# Patient Record
Sex: FEMALE | Race: BLACK OR AFRICAN AMERICAN | NOT HISPANIC OR LATINO | Employment: OTHER | URBAN - METROPOLITAN AREA
[De-identification: names, ages, dates, MRNs, and addresses within clinical notes are randomized per-mention and may not be internally consistent; named-entity substitution may affect disease eponyms.]

---

## 2018-12-05 ENCOUNTER — TELEPHONE (OUTPATIENT)
Dept: HEMATOLOGY/ONCOLOGY | Facility: CLINIC | Age: 63
End: 2018-12-05

## 2018-12-05 NOTE — TELEPHONE ENCOUNTER
Pt wants daughter to accompany her to consult.  She will call me to schedule tomorrow.  Answered her questions about consult visit & transplant planning.    ----- Message from Leila Canela sent at 12/5/2018  2:05 PM CST -----  Regarding: RE: transplant benefit check    Yes patient has medicare and medicaid    ----- Message -----  From: Nanci Overton RN  Sent: 12/5/2018   1:55 PM  To: Leila Canela  Subject: transplant benefit check                         Please advise coverage.  Her medicare & medicaid cards in media.    Thanks,  Nanci

## 2018-12-05 NOTE — LETTER
Fax Transmission                                                                                                                                                       Date: 2018    - - - Slide Request - - -   To:      [facility] From:   Blood & Marrow Transplant                   Navigator - Nanci       Fax:          [phone] Fax:          [phone]   Phone:     [phone] Phone:      [phone]     Patient:   SUKHDEV RYANO.B.  1955               Specimen ID:  [id number]   Date Collected:  2018           Please send slides to:     [facility]     Leukemia, Blood & Marrow Transplant Program     3rd Floor     [facility]     Attn:  Transplant Coordinator     [address] [place] [address]  [id number]     [employer] Acct code:  [id number]                               Thank you        If there are any problems with this transmission, please call immediately. Thank you.    Confidentiality notice: The accompanying facsimile is intended solely for the use of the recipient designated above. Document(s) transmitted herewith may contain information that is confidential and privileged. Delivery, distribution or dissemination of this communication other than to the intended recipient is strictly prohibited. If you have received this facsimile in error, please notify [facility]'s Corporate Integrity Department immediately by telephone at [phone].

## 2018-12-14 DIAGNOSIS — C90.00 MULTIPLE MYELOMA, REMISSION STATUS UNSPECIFIED: Primary | ICD-10-CM

## 2018-12-14 DIAGNOSIS — Z76.82 STEM CELL TRANSPLANT CANDIDATE: ICD-10-CM

## 2018-12-14 PROCEDURE — 88323 CONSLTJ&REPRT MATRL PREP SLD: CPT | Performed by: PATHOLOGY

## 2018-12-14 PROCEDURE — 88323 CONSLTJ&REPRT MATRL PREP SLD: CPT | Mod: 26,,, | Performed by: PATHOLOGY

## 2018-12-17 ENCOUNTER — EDUCATION (OUTPATIENT)
Dept: HEMATOLOGY/ONCOLOGY | Facility: CLINIC | Age: 63
End: 2018-12-17

## 2018-12-17 ENCOUNTER — INITIAL CONSULT (OUTPATIENT)
Dept: HEMATOLOGY/ONCOLOGY | Facility: CLINIC | Age: 63
End: 2018-12-17
Payer: MEDICARE

## 2018-12-17 VITALS
TEMPERATURE: 98 F | SYSTOLIC BLOOD PRESSURE: 153 MMHG | RESPIRATION RATE: 18 BRPM | OXYGEN SATURATION: 98 % | HEART RATE: 80 BPM | BODY MASS INDEX: 34.52 KG/M2 | DIASTOLIC BLOOD PRESSURE: 72 MMHG | WEIGHT: 202.19 LBS | HEIGHT: 64 IN

## 2018-12-17 DIAGNOSIS — C90.00 MULTIPLE MYELOMA, REMISSION STATUS UNSPECIFIED: Primary | ICD-10-CM

## 2018-12-17 DIAGNOSIS — Z11.4 ENCOUNTER FOR SCREENING FOR HIV: ICD-10-CM

## 2018-12-17 PROCEDURE — 99999 PR PBB SHADOW E&M-EST. PATIENT-LVL IV: CPT | Mod: PBBFAC,,, | Performed by: INTERNAL MEDICINE

## 2018-12-17 PROCEDURE — 99205 OFFICE O/P NEW HI 60 MIN: CPT | Mod: S$PBB,,, | Performed by: INTERNAL MEDICINE

## 2018-12-17 PROCEDURE — 99214 OFFICE O/P EST MOD 30 MIN: CPT | Mod: PBBFAC | Performed by: INTERNAL MEDICINE

## 2018-12-17 RX ORDER — LENALIDOMIDE 25 MG/1
CAPSULE ORAL
Refills: 0 | COMMUNITY
Start: 2018-11-26 | End: 2019-02-25

## 2018-12-17 RX ORDER — DEXAMETHASONE 4 MG/1
40 TABLET ORAL
Refills: 4 | COMMUNITY
Start: 2018-11-19 | End: 2019-03-18

## 2018-12-17 RX ORDER — TRAMADOL HYDROCHLORIDE 50 MG/1
TABLET ORAL
Refills: 2 | COMMUNITY
Start: 2018-10-23 | End: 2019-02-19

## 2018-12-17 RX ORDER — BROMFENAC SODIUM 0.7 MG/ML
SOLUTION/ DROPS OPHTHALMIC
Refills: 2 | COMMUNITY
Start: 2018-10-10 | End: 2019-02-19

## 2018-12-17 RX ORDER — ONDANSETRON 8 MG/1
8 TABLET, ORALLY DISINTEGRATING ORAL EVERY 8 HOURS PRN
Refills: 6 | COMMUNITY
Start: 2018-10-02

## 2018-12-17 RX ORDER — OFLOXACIN 3 MG/ML
SOLUTION/ DROPS OPHTHALMIC
Refills: 0 | COMMUNITY
Start: 2018-11-04 | End: 2019-02-19

## 2018-12-17 RX ORDER — SITAGLIPTIN AND METFORMIN HYDROCHLORIDE 500; 50 MG/1; MG/1
TABLET, FILM COATED ORAL
Refills: 5 | COMMUNITY
Start: 2018-11-26

## 2018-12-17 RX ORDER — LOSARTAN POTASSIUM AND HYDROCHLOROTHIAZIDE 12.5; 1 MG/1; MG/1
TABLET ORAL
Refills: 2 | COMMUNITY
Start: 2018-09-21 | End: 2021-07-02

## 2018-12-17 RX ORDER — MONTELUKAST SODIUM 10 MG/1
10 TABLET ORAL DAILY
Refills: 0 | Status: ON HOLD | COMMUNITY
Start: 2018-12-03 | End: 2019-03-14

## 2018-12-17 RX ORDER — ASPIRIN 81 MG/1
TABLET ORAL
Refills: 2 | COMMUNITY
Start: 2018-11-07 | End: 2019-03-18

## 2018-12-17 RX ORDER — GLIPIZIDE 10 MG/1
TABLET ORAL
Refills: 2 | COMMUNITY
Start: 2018-11-25

## 2018-12-17 RX ORDER — PREDNISOLONE ACETATE 10 MG/ML
SUSPENSION/ DROPS OPHTHALMIC
Refills: 1 | COMMUNITY
Start: 2018-11-01 | End: 2019-02-19

## 2018-12-17 RX ORDER — ACYCLOVIR 400 MG/1
400 TABLET ORAL 2 TIMES DAILY
Refills: 10 | Status: ON HOLD | COMMUNITY
Start: 2018-12-03 | End: 2019-04-01 | Stop reason: HOSPADM

## 2018-12-17 RX ORDER — PROCHLORPERAZINE MALEATE 10 MG
10 TABLET ORAL 4 TIMES DAILY PRN
Refills: 6 | COMMUNITY
Start: 2018-10-02

## 2018-12-17 NOTE — PROGRESS NOTES
Met with patient and daughter, michelle Singh for an informational introductory educational session given on autlogous transplant.  Discussed pre transplant evaluation, mobilization, collection and admission to the hospital.  Written materials supplied.  Patient and caregiver given the opportunity to ask questions.  They watched a short film on autologous stem cell transplant.  The patient states she has an appt on Dec 27th at [facility] for a dental check up and extractions.  Patient states she had a mammogram about one year ago at [facility] and her colonoscopy within the last 5 years at [facility].  I will obtain those reports.  Patient and caregiver understand the need for a caregiver to be present with the patient once discharged from home for 24/7 for up to 30 days post transplant.      We discussed a tentative calendar assuming her schedule will be in the next few weeks.  Her restaging will take place approx. The first week of   Feb.  She will finish up her treatment with Dr. Cantrell at [facility].  Both patient and her daughter were given the opportunity to ask questions.  Both stated their questions were answered to their satisfaction.   MANUELA Beckham RN, BMTCN

## 2018-12-17 NOTE — LETTER
December 18, 2018      Aydin Juan MD  [address] [facility] [address]           [facility]  [address] [facility] [address]  Phone: [phone]          Patient: Lori Rivero   MR Number: [id number]   YOB: 1955   Date of Visit: 12/17/2018       Dear Dr. Aydin Juan:    Thank you for referring Lori Rivero to me for evaluation. Attached you will find relevant portions of my assessment and plan of care.    If you have questions, please do not hesitate to call me. I look forward to following Lori Rivero along with you.    Sincerely,    Saúl Alva MD    Enclosure  CC:  No Recipients    If you would like to receive this communication electronically, please contact [email or web address] or [phone] to request more information on [employer] Link access.    For providers and/or their staff who would like to refer a patient to [facility], please contact us through our one-stop-shop provider referral line, [facility] , at [phone].    If you feel you have received this communication in error or would no longer like to receive these types of communications, please e-mail [email or web address]

## 2018-12-17 NOTE — Clinical Note
cbc, cmp, serum free light chains, quantitative immunoglobulins, serum electropheresis, serum immunofixation and MD appt in 8 weeks

## 2018-12-17 NOTE — PROGRESS NOTES
SECTION OF HEMATOLOGY AND BONE MARROW TRANSPLANT  New Patient Visit   12/18/2018  Referred by:  Dr. Aydin Juan  Referred for:myeloma, SCT eval     CHIEF COMPLAINT: No chief complaint on file.      HISTORY OF PRESENT ILLNESS:   63 y.o. female with pmh as below referred for SCT evaluation for MM; with regard to oncologic history; IgG kappa MM; ISS 2; standard risk cytogenetics; with lytic bone lesions at presentation; initiate VRd (21 day cycle) with Dr. Juan at [facility]; currently end of cycle 2 with already significant reduction in m-protein.  She has been tolerating therapy well with only mild grade 1 neuropathy in fingertips.  She comes to clinic with daughter.  States she can walk 2-3 city blocks 2 flights of stairs with no issues.  Denies fever, chills, nightsweats, bleeding, brusing, lymphadenopathy, signs/symptoms of splenomegaly.     PAST MEDICAL HISTORY:   Past Medical History:   Diagnosis Date    DM2 (diabetes mellitus, type 2)     Glaucoma     HTN (hypertension)     Myeloma        PAST SURGICAL HISTORY:   History reviewed. No pertinent surgical history.    PAST SOCIAL HISTORY:   reports that  has never smoked. she has never used smokeless tobacco. She reports that she does not drink alcohol or use drugs.    FAMILY HISTORY:  History reviewed. No pertinent family history.    CURRENT MEDICATIONS:   Current Outpatient Medications   Medication Sig    acyclovir (ZOVIRAX) 400 MG tablet     aspirin (ECOTRIN) 81 MG EC tablet TK 1 T PO D    dexamethasone (DECADRON) 4 MG Tab     glipiZIDE (GLUCOTROL) 10 MG tablet TK 1 T PO D    JANUMET  mg per tablet TK 1 T PO BID WITH MEALS    losartan-hydrochlorothiazide 100-12.5 mg (HYZAAR) 100-12.5 mg Tab TK 1 T PO QD    montelukast (SINGULAIR) 10 mg tablet Take 10 mg by mouth once daily.    ofloxacin (OCUFLOX) 0.3 % ophthalmic solution INT 1 GTT INTO OS QID    ondansetron (ZOFRAN-ODT) 8 MG TbDL     PAZEO 0.7 % Drop Place 1 drop into both eyes every  morning.    prednisoLONE acetate (PRED FORTE) 1 % DrpS INT 1 GTT INTO GTT INTO OS QID    prochlorperazine (COMPAZINE) 10 MG tablet     PROLENSA 0.07 % Drop INT 1 GTT INTO OS QD    REVLIMID 25 mg Cap TK 1 C PO ON DAYS 1 TO 14 AND OFF FOR 7 DAYS FOR A 21 DAY CYCLE    traMADol (ULTRAM) 50 mg tablet TK 1 T PO Q 8 H PRN P     No current facility-administered medications for this visit.      ALLERGIES:   Review of patient's allergies indicates:  No Known Allergies          REVIEW OF SYSTEMS:   General ROS: negative  Psychological ROS: negative  Ophthalmic ROS: negative  ENT ROS: negative  Allergy and Immunology ROS: negative  Hematological and Lymphatic ROS: negative  Endocrine ROS: negative  Respiratory ROS: negative  Cardiovascular ROS: negative  Gastrointestinal ROS: negative  Genito-Urinary ROS: negative  Musculoskeletal ROS: negative  Neurological ROS: see HPI  Dermatological ROS: negative    PHYSICAL EXAM:   Vitals:    12/17/18 1327   BP: (!) 153/72   Pulse: 80   Resp: 18   Temp: 98.2 °F (36.8 °C)       General - well developed, well nourished, no apparent distress  Head & Face - no sinus tenderness  Eyes - normal conjunctivae and lids   ENT - normal external auditory canals and tympanic membranes bilaterally oropharynx clear,  Normal dentition and gums  Neck - normal thyroid  Chest and Lung - normal respiratory effort, clear to auscultation bilaterally   Cardiovascular - RRR with no MGR, normal S1 and S2; no pedal edema  Abdomen -  soft, nontender, no palpable hepatomegaly or splenomegaly  Lymph - no palpable lymphadenopathy  Extremities - unremarkable nails and digits  Heme - no bruising, petechiae, pallor  Skin - no rashes or lesions  Psych - appropriate mood and affect      ECOG Performance Status: (foot note - ECOG PS provided by [employer]) 1 - Symptomatic but completely ambulatory    Karnofsky Performance Score:  80%- Normal Activity with Effort: Some Symptoms of Disease  DATA:    See epic media  Patient left prior to having labs drawn today     ASSESSMENT AND PLAN:   Encounter Diagnoses   Name Primary?    Multiple myeloma, remission status unspecified Yes    Encounter for screening for HIV       -IgG kappa MM; ISS 2; standard risk cytogenetics; with lytic bone lesions at presentation; initiate VRd (21 day cycle) with Dr. Juan at [facility]; currently end of cycle 2 with already significant reduction in m-protein   -appears to be reasonable SCT candidate  -spent 60 minutes on this case, half of which was spent face to face with patient discussion rationale, alternatives, risks of central line placement,  Mobilization/colleection, melphalan autoSCT; discussed estimated 1% risk of transplant related mortality  -she is interested in pursuing transplant  -plan to restage her formally (PET, biochemical studies, serum/urine studies) after cycle 4 VRd and if IMWG [place] or better proceed with pretransplant evaluation   -has minimal well controlled comorbiidites and PS 1  -have communicated with Dr. Juan at [facility] who will continue myeloma therapy and supportive medications       Follow Up: cbc, cmp, serum free light chains, quantitative immunoglobulins, serum electropheresis, serum immunofixation and MD appt in 8 weeks   Darrius Alva MD  Hematology/Oncology/Bone Marrow Transplant

## 2019-01-14 ENCOUNTER — TELEPHONE (OUTPATIENT)
Dept: HEMATOLOGY/ONCOLOGY | Facility: CLINIC | Age: 64
End: 2019-01-14

## 2019-01-14 DIAGNOSIS — C90.00 MULTIPLE MYELOMA, REMISSION STATUS UNSPECIFIED: Primary | ICD-10-CM

## 2019-01-14 DIAGNOSIS — Z76.82 STEM CELL TRANSPLANT CANDIDATE: ICD-10-CM

## 2019-01-28 ENCOUNTER — TELEPHONE (OUTPATIENT)
Dept: HEMATOLOGY/ONCOLOGY | Facility: CLINIC | Age: 64
End: 2019-01-28

## 2019-01-28 NOTE — TELEPHONE ENCOUNTER
Left message for patient to call me regarding her upcoming staging appts on 2/7/19 and to see if she has completed her treatments at [facility].  Earlier this month she stated she had some things she wanted to take care of before starting with transplant eval.  I will also discuss that with her.  I could not leave a message at her cell number.  Message was left at home number along with my contact info.  MANUELA Beckham RN, BMTCN

## 2019-01-31 DIAGNOSIS — R06.02 SHORTNESS OF BREATH ON EXERTION: ICD-10-CM

## 2019-01-31 DIAGNOSIS — R06.02 SOB (SHORTNESS OF BREATH) ON EXERTION: ICD-10-CM

## 2019-01-31 DIAGNOSIS — Z01.818 ENCOUNTER FOR PRE-TRANSPLANT EVALUATION FOR STEM CELL TRANSPLANT: ICD-10-CM

## 2019-01-31 DIAGNOSIS — Z76.82 STEM CELL TRANSPLANT CANDIDATE: ICD-10-CM

## 2019-01-31 DIAGNOSIS — C90.00 MULTIPLE MYELOMA: Primary | ICD-10-CM

## 2019-01-31 DIAGNOSIS — D69.6 THROMBOCYTOPENIA: ICD-10-CM

## 2019-02-01 ENCOUNTER — TELEPHONE (OUTPATIENT)
Dept: HEMATOLOGY/ONCOLOGY | Facility: CLINIC | Age: 64
End: 2019-02-01

## 2019-02-01 NOTE — TELEPHONE ENCOUNTER
Phone call to patient and her daughter. (on conference call).  Notified her of her upcoming appts on 2/7/19.  Patient and daughter were given the opportunity to ask questions.  They verbalized understanding and stated all of their questions had been answered to their satisfaction.  MANUELA Beckham RN, BMTCN

## 2019-02-06 NOTE — H&P
[facility]  Hematology  Bone Marrow Transplant  H&P    Subjective:     Principal Problem: Multiple Myeloma pre-transplant staging    HPI: 63 y.o. female with pmh as below referred for SCT evaluation for MM; with regard to oncologic history; IgG kappa MM; ISS 2; standard risk cytogenetics; with lytic bone lesions at presentation; initiate VRd (21 day cycle) with Dr. Juan at [facility]; currently end of cycle 2 with already significant reduction in m-protein.  She has been tolerating therapy well with only mild grade 1 neuropathy in fingertips.  She comes to clinic with daughter.  States she can walk 2-3 city blocks 2 flights of stairs with no issues.  Denies fever, chills, nightsweats, bleeding, brusing, lymphadenopathy, signs/symptoms of splenomegaly.     Patient information was obtained from patient, past medical records and ER records.     Oncology History: see HPI     No medications prior to admission.       Patient has no known allergies.     Past Medical History:   Diagnosis Date    DM2 (diabetes mellitus, type 2)     Glaucoma     HTN (hypertension)     Myeloma      No past surgical history on file.  Family History     None        Tobacco Use    Smoking status: Never Smoker    Smokeless tobacco: Never Used   Substance and Sexual Activity    Alcohol use: No     Frequency: Never    Drug use: No    Sexual activity: Not on file       Review of Systems   Constitutional: Negative.    HENT: Negative.    Eyes: Negative.    Respiratory: Negative.    Cardiovascular: Negative.    Gastrointestinal: Negative.    Endocrine: Negative.    Genitourinary: Negative.    Musculoskeletal:        Sciatica   Skin: Negative.    Allergic/Immunologic: Negative.    Neurological:        Sciatica to ble   Hematological: Negative.    Psychiatric/Behavioral: Negative.      Objective:     Vital Signs (Most Recent):    Vital Signs (24h Range):  BP: ()/()   Arterial Line BP: ()/()    General - well developed, well  nourished, no apparent distress  Head & Face - no sinus tenderness  Eyes - normal conjunctivae and lids   ENT - normal external auditory canals and tympanic membranes bilaterally oropharynx clear,  Normal dentition and gums  Neck - normal thyroid  Chest and Lung - normal respiratory effort, clear to auscultation bilaterally   Cardiovascular - RRR with no MGR, normal S1 and S2; no pedal edema  Abdomen -  soft, nontender, no palpable hepatomegaly or splenomegaly  Lymph - no palpable lymphadenopathy  Extremities - unremarkable nails and digits  Heme - no bruising, petechiae, pallor  Skin - no rashes or lesions  Psych - appropriate mood and affect         There is no height or weight on file to calculate BMI.  There is no height or weight on file to calculate BSA.      Assessment/Plan:     Okay to proceed with planned BMBx.    VTE Risk Mitigation (From admission, onward)    None          Disposition: Discharge home per anesthesia.    Maria Guadalupe Poole, NP  Bone Marrow Transplant  Hematology  [facility]

## 2019-02-07 ENCOUNTER — HOSPITAL ENCOUNTER (OUTPATIENT)
Dept: RADIOLOGY | Facility: HOSPITAL | Age: 64
Discharge: HOME | End: 2019-02-07
Attending: INTERNAL MEDICINE
Payer: MEDICARE

## 2019-02-07 ENCOUNTER — HOSPITAL ENCOUNTER (OUTPATIENT)
Facility: HOSPITAL | Age: 64
Discharge: HOME | End: 2019-02-07
Attending: INTERNAL MEDICINE | Admitting: INTERNAL MEDICINE
Payer: MEDICARE

## 2019-02-07 ENCOUNTER — ANESTHESIA EVENT (OUTPATIENT)
Dept: SURGERY | Facility: HOSPITAL | Age: 64
End: 2019-02-07
Payer: MEDICARE

## 2019-02-07 ENCOUNTER — OFFICE VISIT (OUTPATIENT)
Dept: PSYCHIATRY | Facility: CLINIC | Age: 64
End: 2019-02-07
Payer: MEDICARE

## 2019-02-07 ENCOUNTER — ANESTHESIA (OUTPATIENT)
Dept: SURGERY | Facility: HOSPITAL | Age: 64
End: 2019-02-07
Payer: MEDICARE

## 2019-02-07 VITALS
WEIGHT: 198 LBS | HEART RATE: 68 BPM | BODY MASS INDEX: 33.8 KG/M2 | TEMPERATURE: 99 F | DIASTOLIC BLOOD PRESSURE: 72 MMHG | OXYGEN SATURATION: 100 % | HEIGHT: 64 IN | SYSTOLIC BLOOD PRESSURE: 167 MMHG | RESPIRATION RATE: 18 BRPM

## 2019-02-07 DIAGNOSIS — C90.00 MULTIPLE MYELOMA, REMISSION STATUS UNSPECIFIED: ICD-10-CM

## 2019-02-07 DIAGNOSIS — Z76.82 STEM CELL TRANSPLANT CANDIDATE: ICD-10-CM

## 2019-02-07 DIAGNOSIS — C90.00 MULTIPLE MYELOMA, REMISSION STATUS UNSPECIFIED: Primary | ICD-10-CM

## 2019-02-07 DIAGNOSIS — G47.01 INSOMNIA DUE TO MEDICAL CONDITION: ICD-10-CM

## 2019-02-07 DIAGNOSIS — Z01.818 PRE-TRANSPLANT EVALUATION FOR STEM CELL TRANSPLANT: Primary | ICD-10-CM

## 2019-02-07 LAB
BASOPHILS # BLD AUTO: 0.02 K/UL
BASOPHILS NFR BLD: 0.4 %
BONE MARROW WRIGHT STAIN COMMENT: NORMAL
DIFFERENTIAL METHOD: ABNORMAL
EOSINOPHIL # BLD AUTO: 0.3 K/UL
EOSINOPHIL NFR BLD: 5.3 %
ERYTHROCYTE [DISTWIDTH] IN BLOOD BY AUTOMATED COUNT: 17.2 %
HCT VFR BLD AUTO: 31.6 %
HGB BLD-MCNC: 9.5 G/DL
IMM GRANULOCYTES # BLD AUTO: 0.01 K/UL
IMM GRANULOCYTES NFR BLD AUTO: 0.2 %
LYMPHOCYTES # BLD AUTO: 1.3 K/UL
LYMPHOCYTES NFR BLD: 23.4 %
MCH RBC QN AUTO: 25.3 PG
MCHC RBC AUTO-ENTMCNC: 30.1 G/DL
MCV RBC AUTO: 84 FL
MONOCYTES # BLD AUTO: 0.4 K/UL
MONOCYTES NFR BLD: 7.5 %
NEUTROPHILS # BLD AUTO: 3.5 K/UL
NEUTROPHILS NFR BLD: 63.2 %
NRBC BLD-RTO: 0 /100 WBC
PLATELET # BLD AUTO: 137 K/UL
PMV BLD AUTO: 13 FL
POCT GLUCOSE: 123 MG/DL (ref 70–110)
POCT GLUCOSE: 144 MG/DL (ref 70–110)
POCT GLUCOSE: 97 MG/DL (ref 70–110)
RBC # BLD AUTO: 3.76 M/UL
WBC # BLD AUTO: 5.48 K/UL

## 2019-02-07 PROCEDURE — 88311 TISSUE SPECIMEN TO PATHOLOGY, BONE MARROW ASPIRATION/BIOPSY PROCEDURE: ICD-10-PCS | Mod: 26,,, | Performed by: PATHOLOGY

## 2019-02-07 PROCEDURE — 88237 TISSUE CULTURE BONE MARROW: CPT

## 2019-02-07 PROCEDURE — D9220A PRA ANESTHESIA: ICD-10-PCS | Mod: ANES,,, | Performed by: ANESTHESIOLOGY

## 2019-02-07 PROCEDURE — 88189 PR  FLOWCYTOMETRY/READ, 16 & > MARKERS: ICD-10-PCS | Mod: ,,, | Performed by: PATHOLOGY

## 2019-02-07 PROCEDURE — 88342 IMHCHEM/IMCYTCHM 1ST ANTB: CPT | Mod: 26,59,, | Performed by: PATHOLOGY

## 2019-02-07 PROCEDURE — 88313 TISSUE SPECIMEN TO PATHOLOGY, BONE MARROW ASPIRATION/BIOPSY PROCEDURE: ICD-10-PCS | Mod: 26,,, | Performed by: PATHOLOGY

## 2019-02-07 PROCEDURE — 36000705 HC OR TIME LEV I EA ADD 15 MIN: Performed by: INTERNAL MEDICINE

## 2019-02-07 PROCEDURE — 99999 PR PBB SHADOW E&M-EST. PATIENT-LVL II: ICD-10-PCS | Mod: PBBFAC,,, | Performed by: PSYCHOLOGIST

## 2019-02-07 PROCEDURE — 78815 PET IMAGE W/CT SKULL-THIGH: CPT | Mod: TC

## 2019-02-07 PROCEDURE — 71000015 HC POSTOP RECOV 1ST HR: Performed by: INTERNAL MEDICINE

## 2019-02-07 PROCEDURE — A9552 F18 FDG: HCPCS

## 2019-02-07 PROCEDURE — 99212 OFFICE O/P EST SF 10 MIN: CPT | Mod: PBBFAC,25 | Performed by: PSYCHOLOGIST

## 2019-02-07 PROCEDURE — 90791 PR PSYCHIATRIC DIAGNOSTIC EVALUATION: ICD-10-PCS | Mod: S$PBB,,, | Performed by: PSYCHOLOGIST

## 2019-02-07 PROCEDURE — 36000704 HC OR TIME LEV I 1ST 15 MIN: Performed by: INTERNAL MEDICINE

## 2019-02-07 PROCEDURE — 85097 TISSUE SPECIMEN TO PATHOLOGY, BONE MARROW ASPIRATION/BIOPSY PROCEDURE: ICD-10-PCS | Mod: ,,, | Performed by: PATHOLOGY

## 2019-02-07 PROCEDURE — D9220A PRA ANESTHESIA: ICD-10-PCS | Mod: CRNA,,, | Performed by: NURSE ANESTHETIST, CERTIFIED REGISTERED

## 2019-02-07 PROCEDURE — 88305 TISSUE SPECIMEN TO PATHOLOGY, BONE MARROW ASPIRATION/BIOPSY PROCEDURE: ICD-10-PCS | Mod: 26,,, | Performed by: PATHOLOGY

## 2019-02-07 PROCEDURE — 25000003 PHARM REV CODE 250: Performed by: ANESTHESIOLOGY

## 2019-02-07 PROCEDURE — 71000044 HC DOSC ROUTINE RECOVERY FIRST HOUR: Performed by: INTERNAL MEDICINE

## 2019-02-07 PROCEDURE — D9220A PRA ANESTHESIA: Mod: ANES,,, | Performed by: ANESTHESIOLOGY

## 2019-02-07 PROCEDURE — 88189 FLOWCYTOMETRY/READ 16 & >: CPT | Mod: ,,, | Performed by: PATHOLOGY

## 2019-02-07 PROCEDURE — 88264 CHROMOSOME ANALYSIS 20-25: CPT

## 2019-02-07 PROCEDURE — 88184 FLOWCYTOMETRY/ TC 1 MARKER: CPT | Performed by: PATHOLOGY

## 2019-02-07 PROCEDURE — 38222 PR BONE MARROW BIOPSY(IES) W/ASPIRATION(S); DIAGNOSTIC: ICD-10-PCS | Mod: LT,,, | Performed by: NURSE PRACTITIONER

## 2019-02-07 PROCEDURE — 82962 GLUCOSE BLOOD TEST: CPT | Performed by: INTERNAL MEDICINE

## 2019-02-07 PROCEDURE — 78815 NM PET CT ROUTINE: ICD-10-PCS | Mod: 26,PI,, | Performed by: RADIOLOGY

## 2019-02-07 PROCEDURE — 38222 DX BONE MARROW BX & ASPIR: CPT | Mod: LT,,, | Performed by: NURSE PRACTITIONER

## 2019-02-07 PROCEDURE — 78815 PET IMAGE W/CT SKULL-THIGH: CPT | Mod: 26,PI,, | Performed by: RADIOLOGY

## 2019-02-07 PROCEDURE — 63600175 PHARM REV CODE 636 W HCPCS: Performed by: NURSE ANESTHETIST, CERTIFIED REGISTERED

## 2019-02-07 PROCEDURE — 37000009 HC ANESTHESIA EA ADD 15 MINS: Performed by: INTERNAL MEDICINE

## 2019-02-07 PROCEDURE — 90791 PSYCH DIAGNOSTIC EVALUATION: CPT | Mod: S$PBB,,, | Performed by: PSYCHOLOGIST

## 2019-02-07 PROCEDURE — 88271 CYTOGENETICS DNA PROBE: CPT | Mod: 59

## 2019-02-07 PROCEDURE — 37000008 HC ANESTHESIA 1ST 15 MINUTES: Performed by: INTERNAL MEDICINE

## 2019-02-07 PROCEDURE — 88341 TISSUE SPECIMEN TO PATHOLOGY, BONE MARROW ASPIRATION/BIOPSY PROCEDURE: ICD-10-PCS | Mod: 26,,, | Performed by: PATHOLOGY

## 2019-02-07 PROCEDURE — 88311 DECALCIFY TISSUE: CPT | Mod: 26,,, | Performed by: PATHOLOGY

## 2019-02-07 PROCEDURE — D9220A PRA ANESTHESIA: Mod: CRNA,,, | Performed by: NURSE ANESTHETIST, CERTIFIED REGISTERED

## 2019-02-07 PROCEDURE — 88313 SPECIAL STAINS GROUP 2: CPT

## 2019-02-07 PROCEDURE — 90791 PSYCH DIAGNOSTIC EVALUATION: CPT | Mod: PBBFAC | Performed by: PSYCHOLOGIST

## 2019-02-07 PROCEDURE — 88185 FLOWCYTOMETRY/TC ADD-ON: CPT | Mod: 59 | Performed by: PATHOLOGY

## 2019-02-07 PROCEDURE — 25000003 PHARM REV CODE 250: Performed by: INTERNAL MEDICINE

## 2019-02-07 PROCEDURE — 85097 BONE MARROW INTERPRETATION: CPT | Mod: ,,, | Performed by: PATHOLOGY

## 2019-02-07 PROCEDURE — 88313 SPECIAL STAINS GROUP 2: CPT | Mod: 26,,, | Performed by: PATHOLOGY

## 2019-02-07 PROCEDURE — 99999 PR PBB SHADOW E&M-EST. PATIENT-LVL II: CPT | Mod: PBBFAC,,, | Performed by: PSYCHOLOGIST

## 2019-02-07 PROCEDURE — 88341 IMHCHEM/IMCYTCHM EA ADD ANTB: CPT | Mod: 26,,, | Performed by: PATHOLOGY

## 2019-02-07 PROCEDURE — 88305 TISSUE EXAM BY PATHOLOGIST: CPT | Mod: 59 | Performed by: PATHOLOGY

## 2019-02-07 PROCEDURE — 85025 COMPLETE CBC W/AUTO DIFF WBC: CPT

## 2019-02-07 PROCEDURE — 88342 TISSUE SPECIMEN TO PATHOLOGY, BONE MARROW ASPIRATION/BIOPSY PROCEDURE: ICD-10-PCS | Mod: 26,59,, | Performed by: PATHOLOGY

## 2019-02-07 RX ORDER — PROPOFOL 10 MG/ML
VIAL (ML) INTRAVENOUS
Status: DISCONTINUED | OUTPATIENT
Start: 2019-02-07 | End: 2019-02-07

## 2019-02-07 RX ORDER — LIDOCAINE HCL/PF 100 MG/5ML
SYRINGE (ML) INTRAVENOUS
Status: DISCONTINUED | OUTPATIENT
Start: 2019-02-07 | End: 2019-02-07

## 2019-02-07 RX ORDER — ONDANSETRON 2 MG/ML
4 INJECTION INTRAMUSCULAR; INTRAVENOUS ONCE AS NEEDED
Status: DISCONTINUED | OUTPATIENT
Start: 2019-02-07 | End: 2019-02-07 | Stop reason: HOSPADM

## 2019-02-07 RX ORDER — SODIUM CHLORIDE 0.9 % (FLUSH) 0.9 %
3 SYRINGE (ML) INJECTION
Status: DISCONTINUED | OUTPATIENT
Start: 2019-02-07 | End: 2019-02-07 | Stop reason: HOSPADM

## 2019-02-07 RX ORDER — SODIUM CHLORIDE 9 MG/ML
1000 INJECTION, SOLUTION INTRAVENOUS CONTINUOUS
Status: DISCONTINUED | OUTPATIENT
Start: 2019-02-07 | End: 2019-02-07 | Stop reason: HOSPADM

## 2019-02-07 RX ORDER — PROPOFOL 10 MG/ML
VIAL (ML) INTRAVENOUS CONTINUOUS PRN
Status: DISCONTINUED | OUTPATIENT
Start: 2019-02-07 | End: 2019-02-07

## 2019-02-07 RX ORDER — FENTANYL CITRATE 50 UG/ML
25 INJECTION, SOLUTION INTRAMUSCULAR; INTRAVENOUS EVERY 5 MIN PRN
Status: DISCONTINUED | OUTPATIENT
Start: 2019-02-07 | End: 2019-02-07 | Stop reason: HOSPADM

## 2019-02-07 RX ORDER — LIDOCAINE HYDROCHLORIDE 10 MG/ML
INJECTION, SOLUTION EPIDURAL; INFILTRATION; INTRACAUDAL; PERINEURAL
Status: DISCONTINUED | OUTPATIENT
Start: 2019-02-07 | End: 2019-02-07 | Stop reason: HOSPADM

## 2019-02-07 RX ORDER — TIZANIDINE 4 MG/1
4 TABLET ORAL 3 TIMES DAILY PRN
COMMUNITY

## 2019-02-07 RX ORDER — LIDOCAINE HYDROCHLORIDE 10 MG/ML
1 INJECTION, SOLUTION EPIDURAL; INFILTRATION; INTRACAUDAL; PERINEURAL ONCE
Status: COMPLETED | OUTPATIENT
Start: 2019-02-07 | End: 2019-02-07

## 2019-02-07 RX ADMIN — LIDOCAINE HYDROCHLORIDE 80 MG: 20 INJECTION, SOLUTION INTRAVENOUS at 12:02

## 2019-02-07 RX ADMIN — LIDOCAINE HYDROCHLORIDE 10 MG: 10 INJECTION, SOLUTION EPIDURAL; INFILTRATION; INTRACAUDAL; PERINEURAL at 11:02

## 2019-02-07 RX ADMIN — SODIUM CHLORIDE 1000 ML: 0.9 INJECTION, SOLUTION INTRAVENOUS at 11:02

## 2019-02-07 RX ADMIN — PROPOFOL 200 MCG/KG/MIN: 10 INJECTION, EMULSION INTRAVENOUS at 12:02

## 2019-02-07 RX ADMIN — PROPOFOL 60 MG: 10 INJECTION, EMULSION INTRAVENOUS at 12:02

## 2019-02-07 NOTE — PLAN OF CARE
Patient awake and alert. No complaints of pain or nausea.  Left hip band-aid dry and intact. No signs of bleeding or hematoma.  Discharge instructions given and explained to patient and family.  Patient tolerated P.O. Liquids.  Patient dressed,in wheelchair and ready for discharge.

## 2019-02-07 NOTE — DISCHARGE SUMMARY
[facility]  Hematology  Bone Marrow Transplant  Discharge Summary      Patient Name: Lori Rivero  MRN: [id number]  Admission Date: 2/7/2019  Hospital Length of Stay: 0 days  Discharge Date and Time: 2/7/2019  1:10 PM  Attending Physician: Saúl Alva MD   Discharging Provider: Maria Guadalupe Poole NP  Primary Care Provider: Rachael Munoz MD    HPI: No notes on file    Procedure(s) (LRB):  Biopsy-bone marrow (Left)     Pending Diagnostic Studies:     Procedure Component Value Units Date/Time    Bone Marrow Prep and Stain [[id number]] Collected:  02/07/19 1151    Order Status:  Sent Lab Status:  In process Updated:  02/07/19 1151    Specimen:  Bone Marrow     CBC auto differential [[id number]] Collected:  02/07/19 1148    Order Status:  Sent Lab Status:  In process Updated:  02/07/19 1155    Specimen:  Blood     Chromosome Analysis, Bone Marrow [[id number]] Collected:  02/07/19 1151    Order Status:  Sent Lab Status:  In process Updated:  02/07/19 1152    Specimen:  Bone Marrow     Iron Stain, Bone Marrow [[id number]] Collected:  02/07/19 1151    Order Status:  Sent Lab Status:  In process Updated:  02/07/19 1151    Specimen:  Bone Marrow     Leukemia/Lymphoma Screen - Bone Marrow Left Posterior Iliac Crest [[id number]] Collected:  02/07/19 1151    Order Status:  Sent Lab Status:  In process Updated:  02/07/19 1152    Specimen:  Bone Marrow     Plasma Cell Proliferative Disorder (PCPD), FISH [[id number]] Collected:  02/07/19 1151    Order Status:  Sent Lab Status:  In process Updated:  02/07/19 1151    Specimen:  Bone Marrow     Tissue Specimen to Pathology, Bone Marrow Aspiration/Biopsy Procedure [[id number]] Collected:  02/07/19 1151    Order Status:  Sent Lab Status:  No result     Specimen:  Bone Marrow Aspirate, Left Iliac Crest         Final Active Diagnoses:    Diagnosis Date Noted POA    Multiple myeloma [C90.00] 02/07/2019 Yes      Problems Resolved During this Admission:       Discharged Condition: stable    Disposition: Home or Self Care    Follow Up:    Patient Instructions:      Notify your health care provider if you experience any of the following:  temperature >100.4     Notify your health care provider if you experience any of the following:  severe uncontrolled pain     Notify your health care provider if you experience any of the following:  redness, tenderness, or signs of infection (pain, swelling, redness, odor or green/yellow discharge around incision site)     Remove dressing in 24 hours     Activity as tolerated     Medications:  Reconciled Home Medications:      Medication List      ASK your doctor about these medications    acyclovir 400 MG tablet  Commonly known as:  ZOVIRAX     aspirin 81 MG EC tablet  Commonly known as:  ECOTRIN  TK 1 T PO D     dexamethasone 4 MG Tab  Commonly known as:  DECADRON     glipiZIDE 10 MG tablet  Commonly known as:  GLUCOTROL  TK 1 T PO D     JANUMET  mg per tablet  Generic drug:  SITagliptan-metformin  TK 1 T PO BID WITH MEALS     losartan-hydrochlorothiazide 100-12.5 mg 100-12.5 mg Tab  Commonly known as:  HYZAAR  TK 1 T PO QD     montelukast 10 mg tablet  Commonly known as:  SINGULAIR  Take 10 mg by mouth once daily.     ofloxacin 0.3 % ophthalmic solution  Commonly known as:  OCUFLOX  INT 1 GTT INTO OS QID     ondansetron 8 MG Tbdl  Commonly known as:  ZOFRAN-ODT     PAZEO 0.7 % Drop  Generic drug:  olopatadine  Place 1 drop into both eyes every morning.     prednisoLONE acetate 1 % Drps  Commonly known as:  PRED FORTE  INT 1 GTT INTO GTT INTO OS QID     prochlorperazine 10 MG tablet  Commonly known as:  COMPAZINE     PROLENSA 0.07 % Drop  Generic drug:  bromfenac  INT 1 GTT INTO OS QD     REVLIMID 25 mg Cap  Generic drug:  lenalidomide  TK 1 C PO ON DAYS 1 TO 14 AND OFF FOR 7 DAYS FOR A 21 DAY CYCLE     tiZANidine 4 MG tablet  Commonly known as:  ZANAFLEX  Take 4 mg by mouth 3 (three) times daily as needed.     traMADol 50 mg  tablet  Commonly known as:  ULTRAM  TK 1 T PO Q 8 H PRN P            Maria Guadalupe Poole, NP  Bone Marrow Transplant  [facility]

## 2019-02-07 NOTE — DISCHARGE INSTRUCTIONS
Discharge Instructions for patients having a Bone Marrow Aspiration / Biopsy    Keep bandage in place for 24 hours following procedure.   - Do not shower or take a tube bath during this time. (You may take a sponge bath.)  - Call the nurse or physician for excessive bleeding or pain.  - You may take Tylenol as needed for pain.     You have received medication to sedate you.  -Do not drive a car or operate heavy machinery for the rest of the day.  You may resume other activities as tolerated.    You can call [phone] for any problems during the hours of 8:00 AM- 5:00 PM.    For an emergency after 5;00 PM you can call [phone] and have the  page the Hematologist / Oncologist on call.

## 2019-02-07 NOTE — LETTER
February 10, 2019        Saúl Alva MD  [address] [facility] [address]             [facility]  [address] [address]  Phone: [phone]  Fax: [phone]   Patient: Lori Rivero   MR Number: [id number]   YOB: 1955   Date of Visit: 2/7/2019       Dear Dr. Alva:    Thank you for referring Lori Rivero to me for evaluation. Below are the relevant portions of my assessment and plan of care.     Lori Rivero is a  63 y.o. female referred by Dr. Alva for psychological evaluation prior to stem cell transplantation.   The patient appears absent of disabling psychopathology or disabilities which would prevent understanding and compliance with medical treatment.  There is no evidence of suicidality.    The patient has limited knowledge about HSCT, unclear expectations for health and illness following transplantation, inadequate  understanding of the possible risks and complications of this treatment option, and a high willingness to sustain effort for lifestyle changes and health adaptations which will be required of her.  She is unaware of possible medical side effects.    She is aware of the 30 day 1 hour residence requirement.   She reports adequate compliance with previous medical treatment.   Lori Rivero has excellent social support from several family members, particularly her daughter. Caregivers are engaged and aware of post-HSCT demands.   The patient exhibits a high degree of social stability.   The patient acknowledges no stressors expected to limit her ability to cope with the demands of HSCT and recovery.   The patient reports moderate caffeine consumption without withdrawal symptoms, no tobacco use, no alcohol use and no illicit drug use   She demonstrates limited health literacy.        Impressions:  Lori Rivero is an acceptable HSCT candidate from a psychological perspective,  once additional education has been accomplished.   There are no overt psychological contraindications for proceeding with the procedure.She has no significant mental health history, and reports no current psychiatric problems or major adjustment issues. The patient has good social support and has already begun making appropriate life plans in anticipation of the procedure. The patient has verbalized appropriate awareness and commitment to the necessary behavioral changes associated with HSCTand appears willing to adjust to long-term lifestyle challenges and medical follow-up. There are no recommendations for psychological treatment at this time. The patient and family are aware of resources available should their psychological needs change in the future. Given literacy limitations, this patient should be provided all vital information for consent, decision-making, and treatment both verbally and in writing.       If you have questions, please do not hesitate to call me. I look forward to following Lori along with you.    Sincerely,      Aaron Steinberg, PhD           CC  Kalli Beckham RN

## 2019-02-07 NOTE — ANESTHESIA PREPROCEDURE EVALUATION
02/07/2019  Lori Rivero is a 63 y.o., female   Pre-operative evaluation for Procedure(s) (LRB):  Biopsy-bone marrow (Left)    Lori Rivero is a 63 y.o. female     There is no problem list on file for this patient.      Review of patient's allergies indicates:  No Known Allergies    No current facility-administered medications on file prior to encounter.      Current Outpatient Medications on File Prior to Encounter   Medication Sig Dispense Refill    acyclovir (ZOVIRAX) 400 MG tablet   10    aspirin (ECOTRIN) 81 MG EC tablet TK 1 T PO D  2    dexamethasone (DECADRON) 4 MG Tab   4    glipiZIDE (GLUCOTROL) 10 MG tablet TK 1 T PO D  2    JANUMET  mg per tablet TK 1 T PO BID WITH MEALS  5    losartan-hydrochlorothiazide 100-12.5 mg (HYZAAR) 100-12.5 mg Tab TK 1 T PO QD  2    montelukast (SINGULAIR) 10 mg tablet Take 10 mg by mouth once daily.  0    ofloxacin (OCUFLOX) 0.3 % ophthalmic solution INT 1 GTT INTO OS QID  0    ondansetron (ZOFRAN-ODT) 8 MG TbDL   6    PAZEO 0.7 % Drop Place 1 drop into both eyes every morning.  5    prednisoLONE acetate (PRED FORTE) 1 % DrpS INT 1 GTT INTO GTT INTO OS QID  1    prochlorperazine (COMPAZINE) 10 MG tablet   6    PROLENSA 0.07 % Drop INT 1 GTT INTO OS QD  2    REVLIMID 25 mg Cap TK 1 C PO ON DAYS 1 TO 14 AND OFF FOR 7 DAYS FOR A 21 DAY CYCLE  0    traMADol (ULTRAM) 50 mg tablet TK 1 T PO Q 8 H PRN P  2       No past surgical history on file.    Social History     Socioeconomic History    Marital status: Legally      Spouse name: Not on file    Number of children: Not on file    Years of education: Not on file    Highest education level: Not on file   Social Needs    Financial resource strain: Not on file    Food insecurity - worry: Not on file    Food insecurity - inability: Not on file    Transportation needs - medical: Not  on file    Transportation needs - non-medical: Not on file   Occupational History    Not on file   Tobacco Use    Smoking status: Never Smoker    Smokeless tobacco: Never Used   Substance and Sexual Activity    Alcohol use: No     Frequency: Never    Drug use: No    Sexual activity: Not on file   Other Topics Concern    Not on file   Social History Narrative    Not on file         Anesthesia Evaluation    I have reviewed the Patient Summary Reports.     I have reviewed the Medications.     Review of Systems  Anesthesia Hx:  No problems with previous Anesthesia Denies Hx of Anesthetic complications  Denies Family Hx of Anesthesia complications.   Denies Personal Hx of Anesthesia complications.   Social:  No Alcohol Use, Non-Smoker    Hematology/Oncology:  Hematology Normal      Current/Recent Cancer. Oncology Comments: Multiple myeloma    EENT/Dental:EENT/Dental Normal   Cardiovascular:   Exercise tolerance: good Hypertension    Pulmonary:  Pulmonary Normal    Renal/:  Renal/ Normal     Hepatic/GI:  Hepatic/GI Normal    Neurological:  Neurology Normal    Endocrine:   Diabetes, type 2    Psych:  Psychiatric Normal           Physical Exam  General:  Well nourished    Airway/Jaw/Neck:  Airway Findings: Mouth Opening: Normal Tongue: Normal  General Airway Assessment: Adult, Average  Mallampati: II  TM Distance: Normal, at least 6 cm  Jaw/Neck Findings:  Neck ROM: Normal ROM      Dental:  Dental Findings: In tact   Chest/Lungs:  Chest/Lungs Findings: Normal Respiratory Rate, Clear to auscultation     Heart/Vascular:  Heart Findings: Rate: Normal  Rhythm: Regular Rhythm        Mental Status:  Mental Status Findings:  Alert and Oriented, Cooperative         Anesthesia Plan  Type of Anesthesia, risks & benefits discussed:  Anesthesia Type:  MAC  Patient's Preference:   Intra-op Monitoring Plan: standard ASA monitors  Intra-op Monitoring Plan Comments:   Post Op Pain Control Plan: multimodal analgesia and IV/PO  Opioids PRN  Post Op Pain Control Plan Comments:   Induction:   IV  Beta Blocker:  Patient is not currently on a Beta-Blocker (No further documentation required).       Informed Consent: Patient understands risks and agrees with Anesthesia plan.  Questions answered. Anesthesia consent signed with patient.  ASA Score: 3     Day of Surgery Review of History & Physical:    H&P update referred to the surgeon.         Ready For Surgery From Anesthesia Perspective.

## 2019-02-07 NOTE — PROCEDURES
PROCEDURE NOTE:  Date of Procedure: 02/07/2019  Bone Marrow Biopsy and Aspiration  Indication: Multiple Myeloma pre-transplant staging  Consent: Informed consent was obtained from patient.  Timeout: Done and documented.  Position: Right Lateral  Site: Left posterior illiac crest.  Prep: Betadine.  Needle used: 11 gauge Jamshidi needle.  Anesthetic: 1% lidocaine 5 cc.  Biopsy: The biopsy needle was introduced into the marrow cavity and an aspirate was obtained without complications and sent for flow cytometry and cytogenetics and PCPD FISH. Core biopsy obtained without difficulty and sent for routine histologic examination.  Complications: None.  Disposition: The patient was discharged home per anesthesia protocol.  Blood loss: Minimal.     Maria Guadalupe Poole, FNP  Hematology/Oncology/Bone Marrow Transplant

## 2019-02-07 NOTE — TRANSFER OF CARE
"Anesthesia Transfer of Care Note    Patient: Lori Rivero    Procedure(s) Performed: Procedure(s) (LRB):  Biopsy-bone marrow (Left)    Patient location: [facility]    Anesthesia Type: general    Transport from OR: Transported from OR on room air with adequate spontaneous ventilation    Post pain: adequate analgesia    Post assessment: tolerated procedure well and no apparent anesthetic complications    Post vital signs: stable    Level of consciousness: responds to stimulation and sedated    Nausea/Vomiting: no nausea/vomiting    Complications: none    Transfer of care protocol was followed      Last vitals:   Visit Vitals  /50   Pulse 66   Temp 36.4 °C (97.6 °F) (Oral)   Resp 18   Ht 5' 4" (1.626 m)   Wt 89.8 kg (198 lb)   SpO2 98%   Breastfeeding? No   BMI 33.99 kg/m²     "

## 2019-02-08 LAB — BONE MARROW IRON STAIN COMMENT: NORMAL

## 2019-02-08 NOTE — ANESTHESIA POSTPROCEDURE EVALUATION
"Anesthesia Post Evaluation    Patient: Lori Rivero    Procedure(s) Performed: Procedure(s) (LRB):  Biopsy-bone marrow (Left)    Final Anesthesia Type: general  Patient location during evaluation: PACU  Patient participation: Yes- Able to Participate  Level of consciousness: awake and alert  Pain management: adequate  Airway patency: patent  PONV status at discharge: No PONV  Anesthetic complications: no      Cardiovascular status: blood pressure returned to baseline  Respiratory status: unassisted, spontaneous ventilation and room air  Hydration status: euvolemic  Follow-up not needed.        Visit Vitals  BP (!) 167/72 (BP Location: Right arm, Patient Position: Lying)   Pulse 68   Temp 37.1 °C (98.7 °F) (Temporal)   Resp 18   Ht 5' 4" (1.626 m)   Wt 89.8 kg (198 lb)   SpO2 100%   Breastfeeding? No   BMI 33.99 kg/m²       Pain/Corrine Score: Pain Rating Prior to Med Admin: 0 (2/7/2019  1:10 PM)  Pain Rating Post Med Admin: 0 (2/7/2019  1:10 PM)  Corrine Score: 10 (2/7/2019  1:10 PM)        "

## 2019-02-10 PROBLEM — G47.01 INSOMNIA DUE TO MEDICAL CONDITION: Status: ACTIVE | Noted: 2019-02-10

## 2019-02-10 NOTE — PROGRESS NOTES
Psycho-Oncology Pre-Transplant Evaluation  Psychiatry Initial Visit (PhD)    Date:  2/7/2019     CPT Code: 46168 Evaluation Length (direct face-to-face time):  1 hour      Referred by:  BMT Team/ Oncologist: NOEL Alva MD.     Chief complaint/reason for encounter:  Psychological Evaluation prior to stem cell transplantation    Clinical status of patient: Outpatient    Lori Rivero, a 63 y.o. female, was seen for initial evaluation visit.  Met with patient and daughter. Her primary care physician is Rachael Munoz MD.       Social situation/Stressors:Lori Rivero is an 63 y.o. female referred by Dr. Alva for pre-transplant evaluation.  Lori Rivero lives alone in [place], [place]. She is a retired .  She has been away from work for 10 years. Lori Rivero has been  1x ( since 1980's) and has 4 children (Samantha, Cayetano, Aydin, Scotty). She has 8 grandchildren and 1 great-grandchild. She has 10 living siblings. The patient reports good social support, especially from her daughter, 2 of her sons, and 2 daughter's in law, as well as her siblings. Her daugther will be present and available to assist the patient during her recovery period.   Lori Rivero is an active member of a [employer] (her niece is her ). Lori Rivero's hobbies include watching [employer] and cooking for her family.   The patient has no  history.    Additional stressors: sciatica     Strengths: Housing stability, Able to vocalize needs, Values and traditions, Interpersonal relationships and supports available - family, relatives, friends and Cultural/spiritual/Quaker and community involvement   Liabilities: Complicated medical illness    History of present illness: Lori Rivero has adjusted to illness fairly well primarily through active coping strategies, focus on alternative activities and prayer. She has engaged in limited information  gathering, preferring to allow her daughter to gather information for her.  The patient has excellent family support.  Her family is coping well with the diagnosis/treatment/prognosis.    Lori Rivero reports using time with family and friends  as her primary methods of coping with general stressors. She reports no illness-related psychosocial stressors.  She reports no stressors expected to pose a barrier to transplant or post-transplant requirements. The patient has an adequate and growing partnership with her [facility] [place] [place]. The patient reports the following barriers to cancer care:none.    Stem Cell Transplantation (SCT):  oLri Rivero possesses a limited level of knowledge about SCT gleaned from information provided to her by her daughter and discussions with her clinical team. She had not met with all clinical team members at the time of interview.  Lori Rivero is knowledgeable about some of the possible costs, risks, and complications of the procedure and the behavioral changes which will be required of her. She requires additional teaching about risks of the procedure. She has anticipated her recovery needs and has planned assistance from [facility] to facilitate healing. Lori Rivero is aware of the requirement that HSCT patients must stay within 1 hour of the hospital for their first 30 days post-transplant.  Lori Rivero knows she must commit to careful monitoring of symptoms, the possibility of a complex long-term multiple drug treatment regimen, and long term follow-up visits with her oncologist (as required) following the procedure.  She is aware of the following necessary behavioral changes:changes in food selection, preparation, and storage, increased vigilance with home cleanliness , careful personal and dental hygiene  and rapid return to physical activity. The patient reports good compliance with medical treatment in the past, which is supported by review of  her medical chart.  Lori Rivero has limited knowledge on which to base expectations of health and illness possibilities following SCT.    Medical/Surgical History:   Patient Active Problem List   Diagnosis    Multiple myeloma    Insomnia due to medical condition         Pain scale:     Health Behaviors:       ETOH Use: No       Tobacco Use: No   Illicit Drug Use:  No     Prescription Misuse:No   Caffeine: moderate, no caffeine withdrawal   Exercise: 2x week PT.   Advanced directives:No     Family History:   Psychiatric illness: Yes (cousin- anxiety disorder)    Alcohol/Drug Abuse: No     Suicide: No      Past Psychiatric History:   Inpatient treatment: No     Outpatient treatment: Yes (group therapy after mother's death)    Prior substance abuse treatment: No     Suicide Attempts: No      Psychotropic Medications:  Current: none       Past: none    Current medications as per below, allergies reviewed in chart.  Current Outpatient Medications   Medication    acyclovir (ZOVIRAX) 400 MG tablet    aspirin (ECOTRIN) 81 MG EC tablet    dexamethasone (DECADRON) 4 MG Tab    glipiZIDE (GLUCOTROL) 10 MG tablet    JANUMET  mg per tablet    losartan-hydrochlorothiazide 100-12.5 mg (HYZAAR) 100-12.5 mg Tab    montelukast (SINGULAIR) 10 mg tablet    ofloxacin (OCUFLOX) 0.3 % ophthalmic solution    ondansetron (ZOFRAN-ODT) 8 MG TbDL    PAZEO 0.7 % Drop    prednisoLONE acetate (PRED FORTE) 1 % DrpS    prochlorperazine (COMPAZINE) 10 MG tablet    PROLENSA 0.07 % Drop    REVLIMID 25 mg Cap    tiZANidine (ZANAFLEX) 4 MG tablet    traMADol (ULTRAM) 50 mg tablet     No current facility-administered medications for this visit.         CAM Therapies:     Psychological Screening: Distress thermometer:   Distress Score    Distress Score: 0        Practical Problems Physical Problems                                                   Family Problems Fatigue: Yes                     Getting Around: Yes              "    Emotional Problems                      Nervousness: Yes  Pain: Yes           Worry: Yes        Sleep: Yes          Spiritual/Religions Concerns               Other Problems               Depression: Denies  Standardized depression screening tool used    (PHQ-9= 5; does not meet screener)    [place]um[place]: Denies Psychosis: Denies    Generalized anxiety: Denies   Standardized anxiety screening tool used  (MAGALY-7= 3 does not meet screener)     Panic Disorder: Denies   Social/specific phobia: Denies OCD: Denies  Trauma: Denies   Sexual Dysfunction:  Denies    Head Injury History: Denies   Sleep: restless sleep , interrupted sleep, sleep interrupted by pain and non-restorative sleep , 1 hour+ extended sleep onset latency and multiple x WASO (with re-onset difficulty), (+) EDS  and daily naps (1 hour) , AM caffeine and no sleep hygiene considerations  no use of OTC/melatonin/hypnotics/benzodiazepines ; taking neurontin ("not as much help as I'd hoped")   Personality Functioning: The patient does not display any personality    characteristics which would be an impediment to receiving BMT.      Mental Status Exam:    General appearance:  appears stated age, neatly dressed, well groomed  Level of cooperation:  cooperative  Thought processes:  logical, goal-directed   Speech: normal in rate, volume, and tone    Mood: euthymic  Affect: euthymic  Thought content:  no illusions, no visual hallucinations, no auditory hallucinations, no delusions, no active or passive homicidal thoughts, no active or passive suicidal ideation, no obsessions, no compulsions, no violence  Orientation:  oriented to person, place, and time  Memory:  Recent memory:  3 of 3 objects after brief delay.    Remote memory - intact  Attention span and concentration:  spelled WORLD forwards and backwards; SAVEAHAART without difficulty  Abstract reasoning:    Similarities: abstract.    Proverbs: abstract.  Judgment and insight: fair  Language:  Intact    MMSE:  " 26/30; No evidence of impairment  REALM-R:  Limited health literacy      SUMMARY AND RECOMMENDATIONS:   Lori Rivero is a  63 y.o. female referred by Dr. Alva for psychological evaluation prior to stem cell transplantation.   The patient appears absent of disabling psychopathology or disabilities which would prevent understanding and compliance with medical treatment.  There is no evidence of suicidality.    The patient has limited knowledge about HSCT, unclear expectations for health and illness following transplantation, inadequate  understanding of the possible risks and complications of this treatment option, and a high willingness to sustain effort for lifestyle changes and health adaptations which will be required of her.  She is unaware of possible medical side effects.    She is aware of the 30 day 1 hour residence requirement.   She reports adequate compliance with previous medical treatment.   Lori Rivero has excellent social support from several family members, particularly her daughter. Caregivers are engaged and aware of post-HSCT demands.   The patient exhibits a high degree of social stability.   The patient acknowledges no stressors expected to limit her ability to cope with the demands of HSCT and recovery.   The patient reports moderate caffeine consumption without withdrawal symptoms, no tobacco use, no alcohol use and no illicit drug use   She demonstrates limited health literacy.        Impressions:  Lori Rivero is an acceptable HSCT candidate from a psychological perspective, once additional education has been accomplished.   There are no overt psychological contraindications for proceeding with the procedure.She has no significant mental health history, and reports no current psychiatric problems or major adjustment issues. The patient has good social support and has already begun making appropriate life plans in anticipation of the procedure. The patient has  verbalized appropriate awareness and commitment to the necessary behavioral changes associated with HSCTand appears willing to adjust to long-term lifestyle challenges and medical follow-up. There are no recommendations for psychological treatment at this time. The patient and family are aware of resources available should their psychological needs change in the future. Given literacy limitations, this patient should be provided all vital information for consent, decision-making, and treatment both verbally and in writing.     Aaron Steinberg, PhD  Clinical Psychologist  [place] License #[id number]

## 2019-02-11 DIAGNOSIS — Z76.82 STEM CELL TRANSPLANT CANDIDATE: ICD-10-CM

## 2019-02-11 DIAGNOSIS — C90.00 MULTIPLE MYELOMA: Primary | ICD-10-CM

## 2019-02-11 LAB
BODY SITE - BONE MARROW: NORMAL
CLINICAL DIAGNOSIS - BONE MARROW: NORMAL
FLOW CYTOMETRY ANTIBODIES ANALYZED - BONE MARROW: NORMAL
FLOW CYTOMETRY COMMENT - BONE MARROW: NORMAL
FLOW CYTOMETRY INTERPRETATION - BONE MARROW: NORMAL
GENETICIST REVIEW: NORMAL
PLASMA CELL PROLIF RELEASED BY: NORMAL
PLASMA CELL PROLIF RESULT SUMMARY: NORMAL
PLASMA CELL PROLIF RESULT TABLE: NORMAL
REASON FOR REFERRAL, PLASMA CELL PROLIF (PCPD), FISH: NORMAL
REF LAB TEST METHOD: NORMAL
RESULTS, PLASMA CELL PROLIF (PCPD), FISH: NORMAL
SERVICE CMNT-IMP: NORMAL
SERVICE CMNT-IMP: NORMAL
SPECIMEN SOURCE: NORMAL
SPECIMEN, PLASMA CELL PROLIF (PCPD), FISH: NORMAL

## 2019-02-11 RX ORDER — DIPHENHYDRAMINE HCL 25 MG
25 CAPSULE ORAL ONCE AS NEEDED
Status: CANCELLED | OUTPATIENT
Start: 2019-03-14 | End: 2019-03-14

## 2019-02-11 RX ORDER — DIPHENHYDRAMINE HCL 25 MG
25 CAPSULE ORAL ONCE AS NEEDED
Status: CANCELLED | OUTPATIENT
Start: 2019-03-10 | End: 2019-03-03

## 2019-02-11 RX ORDER — ACETAMINOPHEN 325 MG/1
650 TABLET ORAL ONCE AS NEEDED
Status: CANCELLED | OUTPATIENT
Start: 2019-03-10 | End: 2019-03-03

## 2019-02-11 RX ORDER — SODIUM,POTASSIUM PHOSPHATES 280-250MG
2 POWDER IN PACKET (EA) ORAL ONCE AS NEEDED
Status: CANCELLED | OUTPATIENT
Start: 2019-03-14 | End: 2019-03-14

## 2019-02-11 RX ORDER — LANOLIN ALCOHOL/MO/W.PET/CERES
800 CREAM (GRAM) TOPICAL ONCE AS NEEDED
Status: CANCELLED | OUTPATIENT
Start: 2019-03-12 | End: 2019-03-12

## 2019-02-11 RX ORDER — POTASSIUM CHLORIDE 750 MG/1
40 TABLET, EXTENDED RELEASE ORAL ONCE AS NEEDED
Status: CANCELLED | OUTPATIENT
Start: 2019-03-13 | End: 2019-03-13

## 2019-02-11 RX ORDER — LANOLIN ALCOHOL/MO/W.PET/CERES
800 CREAM (GRAM) TOPICAL ONCE AS NEEDED
Status: CANCELLED | OUTPATIENT
Start: 2019-03-11 | End: 2019-03-11

## 2019-02-11 RX ORDER — ACETAMINOPHEN 325 MG/1
650 TABLET ORAL ONCE AS NEEDED
Status: CANCELLED | OUTPATIENT
Start: 2019-03-14 | End: 2019-03-14

## 2019-02-11 RX ORDER — LANOLIN ALCOHOL/MO/W.PET/CERES
800 CREAM (GRAM) TOPICAL ONCE AS NEEDED
Status: CANCELLED | OUTPATIENT
Start: 2019-03-14 | End: 2019-03-14

## 2019-02-11 RX ORDER — POTASSIUM CHLORIDE 750 MG/1
40 TABLET, EXTENDED RELEASE ORAL ONCE AS NEEDED
Status: CANCELLED | OUTPATIENT
Start: 2019-03-14 | End: 2019-03-14

## 2019-02-11 RX ORDER — LANOLIN ALCOHOL/MO/W.PET/CERES
800 CREAM (GRAM) TOPICAL ONCE AS NEEDED
Status: CANCELLED | OUTPATIENT
Start: 2019-03-10 | End: 2019-03-03

## 2019-02-11 RX ORDER — DIPHENHYDRAMINE HCL 25 MG
25 CAPSULE ORAL ONCE AS NEEDED
Status: CANCELLED | OUTPATIENT
Start: 2019-03-12 | End: 2019-03-12

## 2019-02-11 RX ORDER — POTASSIUM CHLORIDE 750 MG/1
40 TABLET, EXTENDED RELEASE ORAL ONCE AS NEEDED
Status: CANCELLED | OUTPATIENT
Start: 2019-03-12 | End: 2019-03-12

## 2019-02-11 RX ORDER — DIPHENHYDRAMINE HCL 25 MG
25 CAPSULE ORAL ONCE AS NEEDED
Status: CANCELLED | OUTPATIENT
Start: 2019-03-13 | End: 2019-03-13

## 2019-02-11 RX ORDER — ACETAMINOPHEN 325 MG/1
650 TABLET ORAL ONCE AS NEEDED
Status: CANCELLED | OUTPATIENT
Start: 2019-03-12 | End: 2019-03-12

## 2019-02-11 RX ORDER — SODIUM,POTASSIUM PHOSPHATES 280-250MG
2 POWDER IN PACKET (EA) ORAL ONCE AS NEEDED
Status: CANCELLED | OUTPATIENT
Start: 2019-03-12 | End: 2019-03-12

## 2019-02-11 RX ORDER — DIPHENHYDRAMINE HCL 25 MG
25 CAPSULE ORAL ONCE AS NEEDED
Status: CANCELLED | OUTPATIENT
Start: 2019-03-11 | End: 2019-03-11

## 2019-02-11 RX ORDER — POTASSIUM CHLORIDE 750 MG/1
40 TABLET, EXTENDED RELEASE ORAL ONCE AS NEEDED
Status: CANCELLED | OUTPATIENT
Start: 2019-03-11 | End: 2019-03-11

## 2019-02-11 RX ORDER — LANOLIN ALCOHOL/MO/W.PET/CERES
800 CREAM (GRAM) TOPICAL ONCE AS NEEDED
Status: CANCELLED | OUTPATIENT
Start: 2019-03-13 | End: 2019-03-13

## 2019-02-11 RX ORDER — POTASSIUM CHLORIDE 750 MG/1
40 TABLET, EXTENDED RELEASE ORAL ONCE AS NEEDED
Status: CANCELLED | OUTPATIENT
Start: 2019-03-10 | End: 2019-03-03

## 2019-02-11 RX ORDER — SODIUM,POTASSIUM PHOSPHATES 280-250MG
2 POWDER IN PACKET (EA) ORAL ONCE AS NEEDED
Status: CANCELLED | OUTPATIENT
Start: 2019-03-13 | End: 2019-03-13

## 2019-02-11 RX ORDER — ACETAMINOPHEN 325 MG/1
650 TABLET ORAL ONCE AS NEEDED
Status: CANCELLED | OUTPATIENT
Start: 2019-03-13 | End: 2019-03-13

## 2019-02-11 RX ORDER — SODIUM,POTASSIUM PHOSPHATES 280-250MG
2 POWDER IN PACKET (EA) ORAL ONCE AS NEEDED
Status: CANCELLED | OUTPATIENT
Start: 2019-03-11 | End: 2019-03-11

## 2019-02-11 RX ORDER — SODIUM,POTASSIUM PHOSPHATES 280-250MG
2 POWDER IN PACKET (EA) ORAL ONCE AS NEEDED
Status: CANCELLED | OUTPATIENT
Start: 2019-03-10 | End: 2019-03-03

## 2019-02-11 RX ORDER — ACETAMINOPHEN 325 MG/1
650 TABLET ORAL ONCE AS NEEDED
Status: CANCELLED | OUTPATIENT
Start: 2019-03-11 | End: 2019-03-11

## 2019-02-14 LAB
CHROM BANDING METHOD: NORMAL
CHROMOSOME ANALYSIS BM ADDITIONAL INFORMATION: NORMAL
CHROMOSOME ANALYSIS BM RELEASED BY: NORMAL
CHROMOSOME ANALYSIS BM RESULT SUMMARY: NORMAL
CLINICAL CYTOGENETICIST REVIEW: NORMAL
KARYOTYP MAR: NORMAL
REASON FOR REFERRAL (NARRATIVE): NORMAL
REF LAB TEST METHOD: NORMAL
SPECIMEN SOURCE: NORMAL
SPECIMEN: NORMAL

## 2019-02-19 ENCOUNTER — CLINICAL SUPPORT (OUTPATIENT)
Dept: HEMATOLOGY/ONCOLOGY | Facility: CLINIC | Age: 64
End: 2019-02-19
Payer: MEDICARE

## 2019-02-19 ENCOUNTER — LAB VISIT (OUTPATIENT)
Dept: LAB | Facility: HOSPITAL | Age: 64
End: 2019-02-19
Payer: MEDICARE

## 2019-02-19 ENCOUNTER — SOCIAL WORK (OUTPATIENT)
Dept: HEMATOLOGY/ONCOLOGY | Facility: CLINIC | Age: 64
End: 2019-02-19
Payer: MEDICARE

## 2019-02-19 DIAGNOSIS — Z76.82 STEM CELL TRANSPLANT CANDIDATE: Primary | ICD-10-CM

## 2019-02-19 DIAGNOSIS — C90.00 MULTIPLE MYELOMA: ICD-10-CM

## 2019-02-19 DIAGNOSIS — Z76.82 STEM CELL TRANSPLANT CANDIDATE: ICD-10-CM

## 2019-02-19 DIAGNOSIS — C90.00 MULTIPLE MYELOMA, REMISSION STATUS UNSPECIFIED: ICD-10-CM

## 2019-02-19 DIAGNOSIS — Z71.3 NUTRITIONAL COUNSELING: Primary | ICD-10-CM

## 2019-02-19 DIAGNOSIS — D69.6 THROMBOCYTOPENIA: ICD-10-CM

## 2019-02-19 LAB
ABO + RH BLD: NORMAL
ALBUMIN SERPL BCP-MCNC: 3.5 G/DL
ALP SERPL-CCNC: 94 U/L
ALT SERPL W/O P-5'-P-CCNC: 20 U/L
ANION GAP SERPL CALC-SCNC: 7 MMOL/L
APTT BLDCRRT: <21 SEC
AST SERPL-CCNC: 12 U/L
B2 MICROGLOB SERPL-MCNC: 1.6 UG/ML
B2 MICROGLOB SERPL-MCNC: 1.6 UG/ML
BASOPHILS # BLD AUTO: 0.02 K/UL
BASOPHILS NFR BLD: 0.3 %
BILIRUB SERPL-MCNC: 0.2 MG/DL
BLD GP AB SCN CELLS X3 SERPL QL: NORMAL
BUN SERPL-MCNC: 16 MG/DL
CALCIUM SERPL-MCNC: 9 MG/DL
CHLORIDE SERPL-SCNC: 107 MMOL/L
CO2 SERPL-SCNC: 28 MMOL/L
CREAT SERPL-MCNC: 0.9 MG/DL
CREAT SERPL-MCNC: 0.9 MG/DL
DIFFERENTIAL METHOD: ABNORMAL
EOSINOPHIL # BLD AUTO: 0.1 K/UL
EOSINOPHIL NFR BLD: 1.7 %
ERYTHROCYTE [DISTWIDTH] IN BLOOD BY AUTOMATED COUNT: 17.2 %
EST. GFR  (AFRICAN AMERICAN): >60 ML/MIN/1.73 M^2
EST. GFR  (AFRICAN AMERICAN): >60 ML/MIN/1.73 M^2
EST. GFR  (NON AFRICAN AMERICAN): >60 ML/MIN/1.73 M^2
EST. GFR  (NON AFRICAN AMERICAN): >60 ML/MIN/1.73 M^2
GLUCOSE SERPL-MCNC: 73 MG/DL
HCT VFR BLD AUTO: 36.3 %
HGB BLD-MCNC: 11.2 G/DL
HGB S BLD QL SOLY: NEGATIVE
IGA SERPL-MCNC: 164 MG/DL
IGG SERPL-MCNC: 824 MG/DL
IGM SERPL-MCNC: 59 MG/DL
IMM GRANULOCYTES # BLD AUTO: 0.04 K/UL
IMM GRANULOCYTES NFR BLD AUTO: 0.5 %
INR PPP: 0.9
LYMPHOCYTES # BLD AUTO: 1.6 K/UL
LYMPHOCYTES NFR BLD: 20.1 %
MAGNESIUM SERPL-MCNC: 2.1 MG/DL
MCH RBC QN AUTO: 26.2 PG
MCHC RBC AUTO-ENTMCNC: 30.9 G/DL
MCV RBC AUTO: 85 FL
MONOCYTES # BLD AUTO: 1.1 K/UL
MONOCYTES NFR BLD: 13.5 %
NEUTROPHILS # BLD AUTO: 5 K/UL
NEUTROPHILS NFR BLD: 63.9 %
NRBC BLD-RTO: 0 /100 WBC
PHOSPHATE SERPL-MCNC: 2.8 MG/DL
PLATELET # BLD AUTO: 218 K/UL
PMV BLD AUTO: 12.3 FL
POTASSIUM SERPL-SCNC: 4 MMOL/L
PROT SERPL-MCNC: 6.8 G/DL
PROTHROMBIN TIME: 9.5 SEC
RBC # BLD AUTO: 4.28 M/UL
SODIUM SERPL-SCNC: 142 MMOL/L
WBC # BLD AUTO: 7.8 K/UL

## 2019-02-19 PROCEDURE — 82232 ASSAY OF BETA-2 PROTEIN: CPT | Mod: 91

## 2019-02-19 PROCEDURE — 83735 ASSAY OF MAGNESIUM: CPT

## 2019-02-19 PROCEDURE — 84100 ASSAY OF PHOSPHORUS: CPT

## 2019-02-19 PROCEDURE — 84165 PROTEIN E-PHORESIS SERUM: CPT | Mod: 26,,, | Performed by: PATHOLOGY

## 2019-02-19 PROCEDURE — 86334 PATHOLOGIST INTERPRETATION IFE: ICD-10-PCS | Mod: 26,,, | Performed by: PATHOLOGY

## 2019-02-19 PROCEDURE — 36415 COLL VENOUS BLD VENIPUNCTURE: CPT

## 2019-02-19 PROCEDURE — 87535 HIV-1 PROBE&REVERSE TRNSCRPJ: CPT

## 2019-02-19 PROCEDURE — 99999 PR PBB SHADOW E&M-EST. PATIENT-LVL II: ICD-10-PCS | Mod: PBBFAC,,, | Performed by: DIETITIAN, REGISTERED

## 2019-02-19 PROCEDURE — 97802 MEDICAL NUTRITION INDIV IN: CPT | Mod: PBBFAC | Performed by: DIETITIAN, REGISTERED

## 2019-02-19 PROCEDURE — 99999 PR PBB SHADOW E&M-EST. PATIENT-LVL II: CPT | Mod: PBBFAC,,,

## 2019-02-19 PROCEDURE — 99212 OFFICE O/P EST SF 10 MIN: CPT | Mod: PBBFAC,25,27 | Performed by: DIETITIAN, REGISTERED

## 2019-02-19 PROCEDURE — 86901 BLOOD TYPING SEROLOGIC RH(D): CPT

## 2019-02-19 PROCEDURE — 85730 THROMBOPLASTIN TIME PARTIAL: CPT

## 2019-02-19 PROCEDURE — 86703 HIV-1/HIV-2 1 RESULT ANTBDY: CPT

## 2019-02-19 PROCEDURE — 83520 IMMUNOASSAY QUANT NOS NONAB: CPT | Mod: 59

## 2019-02-19 PROCEDURE — 82955 ASSAY OF G6PD ENZYME: CPT

## 2019-02-19 PROCEDURE — 99212 OFFICE O/P EST SF 10 MIN: CPT | Mod: PBBFAC,25

## 2019-02-19 PROCEDURE — 86753 PROTOZOA ANTIBODY NOS: CPT

## 2019-02-19 PROCEDURE — 86803 HEPATITIS C AB TEST: CPT

## 2019-02-19 PROCEDURE — 86704 HEP B CORE ANTIBODY TOTAL: CPT

## 2019-02-19 PROCEDURE — 83020 HEMOGLOBIN ELECTROPHORESIS: CPT

## 2019-02-19 PROCEDURE — 86334 IMMUNOFIX E-PHORESIS SERUM: CPT

## 2019-02-19 PROCEDURE — 84165 PATHOLOGIST INTERPRETATION SPE: ICD-10-PCS | Mod: 26,,, | Performed by: PATHOLOGY

## 2019-02-19 PROCEDURE — 86687 HTLV-I ANTIBODY: CPT

## 2019-02-19 PROCEDURE — 86334 IMMUNOFIX E-PHORESIS SERUM: CPT | Mod: 26,,, | Performed by: PATHOLOGY

## 2019-02-19 PROCEDURE — 85610 PROTHROMBIN TIME: CPT

## 2019-02-19 PROCEDURE — 86850 RBC ANTIBODY SCREEN: CPT

## 2019-02-19 PROCEDURE — 85660 RBC SICKLE CELL TEST: CPT

## 2019-02-19 PROCEDURE — 86644 CMV ANTIBODY: CPT

## 2019-02-19 PROCEDURE — 80053 COMPREHEN METABOLIC PANEL: CPT

## 2019-02-19 PROCEDURE — 86592 SYPHILIS TEST NON-TREP QUAL: CPT

## 2019-02-19 PROCEDURE — 87340 HEPATITIS B SURFACE AG IA: CPT

## 2019-02-19 PROCEDURE — 85025 COMPLETE CBC W/AUTO DIFF WBC: CPT

## 2019-02-19 PROCEDURE — 86901 BLOOD TYPING SEROLOGIC RH(D): CPT | Mod: 91

## 2019-02-19 PROCEDURE — 82784 ASSAY IGA/IGD/IGG/IGM EACH: CPT | Mod: 59

## 2019-02-19 PROCEDURE — 99999 PR PBB SHADOW E&M-EST. PATIENT-LVL II: CPT | Mod: PBBFAC,,, | Performed by: DIETITIAN, REGISTERED

## 2019-02-19 PROCEDURE — 99999 PR PBB SHADOW E&M-EST. PATIENT-LVL II: ICD-10-PCS | Mod: PBBFAC,,,

## 2019-02-19 PROCEDURE — 84165 PROTEIN E-PHORESIS SERUM: CPT

## 2019-02-19 PROCEDURE — 87798 DETECT AGENT NOS DNA AMP: CPT

## 2019-02-19 RX ORDER — GABAPENTIN 300 MG/1
300 CAPSULE ORAL 3 TIMES DAILY
Status: ON HOLD | COMMUNITY
End: 2019-04-01 | Stop reason: HOSPADM

## 2019-02-19 NOTE — PROGRESS NOTES
BMT Pharmacist Evaluation      Current Outpatient Medications:     acyclovir (ZOVIRAX) 400 MG tablet, Take 400 mg by mouth 2 (two) times daily. , Disp: , Rfl: 10    aspirin (ECOTRIN) 81 MG EC tablet, Take 1 tablet by mouth ever night, Disp: , Rfl: 2    dexamethasone (DECADRON) 4 MG Tab, Take 40 mg by mouth every Thursday. On day 1, 8, 15 of chemotherapy, Disp: , Rfl: 4    gabapentin (NEURONTIN) 300 MG capsule, Take 300 mg by mouth 3 (three) times daily., Disp: , Rfl:     glipiZIDE (GLUCOTROL) 10 MG tablet, Take 1 tablet by mouth daily, Disp: , Rfl: 2    JANUMET  mg per tablet, Take 1 tablet twice a day with meals, Disp: , Rfl: 5    losartan-hydrochlorothiazide 100-12.5 mg (HYZAAR) 100-12.5 mg Tab, Take 1 tablet by mouth daily, Disp: , Rfl: 2    montelukast (SINGULAIR) 10 mg tablet, Take 10 mg by mouth once daily., Disp: , Rfl: 0    multivit,iron,minerals/lutein (CENTRUM SILVER ULTRA WOMEN'S ORAL), Take 1 tablet by mouth once daily., Disp: , Rfl:     PAZEO 0.7 % Drop, Place 1 drop into both eyes every morning., Disp: , Rfl: 5    REVLIMID 25 mg Cap, Take 1 capsule by mouth on days 1 to 14 and off for 7 days for a 21 day cycle., Disp: , Rfl: 0    tiZANidine (ZANAFLEX) 4 MG tablet, Take 4 mg by mouth 3 (three) times daily as needed (back muscle spasms). , Disp: , Rfl:     ondansetron (ZOFRAN-ODT) 8 MG TbDL, Take 8 mg by mouth every 8 (eight) hours as needed (nausea). , Disp: , Rfl: 6    prochlorperazine (COMPAZINE) 10 MG tablet, Take 10 mg by mouth 4 (four) times daily as needed (nausea/vomiting). , Disp: , Rfl: 6      Review of patient's allergies indicates:  No Known Allergies      CrCl cannot be calculated (No order found.).       Medication adherence: great; uses a pill box and can name all her medications by memory  Medication-related problems: zanaflex is not working for her sciatica pain     Planned conditioning regimen:  Melphalan on Day -1    Antimicrobial Prophylaxis:  Acyclovir starting on  Day -1  Levofloxacin starting on Day -1  Fluconazole starting on Day -1    Growth Factor Support:  Neupogen starting on Day +7      Caregiver: daughter  Post-transplant discharge plans: home     Notes:  Reviewed and reconciled the medication list with the patient and daughter. Patient was able to confirm all medications she currently takes including schedule and indication. Patient demonstrates excellent medication adherence and understands the importance of this through the transplant process.     Reviewed the planned high-dose chemotherapy regimen, including schedule and possible side effects. Provided the patient with drug information handouts for chemotherapy. Reviewed possible side effects of transplant including: neutropenia, thrombocytopenia, anemia, infection, infusion reactions, nausea/vomiting, mucositis, loss of appetite, taste changes, diarrhea,hair loss, liver and/or renal dysfunction. Reviewed prophylactic antimicrobials, as well as prophylactic and as needed antiemetics. Encouraged the patient to report all possible side effects/new symptoms and to ask for supportive care medications if needed. Patient verbalized understanding and all questions were answered.     Proposed recommendations:  We discussed that she is on aspirin for her chemotherapy and this can be stopped at admit if no other indications are identified. Additionally, she states that the sciatica pain is getting worse; the gabapentin and tizanidine are not working well. Consider adjusting therapy while inpatient if this does not improve or worsens. She also states that she has nausea medications at home and we do not need to send an additional prescription at discharge. The patient demonstrates good medication adherence and understanding of the chemotherapy and transplant plan. BMT/Hematology Oncology PharmD will continue to follow the patient while admitted to the inpatient unit.     Joan Talamantes, PharmD, BCPS, BCOP  Clinical  Pharmacy Specialist   BMT/Hematology Oncology  SpectraLink: [id number]

## 2019-02-20 ENCOUNTER — HOSPITAL ENCOUNTER (OUTPATIENT)
Dept: CARDIOLOGY | Facility: CLINIC | Age: 64
Discharge: HOME | End: 2019-02-20
Payer: MEDICARE

## 2019-02-20 ENCOUNTER — HOSPITAL ENCOUNTER (OUTPATIENT)
Dept: RADIOLOGY | Facility: HOSPITAL | Age: 64
Discharge: HOME | End: 2019-02-20
Attending: INTERNAL MEDICINE
Payer: MEDICARE

## 2019-02-20 ENCOUNTER — HOSPITAL ENCOUNTER (OUTPATIENT)
Dept: PULMONOLOGY | Facility: CLINIC | Age: 64
Discharge: HOME | End: 2019-02-20
Payer: MEDICARE

## 2019-02-20 ENCOUNTER — HOSPITAL ENCOUNTER (OUTPATIENT)
Dept: CARDIOLOGY | Facility: CLINIC | Age: 64
Discharge: HOME | End: 2019-02-20
Attending: INTERNAL MEDICINE
Payer: MEDICARE

## 2019-02-20 VITALS
HEART RATE: 68 BPM | HEIGHT: 64 IN | DIASTOLIC BLOOD PRESSURE: 72 MMHG | SYSTOLIC BLOOD PRESSURE: 167 MMHG | WEIGHT: 197 LBS | BODY MASS INDEX: 33.63 KG/M2

## 2019-02-20 VITALS — WEIGHT: 198 LBS | BODY MASS INDEX: 33.8 KG/M2 | HEIGHT: 64 IN

## 2019-02-20 DIAGNOSIS — R06.02 SHORTNESS OF BREATH ON EXERTION: ICD-10-CM

## 2019-02-20 DIAGNOSIS — Z01.818 ENCOUNTER FOR PRE-TRANSPLANT EVALUATION FOR STEM CELL TRANSPLANT: ICD-10-CM

## 2019-02-20 DIAGNOSIS — Z76.82 STEM CELL TRANSPLANT CANDIDATE: ICD-10-CM

## 2019-02-20 DIAGNOSIS — C90.00 MULTIPLE MYELOMA: ICD-10-CM

## 2019-02-20 DIAGNOSIS — R06.02 SOB (SHORTNESS OF BREATH) ON EXERTION: ICD-10-CM

## 2019-02-20 LAB
ALBUMIN SERPL ELPH-MCNC: 3.64 G/DL
ALPHA1 GLOB SERPL ELPH-MCNC: 0.33 G/DL
ALPHA2 GLOB SERPL ELPH-MCNC: 0.86 G/DL
ASCENDING AORTA: 2.98 CM
AV INDEX (PROSTH): 1.07
AV MEAN GRADIENT: 3.5 MMHG
AV PEAK GRADIENT: 6.76 MMHG
AV VALVE AREA: 3.61 CM2
AV VELOCITY RATIO: 1.04
B-GLOBULIN SERPL ELPH-MCNC: 0.86 G/DL
BSA FOR ECHO PROCEDURE: 2.01 M2
CV ECHO LV RWT: 0.37 CM
DOP CALC AO PEAK VEL: 1.3 M/S
DOP CALC AO VTI: 22.7 CM
DOP CALC LVOT AREA: 3.36 CM2
DOP CALC LVOT DIAMETER: 2.07 CM
DOP CALC LVOT PEAK VEL: 1.35 M/S
DOP CALC LVOT STROKE VOLUME: 82.04 CM3
DOP CALCLVOT PEAK VEL VTI: 24.39 CM
E WAVE DECELERATION TIME: 256.34 MSEC
E/A RATIO: 0.68
E/E' RATIO: 4.95
ECHO LV POSTERIOR WALL: 0.96 CM (ref 0.6–1.1)
FRACTIONAL SHORTENING: 29 % (ref 28–44)
G6PD RBC-CCNT: 17.5 U/G HGB (ref 7–20.5)
GAMMA GLOB SERPL ELPH-MCNC: 0.7 G/DL
INTERPRETATION SERPL IFE-IMP: NORMAL
INTERVENTRICULAR SEPTUM: 0.86 CM (ref 0.6–1.1)
IVRT: 0.09 MSEC
KAPPA LC SER QL IA: 1.12 MG/DL
KAPPA LC/LAMBDA SER IA: 0.75
LA MAJOR: 5.51 CM
LA MINOR: 5.46 CM
LA WIDTH: 4.68 CM
LAMBDA LC SER QL IA: 1.5 MG/DL
LEFT ATRIUM SIZE: 3.66 CM
LEFT ATRIUM VOLUME INDEX: 41.1 ML/M2
LEFT ATRIUM VOLUME: 79.86 CM3
LEFT INTERNAL DIMENSION IN SYSTOLE: 3.67 CM (ref 2.1–4)
LEFT VENTRICLE DIASTOLIC VOLUME INDEX: 66.42 ML/M2
LEFT VENTRICLE DIASTOLIC VOLUME: 129.08 ML
LEFT VENTRICLE MASS INDEX: 87.9 G/M2
LEFT VENTRICLE SYSTOLIC VOLUME INDEX: 29.3 ML/M2
LEFT VENTRICLE SYSTOLIC VOLUME: 56.9 ML
LEFT VENTRICULAR INTERNAL DIMENSION IN DIASTOLE: 5.19 CM (ref 3.5–6)
LEFT VENTRICULAR MASS: 170.89 G
LV LATERAL E/E' RATIO: 3.92
LV SEPTAL E/E' RATIO: 6.71
MV PEAK A VEL: 0.69 M/S
MV PEAK E VEL: 0.47 M/S
PATHOLOGIST INTERPRETATION IFE: NORMAL
PATHOLOGIST INTERPRETATION SPE: NORMAL
PISA TR MAX VEL: 2.09 M/S
PRE FEV1 FVC: 81
PRE FEV1: 1.86
PRE FVC: 2.31
PREDICTED FEV1 FVC: 80
PREDICTED FEV1: 2.24
PREDICTED FVC: 2.83
PROT SERPL-MCNC: 6.4 G/DL
PULM VEIN S/D RATIO: 1.28
PV PEAK D VEL: 0.65 M/S
PV PEAK S VEL: 0.83 M/S
RA MAJOR: 4.95 CM
RA PRESSURE: 8 MMHG
RA WIDTH: 3.68 CM
RIGHT VENTRICULAR END-DIASTOLIC DIMENSION: 3.85 CM
SINUS: 3.04 CM
STJ: 2.89 CM
TDI LATERAL: 0.12
TDI SEPTAL: 0.07
TDI: 0.1
TR MAX PG: 17.47 MMHG
TRICUSPID ANNULAR PLANE SYSTOLIC EXCURSION: 2.11 CM
TV REST PULMONARY ARTERY PRESSURE: 25 MMHG

## 2019-02-20 PROCEDURE — 93010 EKG 12-LEAD: ICD-10-PCS | Mod: S$PBB,,, | Performed by: INTERNAL MEDICINE

## 2019-02-20 PROCEDURE — 94729 DIFFUSING CAPACITY: CPT | Mod: 26,S$PBB,, | Performed by: INTERNAL MEDICINE

## 2019-02-20 PROCEDURE — 94729 PR C02/MEMBANE DIFFUSE CAPACITY: ICD-10-PCS | Mod: 26,S$PBB,, | Performed by: INTERNAL MEDICINE

## 2019-02-20 PROCEDURE — 71046 X-RAY EXAM CHEST 2 VIEWS: CPT | Mod: TC,FY

## 2019-02-20 PROCEDURE — 94010 BREATHING CAPACITY TEST: CPT | Mod: PBBFAC | Performed by: INTERNAL MEDICINE

## 2019-02-20 PROCEDURE — 93306 TRANSTHORACIC ECHO (TTE) COMPLETE (CUPID ONLY): ICD-10-PCS | Mod: 26,S$PBB,, | Performed by: INTERNAL MEDICINE

## 2019-02-20 PROCEDURE — 94729 DIFFUSING CAPACITY: CPT | Mod: PBBFAC | Performed by: INTERNAL MEDICINE

## 2019-02-20 PROCEDURE — 93005 ELECTROCARDIOGRAM TRACING: CPT | Mod: PBBFAC | Performed by: INTERNAL MEDICINE

## 2019-02-20 PROCEDURE — 71046 XR CHEST PA AND LATERAL: ICD-10-PCS | Mod: 26,,, | Performed by: RADIOLOGY

## 2019-02-20 PROCEDURE — 71046 X-RAY EXAM CHEST 2 VIEWS: CPT | Mod: 26,,, | Performed by: RADIOLOGY

## 2019-02-20 PROCEDURE — 93010 ELECTROCARDIOGRAM REPORT: CPT | Mod: S$PBB,,, | Performed by: INTERNAL MEDICINE

## 2019-02-20 PROCEDURE — 94010 BREATHING CAPACITY TEST: CPT | Mod: 26,S$PBB,, | Performed by: INTERNAL MEDICINE

## 2019-02-20 PROCEDURE — 93306 TTE W/DOPPLER COMPLETE: CPT | Mod: PBBFAC | Performed by: INTERNAL MEDICINE

## 2019-02-20 PROCEDURE — 94010 BREATHING CAPACITY TEST: ICD-10-PCS | Mod: 26,S$PBB,, | Performed by: INTERNAL MEDICINE

## 2019-02-20 NOTE — PROGRESS NOTES
"Referring Physician:Saúl Alva MD     Reason for visit:  Chief Complaint   Patient presents with    Nutrition Counseling    Multiple Myeloma        :1955     Allergies Reviewed  Meds Reviewed    Anthropometrics  Weight:89.8 kg (197 lb 15.6 oz)  Height:5' 4" (1.626 m)  BMI:Body mass index is 33.98 kg/m².   IBW: 120#    Meds:  Outpatient Medications Prior to Visit   Medication Sig Dispense Refill    acyclovir (ZOVIRAX) 400 MG tablet Take 400 mg by mouth 2 (two) times daily.   10    aspirin (ECOTRIN) 81 MG EC tablet Take 1 tablet by mouth ever night  2    dexamethasone (DECADRON) 4 MG Tab Take 40 mg by mouth every Thursday. On day 1, 8, 15 of chemotherapy  4    glipiZIDE (GLUCOTROL) 10 MG tablet Take 1 tablet by mouth daily  2    JANUMET  mg per tablet Take 1 tablet twice a day with meals  5    losartan-hydrochlorothiazide 100-12.5 mg (HYZAAR) 100-12.5 mg Tab Take 1 tablet by mouth daily  2    montelukast (SINGULAIR) 10 mg tablet Take 10 mg by mouth once daily.  0    ondansetron (ZOFRAN-ODT) 8 MG TbDL Take 8 mg by mouth every 8 (eight) hours as needed (nausea).   6    PAZEO 0.7 % Drop Place 1 drop into both eyes every morning.  5    prochlorperazine (COMPAZINE) 10 MG tablet Take 10 mg by mouth 4 (four) times daily as needed (nausea/vomiting).   6    REVLIMID 25 mg Cap Take 1 capsule by mouth on days 1 to 14 and off for 7 days for a 21 day cycle.  0    tiZANidine (ZANAFLEX) 4 MG tablet Take 4 mg by mouth 3 (three) times daily as needed (back muscle spasms).        No facility-administered medications prior to visit.          Labs: WNL     Estimated Nutrition Needs: 8422-6705 Kcals/day (30-35 kcal/kg),  g protein (1.5-2.0 g/kg)     Diet Hx: Pt here with daughter for pre-transplant nutrition counseling. Current weight of 198# which pt feels is stable. Reports UBW of 209# which was last weighed in Sept/Oct. Pt denies n/v/d/c. No complaints of taste changes or issues eating. Pt " has great appetite; eating 3 meals/day with snacks. Pt has diabetes--on Glipizide and Janumet--with AM blood sugars between .        Assessment: Provided pt with handout on nutrition therapy for bone marrow transplant patients. Reviewed foods recommended and not recommended in each food group. Reviewed food safety and proper cooking temperatures. Discussed importance of adequate intake during admit for transplant. Answered questions. Reviewed medications, supplement/herbal intake and no food/med interactions noted. Lab results noted. Compliance is good. Comprehension is good.    Nutrition Diagnosis: Increased nutrient needs related to increased demand for nutrients as evidenced by bone marrow transplant    Recommendations: Encouraged pt to eat 6 small meals/day with a variety of fruits, vegetables, whole grains, lean meat, and dairy following BMT nutrition therapy and safety guidelines. Encouraged adequate intake to maintain weight throughout treatment.      Consultation Time:25 minutes.    Follow Up: PRN

## 2019-02-20 NOTE — PROGRESS NOTES
[facility]   Bone Marrow Transplant Psychosocial Assessment   Date: 2019       Demographic Information     Name: Lori Rivero    : 1955    Age: 63 y.o.    Sex: female    Race: Black or     Marital Status: Legally     #:     Phone Number(s): [phone] (home)  or [phone]- (cell)   Home Address: [address] [place] [address]  [address] [facility] [address]    Mailing Address: [address] [place] [address]  [address] [facility] [address]    Are you a [place] Citizen? Yes   If no, please explain:        Contact Information     Next of Kin: Samantha Stack   Relationship: daughter   Phone Number(s): [phone]   Emergency Contact: Extended Emergency Contact Information  Primary Emergency Contact: Samantha Rivero Phone: [phone]  Relation: Daughter        Living Arrangements   Household Composition:  Patient currently resides with: Alone   If patient resides with spouse, please explain the marital relationship:    Does the patient currently own his/her own home? No   Does the patient currently rent home/apartment: Yes   Are current living arrangements permanent? Yes (p[t. Does report however that she would like to move once she recovers from transplant to a larger apartment)   If no, please explain:        Children's Names     Name Sex Age   1.  Samantha Stack female    2.  Cayetano Rivero male    3.  Aydin Rivero male    4.  Scotty Chavez male    5.       Who will be the primary caregiver for the children when patient is admitted to the hospital?    Name:    Phone Number:         Support System   Primary Caregiver:     Name: Samantha Stack   Relationship: dtr   Cell #: [phone]   Address:    Home #:    City:    Street:    ZIP:        Secondary Caregiver:     Name:    Relationship:    Cell #:    Address:    Home #:    City:    Street:    ZIP:      Will patient's caregiver be available full-time? 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 Restorationist   Is  patient currently practicing or non-practicing? Yes      Does patient have any other sources for support? Yes      If yes, please explain: family       Post BMT Plans      Does patient have full understanding of recovery from BMT? Yes   Does patient understand risk associated with BMT? Yes   What are patient's housing plans post BMT? Relatives home (Daughters Home)   Does patient have a Living Will? No   Does patient have a Power of ?  No   If yes, please give name of POA:  Name:     Phone #:        Employment Information     Is patient currently employed? No   Employer: OTHER   Phone #: Data Unavailable   Position:    Full Time/Part Time?    YRS:        Secondary Employment Information     Employer:    Phone #:    Position:    Full Time/Part Time?    YRS:        Significant Other Employment Information     Employer:    Phone #:    Position:    Full Time/Part Time?    YRS:          Financial Information     Monthly Income: $790   Yearly Income:    Source of Income:  SSD   Do you have any financial concerns? No   If yes, please explain:         Insurance Information      Do you have health insurance?  Yes   Insurance Carrier Medicare   Policy #:    Group #:    Policy Randall:    Medicare: Yes   Medicare Part D: No   Medicaid: Yes   Do you have Disability Insurance? No      Do you have a Cancer Policy? No   Do you have medication/prescription coverage? Yes   Are you a ? No       Medical Information     Diagnosis: Multiple Myeloma   Date of Diagnosis: 09/2018   Is this a new diagnosis? Yes         If no, please explain:    Past Medical History: Past Medical History:   Diagnosis Date    Depression     DM2 (diabetes mellitus, type 2)     Glaucoma     HTN (hypertension)     Myeloma     Therapy     after mother's death      Infusion Services: none   Home Health: none   Durable Medical Equipment: none   Activities of Daily Living: Independent    Patient's Family Cancer History: Cancer-related family  history is not on file.       Cognitive Functioning     Cognitive State: alert, oriented   Does patient have any concerns that may affect medical follow up and full understanding of treatment? No   Does patient have any concerns that may impact medication compliance? No   Education Level: no high school   Does the patient have any learning disabilities? No   If yes, please explain:    Can the patient read English? Yes   Can the patient write in English? Yes      Is the patient Literate? Yes   What is the patient's primary language? English   Does the patient need interpretation services? No        Psychosocial History     Does patient have any emotional issues? No   If, yes please explain:     Does patient have a psychiatric history? No   If, yes please explain:     Is patient currently taking any psychiatric medication? No   If yes, please list medications:    Is patient currently in therapy or attending support groups? No   Where is the patient currently in therapy?    Therapist/Counselor Name:    Therapist/Counselor Phone #:        Alcohol/Drug Use/Abuse History     Alcohol Use: Social History     Substance and Sexual Activity   Alcohol Use No    Frequency: Never      Tobacco Use: Social History     Tobacco Use   Smoking Status Never Smoker   Smokeless Tobacco Never Used      Drug Use: Social History     Substance and Sexual Activity   Drug Use No          Coping Skills     How is the patient currently coping with their diagnosis? Pt. States that she is doing well. She states that her family is very supportive and involved in her care and pt. Also reports a strong aiden and prayer that have helped her thru recent illness.   Is the patient open/receptive to psychosocial intervention? Yes   Has the patient experienced any significant losses in his/her life? Yes      If yes, please explain: Sister passed recently from CHF   What are the patient's identified needs? None noted   Goals: Successful transplant    Interview Behavior: Appropriate    Suitability for Transplant: yes   Additional Comments: Pt. And daughter aware of need for 24 hr caregiver for 30 days post transplant. Daughter will be caregiver. Caregiver agreement signed.

## 2019-02-21 LAB
CHAGAS AB, DONOR EVAL (BLOOD CENTER): NORMAL
CMV AB TOTAL, DONOR EVAL (BLOOD CENTER): REACTIVE
HEPATITIS B CORE  AB, DONOR EVAL, (BLOOD CENTER): NORMAL
HEPATITIS B SURFACE AG, DONOR EVAL, (BLOOD CENTER): NORMAL
HEPATITIS C ANTIBODY,DONOR EVAL (BLOOD CENTER): NORMAL
HGB A2 MFR BLD HPLC: 3.1 %
HGB FRACT BLD ELPH-IMP: NORMAL
HGB FRACT BLD ELPH-IMP: NORMAL
HIV1/2 AG/AB, DONOR EVAL (BLOOD CENTER): NORMAL
HTLV I/II ANTIBODY, DONOR EVAL (BLOOD EVAL): NORMAL
NAT PANEL, DONOR EVAL (BLOOD CENTER): NORMAL
RPR, DONOR EVAL (BLOOD CENTER): REACTIVE

## 2019-02-25 ENCOUNTER — OFFICE VISIT (OUTPATIENT)
Dept: HEMATOLOGY/ONCOLOGY | Facility: CLINIC | Age: 64
End: 2019-02-25
Payer: MEDICARE

## 2019-02-25 VITALS
SYSTOLIC BLOOD PRESSURE: 131 MMHG | HEART RATE: 73 BPM | HEIGHT: 64 IN | BODY MASS INDEX: 31.88 KG/M2 | RESPIRATION RATE: 16 BRPM | OXYGEN SATURATION: 99 % | DIASTOLIC BLOOD PRESSURE: 63 MMHG | WEIGHT: 186.75 LBS | TEMPERATURE: 98 F

## 2019-02-25 DIAGNOSIS — R91.1 PULMONARY NODULE: ICD-10-CM

## 2019-02-25 DIAGNOSIS — C90.00 MULTIPLE MYELOMA, REMISSION STATUS UNSPECIFIED: Primary | ICD-10-CM

## 2019-02-25 DIAGNOSIS — Z76.82 STEM CELL TRANSPLANT CANDIDATE: ICD-10-CM

## 2019-02-25 PROCEDURE — 99215 PR OFFICE/OUTPT VISIT, EST, LEVL V, 40-54 MIN: ICD-10-PCS | Mod: S$PBB,,, | Performed by: INTERNAL MEDICINE

## 2019-02-25 PROCEDURE — 99214 OFFICE O/P EST MOD 30 MIN: CPT | Mod: PBBFAC | Performed by: INTERNAL MEDICINE

## 2019-02-25 PROCEDURE — 99215 OFFICE O/P EST HI 40 MIN: CPT | Mod: S$PBB,,, | Performed by: INTERNAL MEDICINE

## 2019-02-25 PROCEDURE — 99999 PR PBB SHADOW E&M-EST. PATIENT-LVL IV: CPT | Mod: PBBFAC,,, | Performed by: INTERNAL MEDICINE

## 2019-02-25 PROCEDURE — 99999 PR PBB SHADOW E&M-EST. PATIENT-LVL IV: ICD-10-PCS | Mod: PBBFAC,,, | Performed by: INTERNAL MEDICINE

## 2019-02-25 RX ORDER — GLYBURIDE 5 MG/1
5 TABLET ORAL
Status: ON HOLD | COMMUNITY
Start: 2013-06-27 | End: 2019-04-02 | Stop reason: HOSPADM

## 2019-02-25 RX ORDER — VALSARTAN AND HYDROCHLOROTHIAZIDE 160; 12.5 MG/1; MG/1
1 TABLET, FILM COATED ORAL
Status: ON HOLD | COMMUNITY
Start: 2019-02-22 | End: 2019-03-14

## 2019-02-25 NOTE — PROGRESS NOTES
SECTION OF HEMATOLOGY AND BONE MARROW TRANSPLANT  Return  Patient Visit   02/26/2019  Referred by:  No ref. provider found  Referred for:myeloma, SCT eval     CHIEF COMPLAINT: No chief complaint on file.      HISTORY OF PRESENT ILLNESS:   63 y.o. female with pmh as below referred dec 2018  for SCT evaluation for MM; with regard to oncologic history; IgG kappa MM; ISS 2; standard risk cytogenetics; with lytic bone lesions at presentation; initiated VRd (21 day cycle) with Dr. Juan at [facility]; currently mid cycle 5 .   Completed pre transplant staging showing   Achieved VGPR and completed pre transplant eval with no overt contraindications to transplant.    Presents for pre transplant consent signing. Comes with daughter who will be primary caretaker.  Denies fever, chills, nightsweats, bleeding, brusing, lymphadenopathy, signs/symptoms of splenomegaly.      PAST MEDICAL HISTORY:   Past Medical History:   Diagnosis Date    Depression     DM2 (diabetes mellitus, type 2)     Glaucoma     HTN (hypertension)     Myeloma     Therapy     after mother's death       PAST SURGICAL HISTORY:   Past Surgical History:   Procedure Laterality Date    Biopsy-bone marrow Left 2/7/2019    Performed by Saúl Alva MD at [facility] OR [address] [place] [address]       PAST SOCIAL HISTORY:   reports that  has never smoked. she has never used smokeless tobacco. She reports that she does not drink alcohol or use drugs.    FAMILY HISTORY:  History reviewed. No pertinent family history.    CURRENT MEDICATIONS:   Current Outpatient Medications   Medication Sig    acyclovir (ZOVIRAX) 400 MG tablet Take 400 mg by mouth 2 (two) times daily.     aspirin (ECOTRIN) 81 MG EC tablet Take 1 tablet by mouth ever night    dexamethasone (DECADRON) 4 MG Tab Take 40 mg by mouth every Thursday. On day 1, 8, 15 of chemotherapy    gabapentin (NEURONTIN) 300 MG capsule Take 300 mg by mouth 3 (three) times daily.    glipiZIDE (GLUCOTROL) 10 MG tablet Take 1 tablet  by mouth daily    glyBURIDE (DIABETA) 5 MG tablet Take 5 mg by mouth.    JANUMET  mg per tablet Take 1 tablet twice a day with meals    losartan-hydrochlorothiazide 100-12.5 mg (HYZAAR) 100-12.5 mg Tab Take 1 tablet by mouth daily    montelukast (SINGULAIR) 10 mg tablet Take 10 mg by mouth once daily.    multivit,iron,minerals/lutein (CENTRUM SILVER ULTRA WOMEN'S ORAL) Take 1 tablet by mouth once daily.    ondansetron (ZOFRAN-ODT) 8 MG TbDL Take 8 mg by mouth every 8 (eight) hours as needed (nausea).     PAZEO 0.7 % Drop Place 1 drop into both eyes every morning.    prochlorperazine (COMPAZINE) 10 MG tablet Take 10 mg by mouth 4 (four) times daily as needed (nausea/vomiting).     tiZANidine (ZANAFLEX) 4 MG tablet Take 4 mg by mouth 3 (three) times daily as needed (back muscle spasms).     valsartan-hydrochlorothiazide (DIOVAN-HCT) 160-12.5 mg per tablet Take 1 tablet by mouth.     No current facility-administered medications for this visit.      ALLERGIES:   Review of patient's allergies indicates:  No Known Allergies          REVIEW OF SYSTEMS:   General ROS: negative  Psychological ROS: negative  Ophthalmic ROS: negative  ENT ROS: negative  Allergy and Immunology ROS: negative  Hematological and Lymphatic ROS: negative  Endocrine ROS: negative  Respiratory ROS: negative  Cardiovascular ROS: negative  Gastrointestinal ROS: negative  Genito-Urinary ROS: negative  Musculoskeletal ROS: negative  Neurological ROS: see HPI  Dermatological ROS: negative    PHYSICAL EXAM:   Vitals:    02/25/19 1312   BP: 131/63   Pulse: 73   Resp: 16   Temp: 98.4 °F (36.9 °C)       General - well developed, well nourished, no apparent distress  Head & Face - no sinus tenderness  Eyes - normal conjunctivae and lids   ENT - normal external auditory canals and tympanic membranes bilaterally oropharynx clear,  Normal dentition and gums  Neck - normal thyroid  Chest and Lung - normal respiratory effort, clear to auscultation  bilaterally   Cardiovascular - RRR with no MGR, normal S1 and S2; no pedal edema  Abdomen -  soft, nontender, no palpable hepatomegaly or splenomegaly  Lymph - no palpable lymphadenopathy  Extremities - unremarkable nails and digits  Heme - no bruising, petechiae, pallor  Skin - no rashes or lesions  Psych - appropriate mood and affect      ECOG Performance Status: (foot note - ECOG PS provided by Eastern Cooperative Oncology Group) 1 - Symptomatic but completely ambulatory    Karnofsky Performance Score:  80%- Normal Activity with Effort: Some Symptoms of Disease  DATA:   Lab Results   Component Value Date    WBC 7.80 02/19/2019    HGB 11.2 (L) 02/19/2019    HCT 36.3 (L) 02/19/2019    MCV 85 02/19/2019     02/19/2019     Gran # (ANC)   Date Value Ref Range Status   02/19/2019 5.0 1.8 - 7.7 K/uL Final     Gran%   Date Value Ref Range Status   02/19/2019 63.9 38.0 - 73.0 % Final     CMP  Sodium   Date Value Ref Range Status   02/19/2019 142 136 - 145 mmol/L Final     Potassium   Date Value Ref Range Status   02/19/2019 4.0 3.5 - 5.1 mmol/L Final     Chloride   Date Value Ref Range Status   02/19/2019 107 95 - 110 mmol/L Final     CO2   Date Value Ref Range Status   02/19/2019 28 23 - 29 mmol/L Final     Glucose   Date Value Ref Range Status   02/19/2019 73 70 - 110 mg/dL Final     BUN, Bld   Date Value Ref Range Status   02/19/2019 16 8 - 23 mg/dL Final     Creatinine   Date Value Ref Range Status   02/19/2019 0.9 0.5 - 1.4 mg/dL Final     Calcium   Date Value Ref Range Status   02/19/2019 9.0 8.7 - 10.5 mg/dL Final     Total Protein   Date Value Ref Range Status   02/19/2019 6.8 6.0 - 8.4 g/dL Final     Albumin   Date Value Ref Range Status   02/19/2019 3.5 3.5 - 5.2 g/dL Final     Total Bilirubin   Date Value Ref Range Status   02/19/2019 0.2 0.1 - 1.0 mg/dL Final     Comment:     For infants and newborns, interpretation of results should be based  on gestational age, weight and in agreement with  clinical  observations.  Premature Infant recommended reference ranges:  Up to 24 hours.............<8.0 mg/dL  Up to 48 hours............<12.0 mg/dL  3-5 days..................<15.0 mg/dL  6-29 days.................<15.0 mg/dL       Alkaline Phosphatase   Date Value Ref Range Status   02/19/2019 94 55 - 135 U/L Final     AST   Date Value Ref Range Status   02/19/2019 12 10 - 40 U/L Final     ALT   Date Value Ref Range Status   02/19/2019 20 10 - 44 U/L Final     Anion Gap   Date Value Ref Range Status   02/19/2019 7 (L) 8 - 16 mmol/L Final     eGFR if    Date Value Ref Range Status   02/19/2019 >60.0 >60 mL/min/1.73 m^2 Final   02/19/2019 >60.0 >60 mL/min/1.73 m^2 Final     eGFR if non    Date Value Ref Range Status   02/19/2019 >60.0 >60 mL/min/1.73 m^2 Final     Comment:     Calculation used to obtain the estimated glomerular filtration  rate (eGFR) is the CKD-EPI equation.      02/19/2019 >60.0 >60 mL/min/1.73 m^2 Final     Comment:     Calculation used to obtain the estimated glomerular filtration  rate (eGFR) is the CKD-EPI equation.        [place] Free Light Chains   Date Value Ref Range Status   02/19/2019 1.12 0.33 - 1.94 mg/dL Final     Lambda Free Light Chains   Date Value Ref Range Status   02/19/2019 1.50 0.57 - 2.63 mg/dL Final     Kappa/Lambda FLC Ratio   Date Value Ref Range Status   02/19/2019 0.75 0.26 - 1.65 Final     IgG - Serum   Date Value Ref Range Status   02/19/2019 824 650 - 1600 mg/dL Final     Comment:     IgG Cord Blood Reference Range: 650-1600 mg/dL.     IgA   Date Value Ref Range Status   02/19/2019 164 40 - 350 mg/dL Final     Comment:     IgA Cord Blood Reference Range: <5 mg/dL.     IgM   Date Value Ref Range Status   02/19/2019 59 50 - 300 mg/dL Final     Comment:     IgM Cord Blood Reference Range: <25 mg/dL.     Pathologist Interpretation SPE   Pathologist Interpretation SPE   Collected: 02/19/19 1139   Resulting lab: [facility]   Value: REVIEWED   Comment: Electronically reviewed and signed by:   Bina Mejia M.D.   Signed on 02/20/19 at 17:33   Normal total protein.   No paraproteins are detected.   See corresponding JOJO.      Pathologist Interpretation JOJO   Pathologist Interpretation JOJO   Collected: 02/19/19 1139   Resulting lab: [facility]   Value: REVIEWED   Comment: Electronically reviewed and signed by:   Bina Mejia M.D.   Signed on 02/20/19 at 17:34   An IgG kappa specific monoclonal protein is present in beta.  This is   not seen on the corresponding SPE and therefore cannot be measured.        SPECIMEN -2/7/2019   1) Bone marrow clot, left iliac crest.  2) Bone marrow core biopsy, left iliac crest.  3) Bone marrow aspirate slides.  Supplemental Diagnosis  Please also see cytogenetic karyotype and FISH results reported in Epic from [facility], [address] [place] [address], which give, in part, the following results:  "46,XX[20], No clonal abnormality was apparent."  "Plasma cell proliferative disorder, FISH:  An insufficient number of plasma cells was available for this FISH test . This result does not exclude the presence  of a plasma cell proliferative disorder.  CD20  (Electronically Signed: 2019-02-14 12:17:10 )  Diagnosed by: Sun Kaba M.D.  FINAL PATHOLOGIC DIAGNOSIS  BONE MARROW, LEFT ILIAC CREST, ASPIRATE, CLOT, AND CORE BIOPSY:  --Cellular marrow, 30-60%, with trilineage hematopoiesis, see comment  --Mild plasmacytosis by  immunohistochemical stain, estimated at 5% of total cellularity, polytypic by flow  cytometry, see comment  --No increase in blasts  --Adequate megakaryocytes  --Stainable iron is present and appears adequate  COMMENT: Concomitantly submitted flow cytometric analysis detects no diagnostic abnormal hematopoietic  population. B cells are polyclonal and T cells are immunophenotypically unremarkable . " The blast gate is not  increased. A population CD38 bright plasma cells shows polytypic cytoplasmic light chain expression and no  diagnostic immunophenotypic abnormalities.  This patient has a reported prior history of myeloma. There is no definitive evidence of myeloma on the current  study. Correlate clinically and with any available cytogenetic and molecular studies. Additionally, correlation with  the patient's prior diagnostic material and with current clinical studies such as serum protein electrophoresis and  immunofixation are highly recommended.  Diagnosed by: Sun Kaba M.D.  (Electronically Signed: 2019-02-11 11:50:27)  Microscopic Examination  A 63-year-old female with recent diagnosis of myeloma, under therapy at outside hospital  Current CBC shows normocytic anemia 9.5/31.6; normal white count and diff; platelets 137  Bone marrow aspirates are cellular and for review. Megakaryocytes are present and appear acceptable in number  and morphology overall. Myeloid cells show maturation through segmented granulocyte stage with no increase in  blasts. Erythroid cells also show maturation with an overall myeloid to erythroid ratio of 1:1.1. Populations of  eosinophils monocytes, plasma cells, and small lymphocytes are present, none of which appear increased in number.  An iron stained aspirate smear shows numerous spicules with the presence of stainable iron. No increase in ring  sideroblasts is seen.  Blasts 1%  Promyelocytes 2.5%  Myelocytes 5%  Metamyelocytes 9%  Bands 5.5%  Neutrophils 11.5%  Eosinophils 3%  Monocytes 4%  Plasma cells 1%  Lymphocytes 11.5%  Erythroid precursors 46%  The bone marrow and core biopsy sections are acceptable for review. Overall cellularity is approximately 30-60%,  acceptable for age. Megakaryocytes are present and appear acceptable in number with no significant clustering seen.  Myeloid cells show maturation through the segmented granulocyte stage with obvious increase  in blasts. Scattered  erythroid islands are present. No obvious lymphoid aggregates are identified on H&E sections and no atypical infiltrate  is seen.  Iron stains of the clot and core biopsy sections show the presence of stainable iron which appears adequate. A short  panel of immunohistochemical stains is performed for greater sensitivity and architectural evaluation. CD3 highlights  scattered interstitial lymphocytes with a few loose aggregates noted. CD20 highlights scattered predominantly  individual small lymphocytes with no significant clustering seen. CD56 highlights only rare individual cells.   highlights scattered individual and small clusters of plasma cells, overall estimated at 5% of total cellularity. All  controls appear appropriate.    ASSESSMENT AND PLAN:   Encounter Diagnoses   Name Primary?    Multiple myeloma, remission status unspecified Yes    Stem cell transplant candidate     Pulmonary nodule      -IgG kappa MM; ISS 2; standard risk cytogenetics; with lytic bone lesions at presentation; initiate VRd (21 day cycle) with Dr. Juan at [facility]; currently mid cycle  5    -she has achieved VGPR  -pre transplant eval with no contraindication for transplant   -never smoker so plan to follow up LL nodule at repeat PET scan at day +100  -spent 60 minutes on this case, half of which was spent face to face with patient and daughter/caretaker discussing rationale, alternatives, risks of central line placement,  Mobilization/colleection, melphalan 200mg/m2 autoSCT; discussed estimated 1% risk of transplant related mortality  -discussed need for appt and medication compliance following transplant  -discussed need for full time caretaker through day +30  -discussed need to stay within 60 miles of transplant center through day +30  -patient and caretaker expressed understanding; all written consents obtained  -likely revlimid maintenance at day +100    Follow Up: per BMT coordinator   Darrius  MD Nida  Hematology/Oncology/Bone Marrow Transplant

## 2019-02-26 ENCOUNTER — TELEPHONE (OUTPATIENT)
Dept: HEMATOLOGY/ONCOLOGY | Facility: CLINIC | Age: 64
End: 2019-02-26

## 2019-02-26 NOTE — TELEPHONE ENCOUNTER
Saúl Alva MD sent to Kalli Beckham RN             Actually does not.  Will just fu on nodule post transplant      Previous Messages      ----- Message -----   From: Kalli Beckham RN   Sent: 2/21/2019  10:06 AM   To: Saúl Alva MD     Okay will do.     ----- Message -----   From: Saúl Alva MD   Sent: 2/21/2019   9:48 AM   To: Kalli Beckham RN     Needs urgent referral to cardiothoracic tumor board pre transplant for pet avid pulm nodule before transplant   ----- Message -----   From: Kalli Beckham RN   Sent: 2/20/2019   4:48 PM   To: MD Dr. Nida Rodriguez please advise on PET findings.  She is doing her eval currently.

## 2019-02-28 DIAGNOSIS — Z52.011 AUTOLOGOUS DONOR OF STEM CELLS: ICD-10-CM

## 2019-02-28 DIAGNOSIS — Z76.82 STEM CELL TRANSPLANT CANDIDATE: ICD-10-CM

## 2019-02-28 DIAGNOSIS — C90.00 MULTIPLE MYELOMA: Primary | ICD-10-CM

## 2019-02-28 DIAGNOSIS — D69.6 THROMBOCYTOPENIA: ICD-10-CM

## 2019-03-10 ENCOUNTER — INFUSION (OUTPATIENT)
Dept: INFUSION THERAPY | Facility: HOSPITAL | Age: 64
End: 2019-03-10
Attending: INTERNAL MEDICINE
Payer: MEDICARE

## 2019-03-10 DIAGNOSIS — C90.00 MULTIPLE MYELOMA, REMISSION STATUS UNSPECIFIED: Primary | ICD-10-CM

## 2019-03-10 PROCEDURE — 96372 THER/PROPH/DIAG INJ SC/IM: CPT

## 2019-03-10 PROCEDURE — 63600175 PHARM REV CODE 636 W HCPCS: Mod: JG | Performed by: NURSE PRACTITIONER

## 2019-03-10 RX ADMIN — FILGRASTIM 960 MCG: 480 INJECTION, SOLUTION INTRAVENOUS; SUBCUTANEOUS at 08:03

## 2019-03-10 NOTE — NURSING
Patient in clinic today for SQ Neupogen injections as part of stem cell mobilization. Patient has no c/o pain or discomfort. No new symptoms since yesterday. Patient understands to return to clinic tomorrow morning for another dose of Neupogen.

## 2019-03-11 ENCOUNTER — INFUSION (OUTPATIENT)
Dept: INFUSION THERAPY | Facility: HOSPITAL | Age: 64
End: 2019-03-11
Attending: INTERNAL MEDICINE
Payer: MEDICARE

## 2019-03-11 DIAGNOSIS — C90.00 MULTIPLE MYELOMA, REMISSION STATUS UNSPECIFIED: Primary | ICD-10-CM

## 2019-03-11 PROCEDURE — 96372 THER/PROPH/DIAG INJ SC/IM: CPT

## 2019-03-11 PROCEDURE — 63600175 PHARM REV CODE 636 W HCPCS: Mod: JG | Performed by: NURSE PRACTITIONER

## 2019-03-11 RX ADMIN — FILGRASTIM 960 MCG: 480 INJECTION, SOLUTION INTRAVENOUS; SUBCUTANEOUS at 08:03

## 2019-03-12 ENCOUNTER — INFUSION (OUTPATIENT)
Dept: INFUSION THERAPY | Facility: HOSPITAL | Age: 64
End: 2019-03-12
Attending: INTERNAL MEDICINE
Payer: MEDICARE

## 2019-03-12 DIAGNOSIS — C90.00 MULTIPLE MYELOMA, REMISSION STATUS UNSPECIFIED: Primary | ICD-10-CM

## 2019-03-12 PROCEDURE — 63600175 PHARM REV CODE 636 W HCPCS: Mod: JG | Performed by: NURSE PRACTITIONER

## 2019-03-12 PROCEDURE — 96372 THER/PROPH/DIAG INJ SC/IM: CPT

## 2019-03-12 RX ADMIN — FILGRASTIM 960 MCG: 480 INJECTION, SOLUTION INTRAVENOUS; SUBCUTANEOUS at 08:03

## 2019-03-13 ENCOUNTER — INFUSION (OUTPATIENT)
Dept: INFUSION THERAPY | Facility: HOSPITAL | Age: 64
End: 2019-03-13
Attending: INTERNAL MEDICINE
Payer: MEDICARE

## 2019-03-13 DIAGNOSIS — Z52.001 DONOR OF STEM CELL: ICD-10-CM

## 2019-03-13 DIAGNOSIS — C90.00 MULTIPLE MYELOMA: Primary | ICD-10-CM

## 2019-03-13 DIAGNOSIS — Z52.011 AUTOLOGOUS DONOR OF STEM CELLS: ICD-10-CM

## 2019-03-13 DIAGNOSIS — D69.6 THROMBOCYTOPENIA: ICD-10-CM

## 2019-03-13 DIAGNOSIS — C90.00 MULTIPLE MYELOMA, REMISSION STATUS UNSPECIFIED: Primary | ICD-10-CM

## 2019-03-13 PROCEDURE — 63600175 PHARM REV CODE 636 W HCPCS: Mod: JG | Performed by: NURSE PRACTITIONER

## 2019-03-13 PROCEDURE — 96372 THER/PROPH/DIAG INJ SC/IM: CPT

## 2019-03-13 RX ADMIN — FILGRASTIM 960 MCG: 480 INJECTION, SOLUTION INTRAVENOUS; SUBCUTANEOUS at 08:03

## 2019-03-13 NOTE — NURSING
Patient here for neupogen injections-given in 2 divided doses-complains of bone pain in legs-tolerated well.

## 2019-03-14 ENCOUNTER — INFUSION (OUTPATIENT)
Dept: INFUSION THERAPY | Facility: HOSPITAL | Age: 64
End: 2019-03-14
Attending: INTERNAL MEDICINE
Payer: MEDICARE

## 2019-03-14 ENCOUNTER — HOSPITAL ENCOUNTER (OUTPATIENT)
Facility: HOSPITAL | Age: 64
Discharge: HOME | End: 2019-03-14
Attending: INTERNAL MEDICINE | Admitting: INTERNAL MEDICINE
Payer: MEDICARE

## 2019-03-14 ENCOUNTER — HOSPITAL ENCOUNTER (OUTPATIENT)
Dept: TRANSFUSION MEDICINE | Facility: HOSPITAL | Age: 64
Discharge: HOME | End: 2019-03-14
Attending: INTERNAL MEDICINE
Payer: MEDICARE

## 2019-03-14 VITALS
SYSTOLIC BLOOD PRESSURE: 158 MMHG | WEIGHT: 198 LBS | RESPIRATION RATE: 12 BRPM | TEMPERATURE: 98 F | DIASTOLIC BLOOD PRESSURE: 65 MMHG | BODY MASS INDEX: 33.8 KG/M2 | HEART RATE: 84 BPM | HEIGHT: 64 IN | OXYGEN SATURATION: 100 %

## 2019-03-14 DIAGNOSIS — Z76.82 STEM CELL TRANSPLANT CANDIDATE: ICD-10-CM

## 2019-03-14 DIAGNOSIS — Z52.011 AUTOLOGOUS DONOR OF STEM CELLS: ICD-10-CM

## 2019-03-14 DIAGNOSIS — C90.00 MULTIPLE MYELOMA: Primary | ICD-10-CM

## 2019-03-14 DIAGNOSIS — G47.01 INSOMNIA DUE TO MEDICAL CONDITION: ICD-10-CM

## 2019-03-14 DIAGNOSIS — Z52.001 DONOR OF STEM CELL: ICD-10-CM

## 2019-03-14 DIAGNOSIS — C90.00 MULTIPLE MYELOMA: ICD-10-CM

## 2019-03-14 DIAGNOSIS — R06.02 SOB (SHORTNESS OF BREATH) ON EXERTION: ICD-10-CM

## 2019-03-14 DIAGNOSIS — C90.00 MULTIPLE MYELOMA, REMISSION STATUS UNSPECIFIED: ICD-10-CM

## 2019-03-14 DIAGNOSIS — C90.00 MULTIPLE MYELOMA, REMISSION STATUS UNSPECIFIED: Primary | ICD-10-CM

## 2019-03-14 LAB
ALBUMIN SERPL BCP-MCNC: 2.8 G/DL
ALP SERPL-CCNC: 154 U/L
ALT SERPL W/O P-5'-P-CCNC: 10 U/L
ANION GAP SERPL CALC-SCNC: 13 MMOL/L
ANISOCYTOSIS BLD QL SMEAR: SLIGHT
AST SERPL-CCNC: 14 U/L
BASOPHILS # BLD AUTO: ABNORMAL K/UL
BASOPHILS NFR BLD: 0 %
BILIRUB SERPL-MCNC: 0.3 MG/DL
BUN SERPL-MCNC: 10 MG/DL
BURR CELLS BLD QL SMEAR: ABNORMAL
CA-I BLDV-SCNC: 0.84 MMOL/L
CALCIUM SERPL-MCNC: 9 MG/DL
CHLORIDE SERPL-SCNC: 101 MMOL/L
CO2 SERPL-SCNC: 29 MMOL/L
CREAT SERPL-MCNC: 0.7 MG/DL
DIFFERENTIAL METHOD: ABNORMAL
DOHLE BOD BLD QL SMEAR: PRESENT
EOSINOPHIL # BLD AUTO: ABNORMAL K/UL
EOSINOPHIL NFR BLD: 2 %
ERYTHROCYTE [DISTWIDTH] IN BLOOD BY AUTOMATED COUNT: 17.6 %
EST. GFR  (AFRICAN AMERICAN): >60 ML/MIN/1.73 M^2
EST. GFR  (NON AFRICAN AMERICAN): >60 ML/MIN/1.73 M^2
GLUCOSE SERPL-MCNC: 157 MG/DL
HCT VFR BLD AUTO: 29.2 %
HGB BLD-MCNC: 9.5 G/DL
IMM GRANULOCYTES # BLD AUTO: ABNORMAL K/UL
IMM GRANULOCYTES NFR BLD AUTO: ABNORMAL %
LYMPHOCYTES # BLD AUTO: ABNORMAL K/UL
LYMPHOCYTES NFR BLD: 4 %
MAGNESIUM SERPL-MCNC: 1.2 MG/DL
MCH RBC QN AUTO: 26.7 PG
MCHC RBC AUTO-ENTMCNC: 32.5 G/DL
MCV RBC AUTO: 82 FL
MONOCYTES # BLD AUTO: ABNORMAL K/UL
MONOCYTES NFR BLD: 1 %
NEUTROPHILS NFR BLD: 89 %
NEUTS BAND NFR BLD MANUAL: 4 %
NRBC BLD-RTO: 0 /100 WBC
OVALOCYTES BLD QL SMEAR: ABNORMAL
PHOSPHATE SERPL-MCNC: 2.2 MG/DL
PLATELET # BLD AUTO: 82 K/UL
PMV BLD AUTO: 10.7 FL
POCT GLUCOSE: 112 MG/DL (ref 70–110)
POIKILOCYTOSIS BLD QL SMEAR: SLIGHT
POLYCHROMASIA BLD QL SMEAR: ABNORMAL
POTASSIUM SERPL-SCNC: 2.6 MMOL/L
PROT SERPL-MCNC: 5.2 G/DL
RBC # BLD AUTO: 3.56 M/UL
SCHISTOCYTES BLD QL SMEAR: ABNORMAL
SODIUM SERPL-SCNC: 143 MMOL/L
TOXIC GRANULES BLD QL SMEAR: PRESENT
WBC # BLD AUTO: 33.82 K/UL

## 2019-03-14 PROCEDURE — 25000003 PHARM REV CODE 250: Performed by: STUDENT IN AN ORGANIZED HEALTH CARE EDUCATION/TRAINING PROGRAM

## 2019-03-14 PROCEDURE — 96372 THER/PROPH/DIAG INJ SC/IM: CPT

## 2019-03-14 PROCEDURE — 85027 COMPLETE CBC AUTOMATED: CPT

## 2019-03-14 PROCEDURE — 38206 PR PROG CELL HARVEST,TRANSPLANT,AUTOLOGOUS: ICD-10-PCS | Mod: ,,, | Performed by: PATHOLOGY

## 2019-03-14 PROCEDURE — 63600175 PHARM REV CODE 636 W HCPCS: Performed by: PATHOLOGY

## 2019-03-14 PROCEDURE — 38207 CRYOPRESERVE STEM CELLS: CPT

## 2019-03-14 PROCEDURE — 84100 ASSAY OF PHOSPHORUS: CPT

## 2019-03-14 PROCEDURE — 25000003 PHARM REV CODE 250: Performed by: PATHOLOGY

## 2019-03-14 PROCEDURE — 38206 HARVEST AUTO STEM CELLS: CPT

## 2019-03-14 PROCEDURE — 36415 COLL VENOUS BLD VENIPUNCTURE: CPT

## 2019-03-14 PROCEDURE — 38214 VOLUME DEPLETE OF HARVEST: CPT

## 2019-03-14 PROCEDURE — 82330 ASSAY OF CALCIUM: CPT

## 2019-03-14 PROCEDURE — 83735 ASSAY OF MAGNESIUM: CPT | Mod: 91

## 2019-03-14 PROCEDURE — 85007 BL SMEAR W/DIFF WBC COUNT: CPT

## 2019-03-14 PROCEDURE — 25000003 PHARM REV CODE 250: Performed by: NURSE PRACTITIONER

## 2019-03-14 PROCEDURE — 38206 HARVEST AUTO STEM CELLS: CPT | Mod: ,,, | Performed by: PATHOLOGY

## 2019-03-14 PROCEDURE — 80053 COMPREHEN METABOLIC PANEL: CPT

## 2019-03-14 PROCEDURE — 63600175 PHARM REV CODE 636 W HCPCS: Performed by: STUDENT IN AN ORGANIZED HEALTH CARE EDUCATION/TRAINING PROGRAM

## 2019-03-14 PROCEDURE — 82962 GLUCOSE BLOOD TEST: CPT

## 2019-03-14 PROCEDURE — 63600175 PHARM REV CODE 636 W HCPCS: Mod: JG | Performed by: NURSE PRACTITIONER

## 2019-03-14 RX ORDER — OXYCODONE HYDROCHLORIDE 5 MG/1
5 TABLET ORAL EVERY 6 HOURS PRN
Status: DISCONTINUED | OUTPATIENT
Start: 2019-03-14 | End: 2019-03-14 | Stop reason: HOSPADM

## 2019-03-14 RX ORDER — SODIUM CHLORIDE 9 MG/ML
INJECTION, SOLUTION INTRAVENOUS CONTINUOUS
Status: DISCONTINUED | OUTPATIENT
Start: 2019-03-14 | End: 2019-03-14 | Stop reason: HOSPADM

## 2019-03-14 RX ORDER — FENTANYL CITRATE 50 UG/ML
50 INJECTION, SOLUTION INTRAMUSCULAR; INTRAVENOUS
Status: DISCONTINUED | OUTPATIENT
Start: 2019-03-14 | End: 2019-03-14 | Stop reason: HOSPADM

## 2019-03-14 RX ORDER — MIDAZOLAM HYDROCHLORIDE 1 MG/ML
INJECTION INTRAMUSCULAR; INTRAVENOUS CODE/TRAUMA/SEDATION MEDICATION
Status: COMPLETED | OUTPATIENT
Start: 2019-03-14 | End: 2019-03-14

## 2019-03-14 RX ORDER — FENTANYL CITRATE 50 UG/ML
INJECTION, SOLUTION INTRAMUSCULAR; INTRAVENOUS CODE/TRAUMA/SEDATION MEDICATION
Status: COMPLETED | OUTPATIENT
Start: 2019-03-14 | End: 2019-03-14

## 2019-03-14 RX ORDER — CEFAZOLIN SODIUM 1 G/50ML
SOLUTION INTRAVENOUS
Status: COMPLETED | OUTPATIENT
Start: 2019-03-14 | End: 2019-03-14

## 2019-03-14 RX ORDER — LANOLIN ALCOHOL/MO/W.PET/CERES
800 CREAM (GRAM) TOPICAL ONCE AS NEEDED
Status: COMPLETED | OUTPATIENT
Start: 2019-03-14 | End: 2019-03-14

## 2019-03-14 RX ORDER — ACETAMINOPHEN 325 MG/1
650 TABLET ORAL ONCE AS NEEDED
Status: DISCONTINUED | OUTPATIENT
Start: 2019-03-14 | End: 2019-03-15 | Stop reason: HOSPADM

## 2019-03-14 RX ORDER — MIDAZOLAM HYDROCHLORIDE 1 MG/ML
2 INJECTION INTRAMUSCULAR; INTRAVENOUS
Status: DISCONTINUED | OUTPATIENT
Start: 2019-03-14 | End: 2019-03-14 | Stop reason: HOSPADM

## 2019-03-14 RX ORDER — POTASSIUM CHLORIDE 20 MEQ/1
40 TABLET, EXTENDED RELEASE ORAL ONCE AS NEEDED
Status: COMPLETED | OUTPATIENT
Start: 2019-03-14 | End: 2019-03-14

## 2019-03-14 RX ORDER — SODIUM,POTASSIUM PHOSPHATES 280-250MG
2 POWDER IN PACKET (EA) ORAL ONCE AS NEEDED
Status: COMPLETED | OUTPATIENT
Start: 2019-03-14 | End: 2019-03-14

## 2019-03-14 RX ORDER — DIPHENHYDRAMINE HCL 25 MG
25 CAPSULE ORAL ONCE AS NEEDED
Status: DISCONTINUED | OUTPATIENT
Start: 2019-03-14 | End: 2019-03-15 | Stop reason: HOSPADM

## 2019-03-14 RX ORDER — ALBUMIN HUMAN 50 G/1000ML
12.5 SOLUTION INTRAVENOUS ONCE
Status: DISCONTINUED | OUTPATIENT
Start: 2019-03-15 | End: 2019-03-15

## 2019-03-14 RX ORDER — HEPARIN SODIUM 1000 [USP'U]/ML
3200 INJECTION, SOLUTION INTRAVENOUS; SUBCUTANEOUS ONCE
Status: COMPLETED | OUTPATIENT
Start: 2019-03-14 | End: 2019-03-15

## 2019-03-14 RX ADMIN — MIDAZOLAM HYDROCHLORIDE 1 MG: 1 INJECTION, SOLUTION INTRAMUSCULAR; INTRAVENOUS at 01:03

## 2019-03-14 RX ADMIN — FENTANYL CITRATE 50 MCG: 50 INJECTION, SOLUTION INTRAMUSCULAR; INTRAVENOUS at 01:03

## 2019-03-14 RX ADMIN — CEFAZOLIN SODIUM 1 G: 1 SOLUTION INTRAVENOUS at 01:03

## 2019-03-14 RX ADMIN — CALCIUM GLUCONATE 4000 MG: 94 INJECTION, SOLUTION INTRAVENOUS at 05:03

## 2019-03-14 RX ADMIN — POTASSIUM & SODIUM PHOSPHATES POWDER PACK 280-160-250 MG 2 PACKET: 280-160-250 PACK at 11:03

## 2019-03-14 RX ADMIN — OXYCODONE HYDROCHLORIDE 5 MG: 5 TABLET ORAL at 03:03

## 2019-03-14 RX ADMIN — FILGRASTIM 960 MCG: 480 INJECTION, SOLUTION INTRAVENOUS; SUBCUTANEOUS at 04:03

## 2019-03-14 RX ADMIN — POTASSIUM CHLORIDE 40 MEQ: 1500 TABLET, EXTENDED RELEASE ORAL at 11:03

## 2019-03-14 RX ADMIN — MAGNESIUM OXIDE TAB 400 MG (241.3 MG ELEMENTAL MG) 800 MG: 400 (241.3 MG) TAB at 11:03

## 2019-03-14 NOTE — PLAN OF CARE
Warm blankets placed on patient and warm packs placed on bilateral legs for sciatic nerve pain. Dr. Mitchell is at the bedside and obtained consent from patient for procedure. The patient verbalizes that if any more consents need to be signed she gives permission for her daughter to sign them.

## 2019-03-14 NOTE — PLAN OF CARE
This writer spoke to Gracy in IR regarding the need for an admit order. The patient was also inquiring regarding time frame before procedure. Gracy informed me, due to an emergency, the patient's start time has been delayed. This writer will updated the patient.

## 2019-03-14 NOTE — PLAN OF CARE
Patient assigned to this writer by charge nurse. The patient is in the waiting room but will be escorted to [facility] room 10.This writer is completing a chart review and reviewing MD orders.

## 2019-03-14 NOTE — H&P
Radiology History & Physical      SUBJECTIVE:     Chief Complaint: Myeloma    History of Present Illness:  Lori Rivero is a 63 y.o. female who presents for Permacath placement.    Past Medical History:   Diagnosis Date    Depression     DM2 (diabetes mellitus, type 2)     Glaucoma     HTN (hypertension)     Myeloma     Therapy     after mother's death     Past Surgical History:   Procedure Laterality Date    Biopsy-bone marrow Left 2/7/2019    Performed by Saúl Alva MD at [facility] OR [address] [place] [address]       Home Meds:   Prior to Admission medications    Medication Sig Start Date End Date Taking? Authorizing Provider   acyclovir (ZOVIRAX) 400 MG tablet Take 400 mg by mouth 2 (two) times daily.  12/3/18  Yes Historical Provider, MD   aspirin (ECOTRIN) 81 MG EC tablet Take 1 tablet by mouth ever night 11/7/18  Yes Historical Provider, MD   dexamethasone (DECADRON) 4 MG Tab Take 40 mg by mouth every Thursday. On day 1, 8, 15 of chemotherapy 11/19/18  Yes Historical Provider, MD   gabapentin (NEURONTIN) 300 MG capsule Take 300 mg by mouth 3 (three) times daily.   Yes Historical Provider, MD   glipiZIDE (GLUCOTROL) 10 MG tablet Take 1 tablet by mouth daily 11/25/18  Yes Historical Provider, MD   glyBURIDE (DIABETA) 5 MG tablet Take 5 mg by mouth. 6/27/13  Yes Historical Provider, MD   JANUMET  mg per tablet Take 1 tablet twice a day with meals 11/26/18  Yes Historical Provider, MD   losartan-hydrochlorothiazide 100-12.5 mg (HYZAAR) 100-12.5 mg Tab Take 1 tablet by mouth daily 9/21/18  Yes Historical Provider, MD   multivit,iron,minerals/lutein (CENTRUM SILVER ULTRA WOMEN'S ORAL) Take 1 tablet by mouth once daily.   Yes Historical Provider, MD   tiZANidine (ZANAFLEX) 4 MG tablet Take 4 mg by mouth 3 (three) times daily as needed (back muscle spasms).    Yes Historical Provider, MD   ondansetron (ZOFRAN-ODT) 8 MG TbDL Take 8 mg by mouth every 8 (eight) hours as needed (nausea).  10/2/18   Historical  Provider, MD   PAZEO 0.7 % Drop Place 1 drop into both eyes every morning. 9/22/18   Historical Provider, MD   prochlorperazine (COMPAZINE) 10 MG tablet Take 10 mg by mouth 4 (four) times daily as needed (nausea/vomiting).  10/2/18   Historical Provider, MD   montelukast (SINGULAIR) 10 mg tablet Take 10 mg by mouth once daily. 12/3/18 3/14/19  Historical Provider, MD   valsartan-hydrochlorothiazide (DIOVAN-HCT) 160-12.5 mg per tablet Take 1 tablet by mouth. 2/22/19 3/14/19  Historical Provider, MD     Anticoagulants/Antiplatelets: no anticoagulation    Allergies: Review of patient's allergies indicates:  No Known Allergies  Sedation History:  no adverse reactions    Review of Systems:   Hematological: no known coagulopathies  Respiratory: no shortness of breath  Cardiovascular: no chest pain  Gastrointestinal: no abdominal pain  Genito-Urinary: no dysuria  Musculoskeletal: back and leg pain  Neurological: no TIA or stroke symptoms         OBJECTIVE:     Vital Signs (Most Recent)  Temp: 98.4 °F (36.9 °C) (03/14/19 1053)  Pulse: 80 (03/14/19 1053)  Resp: 20 (03/14/19 1053)  BP: (!) 177/79 (03/14/19 1053)  SpO2: 97 % (03/14/19 1053)    Physical Exam:  ASA: 2  Mallampati: 2    General: no acute distress  Mental Status: alert and oriented to person, place and time  HEENT: normocephalic, atraumatic  Chest: unlabored breathing  Heart: regular heart rate  Abdomen: nondistended  Extremity: moves all extremities    Laboratory  Lab Results   Component Value Date    INR 1.0 03/13/2019       Lab Results   Component Value Date    WBC 45.79 (H) 03/14/2019    HGB 11.0 (L) 03/14/2019    HCT 35.7 (L) 03/14/2019    MCV 85 03/14/2019     03/14/2019      Lab Results   Component Value Date     03/14/2019     03/14/2019    K 3.3 (L) 03/14/2019     03/14/2019    CO2 25 03/14/2019    BUN 13 03/14/2019    CREATININE 0.7 03/14/2019    CALCIUM 9.1 03/14/2019    MG 1.7 03/14/2019    ALT 13 03/14/2019    AST 19  "03/14/2019    ALBUMIN 3.5 03/14/2019    BILITOT 0.5 03/14/2019       ASSESSMENT/PLAN:     Sedation Plan: moderate  Patient will undergo Permacath placement.    Tima Mitchell MD (Buck)  Radiology PGY-5  [phone]    "

## 2019-03-14 NOTE — PLAN OF CARE
1123 This writer spoke to IR regarding the need for orders, including admission order. I notified them that the patient has been pre-op-ed.

## 2019-03-14 NOTE — DISCHARGE INSTRUCTIONS
For scheduling: Call Giselle at [phone]    For questions or concerns call: SONIA MON-FRI 8 AM- 5PM [phone]. Radiology resident on call [phone].    For immediate concerns that are not emergent, you may call our radiology clinic at: [phone]

## 2019-03-14 NOTE — DISCHARGE SUMMARY
"Radiology Discharge Summary      Hospital Course: No complications    Admit Date: 3/14/2019  Discharge Date: 03/14/2019     Instructions Given to Patient: Yes  Diet: Resume prior diet  Activity: activity as tolerated and no driving for today    Description of Condition on Discharge: Stable  Vital Signs (Most Recent): Temp: 98.4 °F (36.9 °C) (03/14/19 1053)  Pulse: 89 (03/14/19 1400)  Resp: 19 (03/14/19 1400)  BP: (!) 154/70 (03/14/19 1400)  SpO2: 98 % (03/14/19 1400)    Discharge Disposition: Home    Discharge Diagnosis: Multiple Myeloma     Follow-up: with BMT    Tima Mitchell MD (Buck)  Radiology PGY-5  [phone]    "

## 2019-03-14 NOTE — PROGRESS NOTES
Patient given discharge instructions and verbalized understanding of at home care. IV discontinued with catheter intact. Bandage to right chest clean, dry and intact. Patient discharged to next appointment via wheelchair under care of transport.

## 2019-03-14 NOTE — PLAN OF CARE
This writer spoke to Gracy in IR. I asked her if someone could come and consent the patient. She is having sciatic nerve pain and I need an order for pain medication. Gracy states she will let the MD know. This writer is promoting a quiet environment, lights turned off, and repositioned the patient for comfort.

## 2019-03-14 NOTE — PROCEDURES
[facility]  Transfusion Medicine  Procedure Note    SUMMARY   Stem Cell Collection Autologous  Date/Time: 3/14/2019 5:07 PM  Performed by: Jose L Boyd MD  Authorized by: Rafa Salinas MD         Date of Procedure: 3/14/2019     Procedure: Hematopoietic Progenitor Cell Collection    Provider: Jose L Boyd MD     Assisting Provider: None    Pre-Procedure Diagnosis: Multiple myeloma  Post-Procedure Diagnosis: Multiple myeloma    Follow-up Assessment: Ms. Rivero is a 62 y/o female with IgG-k multiple myeloma ISS stage 2, VGPR with VRd who presents today for autologous stem cell collection (ASC) for transplantation as a component of her Tx protocol. A central venous catheter was placed today, prior to her appointment. She has received Neupogen for pre-procedure stem cell mobilization.    Pertinent Laboratory Data:   Complete Blood Count:  WBC 45.79K/microL, Plt 191K/microL, Hb 11.0 g/dL, Hct 35.7%    Basic Metabolic Panel:  Na 141, K 3.3, Cl 105, BUN 13, Cr 0.7, Glc 110     CD34 - quantitative:   Lab Results   Component Value Date    CD34 0.07 03/14/2019    AZ61PFDFEZVH 31.47 03/14/2019    IW50SKQSLPCT 94.6 03/14/2019       Pertinent Medications: None contraindicating ASC    Review of patient's allergies indicates:  No Known Allergies    Anesthesia: None     Technical Procedures Used: Hematopoietic Progenitor Cell collection: The patient presented to the 5th floor Chemotherapy unit, details about the procedure reviewed. Any interim clinical changes from previous clinic visit - No. All pertinent labs reviewed. Human Progenitor (Stem) Cell Collection initiated by Apheresis Nurse. Current plan is to perform the procedure for 7 hours. Initial laboratory tests collected, results to Oncology Nurse. Mid-run laboratory tests collected, results to Oncology Nurse. Included CD34 count on collection bag - results to Stem Cell Lab and BMT clinical team. Final laboratory tests collected,  results to Oncology Nurse. Red blood cell or platelet transfusion - No.  Date of next procedure TBD.    Description of the Findings of the Procedure:     Please see Apheresis Nurse flowsheet for details.    The patient was evaluated and all clinical and laboratory data relevant to the treatment was reviewed, and a decision was made to proceed with the Apheresis procedure.    I was available to the clinical staff throughout the procedure.    Significant Surgical Tasks Conducted by the Assistant(s): Not applicable    Complications: None    Estimated Blood Loss (EBL): None    Implants: None     Specimens: None    Jose L Boyd MD PGY-2  [facility]  [id number] - Blood Bank  3/14/19

## 2019-03-14 NOTE — PROCEDURES
"Radiology Post-Procedure Note    Pre Op Diagnosis: Multiple myeloma  Post Op Diagnosis: Same    Procedure: Placement of tunneled dialysis catheter    Procedure performed by: Patrica    Written Informed Consent Obtained: Yes  Specimen Removed: NO  Estimated Blood Loss: Minimal    Findings:   Under US guidance the right IJ was accessed with a micropuncture needle. I was placed throughout the needle into the IVC. Lidocaine was then administer to the right chest and an incision was made. A a 19 cm Glidecath was then tunneled under the skin. A peel-a-way sheath was placed over the wire and the wire was subsequently removed. The Catheter was then placed within the lumen of the peel-a-way sheath and sutured into place. Imaging demonstrated appropriate placement. Both lumens joseluis back appropriately and flushed equally.    Patient tolerated procedure well.    Tima Mitchell MD (Buck)  Radiology PGY-5  [phone]      "

## 2019-03-14 NOTE — PROGRESS NOTES
Procedure complete. Pt tolerated well. Site clean, dry, intact, no bleeding, no hematoma. Pt to recover in ROCU. Report will be given to RN at bedside.

## 2019-03-15 ENCOUNTER — TELEPHONE (OUTPATIENT)
Dept: HEMATOLOGY/ONCOLOGY | Facility: CLINIC | Age: 64
End: 2019-03-15

## 2019-03-15 ENCOUNTER — DOCUMENTATION ONLY (OUTPATIENT)
Dept: INFUSION THERAPY | Facility: HOSPITAL | Age: 64
End: 2019-03-15

## 2019-03-15 VITALS
BODY MASS INDEX: 33.8 KG/M2 | DIASTOLIC BLOOD PRESSURE: 83 MMHG | HEART RATE: 78 BPM | TEMPERATURE: 99 F | HEIGHT: 64 IN | WEIGHT: 198 LBS | SYSTOLIC BLOOD PRESSURE: 175 MMHG

## 2019-03-15 DIAGNOSIS — C90.00 MULTIPLE MYELOMA: Primary | ICD-10-CM

## 2019-03-15 DIAGNOSIS — Z76.82 STEM CELL TRANSPLANT CANDIDATE: ICD-10-CM

## 2019-03-15 DIAGNOSIS — Z52.011 AUTOLOGOUS DONOR OF STEM CELLS: ICD-10-CM

## 2019-03-15 LAB
ALBUMIN SERPL BCP-MCNC: 2.9 G/DL
ALP SERPL-CCNC: 158 U/L
ALT SERPL W/O P-5'-P-CCNC: 11 U/L
ANION GAP SERPL CALC-SCNC: 11 MMOL/L
ANISOCYTOSIS BLD QL SMEAR: SLIGHT
AST SERPL-CCNC: 14 U/L
BASO STIPL BLD QL SMEAR: ABNORMAL
BASOPHILS NFR BLD: 0.5 %
BILIRUB SERPL-MCNC: 0.4 MG/DL
BUN SERPL-MCNC: 9 MG/DL
CALCIUM SERPL-MCNC: 9.2 MG/DL
CHLORIDE SERPL-SCNC: 98 MMOL/L
CO2 SERPL-SCNC: 33 MMOL/L
CREAT SERPL-MCNC: 0.7 MG/DL
DIFFERENTIAL METHOD: ABNORMAL
DOHLE BOD BLD QL SMEAR: PRESENT
EOSINOPHIL NFR BLD: 0 %
ERYTHROCYTE [DISTWIDTH] IN BLOOD BY AUTOMATED COUNT: 17.4 %
EST. GFR  (AFRICAN AMERICAN): >60 ML/MIN/1.73 M^2
EST. GFR  (NON AFRICAN AMERICAN): >60 ML/MIN/1.73 M^2
GLUCOSE SERPL-MCNC: 178 MG/DL
HCT VFR BLD AUTO: 30.6 %
HGB BLD-MCNC: 9.9 G/DL
IMM GRANULOCYTES # BLD AUTO: ABNORMAL K/UL
IMM GRANULOCYTES NFR BLD AUTO: ABNORMAL %
LYMPHOCYTES NFR BLD: 4 %
MAGNESIUM SERPL-MCNC: 1 MG/DL
MCH RBC QN AUTO: 26.3 PG
MCHC RBC AUTO-ENTMCNC: 32.4 G/DL
MCV RBC AUTO: 81 FL
METAMYELOCYTES NFR BLD MANUAL: 0.5 %
MONOCYTES NFR BLD: 1 %
MYELOCYTES NFR BLD MANUAL: 1 %
NEUTROPHILS NFR BLD: 85 %
NEUTS BAND NFR BLD MANUAL: 8 %
NRBC BLD-RTO: 0 /100 WBC
PHOSPHATE SERPL-MCNC: 2.2 MG/DL
PLATELET # BLD AUTO: 66 K/UL
PLATELET BLD QL SMEAR: ABNORMAL
PMV BLD AUTO: 9.7 FL
POIKILOCYTOSIS BLD QL SMEAR: SLIGHT
POLYCHROMASIA BLD QL SMEAR: ABNORMAL
POTASSIUM SERPL-SCNC: 2.8 MMOL/L
PROT SERPL-MCNC: 5.3 G/DL
RBC # BLD AUTO: 3.76 M/UL
SODIUM SERPL-SCNC: 142 MMOL/L
TOXIC GRANULES BLD QL SMEAR: PRESENT
WBC # BLD AUTO: 37.86 K/UL

## 2019-03-15 PROCEDURE — 63600175 PHARM REV CODE 636 W HCPCS: Performed by: PATHOLOGY

## 2019-03-15 PROCEDURE — 25000003 PHARM REV CODE 250: Performed by: PATHOLOGY

## 2019-03-15 RX ADMIN — POTASSIUM BICARBONATE 50 MEQ: 25 TABLET, EFFERVESCENT ORAL at 01:03

## 2019-03-15 RX ADMIN — HEPARIN SODIUM 3200 UNITS: 1000 INJECTION, SOLUTION INTRAVENOUS; SUBCUTANEOUS at 12:03

## 2019-03-15 NOTE — PROGRESS NOTES
Pt presented to outpt apheresis dept ambulatory with a cane, accompanied by her daughter.  She is oriented x4, states she has pain her her right leg, rates it a 3 on a 0-10 pain scale with 10 being the worst pain imaginable, pain medication was administered in day of surgery this morning.  Autologous stem cell collection started at 16:04 without difficulty via right perm cath, cath patent, biopatch has fresh blood on it, changed today.  4 grams of calcium gluconate given over duration of collection.  Collection ended at 00:11.  Cath flushed and locked.  Pt tolerated collection well.  Daughter at bedside for duration of collection.  40 mEq of potassium chloride, 800 mg of magnesium oxide,  and 2 packets of PHOS-NaK given as replacements after 10pm labs reviewed.  50 mEq of potassium bicarbonate given after post labs drawn.  Instructed pt and her daughter that we would have Kalli reach out to them to let them know if they need any follow up labs.  Dr Salinas was present during collection.  Pt exited dept in a wheelchair accompanied by her daughter.

## 2019-03-15 NOTE — TELEPHONE ENCOUNTER
Phone call to patient to see how she is feeling post stem cell collection.  Also to ask her to come in for additional lab work today to check her electrolytes.  Patient states she is feeling good and has no symptoms or complaints.  She stated she will have to see what time her daughter can bring her for lab work today.  I made the appt for labs and will follow with the patient.    MANUELA Beckham RN, [facility]

## 2019-03-18 ENCOUNTER — INFUSION (OUTPATIENT)
Dept: INFUSION THERAPY | Facility: HOSPITAL | Age: 64
End: 2019-03-18
Attending: INTERNAL MEDICINE
Payer: MEDICARE

## 2019-03-18 ENCOUNTER — HOSPITAL ENCOUNTER (INPATIENT)
Facility: HOSPITAL | Age: 64
LOS: 15 days | Discharge: HOME | DRG: 016 | End: 2019-04-02
Attending: INTERNAL MEDICINE | Admitting: INTERNAL MEDICINE
Payer: MEDICARE

## 2019-03-18 ENCOUNTER — OFFICE VISIT (OUTPATIENT)
Dept: HEMATOLOGY/ONCOLOGY | Facility: CLINIC | Age: 64
DRG: 016 | End: 2019-03-18
Payer: MEDICARE

## 2019-03-18 VITALS
OXYGEN SATURATION: 100 % | TEMPERATURE: 98 F | WEIGHT: 198 LBS | SYSTOLIC BLOOD PRESSURE: 179 MMHG | BODY MASS INDEX: 33.8 KG/M2 | DIASTOLIC BLOOD PRESSURE: 83 MMHG | HEIGHT: 64 IN

## 2019-03-18 DIAGNOSIS — Z76.82 STEM CELL TRANSPLANT CANDIDATE: ICD-10-CM
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
## 2019-03-18 DIAGNOSIS — Z52.011 AUTOLOGOUS DONOR OF STEM CELLS: ICD-10-CM

## 2019-03-18 DIAGNOSIS — C90.01 MULTIPLE MYELOMA IN REMISSION: Primary | ICD-10-CM

## 2019-03-18 DIAGNOSIS — M25.562 ACUTE PAIN OF LEFT KNEE: ICD-10-CM

## 2019-03-18 DIAGNOSIS — C90.00 MULTIPLE MYELOMA: Primary | ICD-10-CM

## 2019-03-18 DIAGNOSIS — G62.0 CHEMOTHERAPY-INDUCED PERIPHERAL NEUROPATHY: ICD-10-CM

## 2019-03-18 DIAGNOSIS — T45.1X5A CHEMOTHERAPY-INDUCED PERIPHERAL NEUROPATHY: ICD-10-CM

## 2019-03-18 DIAGNOSIS — C90.01 MULTIPLE MYELOMA IN REMISSION: ICD-10-CM

## 2019-03-18 DIAGNOSIS — Z94.81 STATUS POST AUTOLOGOUS BONE MARROW TRANSPLANT: ICD-10-CM

## 2019-03-18 DIAGNOSIS — E11.9 TYPE 2 DIABETES MELLITUS WITHOUT COMPLICATION, WITHOUT LONG-TERM CURRENT USE OF INSULIN: ICD-10-CM

## 2019-03-18 DIAGNOSIS — I10 ESSENTIAL HYPERTENSION: ICD-10-CM

## 2019-03-18 LAB
ABO + RH BLD: NORMAL
ALBUMIN SERPL BCP-MCNC: 3.3 G/DL
ALP SERPL-CCNC: 124 U/L
ALT SERPL W/O P-5'-P-CCNC: 14 U/L
ANION GAP SERPL CALC-SCNC: 9 MMOL/L
AST SERPL-CCNC: 14 U/L
BASOPHILS # BLD AUTO: 0.02 K/UL
BASOPHILS NFR BLD: 0.3 %
BILIRUB SERPL-MCNC: 0.3 MG/DL
BLD GP AB SCN CELLS X3 SERPL QL: NORMAL
BUN SERPL-MCNC: 12 MG/DL
CALCIUM SERPL-MCNC: 8.5 MG/DL
CHLORIDE SERPL-SCNC: 104 MMOL/L
CO2 SERPL-SCNC: 25 MMOL/L
CREAT SERPL-MCNC: 0.7 MG/DL
DIFFERENTIAL METHOD: ABNORMAL
EOSINOPHIL # BLD AUTO: 0.1 K/UL
EOSINOPHIL NFR BLD: 1.1 %
ERYTHROCYTE [DISTWIDTH] IN BLOOD BY AUTOMATED COUNT: 17.5 %
EST. GFR  (AFRICAN AMERICAN): >60 ML/MIN/1.73 M^2
EST. GFR  (NON AFRICAN AMERICAN): >60 ML/MIN/1.73 M^2
GLUCOSE SERPL-MCNC: 200 MG/DL
HCT VFR BLD AUTO: 32.8 %
HGB BLD-MCNC: 10.5 G/DL
IMM GRANULOCYTES # BLD AUTO: 0.14 K/UL
IMM GRANULOCYTES NFR BLD AUTO: 2.1 %
LYMPHOCYTES # BLD AUTO: 1.2 K/UL
LYMPHOCYTES NFR BLD: 18 %
MCH RBC QN AUTO: 26.9 PG
MCHC RBC AUTO-ENTMCNC: 32 G/DL
MCV RBC AUTO: 84 FL
MONOCYTES # BLD AUTO: 0.6 K/UL
MONOCYTES NFR BLD: 9 %
NEUTROPHILS # BLD AUTO: 4.6 K/UL
NEUTROPHILS NFR BLD: 69.5 %
NRBC BLD-RTO: 0 /100 WBC
PLATELET # BLD AUTO: 70 K/UL
PMV BLD AUTO: 12.2 FL
POCT GLUCOSE: 189 MG/DL (ref 70–110)
POCT GLUCOSE: 251 MG/DL (ref 70–110)
POTASSIUM SERPL-SCNC: 3.7 MMOL/L
PROT SERPL-MCNC: 6.1 G/DL
RBC # BLD AUTO: 3.91 M/UL
SODIUM SERPL-SCNC: 138 MMOL/L
WBC # BLD AUTO: 6.57 K/UL

## 2019-03-18 PROCEDURE — 80053 COMPREHEN METABOLIC PANEL: CPT

## 2019-03-18 PROCEDURE — 86850 RBC ANTIBODY SCREEN: CPT

## 2019-03-18 PROCEDURE — 63600175 PHARM REV CODE 636 W HCPCS: Performed by: INTERNAL MEDICINE

## 2019-03-18 PROCEDURE — 63600175 PHARM REV CODE 636 W HCPCS: Performed by: NURSE PRACTITIONER

## 2019-03-18 PROCEDURE — 63600175 PHARM REV CODE 636 W HCPCS: Performed by: STUDENT IN AN ORGANIZED HEALTH CARE EDUCATION/TRAINING PROGRAM

## 2019-03-18 PROCEDURE — 99999 PR PBB SHADOW E&M-EST. PATIENT-LVL III: CPT | Mod: PBBFAC,,, | Performed by: NURSE PRACTITIONER

## 2019-03-18 PROCEDURE — 85025 COMPLETE CBC W/AUTO DIFF WBC: CPT

## 2019-03-18 PROCEDURE — 20600001 HC STEP DOWN PRIVATE ROOM

## 2019-03-18 PROCEDURE — 25000003 PHARM REV CODE 250: Performed by: INTERNAL MEDICINE

## 2019-03-18 PROCEDURE — 25000003 PHARM REV CODE 250: Performed by: NURSE PRACTITIONER

## 2019-03-18 PROCEDURE — A9155 ARTIFICIAL SALIVA: HCPCS | Performed by: INTERNAL MEDICINE

## 2019-03-18 PROCEDURE — 99215 OFFICE O/P EST HI 40 MIN: CPT | Mod: S$PBB,,, | Performed by: NURSE PRACTITIONER

## 2019-03-18 PROCEDURE — 36592 COLLECT BLOOD FROM PICC: CPT

## 2019-03-18 PROCEDURE — 99215 PR OFFICE/OUTPT VISIT, EST, LEVL V, 40-54 MIN: ICD-10-PCS | Mod: S$PBB,,, | Performed by: NURSE PRACTITIONER

## 2019-03-18 PROCEDURE — 99213 OFFICE O/P EST LOW 20 MIN: CPT | Mod: PBBFAC,25 | Performed by: NURSE PRACTITIONER

## 2019-03-18 PROCEDURE — 99999 PR PBB SHADOW E&M-EST. PATIENT-LVL III: ICD-10-PCS | Mod: PBBFAC,,, | Performed by: NURSE PRACTITIONER

## 2019-03-18 RX ORDER — PROCHLORPERAZINE EDISYLATE 5 MG/ML
10 INJECTION INTRAMUSCULAR; INTRAVENOUS EVERY 6 HOURS PRN
Status: DISCONTINUED | OUTPATIENT
Start: 2019-03-18 | End: 2019-04-02 | Stop reason: HOSPADM

## 2019-03-18 RX ORDER — HEPARIN SODIUM 1000 [USP'U]/ML
1600 INJECTION, SOLUTION INTRAVENOUS; SUBCUTANEOUS
Status: CANCELLED | OUTPATIENT
Start: 2019-03-18

## 2019-03-18 RX ORDER — SODIUM CHLORIDE 0.9 % (FLUSH) 0.9 %
10 SYRINGE (ML) INJECTION
Status: CANCELLED | OUTPATIENT
Start: 2019-03-18

## 2019-03-18 RX ORDER — HEPARIN 100 UNIT/ML
500 SYRINGE INTRAVENOUS
Status: DISCONTINUED | OUTPATIENT
Start: 2019-03-18 | End: 2019-03-18 | Stop reason: HOSPADM

## 2019-03-18 RX ORDER — FLUCONAZOLE 50 MG/1
400 TABLET ORAL DAILY
Status: CANCELLED | OUTPATIENT
Start: 2019-03-19

## 2019-03-18 RX ORDER — HEPARIN 100 UNIT/ML
500 SYRINGE INTRAVENOUS
Status: CANCELLED | OUTPATIENT
Start: 2019-03-18

## 2019-03-18 RX ORDER — HEPARIN SODIUM 1000 [USP'U]/ML
1600 INJECTION, SOLUTION INTRAVENOUS; SUBCUTANEOUS
Status: DISCONTINUED | OUTPATIENT
Start: 2019-03-18 | End: 2019-04-02 | Stop reason: HOSPADM

## 2019-03-18 RX ORDER — SODIUM CHLORIDE AND POTASSIUM CHLORIDE 150; 900 MG/100ML; MG/100ML
INJECTION, SOLUTION INTRAVENOUS CONTINUOUS
Status: CANCELLED | OUTPATIENT
Start: 2019-03-18

## 2019-03-18 RX ORDER — IBUPROFEN 200 MG
16 TABLET ORAL
Status: DISCONTINUED | OUTPATIENT
Start: 2019-03-18 | End: 2019-04-02 | Stop reason: HOSPADM

## 2019-03-18 RX ORDER — LORAZEPAM 2 MG/ML
1 INJECTION INTRAMUSCULAR EVERY 4 HOURS PRN
Status: DISCONTINUED | OUTPATIENT
Start: 2019-03-18 | End: 2019-04-02 | Stop reason: HOSPADM

## 2019-03-18 RX ORDER — LANOLIN ALCOHOL/MO/W.PET/CERES
400 CREAM (GRAM) TOPICAL EVERY 4 HOURS PRN
Status: DISCONTINUED | OUTPATIENT
Start: 2019-03-18 | End: 2019-04-02 | Stop reason: HOSPADM

## 2019-03-18 RX ORDER — IBUPROFEN 200 MG
24 TABLET ORAL
Status: DISCONTINUED | OUTPATIENT
Start: 2019-03-18 | End: 2019-04-02 | Stop reason: HOSPADM

## 2019-03-18 RX ORDER — ACYCLOVIR 200 MG/1
800 CAPSULE ORAL 2 TIMES DAILY
Status: CANCELLED | OUTPATIENT
Start: 2019-03-19

## 2019-03-18 RX ORDER — LEVOFLOXACIN 500 MG/1
500 TABLET, FILM COATED ORAL DAILY
Status: CANCELLED
Start: 2019-03-19

## 2019-03-18 RX ORDER — LOSARTAN POTASSIUM AND HYDROCHLOROTHIAZIDE 12.5; 1 MG/1; MG/1
1 TABLET ORAL DAILY
Status: DISCONTINUED | OUTPATIENT
Start: 2019-03-19 | End: 2019-03-18

## 2019-03-18 RX ORDER — ONDANSETRON 4 MG/1
8 TABLET, ORALLY DISINTEGRATING ORAL EVERY 8 HOURS PRN
Status: CANCELLED | OUTPATIENT
Start: 2019-03-18

## 2019-03-18 RX ORDER — MAG HYDROX/ALUMINUM HYD/SIMETH 200-200-20
30 SUSPENSION, ORAL (FINAL DOSE FORM) ORAL EVERY 6 HOURS PRN
Status: DISCONTINUED | OUTPATIENT
Start: 2019-03-18 | End: 2019-04-02 | Stop reason: HOSPADM

## 2019-03-18 RX ORDER — SODIUM CHLORIDE AND POTASSIUM CHLORIDE 150; 900 MG/100ML; MG/100ML
INJECTION, SOLUTION INTRAVENOUS CONTINUOUS
Status: DISCONTINUED | OUTPATIENT
Start: 2019-03-18 | End: 2019-04-02

## 2019-03-18 RX ORDER — POTASSIUM CHLORIDE 20 MEQ/1
20 TABLET, EXTENDED RELEASE ORAL
Status: DISCONTINUED | OUTPATIENT
Start: 2019-03-18 | End: 2019-04-02 | Stop reason: HOSPADM

## 2019-03-18 RX ORDER — DEXAMETHASONE 0.5 MG/1
8 TABLET ORAL
Status: CANCELLED | OUTPATIENT
Start: 2019-03-19

## 2019-03-18 RX ORDER — ENOXAPARIN SODIUM 100 MG/ML
40 INJECTION SUBCUTANEOUS EVERY 24 HOURS
Status: DISCONTINUED | OUTPATIENT
Start: 2019-03-18 | End: 2019-03-27

## 2019-03-18 RX ORDER — FLUCONAZOLE 200 MG/1
400 TABLET ORAL DAILY
Status: DISCONTINUED | OUTPATIENT
Start: 2019-03-19 | End: 2019-04-01

## 2019-03-18 RX ORDER — SODIUM,POTASSIUM PHOSPHATES 280-250MG
1 POWDER IN PACKET (EA) ORAL EVERY 4 HOURS PRN
Status: DISCONTINUED | OUTPATIENT
Start: 2019-03-18 | End: 2019-04-02 | Stop reason: HOSPADM

## 2019-03-18 RX ORDER — ONDANSETRON 8 MG/1
8 TABLET, ORALLY DISINTEGRATING ORAL EVERY 8 HOURS PRN
Status: DISCONTINUED | OUTPATIENT
Start: 2019-03-18 | End: 2019-03-25

## 2019-03-18 RX ORDER — TRAMADOL HYDROCHLORIDE 50 MG/1
50 TABLET ORAL EVERY 6 HOURS PRN
Status: DISCONTINUED | OUTPATIENT
Start: 2019-03-18 | End: 2019-04-02 | Stop reason: HOSPADM

## 2019-03-18 RX ORDER — HEPARIN SODIUM 1000 [USP'U]/ML
1000 INJECTION, SOLUTION INTRAVENOUS; SUBCUTANEOUS
Status: COMPLETED | OUTPATIENT
Start: 2019-03-18 | End: 2019-03-18

## 2019-03-18 RX ORDER — GLUCAGON 1 MG
1 KIT INJECTION
Status: DISCONTINUED | OUTPATIENT
Start: 2019-03-18 | End: 2019-04-02 | Stop reason: HOSPADM

## 2019-03-18 RX ORDER — DIPHENHYDRAMINE HYDROCHLORIDE 50 MG/ML
25 INJECTION INTRAMUSCULAR; INTRAVENOUS
Status: DISCONTINUED | OUTPATIENT
Start: 2019-03-18 | End: 2019-03-29

## 2019-03-18 RX ORDER — LANOLIN ALCOHOL/MO/W.PET/CERES
800 CREAM (GRAM) TOPICAL EVERY 4 HOURS PRN
Status: DISCONTINUED | OUTPATIENT
Start: 2019-03-18 | End: 2019-04-02 | Stop reason: HOSPADM

## 2019-03-18 RX ORDER — LOSARTAN POTASSIUM AND HYDROCHLOROTHIAZIDE 12.5; 1 MG/1; MG/1
1 TABLET ORAL DAILY
Status: DISCONTINUED | OUTPATIENT
Start: 2019-03-18 | End: 2019-04-02 | Stop reason: HOSPADM

## 2019-03-18 RX ORDER — TIZANIDINE 2 MG/1
4 TABLET ORAL 3 TIMES DAILY PRN
Status: DISCONTINUED | OUTPATIENT
Start: 2019-03-18 | End: 2019-04-02 | Stop reason: HOSPADM

## 2019-03-18 RX ORDER — ONDANSETRON 4 MG/1
16 TABLET, ORALLY DISINTEGRATING ORAL
Status: CANCELLED | OUTPATIENT
Start: 2019-03-19

## 2019-03-18 RX ORDER — SODIUM,POTASSIUM PHOSPHATES 280-250MG
2 POWDER IN PACKET (EA) ORAL EVERY 4 HOURS PRN
Status: DISCONTINUED | OUTPATIENT
Start: 2019-03-18 | End: 2019-04-02 | Stop reason: HOSPADM

## 2019-03-18 RX ORDER — GABAPENTIN 300 MG/1
300 CAPSULE ORAL 3 TIMES DAILY
Status: DISCONTINUED | OUTPATIENT
Start: 2019-03-18 | End: 2019-03-21

## 2019-03-18 RX ORDER — SODIUM CHLORIDE 0.9 % (FLUSH) 0.9 %
10 SYRINGE (ML) INJECTION
Status: DISCONTINUED | OUTPATIENT
Start: 2019-03-18 | End: 2019-04-02 | Stop reason: HOSPADM

## 2019-03-18 RX ORDER — LORAZEPAM 2 MG/ML
1 INJECTION INTRAMUSCULAR EVERY 4 HOURS PRN
Status: CANCELLED | OUTPATIENT
Start: 2019-03-18

## 2019-03-18 RX ORDER — ONDANSETRON 8 MG/1
16 TABLET, ORALLY DISINTEGRATING ORAL
Status: COMPLETED | OUTPATIENT
Start: 2019-03-19 | End: 2019-03-20

## 2019-03-18 RX ORDER — PROCHLORPERAZINE EDISYLATE 5 MG/ML
10 INJECTION INTRAMUSCULAR; INTRAVENOUS EVERY 6 HOURS PRN
Status: CANCELLED
Start: 2019-03-18

## 2019-03-18 RX ORDER — LEVOFLOXACIN 500 MG/1
500 TABLET, FILM COATED ORAL DAILY
Status: DISCONTINUED | OUTPATIENT
Start: 2019-03-19 | End: 2019-04-01

## 2019-03-18 RX ORDER — SODIUM CHLORIDE 0.9 % (FLUSH) 0.9 %
10 SYRINGE (ML) INJECTION
Status: DISCONTINUED | OUTPATIENT
Start: 2019-03-18 | End: 2019-03-18 | Stop reason: HOSPADM

## 2019-03-18 RX ORDER — PANTOPRAZOLE SODIUM 40 MG/1
40 TABLET, DELAYED RELEASE ORAL DAILY
Status: DISCONTINUED | OUTPATIENT
Start: 2019-03-19 | End: 2019-04-02 | Stop reason: HOSPADM

## 2019-03-18 RX ORDER — OXYCODONE HYDROCHLORIDE 5 MG/1
5 TABLET ORAL EVERY 6 HOURS PRN
Status: DISCONTINUED | OUTPATIENT
Start: 2019-03-18 | End: 2019-03-18

## 2019-03-18 RX ORDER — OLOPATADINE HYDROCHLORIDE 2 MG/ML
1 SOLUTION/ DROPS OPHTHALMIC EVERY MORNING
Status: DISCONTINUED | OUTPATIENT
Start: 2019-03-19 | End: 2019-04-02 | Stop reason: HOSPADM

## 2019-03-18 RX ORDER — ACYCLOVIR 200 MG/1
800 CAPSULE ORAL 2 TIMES DAILY
Status: DISCONTINUED | OUTPATIENT
Start: 2019-03-19 | End: 2019-04-02 | Stop reason: HOSPADM

## 2019-03-18 RX ORDER — ACYCLOVIR 200 MG/1
400 CAPSULE ORAL 2 TIMES DAILY
Status: COMPLETED | OUTPATIENT
Start: 2019-03-18 | End: 2019-03-18

## 2019-03-18 RX ORDER — INSULIN ASPART 100 [IU]/ML
0-5 INJECTION, SOLUTION INTRAVENOUS; SUBCUTANEOUS
Status: DISCONTINUED | OUTPATIENT
Start: 2019-03-18 | End: 2019-03-26

## 2019-03-18 RX ORDER — DEXAMETHASONE 4 MG/1
8 TABLET ORAL
Status: COMPLETED | OUTPATIENT
Start: 2019-03-19 | End: 2019-03-19

## 2019-03-18 RX ADMIN — Medication 30 ML: at 08:03

## 2019-03-18 RX ADMIN — HEPARIN SODIUM 1000 UNITS: 1000 INJECTION, SOLUTION INTRAVENOUS; SUBCUTANEOUS at 12:03

## 2019-03-18 RX ADMIN — GABAPENTIN 300 MG: 300 CAPSULE ORAL at 08:03

## 2019-03-18 RX ADMIN — TRAMADOL HYDROCHLORIDE 50 MG: 50 TABLET, COATED ORAL at 05:03

## 2019-03-18 RX ADMIN — ACYCLOVIR 400 MG: 200 CAPSULE ORAL at 08:03

## 2019-03-18 RX ADMIN — INSULIN ASPART 1 UNITS: 100 INJECTION, SOLUTION INTRAVENOUS; SUBCUTANEOUS at 09:03

## 2019-03-18 RX ADMIN — SODIUM CHLORIDE AND POTASSIUM CHLORIDE: 9; 1.49 INJECTION, SOLUTION INTRAVENOUS at 09:03

## 2019-03-18 RX ADMIN — LOSARTAN POTASSIUM AND HYDROCHLOROTHIAZIDE 1 TABLET: 12.5; 1 TABLET ORAL at 06:03

## 2019-03-18 RX ADMIN — ENOXAPARIN SODIUM 40 MG: 100 INJECTION SUBCUTANEOUS at 06:03

## 2019-03-18 NOTE — HPI
Ms. Leach is a 63-y-o female with a history of HTN, DM II, and IgG Kappa Multiple Myeloma. She is admitted for a planned Daija Auto SCT. She was referred to Dr. Alva for evaluation for SCT in December of 2018. She presented with lytic bone lesions and was mid cycle 5 of VRd, which had been initiated by Dr. Juan at [facility].  She completed pre transplant staging and achieved VGPR. Pre transplant eval with no overt contraindications to transplant. Her daughter will be her primary caretaker.  She denies fever, chills, nightsweats, bleeding, brusing, lymphadenopathy, signs/symptoms of splenomegaly.

## 2019-03-18 NOTE — SUBJECTIVE & OBJECTIVE
Patient information was obtained from patient and past medical records.     Oncology History:   -IgG kappa MM; ISS 2; standard risk cytogenetics; with lytic bone lesions at presentation; initiate VRd (21 day cycle) with Dr. Juan at [facility]   -she has achieved VGPR  -pre transplant eval with no contraindication for transplant   -never smoker so plan to follow up LL nodule at repeat PET scan at day +100  -plan for melphalan 200mg/m2 autoSCT; discussed expectations during transplant stay  -discussed need for full time caretaker through day +30  -discussed need to stay within 60 miles of transplant center through day +30  -likely revlimid maintenance at day +100     Medications Prior to Admission   Medication Sig Dispense Refill Last Dose    acyclovir (ZOVIRAX) 400 MG tablet Take 400 mg by mouth 2 (two) times daily.   10 Taking    gabapentin (NEURONTIN) 300 MG capsule Take 300 mg by mouth 3 (three) times daily.   Taking    glipiZIDE (GLUCOTROL) 10 MG tablet Take 1 tablet by mouth daily  2 Taking    glyBURIDE (DIABETA) 5 MG tablet Take 5 mg by mouth.   Taking    JANUMET  mg per tablet Take 1 tablet twice a day with meals  5 Taking    losartan-hydrochlorothiazide 100-12.5 mg (HYZAAR) 100-12.5 mg Tab Take 1 tablet by mouth daily  2 Taking    multivit,iron,minerals/lutein (CENTRUM SILVER ULTRA WOMEN'S ORAL) Take 1 tablet by mouth once daily.   Taking    ondansetron (ZOFRAN-ODT) 8 MG TbDL Take 8 mg by mouth every 8 (eight) hours as needed (nausea).   6 Taking    PAZEO 0.7 % Drop Place 1 drop into both eyes every morning.  5 Taking    prochlorperazine (COMPAZINE) 10 MG tablet Take 10 mg by mouth 4 (four) times daily as needed (nausea/vomiting).   6 Taking    tiZANidine (ZANAFLEX) 4 MG tablet Take 4 mg by mouth 3 (three) times daily as needed (back muscle spasms).    Taking       Patient has no known allergies.     Past Medical History:   Diagnosis Date    Depression     DM2 (diabetes mellitus, type 2)      Glaucoma     HTN (hypertension)     Myeloma     Therapy     after mother's death     Past Surgical History:   Procedure Laterality Date    Biopsy-bone marrow Left 2/7/2019    Performed by Saúl Alva MD at [facility] OR 2ND FLR    Insertion,catheter,tunneled N/A 3/14/2019    Performed by [facility] Diagnostic Provider at [facility] OR 2ND FLR     Family History     None        Tobacco Use    Smoking status: Never Smoker    Smokeless tobacco: Never Used   Substance and Sexual Activity    Alcohol use: No     Frequency: Never    Drug use: No    Sexual activity: Not on file       Review of Systems   Constitutional: Negative for chills, fatigue, fever and unexpected weight change.   HENT: Negative for congestion, mouth sores, nosebleeds, rhinorrhea, sore throat and voice change.    Eyes: Negative for discharge, redness, itching and visual disturbance.   Respiratory: Negative for cough, shortness of breath and wheezing.    Cardiovascular: Negative for chest pain, palpitations and leg swelling.   Gastrointestinal: Negative for abdominal distention, abdominal pain, constipation, diarrhea, nausea and vomiting.   Endocrine: Negative for cold intolerance, heat intolerance, polydipsia, polyphagia and polyuria.   Genitourinary: Negative for decreased urine volume, dysuria, frequency, hematuria and urgency.   Musculoskeletal: Positive for arthralgias and joint swelling.        Left knee pain. S/p fall.    Skin: Negative for pallor, rash and wound.   Allergic/Immunologic: Negative for environmental allergies, food allergies and immunocompromised state.   Neurological: Negative for dizziness, tremors, seizures, weakness, light-headedness, numbness and headaches.        Sciatic pain   Hematological: Negative for adenopathy. Does not bruise/bleed easily.   Psychiatric/Behavioral: Negative for agitation, confusion, sleep disturbance and suicidal ideas. The patient is not nervous/anxious.      Objective:     Vital Signs (Most  Recent):    Vital Signs (24h Range):  Temp:  [98.4 °F (36.9 °C)] 98.4 °F (36.9 °C)  SpO2:  [100 %] 100 %  BP: (179)/(83) 179/83        There is no height or weight on file to calculate BMI.  There is no height or weight on file to calculate BSA.    Lines/Drains/Airways     Central Venous Catheter Line                 Percutaneous Central Line Insertion/Assessment - triple lumen  03/14/19 1403 right subclavian 4 days                Physical Exam   Constitutional: She is oriented to person, place, and time. She appears well-developed and well-nourished.   HENT:   Head: Normocephalic and atraumatic.   Eyes: Conjunctivae and EOM are normal. Pupils are equal, round, and reactive to light.   Neck: Normal range of motion. Neck supple.   Cardiovascular: Normal rate, regular rhythm, normal heart sounds and intact distal pulses.   Pulmonary/Chest: Effort normal and breath sounds normal.   Abdominal: Soft. Bowel sounds are normal.   Musculoskeletal: She exhibits edema.   +1 BLE. Edema to left knee 2/2 fall.    Neurological: She is alert and oriented to person, place, and time.   Skin: Skin is warm and dry.   Psychiatric: She has a normal mood and affect. Her behavior is normal. Judgment and thought content normal.       Significant Labs:   CBC:   Recent Labs   Lab 03/18/19  1154   WBC 6.57   HGB 10.5*   HCT 32.8*   PLT 70*    and CMP:   Recent Labs   Lab 03/18/19  1154      K 3.7      CO2 25   *   BUN 12   CREATININE 0.7   CALCIUM 8.5*   PROT 6.1   ALBUMIN 3.3*   BILITOT 0.3   ALKPHOS 124   AST 14   ALT 14   ANIONGAP 9   EGFRNONAA >60.0       Diagnostic Results:  I have reviewed all pertinent imaging results/findings within the past 24 hours.

## 2019-03-18 NOTE — ASSESSMENT & PLAN NOTE
-IgG kappa MM; ISS 2; standard risk cytogenetics; with lytic bone lesions at presentation; initiate VRd (21 day cycle) with Dr. Juan at [facility]; currently mid cycle  5    -she has achieved VGPR  -pre transplant eval with no contraindication for transplant   -never smoker so plan to follow up LL nodule at repeat PET scan at day +100  -discussed with patient and daughter rationale, alternatives, risks of central line placement,  Mobilization/colleection, melphalan 200mg/m2 autoSCT; discussed estimated 1% risk of transplant related mortality  -discussed need for appt and medication compliance following transplant  -discussed need for full time caretaker through day +30  -discussed need to stay within 60 miles of transplant center through day +30  -patient and caretaker expressed understanding; all written consents obtained  -likely revlimid maintenance at day +100  -Admitted for Daija auto SCT. Today is Day -

## 2019-03-18 NOTE — ASSESSMENT & PLAN NOTE
- will hold oral anti diabetic medications while inpatient  - blood glucose checks ACHS  - s/s insulin

## 2019-03-18 NOTE — PROGRESS NOTES
Pt. admitted to room 846 from admit office by wheelchair.  Pt. sitting in chair AAOx4 with c/o left knee pain.  Pt. with daughter at bedside.  Pt. oriented to room.  Pt. educated on safety measures to prevent injury to self.  Pt. with nonskid footwear on with bed in lowest position and locked.  Pt. instructed to call for assistance prior to getting OOB.  Call light within reach.  Pt. verbalized understanding.  Will continue to monitor pt.

## 2019-03-18 NOTE — PROGRESS NOTES
SECTION OF HEMATOLOGY AND BONE MARROW TRANSPLANT  Return  Patient Visit   03/18/2019  Referred by:  No ref. provider found  Referred for:myeloma, SCT eval     CHIEF COMPLAINT:   Chief Complaint   Patient presents with    Multiple myeloma, remission status unspecified       HISTORY OF PRESENT ILLNESS:   63 y.o. female with pmh as below referred dec 2018  for SCT evaluation for MM; with regard to oncologic history; IgG kappa MM; ISS 2; standard risk cytogenetics; with lytic bone lesions at presentation; initiated VRd (21 day cycle) with Dr. Juan at [facility]; completed 5 cycles.  Completed pretransplant staging showing VGPR and completed pre transplant eval with no overt contraindications to transplant.  Presents today for admission for Daija 200 Autologous Sct. Denies fever, chills, nightsweats, bleeding, lymphadenopathy, signs/symptoms of splenomegaly, chest pain, sob, n/v/d/c. Does have chronic neuropathy to bilateral feet.  She also has chronic sciatic pain down both legs with BLE edema (+1 pitting edema today). Pt also has pain and swelling to her left knee. States she fell a week ago and hit her knee. Did not see any provider after fall, no imaging was complete. Taking tylenol and muscle relaxer, pt states neither helping.      PAST MEDICAL HISTORY:   Past Medical History:   Diagnosis Date    Depression     DM2 (diabetes mellitus, type 2)     Glaucoma     HTN (hypertension)     Myeloma     Therapy     after mother's death       PAST SURGICAL HISTORY:   Past Surgical History:   Procedure Laterality Date    Biopsy-bone marrow Left 2/7/2019    Performed by Saúl Alva MD at [facility] OR 2ND FLR    Insertion,catheter,tunneled N/A 3/14/2019    Performed by [facility] Diagnostic Provider at [facility] OR 2ND FLR       PAST SOCIAL HISTORY:   reports that  has never smoked. she has never used smokeless tobacco. She reports that she does not drink alcohol or use drugs.    FAMILY HISTORY:  No family history on  file.    CURRENT MEDICATIONS:   Current Outpatient Medications   Medication Sig    acyclovir (ZOVIRAX) 400 MG tablet Take 400 mg by mouth 2 (two) times daily.     gabapentin (NEURONTIN) 300 MG capsule Take 300 mg by mouth 3 (three) times daily.    glipiZIDE (GLUCOTROL) 10 MG tablet Take 1 tablet by mouth daily    glyBURIDE (DIABETA) 5 MG tablet Take 5 mg by mouth.    JANUMET  mg per tablet Take 1 tablet twice a day with meals    losartan-hydrochlorothiazide 100-12.5 mg (HYZAAR) 100-12.5 mg Tab Take 1 tablet by mouth daily    multivit,iron,minerals/lutein (CENTRUM SILVER ULTRA WOMEN'S ORAL) Take 1 tablet by mouth once daily.    ondansetron (ZOFRAN-ODT) 8 MG TbDL Take 8 mg by mouth every 8 (eight) hours as needed (nausea).     PAZEO 0.7 % Drop Place 1 drop into both eyes every morning.    prochlorperazine (COMPAZINE) 10 MG tablet Take 10 mg by mouth 4 (four) times daily as needed (nausea/vomiting).     tiZANidine (ZANAFLEX) 4 MG tablet Take 4 mg by mouth 3 (three) times daily as needed (back muscle spasms).      No current facility-administered medications for this visit.      Facility-Administered Medications Ordered in Other Visits   Medication    heparin, porcine (PF) 100 unit/mL injection flush 500 Units    sodium chloride 0.9% flush 10 mL     ALLERGIES:   Review of patient's allergies indicates:  No Known Allergies          REVIEW OF SYSTEMS:   General ROS: negative  Psychological ROS: negative  Ophthalmic ROS: negative  ENT ROS: negative  Allergy and Immunology ROS: negative  Hematological and Lymphatic ROS: negative  Endocrine ROS: negative  Respiratory ROS: negative  Cardiovascular ROS: negative  Gastrointestinal ROS: negative  Genito-Urinary ROS: negative  Musculoskeletal ROS: chronic sciatic pain, +1 BLE edema, pain to L knee  Neurological ROS: see HPI  Dermatological ROS: bruising to L knee    PHYSICAL EXAM:   Vitals:    03/18/19 1312   BP: (!) 179/83   Temp: 98.4 °F (36.9 °C)        General - well developed, well nourished, no apparent distress  Head & Face - no sinus tenderness  Eyes - normal conjunctivae and lids   ENT - normal external auditory canals and tympanic membranes bilaterally oropharynx clear,  Normal dentition and gums  Neck - normal thyroid  Chest and Lung - normal respiratory effort, clear to auscultation bilaterally   Cardiovascular - RRR with no MGR, normal S1 and S2; no pedal edema  Abdomen -  soft, nontender, no palpable hepatomegaly or splenomegaly  Lymph - no palpable lymphadenopathy  Extremities - unremarkable nails and digits  Heme - no bruising, petechiae, pallor  Skin - no rashes or lesions; edema and hematoma to L knee, +1 edema to BLE  Psych - appropriate mood and affect      ECOG Performance Status: (foot note - ECOG PS provided by Eastern Co[employer]cology Group) 1 - Symptomatic but completely ambulatory    Karnofsky Performance Score:  80%- Normal Activity with Effort: Some Symptoms of Disease  DATA:   Lab Results   Component Value Date    WBC 6.57 03/18/2019    HGB 10.5 (L) 03/18/2019    HCT 32.8 (L) 03/18/2019    MCV 84 03/18/2019    PLT 70 (L) 03/18/2019     Gran # (ANC)   Date Value Ref Range Status   03/18/2019 4.6 1.8 - 7.7 K/uL Final     Gran%   Date Value Ref Range Status   03/18/2019 69.5 38.0 - 73.0 % Final     CMP  Sodium   Date Value Ref Range Status   03/18/2019 138 136 - 145 mmol/L Final     Potassium   Date Value Ref Range Status   03/18/2019 3.7 3.5 - 5.1 mmol/L Final     Chloride   Date Value Ref Range Status   03/18/2019 104 95 - 110 mmol/L Final     CO2   Date Value Ref Range Status   03/18/2019 25 23 - 29 mmol/L Final     Glucose   Date Value Ref Range Status   03/18/2019 200 (H) 70 - 110 mg/dL Final     BUN, Bld   Date Value Ref Range Status   03/18/2019 12 8 - 23 mg/dL Final     Creatinine   Date Value Ref Range Status   03/18/2019 0.7 0.5 - 1.4 mg/dL Final     Calcium   Date Value Ref Range Status   03/18/2019 8.5 (L) 8.7 - 10.5  mg/dL Final     Total Protein   Date Value Ref Range Status   03/18/2019 6.1 6.0 - 8.4 g/dL Final     Albumin   Date Value Ref Range Status   03/18/2019 3.3 (L) 3.5 - 5.2 g/dL Final     Total Bilirubin   Date Value Ref Range Status   03/18/2019 0.3 0.1 - 1.0 mg/dL Final     Comment:     For infants and newborns, interpretation of results should be based  on gestational age, weight and in agreement with clinical  observations.  Premature Infant recommended reference ranges:  Up to 24 hours.............<8.0 mg/dL  Up to 48 hours............<12.0 mg/dL  3-5 days..................<15.0 mg/dL  6-29 days.................<15.0 mg/dL       Alkaline Phosphatase   Date Value Ref Range Status   03/18/2019 124 55 - 135 U/L Final     AST   Date Value Ref Range Status   03/18/2019 14 10 - 40 U/L Final     ALT   Date Value Ref Range Status   03/18/2019 14 10 - 44 U/L Final     Anion Gap   Date Value Ref Range Status   03/18/2019 9 8 - 16 mmol/L Final     eGFR if    Date Value Ref Range Status   03/18/2019 >60.0 >60 mL/min/1.73 m^2 Final     eGFR if non    Date Value Ref Range Status   03/18/2019 >60.0 >60 mL/min/1.73 m^2 Final     Comment:     Calculation used to obtain the estimated glomerular filtration  rate (eGFR) is the CKD-EPI equation.        [place] Free Light Chains   Date Value Ref Range Status   02/19/2019 1.12 0.33 - 1.94 mg/dL Final     [place] Free Light Chains   Date Value Ref Range Status   02/19/2019 1.50 0.57 - 2.63 mg/dL Final     Kappa/Lambda FLC Ratio   Date Value Ref Range Status   02/19/2019 0.75 0.26 - 1.65 Final     IgG - Serum   Date Value Ref Range Status   02/19/2019 824 650 - 1600 mg/dL Final     Comment:     IgG Cord Blood Reference Range: 650-1600 mg/dL.     IgA   Date Value Ref Range Status   02/19/2019 164 40 - 350 mg/dL Final     Comment:     IgA Cord Blood Reference Range: <5 mg/dL.     IgM   Date Value Ref Range Status   02/19/2019 59 50 - 300 mg/dL Final      "Comment:     IgM Cord Blood Reference Range: <25 mg/dL.     Pathologist Interpretation SPE   Pathologist Interpretation SPE   Collected: 02/19/19 1139   Resulting lab: [facility]   Value: REVIEWED   Comment: Electronically reviewed and signed by:   Bina Mejia M.D.   Signed on 02/20/19 at 17:33   Normal total protein.   No paraproteins are detected.   See corresponding JOJO.      Pathologist Interpretation JOJO   Pathologist Interpretation JOJO   Collected: 02/19/19 1139   Resulting lab: [facility]   Value: REVIEWED   Comment: Electronically reviewed and signed by:   Bina Mejia M.D.   Signed on 02/20/19 at 17:34   An IgG kappa specific monoclonal protein is present in beta.  This is   not seen on the corresponding SPE and therefore cannot be measured.        SPECIMEN -2/7/2019   1) Bone marrow clot, left iliac crest.  2) Bone marrow core biopsy, left iliac crest.  3) Bone marrow aspirate slides.  Supplemental Diagnosis  Please also see cytogenetic karyotype and FISH results reported in [facility] from [facility], [address] [place] [address], which give, in part, the following results:  "46,XX[20], No clonal abnormality was apparent."  "Plasma cell proliferative disorder, FISH:  An insufficient number of plasma cells was available for this FISH test . This result does not exclude the presence  of a plasma cell proliferative disorder.  CD20  (Electronically Signed: 2019-02-14 12:17:10 )  Diagnosed by: Sun Kaba M.D.  FINAL PATHOLOGIC DIAGNOSIS  BONE MARROW, LEFT ILIAC CREST, ASPIRATE, CLOT, AND CORE BIOPSY:  --Cellular marrow, 30-60%, with trilineage hematopoiesis, see comment  --Mild plasmacytosis by  immunohistochemical stain, estimated at 5% of total cellularity, polytypic by flow  cytometry, see comment  --No increase in blasts  --Adequate megakaryocytes  --Stainable iron is present and appears " adequate  COMMENT: Concomitantly submitted flow cytometric analysis detects no diagnostic abnormal hematopoietic  population. B cells are polyclonal and T cells are immunophenotypically unremarkable . The blast gate is not  increased. A population CD38 bright plasma cells shows polytypic cytoplasmic light chain expression and no  diagnostic immunophenotypic abnormalities.  This patient has a reported prior history of myeloma. There is no definitive evidence of myeloma on the current  study. Correlate clinically and with any available cytogenetic and molecular studies. Additionally, correlation with  the patient's prior diagnostic material and with current clinical studies such as serum protein electrophoresis and  immunofixation are highly recommended.  Diagnosed by: Sun Kaba M.D.  (Electronically Signed: 2019-02-11 11:50:27)  Microscopic Examination  A 63-year-old female with recent diagnosis of myeloma, under therapy at outside hospital  Current CBC shows normocytic anemia 9.5/31.6; normal white count and diff; platelets 137  Bone marrow aspirates are cellular and for review. Megakaryocytes are present and appear acceptable in number  and morphology overall. Myeloid cells show maturation through segmented granulocyte stage with no increase in  blasts. Erythroid cells also show maturation with an overall myeloid to erythroid ratio of 1:1.1. Populations of  eosinophils monocytes, plasma cells, and small lymphocytes are present, none of which appear increased in number.  An iron stained aspirate smear shows numerous spicules with the presence of stainable iron. No increase in ring  sideroblasts is seen.  Blasts 1%  Promyelocytes 2.5%  Myelocytes 5%  Metamyelocytes 9%  Bands 5.5%  Neutrophils 11.5%  Eosinophils 3%  Monocytes 4%  Plasma cells 1%  Lymphocytes 11.5%  Erythroid precursors 46%  The bone marrow and core biopsy sections are acceptable for review. Overall cellularity is approximately  30-60%,  acceptable for age. Megakaryocytes are present and appear acceptable in number with no significant clustering seen.  Myeloid cells show maturation through the segmented granulocyte stage with obvious increase in blasts. Scattered  erythroid islands are present. No obvious lymphoid aggregates are identified on H&E sections and no atypical infiltrate  is seen.  Iron stains of the clot and core biopsy sections show the presence of stainable iron which appears adequate. A short  panel of immunohistochemical stains is performed for greater sensitivity and architectural evaluation. CD3 highlights  scattered interstitial lymphocytes with a few loose aggregates noted. CD20 highlights scattered predominantly  individual small lymphocytes with no significant clustering seen. CD56 highlights only rare individual cells.   highlights scattered individual and small clusters of plasma cells, overall estimated at 5% of total cellularity. All  controls appear appropriate.    ASSESSMENT AND PLAN:   Encounter Diagnoses   Name Primary?    Multiple myeloma in remission Yes    Stem cell transplant candidate     Essential hypertension     Type 2 diabetes mellitus without complication, without long-term current use of insulin     Chemotherapy-induced peripheral neuropathy     Acute pain of left knee     Fall (on) (from) other stairs and steps, initial encounter        MM/stem cell transplant candidate  -IgG kappa MM; ISS 2; standard risk cytogenetics; with lytic bone lesions at presentation; initiate VRd (21 day cycle) with Dr. Juan at [facility]   -she has achieved VGPR  -pre transplant eval with no contraindication for transplant   -never smoker so plan to follow up LL nodule at repeat PET scan at day +100  -plan for melphalan 200mg/m2 autoSCT; discussed expectations during transplant stay  -discussed need for full time caretaker through day +30  -discussed need to stay within 60 miles of transplant center through day  +30  -likely revlimid maintenance at day +100    HTN  - continue losartan/hctz    DM2  - on glipizide, glyburide, and janumet  - will hold while inpatient for transplant  - accuchecks with sliding scale insulin    Neuropathy  - continue gabapentin    L knee pain s/p Fall  - pt states she fell downs steps at her daughter's house last Sunday  - she did not visit/notify a provider or have an imaging complete  - knee remains swollen with large hematoma  - will complete imaging impatient prior to start of stem cell transplant      Dispo:  Admit to inpatient for Autologous Stem Cell Transplant  Will obtain imaging first prior to start of chemotherapy      Plan of care discussing with collaborating physician Dr. Jason Nixon, NP  Hematology/Oncology/BMT      Distress Screening Results: Psychosocial Distress screening score of Distress Score: 0 noted and reviewed. No intervention indicated.

## 2019-03-18 NOTE — HOSPITAL COURSE
03/18/2019: Patient admitted for Daija auto for Multiple Myeloma.  03/19/2019 Day -1 Daija Auto, tolerated Melphalan this am without difficulty. Knee x-ray done overnight for swelling and pain post fall at home showed mild edema but no fractures. Patient reports neuropathy pain and sciatica which is chronic.   03/20/2019: Day 0 Daija Auto for Multiple Myeloma. Received Melphalan yesterday. Tolerated well. Started on norvasc yesterday for htn. States no BM since admission. PRN pericolace ordered. She will receive 9 bags and a CD34 dose of 3.61 x 10^6/kg at 1330 today.  03/21/2019 Day +1 Daija Auto. Tolerated stem cell infusion yesterday without difficulty. Complains of mild nausea this am, no diarrhea. Reports her main complaint is persistent neuropathy pain the LE making it difficult to walk. BM mild improvement. Weight and I&O stable.   03/22/2019 Day +2 Daija auto, gabapentin increased for neuropathy pain. Continued nausea, re-enforced asking for antiemetics. BP and glucose improved. VSS  03/25/2019 Day +5 Daija Auto, gabapentin increased over the weekend to 600 mg TID and pain improved per patient. Patient reports nausea and emesis, will schedule Zofran ATC and change to IV. 3 BM's in the past 24 hours, not liquid diarrhea. Ambulated with PT who rec HH PT. VSS, afebrile  03/26/2019: Day +6 Daija AutoSCT. VSS. Afebrile. ANC 80. CINV well controlled with scheduled zofran and PRN compazine and PRN ativan. Increased to moderate dose SSI PRN for hyperglycemia.  03/27/2019: Day +7 Daija AutoSCT. VSS. Afebrile. ANC 60. Lovenox held for thrombocytopenia.   3/28/2019: Day +8 Daija Auto. VSS, ANC 0, using Dukes for throat pain, erythema but no ulcers noted. Reports bone pain from Neupogen, treated with Zyrtec and Ultram. Denies nausea, vomiting, frequent BM's but denies diarrhea.   03/29/2019 Day +9 Daija Auto. ANC 0, afebrile on ppx antimicrobials. Receiving platelet transfusion today. Denies nausea, 2 BM's. Reports worsening throat pain, no  lesions noted. Tolerated crushed pills and soft foods. Neuropathy pain stable.   03/30/2019 Day +10. Diarrhea and mucositis stable. Await c diff collection  03/31/2019 Day +11, counts improving. Mucositis improving. Still await c diff collection.  04/01/2019 Day +12 Daija Auto, engrafted today with an ANC of 1700. Neupogen, levaquin and diflucan dc'd. Patient reports throat pain, nausea, appetite and diarhea are all improving. C.diff negative. Afebrile. VSS.   04/02/2019 Day +13 Daija Auto, ANC 4200 today, afebrile and VSS, NEON and no new complaints this am. Transfusing platelets today for Vascath removal. Discharge home with daughter later today. Follow-up in clinic on 4/5.

## 2019-03-18 NOTE — H&P
[facility]  Hematology  Bone Marrow Transplant  H&P    Subjective:     Principal Problem: Stem cell transplant candidate    HPI: Ms. Leach is a 63-y-o female with a history of HTN, DM II, and IgG Kappa Multiple Myeloma. She is admitted for a planned Daija Auto SCT. She was referred to Dr. Alva for evaluation for SCT in December of 2018. She presented with lytic bone lesions and was mid cycle 5 of VRd, which had been initiated by Dr. Juan at [facility].  She completed pre transplant staging and achieved VGPR. Pre transplant eval with no overt contraindications to transplant. Her daughter will be her primary caretaker.   She denies fever, chills, nightsweats, bleeding, brusing, lymphadenopathy, signs/symptoms of splenomegaly.        Patient information was obtained from patient and past medical records.     Oncology History:   -IgG kappa MM; ISS 2; standard risk cytogenetics; with lytic bone lesions at presentation; initiate VRd (21 day cycle) with Dr. Juan at [facility]   -she has achieved VGPR  -pre transplant eval with no contraindication for transplant   -never smoker so plan to follow up LL nodule at repeat PET scan at day +100  -plan for melphalan 200mg/m2 autoSCT; discussed expectations during transplant stay  -discussed need for full time caretaker through day +30  -discussed need to stay within 60 miles of transplant center through day +30  -likely revlimid maintenance at day +100     Medications Prior to Admission   Medication Sig Dispense Refill Last Dose    acyclovir (ZOVIRAX) 400 MG tablet Take 400 mg by mouth 2 (two) times daily.   10 Taking    gabapentin (NEURONTIN) 300 MG capsule Take 300 mg by mouth 3 (three) times daily.   Taking    glipiZIDE (GLUCOTROL) 10 MG tablet Take 1 tablet by mouth daily  2 Taking    glyBURIDE (DIABETA) 5 MG tablet Take 5 mg by mouth.   Taking    JANUMET  mg per tablet Take 1 tablet twice a day with meals  5 Taking     losartan-hydrochlorothiazide 100-12.5 mg (HYZAAR) 100-12.5 mg Tab Take 1 tablet by mouth daily  2 Taking    multivit,iron,minerals/lutein (CENTRUM SILVER ULTRA WOMEN'S ORAL) Take 1 tablet by mouth once daily.   Taking    ondansetron (ZOFRAN-ODT) 8 MG TbDL Take 8 mg by mouth every 8 (eight) hours as needed (nausea).   6 Taking    PAZEO 0.7 % Drop Place 1 drop into both eyes every morning.  5 Taking    prochlorperazine (COMPAZINE) 10 MG tablet Take 10 mg by mouth 4 (four) times daily as needed (nausea/vomiting).   6 Taking    tiZANidine (ZANAFLEX) 4 MG tablet Take 4 mg by mouth 3 (three) times daily as needed (back muscle spasms).    Taking       Patient has no known allergies.     Past Medical History:   Diagnosis Date    Depression     DM2 (diabetes mellitus, type 2)     Glaucoma     HTN (hypertension)     Myeloma     Therapy     after mother's death     Past Surgical History:   Procedure Laterality Date    Biopsy-bone marrow Left 2/7/2019    Performed by Saúl Alva MD at [facility] OR 2ND FLR    Insertion,catheter,tunneled N/A 3/14/2019    Performed by [facility] Diagnostic Provider at [facility] OR 2ND FLR     Family History     None        Tobacco Use    Smoking status: Never Smoker    Smokeless tobacco: Never Used   Substance and Sexual Activity    Alcohol use: No     Frequency: Never    Drug use: No    Sexual activity: Not on file       Review of Systems   Constitutional: Negative for chills, fatigue, fever and unexpected weight change.   HENT: Negative for congestion, mouth sores, nosebleeds, rhinorrhea, sore throat and voice change.    Eyes: Negative for discharge, redness, itching and visual disturbance.   Respiratory: Negative for cough, shortness of breath and wheezing.    Cardiovascular: Negative for chest pain, palpitations and leg swelling.   Gastrointestinal: Negative for abdominal distention, abdominal pain, constipation, diarrhea, nausea and vomiting.   Endocrine: Negative for cold  intolerance, heat intolerance, polydipsia, polyphagia and polyuria.   Genitourinary: Negative for decreased urine volume, dysuria, frequency, hematuria and urgency.   Musculoskeletal: Positive for arthralgias and joint swelling.        Left knee pain. S/p fall.    Skin: Negative for pallor, rash and wound.   Allergic/Immunologic: Negative for environmental allergies, food allergies and immunocompromised state.   Neurological: Negative for dizziness, tremors, seizures, weakness, light-headedness, numbness and headaches.        Sciatic pain   Hematological: Negative for adenopathy. Does not bruise/bleed easily.   Psychiatric/Behavioral: Negative for agitation, confusion, sleep disturbance and suicidal ideas. The patient is not nervous/anxious.      Objective:     Vital Signs (Most Recent):    Vital Signs (24h Range):  Temp:  [98.4 °F (36.9 °C)] 98.4 °F (36.9 °C)  SpO2:  [100 %] 100 %  BP: (179)/(83) 179/83        There is no height or weight on file to calculate BMI.  There is no height or weight on file to calculate BSA.    Lines/Drains/Airways     Central Venous Catheter Line                 Percutaneous Central Line Insertion/Assessment - triple lumen  03/14/19 1403 right subclavian 4 days                Physical Exam   Constitutional: She is oriented to person, place, and time. She appears well-developed and well-nourished.   HENT:   Head: Normocephalic and atraumatic.   Eyes: Conjunctivae and EOM are normal. Pupils are equal, round, and reactive to light.   Neck: Normal range of motion. Neck supple.   Cardiovascular: Normal rate, regular rhythm, normal heart sounds and intact distal pulses.   Pulmonary/Chest: Effort normal and breath sounds normal.   Abdominal: Soft. Bowel sounds are normal.   Musculoskeletal: She exhibits edema.   +1 BLE. Edema to left knee 2/2 fall.    Neurological: She is alert and oriented to person, place, and time.   Skin: Skin is warm and dry.   Psychiatric: She has a normal mood and  affect. Her behavior is normal. Judgment and thought content normal.       Significant Labs:   CBC:   Recent Labs   Lab 03/18/19  1154   WBC 6.57   HGB 10.5*   HCT 32.8*   PLT 70*    and CMP:   Recent Labs   Lab 03/18/19  1154      K 3.7      CO2 25   *   BUN 12   CREATININE 0.7   CALCIUM 8.5*   PROT 6.1   ALBUMIN 3.3*   BILITOT 0.3   ALKPHOS 124   AST 14   ALT 14   ANIONGAP 9   EGFRNONAA >60.0       Diagnostic Results:  I have reviewed all pertinent imaging results/findings within the past 24 hours.    Assessment/Plan:     * Stem cell transplant candidate    - see Multiple Myeloma  - admitted for Daija Auto SCT  - today is Day -     Multiple myeloma    -IgG kappa MM; ISS 2; standard risk cytogenetics; with lytic bone lesions at presentation; initiate VRd (21 day cycle) with Dr. Juan at [facility]; currently mid cycle  5    -she has achieved VGPR  -pre transplant eval with no contraindication for transplant   -never smoker so plan to follow up LL nodule at repeat PET scan at day +100  -discussed with patient and daughter rationale, alternatives, risks of central line placement,  Mobilization/colleection, melphalan 200mg/m2 autoSCT; discussed estimated 1% risk of transplant related mortality  -discussed need for appt and medication compliance following transplant  -discussed need for full time caretaker through day +30  -discussed need to stay within 60 miles of transplant center through day +30  -patient and caretaker expressed understanding; all written consents obtained  -likely revlimid maintenance at day +100  -Admitted for Daija auto SCT. Today is Day -         Left knee pain    - s/p fall ~ 1 week ago  - will check x-ray  - tramadol prn ordered for pain     Essential hypertension    - continue home anti-hypertensive medication     Type 2 diabetes mellitus without complication, without long-term current use of insulin    - will hold oral anti diabetic medications while inpatient  - blood glucose  checks ACHS  - s/s insulin         VTE Risk Mitigation (From admission, onward)        Ordered     enoxaparin injection 40 mg  Daily      03/18/19 1702     IP VTE HIGH RISK PATIENT  Once      03/18/19 1702          Disposition: Inpatient for Auto SCT.    Maria Guadalupe Poole, NP  Bone Marrow Transplant  Hematology  [facility]

## 2019-03-19 PROBLEM — D64.81 ANEMIA ASSOCIATED WITH CHEMOTHERAPY: Status: ACTIVE | Noted: 2019-03-19

## 2019-03-19 PROBLEM — E83.42 HYPOMAGNESEMIA: Status: ACTIVE | Noted: 2019-03-19

## 2019-03-19 PROBLEM — E44.0 MODERATE MALNUTRITION: Status: ACTIVE | Noted: 2019-03-19

## 2019-03-19 PROBLEM — T45.1X5A ANEMIA ASSOCIATED WITH CHEMOTHERAPY: Status: ACTIVE | Noted: 2019-03-19

## 2019-03-19 LAB
ABO + RH BLD: NORMAL
ALBUMIN SERPL BCP-MCNC: 3.1 G/DL
ALBUMIN SERPL BCP-MCNC: 3.1 G/DL
ALP SERPL-CCNC: 106 U/L
ALP SERPL-CCNC: 106 U/L
ALT SERPL W/O P-5'-P-CCNC: 11 U/L
ALT SERPL W/O P-5'-P-CCNC: 11 U/L
ANION GAP SERPL CALC-SCNC: 6 MMOL/L
ANION GAP SERPL CALC-SCNC: 6 MMOL/L
AST SERPL-CCNC: 12 U/L
AST SERPL-CCNC: 12 U/L
BASOPHILS # BLD AUTO: 0.02 K/UL
BASOPHILS # BLD AUTO: 0.02 K/UL
BASOPHILS NFR BLD: 0.4 %
BASOPHILS NFR BLD: 0.4 %
BILIRUB SERPL-MCNC: 0.3 MG/DL
BILIRUB SERPL-MCNC: 0.3 MG/DL
BLD GP AB SCN CELLS X3 SERPL QL: NORMAL
BUN SERPL-MCNC: 8 MG/DL
BUN SERPL-MCNC: 8 MG/DL
CALCIUM SERPL-MCNC: 8.4 MG/DL
CALCIUM SERPL-MCNC: 8.4 MG/DL
CHLORIDE SERPL-SCNC: 104 MMOL/L
CHLORIDE SERPL-SCNC: 104 MMOL/L
CO2 SERPL-SCNC: 26 MMOL/L
CO2 SERPL-SCNC: 26 MMOL/L
CREAT SERPL-MCNC: 0.6 MG/DL
CREAT SERPL-MCNC: 0.6 MG/DL
DIFFERENTIAL METHOD: ABNORMAL
DIFFERENTIAL METHOD: ABNORMAL
EOSINOPHIL # BLD AUTO: 0.1 K/UL
EOSINOPHIL # BLD AUTO: 0.1 K/UL
EOSINOPHIL NFR BLD: 2.2 %
EOSINOPHIL NFR BLD: 2.2 %
ERYTHROCYTE [DISTWIDTH] IN BLOOD BY AUTOMATED COUNT: 17.2 %
ERYTHROCYTE [DISTWIDTH] IN BLOOD BY AUTOMATED COUNT: 17.2 %
EST. GFR  (AFRICAN AMERICAN): >60 ML/MIN/1.73 M^2
EST. GFR  (AFRICAN AMERICAN): >60 ML/MIN/1.73 M^2
EST. GFR  (NON AFRICAN AMERICAN): >60 ML/MIN/1.73 M^2
EST. GFR  (NON AFRICAN AMERICAN): >60 ML/MIN/1.73 M^2
GLUCOSE SERPL-MCNC: 167 MG/DL
GLUCOSE SERPL-MCNC: 167 MG/DL
HCT VFR BLD AUTO: 32 %
HCT VFR BLD AUTO: 32 %
HGB BLD-MCNC: 10 G/DL
HGB BLD-MCNC: 10 G/DL
IMM GRANULOCYTES # BLD AUTO: 0.1 K/UL
IMM GRANULOCYTES # BLD AUTO: 0.1 K/UL
IMM GRANULOCYTES NFR BLD AUTO: 1.8 %
IMM GRANULOCYTES NFR BLD AUTO: 1.8 %
LYMPHOCYTES # BLD AUTO: 1.3 K/UL
LYMPHOCYTES # BLD AUTO: 1.3 K/UL
LYMPHOCYTES NFR BLD: 23.3 %
LYMPHOCYTES NFR BLD: 23.3 %
MAGNESIUM SERPL-MCNC: 1.4 MG/DL
MAGNESIUM SERPL-MCNC: 1.4 MG/DL
MCH RBC QN AUTO: 26.1 PG
MCH RBC QN AUTO: 26.1 PG
MCHC RBC AUTO-ENTMCNC: 31.3 G/DL
MCHC RBC AUTO-ENTMCNC: 31.3 G/DL
MCV RBC AUTO: 84 FL
MCV RBC AUTO: 84 FL
MONOCYTES # BLD AUTO: 0.6 K/UL
MONOCYTES # BLD AUTO: 0.6 K/UL
MONOCYTES NFR BLD: 10.4 %
MONOCYTES NFR BLD: 10.4 %
NEUTROPHILS # BLD AUTO: 3.4 K/UL
NEUTROPHILS # BLD AUTO: 3.4 K/UL
NEUTROPHILS NFR BLD: 61.9 %
NEUTROPHILS NFR BLD: 61.9 %
NRBC BLD-RTO: 0 /100 WBC
NRBC BLD-RTO: 0 /100 WBC
PHOSPHATE SERPL-MCNC: 3 MG/DL
PHOSPHATE SERPL-MCNC: 3 MG/DL
PLATELET # BLD AUTO: 82 K/UL
PLATELET # BLD AUTO: 82 K/UL
PMV BLD AUTO: 12.3 FL
PMV BLD AUTO: 12.3 FL
POCT GLUCOSE: 149 MG/DL (ref 70–110)
POCT GLUCOSE: 190 MG/DL (ref 70–110)
POCT GLUCOSE: 204 MG/DL (ref 70–110)
POTASSIUM SERPL-SCNC: 4.1 MMOL/L
POTASSIUM SERPL-SCNC: 4.1 MMOL/L
PROT SERPL-MCNC: 5.7 G/DL
PROT SERPL-MCNC: 5.7 G/DL
RBC # BLD AUTO: 3.83 M/UL
RBC # BLD AUTO: 3.83 M/UL
SODIUM SERPL-SCNC: 136 MMOL/L
SODIUM SERPL-SCNC: 136 MMOL/L
WBC # BLD AUTO: 5.5 K/UL
WBC # BLD AUTO: 5.5 K/UL

## 2019-03-19 PROCEDURE — 94761 N-INVAS EAR/PLS OXIMETRY MLT: CPT

## 2019-03-19 PROCEDURE — 25000003 PHARM REV CODE 250: Performed by: NURSE PRACTITIONER

## 2019-03-19 PROCEDURE — 83735 ASSAY OF MAGNESIUM: CPT

## 2019-03-19 PROCEDURE — 25000003 PHARM REV CODE 250: Performed by: INTERNAL MEDICINE

## 2019-03-19 PROCEDURE — 99900035 HC TECH TIME PER 15 MIN (STAT)

## 2019-03-19 PROCEDURE — 20600001 HC STEP DOWN PRIVATE ROOM

## 2019-03-19 PROCEDURE — 63600175 PHARM REV CODE 636 W HCPCS: Performed by: STUDENT IN AN ORGANIZED HEALTH CARE EDUCATION/TRAINING PROGRAM

## 2019-03-19 PROCEDURE — A9155 ARTIFICIAL SALIVA: HCPCS | Performed by: INTERNAL MEDICINE

## 2019-03-19 PROCEDURE — 63600175 PHARM REV CODE 636 W HCPCS: Performed by: NURSE PRACTITIONER

## 2019-03-19 PROCEDURE — 99222 1ST HOSP IP/OBS MODERATE 55: CPT | Mod: ,,, | Performed by: INTERNAL MEDICINE

## 2019-03-19 PROCEDURE — 99222 PR INITIAL HOSPITAL CARE,LEVL II: ICD-10-PCS | Mod: ,,, | Performed by: INTERNAL MEDICINE

## 2019-03-19 PROCEDURE — 85025 COMPLETE CBC W/AUTO DIFF WBC: CPT

## 2019-03-19 PROCEDURE — 80053 COMPREHEN METABOLIC PANEL: CPT

## 2019-03-19 PROCEDURE — 84100 ASSAY OF PHOSPHORUS: CPT

## 2019-03-19 PROCEDURE — 63600175 PHARM REV CODE 636 W HCPCS: Performed by: INTERNAL MEDICINE

## 2019-03-19 PROCEDURE — 86850 RBC ANTIBODY SCREEN: CPT

## 2019-03-19 PROCEDURE — 63600175 PHARM REV CODE 636 W HCPCS: Mod: JG | Performed by: INTERNAL MEDICINE

## 2019-03-19 RX ORDER — NITROGLYCERIN 0.4 MG/1
0.4 TABLET SUBLINGUAL ONCE AS NEEDED
Status: COMPLETED | OUTPATIENT
Start: 2019-03-20 | End: 2019-03-20

## 2019-03-19 RX ORDER — EPINEPHRINE 1 MG/ML
0.5 INJECTION, SOLUTION INTRACARDIAC; INTRAMUSCULAR; INTRAVENOUS; SUBCUTANEOUS ONCE AS NEEDED
Status: DISCONTINUED | OUTPATIENT
Start: 2019-03-20 | End: 2019-03-21

## 2019-03-19 RX ORDER — DIPHENHYDRAMINE HYDROCHLORIDE 50 MG/ML
50 INJECTION INTRAMUSCULAR; INTRAVENOUS ONCE AS NEEDED
Status: COMPLETED | OUTPATIENT
Start: 2019-03-20 | End: 2019-03-20

## 2019-03-19 RX ORDER — CLONIDINE HYDROCHLORIDE 0.1 MG/1
0.1 TABLET ORAL ONCE AS NEEDED
Status: COMPLETED | OUTPATIENT
Start: 2019-03-20 | End: 2019-03-20

## 2019-03-19 RX ORDER — MEPERIDINE HYDROCHLORIDE 50 MG/ML
50 INJECTION INTRAMUSCULAR; INTRAVENOUS; SUBCUTANEOUS ONCE AS NEEDED
Status: COMPLETED | OUTPATIENT
Start: 2019-03-20 | End: 2019-03-20

## 2019-03-19 RX ORDER — SODIUM CHLORIDE AND POTASSIUM CHLORIDE 150; 900 MG/100ML; MG/100ML
INJECTION, SOLUTION INTRAVENOUS CONTINUOUS
Status: DISCONTINUED | OUTPATIENT
Start: 2019-03-20 | End: 2019-03-20

## 2019-03-19 RX ORDER — HYDROCODONE BITARTRATE AND ACETAMINOPHEN 500; 5 MG/1; MG/1
TABLET ORAL
Status: DISCONTINUED | OUTPATIENT
Start: 2019-03-20 | End: 2019-03-27

## 2019-03-19 RX ORDER — FUROSEMIDE 10 MG/ML
100 INJECTION INTRAMUSCULAR; INTRAVENOUS ONCE AS NEEDED
Status: COMPLETED | OUTPATIENT
Start: 2019-03-20 | End: 2019-03-20

## 2019-03-19 RX ORDER — DIPHENHYDRAMINE HYDROCHLORIDE 50 MG/ML
50 INJECTION INTRAMUSCULAR; INTRAVENOUS ONCE
Status: COMPLETED | OUTPATIENT
Start: 2019-03-20 | End: 2019-03-20

## 2019-03-19 RX ORDER — AMLODIPINE BESYLATE 5 MG/1
5 TABLET ORAL DAILY
Status: DISCONTINUED | OUTPATIENT
Start: 2019-03-19 | End: 2019-04-02 | Stop reason: HOSPADM

## 2019-03-19 RX ORDER — LORAZEPAM 2 MG/ML
1 INJECTION INTRAMUSCULAR ONCE
Status: COMPLETED | OUTPATIENT
Start: 2019-03-20 | End: 2019-03-20

## 2019-03-19 RX ADMIN — ACYCLOVIR 800 MG: 200 CAPSULE ORAL at 09:03

## 2019-03-19 RX ADMIN — ONDANSETRON 16 MG: 8 TABLET, ORALLY DISINTEGRATING ORAL at 08:03

## 2019-03-19 RX ADMIN — Medication 30 ML: at 05:03

## 2019-03-19 RX ADMIN — OLOPATADINE HYDROCHLORIDE 1 DROP: 2 SOLUTION OPHTHALMIC at 10:03

## 2019-03-19 RX ADMIN — SODIUM CHLORIDE AND POTASSIUM CHLORIDE: 9; 1.49 INJECTION, SOLUTION INTRAVENOUS at 11:03

## 2019-03-19 RX ADMIN — SODIUM CHLORIDE AND POTASSIUM CHLORIDE: 9; 1.49 INJECTION, SOLUTION INTRAVENOUS at 03:03

## 2019-03-19 RX ADMIN — SODIUM CHLORIDE AND POTASSIUM CHLORIDE: 9; 1.49 INJECTION, SOLUTION INTRAVENOUS at 10:03

## 2019-03-19 RX ADMIN — Medication 30 ML: at 09:03

## 2019-03-19 RX ADMIN — GABAPENTIN 300 MG: 300 CAPSULE ORAL at 08:03

## 2019-03-19 RX ADMIN — DEXAMETHASONE 8 MG: 4 TABLET ORAL at 08:03

## 2019-03-19 RX ADMIN — SODIUM CHLORIDE AND POTASSIUM CHLORIDE: 9; 1.49 INJECTION, SOLUTION INTRAVENOUS at 05:03

## 2019-03-19 RX ADMIN — INSULIN ASPART 2 UNITS: 100 INJECTION, SOLUTION INTRAVENOUS; SUBCUTANEOUS at 12:03

## 2019-03-19 RX ADMIN — TIZANIDINE 4 MG: 2 TABLET ORAL at 12:03

## 2019-03-19 RX ADMIN — TRAMADOL HYDROCHLORIDE 50 MG: 50 TABLET, COATED ORAL at 12:03

## 2019-03-19 RX ADMIN — LEVOFLOXACIN 500 MG: 500 TABLET, FILM COATED ORAL at 08:03

## 2019-03-19 RX ADMIN — TRAMADOL HYDROCHLORIDE 50 MG: 50 TABLET, COATED ORAL at 06:03

## 2019-03-19 RX ADMIN — LOSARTAN POTASSIUM AND HYDROCHLOROTHIAZIDE 1 TABLET: 12.5; 1 TABLET ORAL at 08:03

## 2019-03-19 RX ADMIN — Medication 30 ML: at 01:03

## 2019-03-19 RX ADMIN — Medication 400 MG: at 06:03

## 2019-03-19 RX ADMIN — Medication 380 MG: at 10:03

## 2019-03-19 RX ADMIN — FLUCONAZOLE 400 MG: 200 TABLET ORAL at 08:03

## 2019-03-19 RX ADMIN — GABAPENTIN 300 MG: 300 CAPSULE ORAL at 03:03

## 2019-03-19 RX ADMIN — ACYCLOVIR 800 MG: 200 CAPSULE ORAL at 08:03

## 2019-03-19 RX ADMIN — ENOXAPARIN SODIUM 40 MG: 100 INJECTION SUBCUTANEOUS at 04:03

## 2019-03-19 RX ADMIN — Medication 30 ML: at 10:03

## 2019-03-19 RX ADMIN — INSULIN ASPART 1 UNITS: 100 INJECTION, SOLUTION INTRAVENOUS; SUBCUTANEOUS at 09:03

## 2019-03-19 RX ADMIN — Medication 400 MG: at 10:03

## 2019-03-19 RX ADMIN — MAGNESIUM OXIDE TAB 400 MG (241.3 MG ELEMENTAL MG) 400 MG: 400 (241.3 MG) TAB at 01:03

## 2019-03-19 RX ADMIN — AMLODIPINE BESYLATE 5 MG: 5 TABLET ORAL at 04:03

## 2019-03-19 RX ADMIN — PANTOPRAZOLE SODIUM 40 MG: 40 TABLET, DELAYED RELEASE ORAL at 08:03

## 2019-03-19 RX ADMIN — GABAPENTIN 300 MG: 300 CAPSULE ORAL at 09:03

## 2019-03-19 NOTE — ASSESSMENT & PLAN NOTE
Malnutrition in the context of Chronic Illness/Injury    Related to (etiology):  Hypermetabolic condition and inadequate energy intake    Signs and Symptoms (as evidenced by):  Energy Intake: <75% of estimated energy requirement for 6 months  Body Fat Depletion: moderate depletion of triceps   Weight Loss: 23% x 6 months   Fluid Accumulation: mild    Interventions/Recommendations (treatment strategy):  Commercial beverage    Nutrition Diagnosis Status:  New

## 2019-03-19 NOTE — PLAN OF CARE
Problem: Adult Inpatient Plan of Care  Goal: Plan of Care Review    Recommendations     Recommendation/Intervention:   1. Order Boost Glucose Control (chocolate) to aid in nutrient intake. Pt meets criteria for moderate malnutrition.   2. Continue current regular diet and encourage PO intake.   3. Food safety education reviewed with pt.  4. RD following.     Goals: Intake/tolerate >/=85% EEN/EPN with no further weight loss  Nutrition Goal Status: new

## 2019-03-19 NOTE — PLAN OF CARE
Problem: Adult Inpatient Plan of Care  Goal: Plan of Care Review  Outcome: Ongoing (interventions implemented as appropriate)  Pt. Admitted today for AutoSCT.  Pt. Is day -2 and will receive chemo tomorrow.  Pt. with nonskid footwear on with bed in lowest position and locked with bed rails up x2.  Pt. instructed to call prior to getting OOB.  Pt. with call light within reach and verbalized understanding.  Pt. with Xray of knee done this afternoon.  Pt. with c/o left knee  pain with tramadol given PRN.  Pt. With daughter at bedside.  Will continue to monitor pt.

## 2019-03-19 NOTE — ASSESSMENT & PLAN NOTE
- will hold oral anti diabetic medications while inpatient  - blood glucose checks ACHS  - s/s insulin  - DM diet

## 2019-03-19 NOTE — PLAN OF CARE
Problem: Adult Inpatient Plan of Care  Goal: Plan of Care Review  Outcome: Ongoing (interventions implemented as appropriate)  Pt. Day -1 for AutoSCT.  Pt. received chemo today.  Pt. with nonskid footwear on with bed in lowest position and locked with bed rails up x2.  Pt. instructed to call prior to getting OOB.  Pt. with call light within reach and verbalized understanding.  Pt. with hypertension today and started on norvasc.  Pt. received electrolyte replacements today.  Pt. with a good appetite.  Pt. with daughter at bedside.  Will continue to monitor pt.

## 2019-03-19 NOTE — PROGRESS NOTES
Chemo Note: Chemotherapy consent in chart.  Chemo verified with Lupe MURILLO RN.  Right chest vascath  patent and positive for blood return.  Melphalan 380 mg in sodium chloride 0.9% 500 mL started @ 999 mL/h to infuse over 30 minutes.  Pt. given and instructed to chew or suck on ice for 30 minutes pre, during infusion and 1 hour post.  Will continue to monitor pt.

## 2019-03-19 NOTE — ASSESSMENT & PLAN NOTE
-IgG kappa MM; ISS 2; standard risk cytogenetics; with lytic bone lesions at presentation; initiate VRd (21 day cycle) with Dr. Juan at [facility]; currently mid cycle  5    -she has achieved VGPR  -pre transplant eval with no contraindication for transplant   -never smoker so plan to follow up LL nodule at repeat PET scan at day +100  -Admitted for Daija auto SCT. Today is Day -1, tolerated Melphalan this am without difficulty  -Neupogen and ppx antimicrobials per protocol.

## 2019-03-19 NOTE — CONSULTS
"  [facility]  Adult Nutrition  Consult Note    SUMMARY     Recommendations    Recommendation/Intervention:   1. Order Boost Glucose Control (chocolate) to aid in nutrient intake. Pt meets criteria for moderate malnutrition.   2. Continue current regular diet and encourage PO intake.   3. Food safety education reviewed with pt.  4. RD following.    Goals: Intake/tolerate >/=85% EEN/EPN with no further weight loss  Nutrition Goal Status: new  Communication of RD Recs: (POC)    Reason for Assessment    Reason For Assessment: consult  Diagnosis: cancer diagnosis/related complications(SCT candidate)  Relevant Medical History: T2DM, HTN, myeloma    General Information Comments: Pt reports fair intake since admit, 0-50%. Reports wt loss from UBW of 230lb in 9/2018 and has noticed that her shirts are fitting more loosely. Eating less at home as well. NFPE performed - no notable signs of malnutrition except loose skin around tricep, mild clavicle loss. LE with edema. Agreeable to Boost. Already following food safety guidelines at home, so reviewed with pt and provided handout. Stated she would like "to lose a little more" weight, but encouraged pt to have adequate PO intake to ensure good outcomes during treatment.    Nutrition Discharge Planning: Adequate PO intake to prevent further weight loss    Nutrition Risk Screen    Nutrition Risk Screen: no indicators present    Nutrition/Diet History    Patient Reported Diet/Restrictions/Preferences: (food safety)  Spiritual, Cultural Beliefs, Judaism Practices, Values that Affect Care: no  Factors Affecting Nutritional Intake: decreased appetite    Anthropometrics    Temp: 98.1 °F (36.7 °C)  Height Method: Stated  Height: 5' 4" (162.6 cm)  Height (inches): 64 in  Weight Method: Standard Scale  Weight: 80.1 kg (176 lb 9.4 oz)  Weight (lb): 176.59 lb  Ideal Body Weight (IBW), Female: 120 lb  % Ideal Body Weight, Female (lb): 147.16 lb  BMI (Calculated): " 30.4  BMI Grade: 30 - 34.9- obesity - grade I  Usual Body Weight (UBW), k.5 kg(since 2018 per pt)  % Usual Body Weight: 76.81  % Weight Change From Usual Weight: -23.35 %       Lab/Procedures/Meds    Pertinent Labs Reviewed: reviewed  Pertinent Labs Comments: Glu 167, Mg 1.4, Alb 3.1  Pertinent Medications Reviewed: reviewed  Pertinent Medications Comments: dexamethasone, losartan-HCTZ, Zofran, pantoprazole, saliva substitute    Estimated/Assessed Needs    Weight Used For Calorie Calculations: 80.1 kg (176 lb 9.4 oz)  Energy Calorie Requirements (kcal):   Energy Need Method: Kcal/kg(25)  Protein Requirements:  gm (1.2-1.4 gm/kg)  Weight Used For Protein Calculations: 80.1 kg (176 lb 9.4 oz)  Fluid Requirements (mL): per MD     RDA Method (mL):        Nutrition Prescription Ordered    Current Diet Order: Regular    Evaluation of Received Nutrient/Fluid Intake    IV Fluid (mL): 1383  I/O: -118ml  Comments: LBM 3/18  % Intake of Estimated Energy Needs: 25 - 50 %  % Meal Intake: 25 - 50 %    Nutrition Risk    Level of Risk/Frequency of Follow-up: low     Assessment and Plan    Moderate malnutrition    Malnutrition in the context of Chronic Illness/Injury    Related to (etiology):  Hypermetabolic condition and inadequate energy intake    Signs and Symptoms (as evidenced by):  Energy Intake: <75% of estimated energy requirement for 6 months  Body Fat Depletion: moderate depletion of triceps   Weight Loss: 23% x 6 months   Fluid Accumulation: mild    Interventions/Recommendations (treatment strategy):  Commercial beverage    Nutrition Diagnosis Status:  New        Monitor and Evaluation    Food and Nutrient Intake: energy intake, food and beverage intake  Food and Nutrient Adminstration: diet order  Knowledge/Beliefs/Attitudes: food and nutrition knowledge/skill  Physical Activity and Function: nutrition-related ADLs and IADLs  Anthropometric Measurements: weight, weight change, body mass  index  Biochemical Data, Medical Tests and Procedures: electrolyte and renal panel, gastrointestinal profile, glucose/endocrine profile, inflammatory profile  Nutrition-Focused Physical Findings: overall appearance     Malnutrition Assessment    Malnutrition Type: chronic illness  Weight Loss (Malnutrition): greater than 10% in 6 months  Energy Intake (Malnutrition): less than 75% for greater than or equal to 3 months   Upper Arm Region (Subcutaneous Fat Loss): moderate depletion   Clavicle Bone Region (Muscle Loss): mild depletion   Edema (Fluid Accumulation): 2-->mild     Nutrition Follow-Up    RD Follow-up?: Yes

## 2019-03-19 NOTE — SUBJECTIVE & OBJECTIVE
Subjective:     Interval History: Day -1 Daija Auto, tolerated Melphalan this am without difficulty. Knee x-ray done overnight for swelling and pain post fall at home showed mild edema but no fractures. Patient reports neuropathy pain and sciatica which is chronic.     Objective:     Vital Signs (Most Recent):  Temp: 98 °F (36.7 °C) (03/19/19 1520)  Pulse: 91 (03/19/19 1520)  Resp: 18 (03/19/19 1520)  BP: (!) 179/74 (03/19/19 1520)  SpO2: 99 % (03/19/19 1520) Vital Signs (24h Range):  Temp:  [97.9 °F (36.6 °C)-98.8 °F (37.1 °C)] 98 °F (36.7 °C)  Pulse:  [70-93] 91  Resp:  [16-20] 18  SpO2:  [94 %-100 %] 99 %  BP: (165-191)/(74-85) 179/74     Weight: 80.1 kg (176 lb 9.4 oz)  Body mass index is 30.31 kg/m².  Body surface area is 1.9 meters squared.      Intake/Output - Last 3 Shifts       03/17 0700 - 03/18 0659 03/18 0700 - 03/19 0659 03/19 0700 - 03/20 0659    P.O.  500 600    I.V. (mL/kg)  1382.5 (17.3) 984 (12.3)    IV Piggyback   500    Total Intake(mL/kg)  1882.5 (23.5) 2084 (26)    Urine (mL/kg/hr)  2000 2350 (3.1)    Total Output  2000 2350    Net  -117.5 -266                 Physical Exam   Constitutional: She is oriented to person, place, and time. She appears well-developed and well-nourished.   HENT:   Head: Normocephalic and atraumatic.   Eyes: Conjunctivae and EOM are normal. Pupils are equal, round, and reactive to light.   Neck: Normal range of motion. Neck supple.   Cardiovascular: Normal rate, regular rhythm, normal heart sounds and intact distal pulses.   Pulmonary/Chest: Effort normal and breath sounds normal.   Abdominal: Soft. Bowel sounds are normal.   Musculoskeletal: She exhibits edema.   +1 BLE. Edema to left knee 2/2 fall.    Neurological: She is alert and oriented to person, place, and time.   Skin: Skin is warm and dry.   Psychiatric: She has a normal mood and affect. Her behavior is normal. Judgment and thought content normal.       Significant Labs:   CBC:   Recent Labs   Lab  03/18/19  1154 03/19/19  0348   WBC 6.57 5.50  5.50   HGB 10.5* 10.0*  10.0*   HCT 32.8* 32.0*  32.0*   PLT 70* 82*  82*    and CMP:   Recent Labs   Lab 03/18/19  1154 03/19/19  0348    136  136   K 3.7 4.1  4.1    104  104   CO2 25 26  26   * 167*  167*   BUN 12 8  8   CREATININE 0.7 0.6  0.6   CALCIUM 8.5* 8.4*  8.4*   PROT 6.1 5.7*  5.7*   ALBUMIN 3.3* 3.1*  3.1*   BILITOT 0.3 0.3  0.3   ALKPHOS 124 106  106   AST 14 12  12   ALT 14 11  11   ANIONGAP 9 6*  6*   EGFRNONAA >60.0 >60.0  >60.0       Diagnostic Results:  I have reviewed all pertinent imaging results/findings within the past 24 hours.

## 2019-03-19 NOTE — PLAN OF CARE
MDR's with Dr Alfaro.  Patient is admitted for a Daija autoSCT.  Transplant is planned for tomorrow.  Patient has a recent fall at home and has swelling to the left knee.  PT/OT eval placed.  D/c pending engraftment.  Patient is aware of the ELOS of 2 weeks.  Patient lives local and plans to d/c home with the support of family.  CM will continue to follow for needs.

## 2019-03-19 NOTE — PROGRESS NOTES
[facility]  Hematology  Bone Marrow Transplant  Progress Note    Patient Name: Lori Rivero  Admission Date: 3/18/2019  Hospital Length of Stay: 1 days  Code Status: Full Code    Subjective:     Interval History: Day -1 Daija Auto, tolerated Melphalan this am without difficulty. Knee x-ray done overnight for swelling and pain post fall at home showed mild edema but no fractures. Patient reports neuropathy pain and sciatica which is chronic.     Objective:     Vital Signs (Most Recent):  Temp: 98 °F (36.7 °C) (03/19/19 1520)  Pulse: 91 (03/19/19 1520)  Resp: 18 (03/19/19 1520)  BP: (!) 179/74 (03/19/19 1520)  SpO2: 99 % (03/19/19 1520) Vital Signs (24h Range):  Temp:  [97.9 °F (36.6 °C)-98.8 °F (37.1 °C)] 98 °F (36.7 °C)  Pulse:  [70-93] 91  Resp:  [16-20] 18  SpO2:  [94 %-100 %] 99 %  BP: (165-191)/(74-85) 179/74     Weight: 80.1 kg (176 lb 9.4 oz)  Body mass index is 30.31 kg/m².  Body surface area is 1.9 meters squared.      Intake/Output - Last 3 Shifts       03/17 0700 - 03/18 0659 03/18 0700 - 03/19 0659 03/19 0700 - 03/20 0659    P.O.  500 600    I.V. (mL/kg)  1382.5 (17.3) 984 (12.3)    IV Piggyback   500    Total Intake(mL/kg)  1882.5 (23.5) 2084 (26)    Urine (mL/kg/hr)  2000 2350 (3.1)    Total Output  2000 2350    Net  -117.5 -266                 Physical Exam   Constitutional: She is oriented to person, place, and time. She appears well-developed and well-nourished.   HENT:   Head: Normocephalic and atraumatic.   Eyes: Conjunctivae and EOM are normal. Pupils are equal, round, and reactive to light.   Neck: Normal range of motion. Neck supple.   Cardiovascular: Normal rate, regular rhythm, normal heart sounds and intact distal pulses.   Pulmonary/Chest: Effort normal and breath sounds normal.   Abdominal: Soft. Bowel sounds are normal.   Musculoskeletal: She exhibits edema.   +1 BLE. Edema to left knee 2/2 fall.    Neurological: She is alert and oriented to person, place, and time.    Skin: Skin is warm and dry.   Psychiatric: She has a normal mood and affect. Her behavior is normal. Judgment and thought content normal.       Significant Labs:   CBC:   Recent Labs   Lab 03/18/19  1154 03/19/19  0348   WBC 6.57 5.50  5.50   HGB 10.5* 10.0*  10.0*   HCT 32.8* 32.0*  32.0*   PLT 70* 82*  82*    and CMP:   Recent Labs   Lab 03/18/19  1154 03/19/19  0348    136  136   K 3.7 4.1  4.1    104  104   CO2 25 26  26   * 167*  167*   BUN 12 8  8   CREATININE 0.7 0.6  0.6   CALCIUM 8.5* 8.4*  8.4*   PROT 6.1 5.7*  5.7*   ALBUMIN 3.3* 3.1*  3.1*   BILITOT 0.3 0.3  0.3   ALKPHOS 124 106  106   AST 14 12  12   ALT 14 11  11   ANIONGAP 9 6*  6*   EGFRNONAA >60.0 >60.0  >60.0       Diagnostic Results:  I have reviewed all pertinent imaging results/findings within the past 24 hours.    Assessment/Plan:     * Stem cell transplant candidate    - see Multiple Myeloma  - admitted for Daija Auto SCT  - today is Day -1     Multiple myeloma    -IgG kappa MM; ISS 2; standard risk cytogenetics; with lytic bone lesions at presentation; initiate VRd (21 day cycle) with Dr. Juan at [facility]; currently mid cycle  5    -she has achieved VGPR  -pre transplant eval with no contraindication for transplant   -never smoker so plan to follow up LL nodule at repeat PET scan at day +100  -Admitted for Daija auto SCT. Today is Day -1, tolerated Melphalan this am without difficulty  -Neupogen and ppx antimicrobials per protocol.          Hypomagnesemia    - Mag 1.4 today  - will replace PRN per protocol     Anemia associated with chemotherapy    - hgb 10.0  - transfuse PRBC for hgb <7     Left knee pain    - s/p fall ~ 1 week ago  - x-ray with edema, no fractures  - tramadol prn ordered for pain     Essential hypertension    - continue home losartan-HCTZ  - BP elevated since admission, will add Norvasc 5 mg today     Type 2 diabetes mellitus without complication, without long-term current use of  insulin    - will hold oral anti diabetic medications while inpatient  - blood glucose checks ACHS  - s/s insulin  - DM diet         VTE Risk Mitigation (From admission, onward)        Ordered     enoxaparin injection 40 mg  Daily      03/18/19 1702     heparin (porcine) injection 1,600 Units  As needed (PRN)      03/18/19 1754     IP VTE HIGH RISK PATIENT  Once      03/18/19 1702          Disposition: stem cell transplant and count recovery      Trina Musa NP  Bone Marrow Transplant  [facility]

## 2019-03-20 LAB
ALBUMIN SERPL BCP-MCNC: 3.4 G/DL
ALP SERPL-CCNC: 106 U/L
ALT SERPL W/O P-5'-P-CCNC: 11 U/L
ANION GAP SERPL CALC-SCNC: 11 MMOL/L
AST SERPL-CCNC: 13 U/L
BASOPHILS # BLD AUTO: 0.01 K/UL
BASOPHILS NFR BLD: 0.2 %
BILIRUB SERPL-MCNC: 0.4 MG/DL
BLD PROD TYP BPU: NORMAL
BLOOD UNIT EXPIRATION DATE: NORMAL
BLOOD UNIT TYPE CODE: 7300
BLOOD UNIT TYPE: NORMAL
BUN SERPL-MCNC: 11 MG/DL
CALCIUM SERPL-MCNC: 9.2 MG/DL
CHLORIDE SERPL-SCNC: 102 MMOL/L
CO2 SERPL-SCNC: 22 MMOL/L
CODING SYSTEM: NORMAL
CREAT SERPL-MCNC: 0.7 MG/DL
DIFFERENTIAL METHOD: ABNORMAL
DISPENSE STATUS: NORMAL
EOSINOPHIL # BLD AUTO: 0 K/UL
EOSINOPHIL NFR BLD: 0 %
ERYTHROCYTE [DISTWIDTH] IN BLOOD BY AUTOMATED COUNT: 17.2 %
EST. GFR  (AFRICAN AMERICAN): >60 ML/MIN/1.73 M^2
EST. GFR  (NON AFRICAN AMERICAN): >60 ML/MIN/1.73 M^2
GLUCOSE SERPL-MCNC: 181 MG/DL
HCT VFR BLD AUTO: 36.1 %
HGB BLD-MCNC: 11.4 G/DL
IMM GRANULOCYTES # BLD AUTO: 0.1 K/UL
IMM GRANULOCYTES NFR BLD AUTO: 1.6 %
LYMPHOCYTES # BLD AUTO: 0.6 K/UL
LYMPHOCYTES NFR BLD: 9.6 %
MAGNESIUM SERPL-MCNC: 1.7 MG/DL
MCH RBC QN AUTO: 26.3 PG
MCHC RBC AUTO-ENTMCNC: 31.6 G/DL
MCV RBC AUTO: 83 FL
MONOCYTES # BLD AUTO: 0.4 K/UL
MONOCYTES NFR BLD: 6.8 %
NEUTROPHILS # BLD AUTO: 5.1 K/UL
NEUTROPHILS NFR BLD: 81.8 %
NRBC BLD-RTO: 0 /100 WBC
PHOSPHATE SERPL-MCNC: 3.2 MG/DL
PLATELET # BLD AUTO: 117 K/UL
PMV BLD AUTO: 11.8 FL
POCT GLUCOSE: 180 MG/DL (ref 70–110)
POCT GLUCOSE: 214 MG/DL (ref 70–110)
POCT GLUCOSE: 255 MG/DL (ref 70–110)
POCT GLUCOSE: 267 MG/DL (ref 70–110)
POTASSIUM SERPL-SCNC: 4.7 MMOL/L
PROT SERPL-MCNC: 6.4 G/DL
RBC # BLD AUTO: 4.33 M/UL
SODIUM SERPL-SCNC: 135 MMOL/L
UNIT NUMBER: NORMAL
WBC # BLD AUTO: 6.22 K/UL

## 2019-03-20 PROCEDURE — 63600175 PHARM REV CODE 636 W HCPCS: Performed by: INTERNAL MEDICINE

## 2019-03-20 PROCEDURE — 25000003 PHARM REV CODE 250: Performed by: NURSE PRACTITIONER

## 2019-03-20 PROCEDURE — 38208 THAW PRESERVED STEM CELLS: CPT

## 2019-03-20 PROCEDURE — 80053 COMPREHEN METABOLIC PANEL: CPT

## 2019-03-20 PROCEDURE — 20600001 HC STEP DOWN PRIVATE ROOM

## 2019-03-20 PROCEDURE — A9155 ARTIFICIAL SALIVA: HCPCS | Performed by: INTERNAL MEDICINE

## 2019-03-20 PROCEDURE — 85025 COMPLETE CBC W/AUTO DIFF WBC: CPT

## 2019-03-20 PROCEDURE — 63600175 PHARM REV CODE 636 W HCPCS: Performed by: NURSE PRACTITIONER

## 2019-03-20 PROCEDURE — 99232 SBSQ HOSP IP/OBS MODERATE 35: CPT | Mod: ,,, | Performed by: INTERNAL MEDICINE

## 2019-03-20 PROCEDURE — 84100 ASSAY OF PHOSPHORUS: CPT

## 2019-03-20 PROCEDURE — 83735 ASSAY OF MAGNESIUM: CPT

## 2019-03-20 PROCEDURE — 38241 TRANSPLT AUTOL HCT/DONOR: CPT

## 2019-03-20 PROCEDURE — 25000003 PHARM REV CODE 250: Performed by: INTERNAL MEDICINE

## 2019-03-20 PROCEDURE — 99232 PR SUBSEQUENT HOSPITAL CARE,LEVL II: ICD-10-PCS | Mod: ,,, | Performed by: INTERNAL MEDICINE

## 2019-03-20 PROCEDURE — 27000221 HC OXYGEN, UP TO 24 HOURS

## 2019-03-20 RX ORDER — AMOXICILLIN 250 MG
1 CAPSULE ORAL DAILY PRN
Status: DISCONTINUED | OUTPATIENT
Start: 2019-03-20 | End: 2019-04-02 | Stop reason: HOSPADM

## 2019-03-20 RX ADMIN — ACYCLOVIR 800 MG: 200 CAPSULE ORAL at 08:03

## 2019-03-20 RX ADMIN — GABAPENTIN 300 MG: 300 CAPSULE ORAL at 08:03

## 2019-03-20 RX ADMIN — LORAZEPAM 1 MG: 2 INJECTION INTRAMUSCULAR; INTRAVENOUS at 01:03

## 2019-03-20 RX ADMIN — PANTOPRAZOLE SODIUM 40 MG: 40 TABLET, DELAYED RELEASE ORAL at 09:03

## 2019-03-20 RX ADMIN — HYDROCORTISONE SODIUM SUCCINATE 250 MG: 250 INJECTION, POWDER, FOR SOLUTION INTRAMUSCULAR; INTRAVENOUS at 01:03

## 2019-03-20 RX ADMIN — GABAPENTIN 300 MG: 300 CAPSULE ORAL at 09:03

## 2019-03-20 RX ADMIN — Medication 30 ML: at 04:03

## 2019-03-20 RX ADMIN — ENOXAPARIN SODIUM 40 MG: 100 INJECTION SUBCUTANEOUS at 04:03

## 2019-03-20 RX ADMIN — OLOPATADINE HYDROCHLORIDE 1 DROP: 2 SOLUTION OPHTHALMIC at 09:03

## 2019-03-20 RX ADMIN — LEVOFLOXACIN 500 MG: 500 TABLET, FILM COATED ORAL at 09:03

## 2019-03-20 RX ADMIN — INSULIN ASPART 3 UNITS: 100 INJECTION, SOLUTION INTRAVENOUS; SUBCUTANEOUS at 05:03

## 2019-03-20 RX ADMIN — ONDANSETRON 16 MG: 8 TABLET, ORALLY DISINTEGRATING ORAL at 09:03

## 2019-03-20 RX ADMIN — LOSARTAN POTASSIUM AND HYDROCHLOROTHIAZIDE 1 TABLET: 12.5; 1 TABLET ORAL at 09:03

## 2019-03-20 RX ADMIN — Medication 400 MG: at 06:03

## 2019-03-20 RX ADMIN — ACYCLOVIR 800 MG: 200 CAPSULE ORAL at 09:03

## 2019-03-20 RX ADMIN — INSULIN ASPART 3 UNITS: 100 INJECTION, SOLUTION INTRAVENOUS; SUBCUTANEOUS at 12:03

## 2019-03-20 RX ADMIN — Medication 30 ML: at 09:03

## 2019-03-20 RX ADMIN — Medication 400 MG: at 09:03

## 2019-03-20 RX ADMIN — AMLODIPINE BESYLATE 5 MG: 5 TABLET ORAL at 09:03

## 2019-03-20 RX ADMIN — Medication 30 ML: at 08:03

## 2019-03-20 RX ADMIN — INSULIN ASPART 1 UNITS: 100 INJECTION, SOLUTION INTRAVENOUS; SUBCUTANEOUS at 09:03

## 2019-03-20 RX ADMIN — SODIUM CHLORIDE AND POTASSIUM CHLORIDE: .9; .15 SOLUTION INTRAVENOUS at 11:03

## 2019-03-20 RX ADMIN — HEPARIN SODIUM 1600 UNITS: 1000 INJECTION, SOLUTION INTRAVENOUS; SUBCUTANEOUS at 02:03

## 2019-03-20 RX ADMIN — FLUCONAZOLE 400 MG: 200 TABLET ORAL at 09:03

## 2019-03-20 RX ADMIN — DIPHENHYDRAMINE HYDROCHLORIDE 50 MG: 50 INJECTION INTRAMUSCULAR; INTRAVENOUS at 01:03

## 2019-03-20 RX ADMIN — SODIUM CHLORIDE AND POTASSIUM CHLORIDE: 9; 1.49 INJECTION, SOLUTION INTRAVENOUS at 09:03

## 2019-03-20 NOTE — ASSESSMENT & PLAN NOTE
-IgG kappa MM; ISS 2; standard risk cytogenetics; with lytic bone lesions at presentation; initiate VRd (21 day cycle) with Dr. Juan at [facility]; currently mid cycle  5    -she has achieved VGPR  -pre transplant eval with no contraindication for transplant   -never smoker so plan to follow up LL nodule at repeat PET scan at day +100  -Admitted for Daija auto SCT. Today is Day 0, tolerated Melphalan yesterday without difficulty  -Neupogen and ppx antimicrobials per protocol.

## 2019-03-20 NOTE — PLAN OF CARE
Problem: Adult Inpatient Plan of Care  Goal: Plan of Care Review  Outcome: Ongoing (interventions implemented as appropriate)  Plan of care reviewed with pt at the beginning of shift. Verbalized understanding. Did not have any questions or concerns at this time.Instructed pt to call for assistance out of bed since risk for falls. Verbalized understanding. Call light within reach. Bed locked and in the lowest position. Non-skid socks and fall risk band on patient. Bed alarm set. Reminded pt to wash hands frequently. Low PO intake - glucose control boost ordered as a supplement. CBG increased this afternoon - pt received steroids prior to transplant. Tolerated transplant well. No c/o any discomfort at this time.

## 2019-03-20 NOTE — ASSESSMENT & PLAN NOTE
- will hold oral anti diabetic medications while inpatient  - blood glucose checks ACHS  - s/s insulin  - DM diet  - blood glucoses 100s to 200s  - consider changing from low dose s/s to moderate dose sliding scale post transplant

## 2019-03-20 NOTE — SUBJECTIVE & OBJECTIVE
Subjective:     Interval History: Day 0 Daija Auto for Multiple Myeloma. Received Melphalan yesterday. Tolerated well. Started on norvasc yesterday for htn. States no BM since admission. PRN pericolace ordered. She will receive 9 bags and a CD34 dose of 3.61 x 10^6/kg at 1330 today.      Objective:     Vital Signs (Most Recent):  Temp: 98.2 °F (36.8 °C) (03/20/19 0746)  Pulse: 90 (03/20/19 0746)  Resp: 16 (03/20/19 0746)  BP: 138/73 (03/20/19 0746)  SpO2: 96 % (03/20/19 0746) Vital Signs (24h Range):  Temp:  [97.9 °F (36.6 °C)-98.4 °F (36.9 °C)] 98.2 °F (36.8 °C)  Pulse:  [] 90  Resp:  [16-18] 16  SpO2:  [95 %-100 %] 96 %  BP: (126-179)/(73-80) 138/73     Weight: 78.9 kg (173 lb 15.1 oz)  Body mass index is 29.86 kg/m².  Body surface area is 1.89 meters squared.      Intake/Output - Last 3 Shifts       03/18 0700 - 03/19 0659 03/19 0700 - 03/20 0659 03/20 0700 - 03/21 0659    P.O. 500 1310     I.V. (mL/kg) 1382.5 (17.3) 2764.5 (35)     IV Piggyback  500     Total Intake(mL/kg) 1882.5 (23.5) 4574.5 (58)     Urine (mL/kg/hr) 2000 6700 (3.5)     Stool  0     Total Output 2000 6700     Net -117.5 -2125.5            Urine Occurrence  1 x     Stool Occurrence  0 x           Physical Exam   Constitutional: She is oriented to person, place, and time. She appears well-developed and well-nourished.   HENT:   Head: Normocephalic and atraumatic.   Eyes: Conjunctivae and EOM are normal. Pupils are equal, round, and reactive to light.   Neck: Normal range of motion. Neck supple.   Cardiovascular: Normal rate, regular rhythm, normal heart sounds and intact distal pulses.   Pulmonary/Chest: Effort normal and breath sounds normal.   Abdominal: Soft. Bowel sounds are normal.   Musculoskeletal: She exhibits edema.   Trace edema to BLE. Edema to left knee 2/2 fall which is improving.   Neurological: She is alert and oriented to person, place, and time.   Skin: Skin is warm and dry.   Psychiatric: She has a normal mood and affect.  Her behavior is normal. Judgment and thought content normal.       Significant Labs:   CBC:   Recent Labs   Lab 03/18/19  1154 03/19/19  0348 03/20/19  0412   WBC 6.57 5.50  5.50 6.22   HGB 10.5* 10.0*  10.0* 11.4*   HCT 32.8* 32.0*  32.0* 36.1*   PLT 70* 82*  82* 117*    and CMP:   Recent Labs   Lab 03/18/19  1154 03/19/19  0348 03/20/19  0412    136  136 135*   K 3.7 4.1  4.1 4.7    104  104 102   CO2 25 26  26 22*   * 167*  167* 181*   BUN 12 8  8 11   CREATININE 0.7 0.6  0.6 0.7   CALCIUM 8.5* 8.4*  8.4* 9.2   PROT 6.1 5.7*  5.7* 6.4   ALBUMIN 3.3* 3.1*  3.1* 3.4*   BILITOT 0.3 0.3  0.3 0.4   ALKPHOS 124 106  106 106   AST 14 12  12 13   ALT 14 11  11 11   ANIONGAP 9 6*  6* 11   EGFRNONAA >60.0 >60.0  >60.0 >60.0       Diagnostic Results:  I have reviewed all pertinent imaging results/findings within the past 24 hours.

## 2019-03-20 NOTE — PROGRESS NOTES
[facility]  Hematology  Bone Marrow Transplant  Progress Note    Patient Name: Lori Rivero  Admission Date: 3/18/2019  Hospital Length of Stay: 2 days  Code Status: Full Code    Subjective:     Interval History: Day 0 Daija Auto for Multiple Myeloma. Received Melphalan yesterday. Tolerated well. Started on norvasc yesterday for htn. States no BM since admission. PRN pericolace ordered. She will receive 9 bags and a CD34 dose of 3.61 x 10^6/kg at 1330 today.      Objective:     Vital Signs (Most Recent):  Temp: 98.2 °F (36.8 °C) (03/20/19 0746)  Pulse: 90 (03/20/19 0746)  Resp: 16 (03/20/19 0746)  BP: 138/73 (03/20/19 0746)  SpO2: 96 % (03/20/19 0746) Vital Signs (24h Range):  Temp:  [97.9 °F (36.6 °C)-98.4 °F (36.9 °C)] 98.2 °F (36.8 °C)  Pulse:  [] 90  Resp:  [16-18] 16  SpO2:  [95 %-100 %] 96 %  BP: (126-179)/(73-80) 138/73     Weight: 78.9 kg (173 lb 15.1 oz)  Body mass index is 29.86 kg/m².  Body surface area is 1.89 meters squared.      Intake/Output - Last 3 Shifts       03/18 0700 - 03/19 0659 03/19 0700 - 03/20 0659 03/20 0700 - 03/21 0659    P.O. 500 1310     I.V. (mL/kg) 1382.5 (17.3) 2764.5 (35)     IV Piggyback  500     Total Intake(mL/kg) 1882.5 (23.5) 4574.5 (58)     Urine (mL/kg/hr) 2000 6700 (3.5)     Stool  0     Total Output 2000 6700     Net -117.5 -2125.5            Urine Occurrence  1 x     Stool Occurrence  0 x           Physical Exam   Constitutional: She is oriented to person, place, and time. She appears well-developed and well-nourished.   HENT:   Head: Normocephalic and atraumatic.   Eyes: Conjunctivae and EOM are normal. Pupils are equal, round, and reactive to light.   Neck: Normal range of motion. Neck supple.   Cardiovascular: Normal rate, regular rhythm, normal heart sounds and intact distal pulses.   Pulmonary/Chest: Effort normal and breath sounds normal.   Abdominal: Soft. Bowel sounds are normal.   Musculoskeletal: She exhibits edema.   Trace edema to  BLE. Edema to left knee 2/2 fall which is improving.   Neurological: She is alert and oriented to person, place, and time.   Skin: Skin is warm and dry.   Psychiatric: She has a normal mood and affect. Her behavior is normal. Judgment and thought content normal.       Significant Labs:   CBC:   Recent Labs   Lab 03/18/19  1154 03/19/19  0348 03/20/19  0412   WBC 6.57 5.50  5.50 6.22   HGB 10.5* 10.0*  10.0* 11.4*   HCT 32.8* 32.0*  32.0* 36.1*   PLT 70* 82*  82* 117*    and CMP:   Recent Labs   Lab 03/18/19  1154 03/19/19  0348 03/20/19  0412    136  136 135*   K 3.7 4.1  4.1 4.7    104  104 102   CO2 25 26  26 22*   * 167*  167* 181*   BUN 12 8  8 11   CREATININE 0.7 0.6  0.6 0.7   CALCIUM 8.5* 8.4*  8.4* 9.2   PROT 6.1 5.7*  5.7* 6.4   ALBUMIN 3.3* 3.1*  3.1* 3.4*   BILITOT 0.3 0.3  0.3 0.4   ALKPHOS 124 106  106 106   AST 14 12  12 13   ALT 14 11  11 11   ANIONGAP 9 6*  6* 11   EGFRNONAA >60.0 >60.0  >60.0 >60.0       Diagnostic Results:  I have reviewed all pertinent imaging results/findings within the past 24 hours.    Assessment/Plan:     * Stem cell transplant candidate    - see Multiple Myeloma  - admitted for Adija Auto SCT  - today is Day 0     Multiple myeloma    -IgG kappa MM; ISS 2; standard risk cytogenetics; with lytic bone lesions at presentation; initiate VRd (21 day cycle) with Dr. Juan at [facility]; currently mid cycle  5    -she has achieved VGPR  -pre transplant eval with no contraindication for transplant   -never smoker so plan to follow up LL nodule at repeat PET scan at day +100  -Admitted for Daija auto SCT. Today is Day 0, tolerated Melphalan yesterday without difficulty  -Neupogen and ppx antimicrobials per protocol.          Hypomagnesemia    - Mag 1.7 today  - replace PRN per protocol     Anemia associated with chemotherapy    - hgb 11.4  - transfuse PRBC for hgb <7     Moderate malnutrition    - dietician consulted       Left knee pain    - s/p fall ~  1 week ago  - x-ray with edema, no fractures  - tramadol prn ordered for pain      Essential hypertension    - continue home losartan-HCTZ  - BP elevated since admission, Norvasc 5 mg daily started 3/20/19     Type 2 diabetes mellitus without complication, without long-term current use of insulin    - will hold oral anti diabetic medications while inpatient  - blood glucose checks ACHS  - s/s insulin  - DM diet  - blood glucoses 100s to 200s  - consider changing from low dose s/s to moderate dose sliding scale post transplant         VTE Risk Mitigation (From admission, onward)        Ordered     enoxaparin injection 40 mg  Daily      03/18/19 1702     heparin (porcine) injection 1,600 Units  As needed (PRN)      03/18/19 1754     IP VTE HIGH RISK PATIENT  Once      03/18/19 1702          Disposition: Inpatient. Auto SCT planned for today.    Maria Guadalupe Poole, NP  Bone Marrow Transplant  [facility]

## 2019-03-20 NOTE — PROGRESS NOTES
Pt premedicated prior to transplant as per MD order. Pre-hydration completed. Consent verified. Telemetry and vital signs monitored throughout infusion. See Doc Flowsheet for vital signs. Maintenance IVF lowered to 20cc/hr. 9 bags of autologous stem cells administered via gravity to central line - luer lock removed. Each bag checked with secondary nurse against arm band and tag.  Pt tolerated infusion well. Line and catheter flushed. New luer lock applied. Post hydration IVF started at 200cc/hr. Will monitor.

## 2019-03-20 NOTE — PROGRESS NOTES
Assisted pt to chair - cane in use. Instructed pt to call for assistance to get back into bed since risk for falls. Verbalized understanding. Call light within reach. Fall risk band on patient.

## 2019-03-20 NOTE — PLAN OF CARE
Problem: Adult Inpatient Plan of Care  Goal: Plan of Care Review  Outcome: Ongoing (interventions implemented as appropriate)  Day 0 of Auto BMT. Patient remains free from falls and injury this shift. Bed in low, locked position with call bell in reach. Family at bedside. Bed alarm active. Patient encouraged to call for assistance when getting out of bed. Patient verbalized understanding. All belongings within reach. IVF decreased to 75 ml/hr. VSS. Afebrile. Will continue to monitor.

## 2019-03-21 LAB
ALBUMIN SERPL BCP-MCNC: 3 G/DL
ALP SERPL-CCNC: 96 U/L
ALT SERPL W/O P-5'-P-CCNC: 13 U/L
ANION GAP SERPL CALC-SCNC: 7 MMOL/L
AST SERPL-CCNC: 50 U/L
BASOPHILS # BLD AUTO: 0.02 K/UL
BASOPHILS NFR BLD: 0.1 %
BILIRUB SERPL-MCNC: 0.5 MG/DL
BUN SERPL-MCNC: 19 MG/DL
CALCIUM SERPL-MCNC: 8.3 MG/DL
CHLORIDE SERPL-SCNC: 106 MMOL/L
CO2 SERPL-SCNC: 26 MMOL/L
CREAT SERPL-MCNC: 0.7 MG/DL
DIFFERENTIAL METHOD: ABNORMAL
EOSINOPHIL # BLD AUTO: 0 K/UL
EOSINOPHIL NFR BLD: 0 %
ERYTHROCYTE [DISTWIDTH] IN BLOOD BY AUTOMATED COUNT: 17.6 %
EST. GFR  (AFRICAN AMERICAN): >60 ML/MIN/1.73 M^2
EST. GFR  (NON AFRICAN AMERICAN): >60 ML/MIN/1.73 M^2
GLUCOSE SERPL-MCNC: 200 MG/DL
HCT VFR BLD AUTO: 31.1 %
HGB BLD-MCNC: 9.9 G/DL
IMM GRANULOCYTES # BLD AUTO: 0.09 K/UL
IMM GRANULOCYTES NFR BLD AUTO: 0.6 %
LYMPHOCYTES # BLD AUTO: 0.4 K/UL
LYMPHOCYTES NFR BLD: 2.5 %
MAGNESIUM SERPL-MCNC: 2.1 MG/DL
MCH RBC QN AUTO: 26.3 PG
MCHC RBC AUTO-ENTMCNC: 31.8 G/DL
MCV RBC AUTO: 83 FL
MONOCYTES # BLD AUTO: 0.2 K/UL
MONOCYTES NFR BLD: 1.5 %
NEUTROPHILS # BLD AUTO: 13.4 K/UL
NEUTROPHILS NFR BLD: 95.3 %
NRBC BLD-RTO: 0 /100 WBC
PHOSPHATE SERPL-MCNC: 3.1 MG/DL
PLATELET # BLD AUTO: 127 K/UL
PMV BLD AUTO: 12.2 FL
POCT GLUCOSE: 188 MG/DL (ref 70–110)
POCT GLUCOSE: 211 MG/DL (ref 70–110)
POCT GLUCOSE: 282 MG/DL (ref 70–110)
POTASSIUM SERPL-SCNC: 4.2 MMOL/L
PROT SERPL-MCNC: 5.6 G/DL
RBC # BLD AUTO: 3.77 M/UL
SODIUM SERPL-SCNC: 139 MMOL/L
WBC # BLD AUTO: 14.1 K/UL

## 2019-03-21 PROCEDURE — 63600175 PHARM REV CODE 636 W HCPCS: Performed by: NURSE PRACTITIONER

## 2019-03-21 PROCEDURE — 83735 ASSAY OF MAGNESIUM: CPT

## 2019-03-21 PROCEDURE — 25000003 PHARM REV CODE 250: Performed by: NURSE PRACTITIONER

## 2019-03-21 PROCEDURE — A9155 ARTIFICIAL SALIVA: HCPCS | Performed by: INTERNAL MEDICINE

## 2019-03-21 PROCEDURE — 85025 COMPLETE CBC W/AUTO DIFF WBC: CPT

## 2019-03-21 PROCEDURE — 25000003 PHARM REV CODE 250: Performed by: INTERNAL MEDICINE

## 2019-03-21 PROCEDURE — 84100 ASSAY OF PHOSPHORUS: CPT

## 2019-03-21 PROCEDURE — 99232 SBSQ HOSP IP/OBS MODERATE 35: CPT | Mod: ,,, | Performed by: INTERNAL MEDICINE

## 2019-03-21 PROCEDURE — 63600175 PHARM REV CODE 636 W HCPCS: Performed by: INTERNAL MEDICINE

## 2019-03-21 PROCEDURE — 99232 PR SUBSEQUENT HOSPITAL CARE,LEVL II: ICD-10-PCS | Mod: ,,, | Performed by: INTERNAL MEDICINE

## 2019-03-21 PROCEDURE — 80053 COMPREHEN METABOLIC PANEL: CPT

## 2019-03-21 PROCEDURE — 20600001 HC STEP DOWN PRIVATE ROOM

## 2019-03-21 RX ORDER — GABAPENTIN 400 MG/1
400 CAPSULE ORAL 3 TIMES DAILY
Status: DISCONTINUED | OUTPATIENT
Start: 2019-03-21 | End: 2019-03-23

## 2019-03-21 RX ADMIN — LEVOFLOXACIN 500 MG: 500 TABLET, FILM COATED ORAL at 09:03

## 2019-03-21 RX ADMIN — OLOPATADINE HYDROCHLORIDE 1 DROP: 2 SOLUTION OPHTHALMIC at 09:03

## 2019-03-21 RX ADMIN — ACYCLOVIR 800 MG: 200 CAPSULE ORAL at 09:03

## 2019-03-21 RX ADMIN — FLUCONAZOLE 400 MG: 200 TABLET ORAL at 09:03

## 2019-03-21 RX ADMIN — AMLODIPINE BESYLATE 5 MG: 5 TABLET ORAL at 09:03

## 2019-03-21 RX ADMIN — GABAPENTIN 400 MG: 400 CAPSULE ORAL at 08:03

## 2019-03-21 RX ADMIN — Medication 30 ML: at 08:03

## 2019-03-21 RX ADMIN — INSULIN ASPART 1 UNITS: 100 INJECTION, SOLUTION INTRAVENOUS; SUBCUTANEOUS at 07:03

## 2019-03-21 RX ADMIN — Medication 30 ML: at 04:03

## 2019-03-21 RX ADMIN — TIZANIDINE 4 MG: 2 TABLET ORAL at 11:03

## 2019-03-21 RX ADMIN — PANTOPRAZOLE SODIUM 40 MG: 40 TABLET, DELAYED RELEASE ORAL at 09:03

## 2019-03-21 RX ADMIN — SODIUM CHLORIDE AND POTASSIUM CHLORIDE: 9; 1.49 INJECTION, SOLUTION INTRAVENOUS at 11:03

## 2019-03-21 RX ADMIN — ENOXAPARIN SODIUM 40 MG: 100 INJECTION SUBCUTANEOUS at 04:03

## 2019-03-21 RX ADMIN — Medication 30 ML: at 09:03

## 2019-03-21 RX ADMIN — ACYCLOVIR 800 MG: 200 CAPSULE ORAL at 08:03

## 2019-03-21 RX ADMIN — SODIUM CHLORIDE AND POTASSIUM CHLORIDE: 9; 1.49 INJECTION, SOLUTION INTRAVENOUS at 09:03

## 2019-03-21 RX ADMIN — LOSARTAN POTASSIUM AND HYDROCHLOROTHIAZIDE 1 TABLET: 12.5; 1 TABLET ORAL at 09:03

## 2019-03-21 RX ADMIN — GABAPENTIN 300 MG: 300 CAPSULE ORAL at 09:03

## 2019-03-21 RX ADMIN — Medication 30 ML: at 12:03

## 2019-03-21 RX ADMIN — GABAPENTIN 300 MG: 300 CAPSULE ORAL at 03:03

## 2019-03-21 RX ADMIN — ONDANSETRON 8 MG: 8 TABLET, ORALLY DISINTEGRATING ORAL at 07:03

## 2019-03-21 NOTE — PLAN OF CARE
Problem: Adult Inpatient Plan of Care  Goal: Plan of Care Review  Outcome: Ongoing (interventions implemented as appropriate)  Day +1 of Auto BMT. Patient remains free from falls and injury this shift. Bed in low, locked position with call bell in reach. Family at bedside. Bed alarm active. Patient encouraged to call for assistance when getting out of bed. Patient verbalized understanding. All belongings within reach. VSS. Afebrile. Will continue to monitor.

## 2019-03-21 NOTE — SUBJECTIVE & OBJECTIVE
Subjective:     Interval History: Day +1 Daija Auto. Tolerated stem cell infusion yesterday without difficulty. Complains of mild nausea this am, no diarrhea. Reports her main complaint is persistent neuropathy pain the LE making it difficult to walk. BM mild improvement. Weight and I&O stable.    Objective:     Vital Signs (Most Recent):  Temp: 98.7 °F (37.1 °C) (03/21/19 1143)  Pulse: (!) 113 (03/21/19 1143)  Resp: 18 (03/21/19 1143)  BP: 135/60 (03/21/19 1143)  SpO2: 96 % (03/21/19 1143) Vital Signs (24h Range):  Temp:  [98.1 °F (36.7 °C)-99 °F (37.2 °C)] 98.7 °F (37.1 °C)  Pulse:  [] 113  Resp:  [16-22] 18  SpO2:  [93 %-99 %] 96 %  BP: (131-189)/(59-92) 135/60     Weight: 78.8 kg (173 lb 11.6 oz)  Body mass index is 29.82 kg/m².  Body surface area is 1.89 meters squared.      Intake/Output - Last 3 Shifts       03/19 0700 - 03/20 0659 03/20 0700 - 03/21 0659 03/21 0700 - 03/22 0659    P.O. 1310 1070 480    I.V. (mL/kg) 2764.5 (35) 2865 (36.4) 481.3 (6.1)    Blood  540     IV Piggyback 500      Total Intake(mL/kg) 4574.5 (58) 4475 (56.8) 961.3 (12.2)    Urine (mL/kg/hr) 6700 (3.5) 3350 (1.8) 1000 (1.4)    Stool 0  0    Total Output 6700 3350 1000    Net -2125.5 +1125 -38.8           Urine Occurrence 1 x      Stool Occurrence 0 x  0 x          Physical Exam   Constitutional: She is oriented to person, place, and time. She appears well-developed and well-nourished.   HENT:   Head: Normocephalic and atraumatic.   Eyes: Conjunctivae and EOM are normal. Pupils are equal, round, and reactive to light.   Neck: Normal range of motion. Neck supple.   Cardiovascular: Normal rate, regular rhythm, normal heart sounds and intact distal pulses.   Pulmonary/Chest: Effort normal and breath sounds normal.   Abdominal: Soft. Bowel sounds are normal.   Musculoskeletal: She exhibits edema.   Trace edema to BLE. Edema to left knee 2/2 fall which is improving.   Neurological: She is alert and oriented to person, place, and time.    Skin: Skin is warm and dry.   Psychiatric: She has a normal mood and affect. Her behavior is normal. Judgment and thought content normal.       Significant Labs:   CBC:   Recent Labs   Lab 03/20/19 0412 03/21/19 0356   WBC 6.22 14.10*   HGB 11.4* 9.9*   HCT 36.1* 31.1*   * 127*    and CMP:   Recent Labs   Lab 03/20/19 0412 03/21/19 0356   * 139   K 4.7 4.2    106   CO2 22* 26   * 200*   BUN 11 19   CREATININE 0.7 0.7   CALCIUM 9.2 8.3*   PROT 6.4 5.6*   ALBUMIN 3.4* 3.0*   BILITOT 0.4 0.5   ALKPHOS 106 96   AST 13 50*   ALT 11 13   ANIONGAP 11 7*   EGFRNONAA >60.0 >60.0       Diagnostic Results:  None

## 2019-03-21 NOTE — ASSESSMENT & PLAN NOTE
-IgG kappa MM; ISS 2; standard risk cytogenetics; with lytic bone lesions at presentation; initiate VRd (21 day cycle) with Dr. Juan at [facility]; currently mid cycle  5    -she has achieved VGPR  -pre transplant eval with no contraindication for transplant   -never smoker so plan to follow up LL nodule at repeat PET scan at day +100  -Admitted for Daija auto SCT. Today is Day +1, tolerated stem cells yesterday without difficulty  -Neupogen and ppx antimicrobials per protocol.

## 2019-03-21 NOTE — PLAN OF CARE
Day +1 of Auto BMT. Patient remains free from falls and injury this shift. Bed in low, locked position with call bell in reach. Family at bedside. Bed alarm active. Patient encouraged to call for assistance when getting out of bed. Patient verbalized understanding. All belongings within reach. VSS. Afebrile. Will continue to monitor.

## 2019-03-21 NOTE — ASSESSMENT & PLAN NOTE
- will hold oral anti diabetic medications while inpatient  - blood glucose checks ACHS  - s/s insulin  - DM diet  - blood glucoses 100s to 200s, elevated today due to high dose steroids prior to stem cell infusion yesterday

## 2019-03-21 NOTE — PROGRESS NOTES
[facility]  Hematology  Bone Marrow Transplant  Progress Note    Patient Name: Lori Rivero  Admission Date: 3/18/2019  Hospital Length of Stay: 3 days  Code Status: Full Code    Subjective:     Interval History: Day +1 Daija Auto. Tolerated stem cell infusion yesterday without difficulty. Complains of mild nausea this am, no diarrhea. Reports her main complaint is persistent neuropathy pain the LE making it difficult to walk. BM mild improvement. Weight and I&O stable.    Objective:     Vital Signs (Most Recent):  Temp: 98.7 °F (37.1 °C) (03/21/19 1143)  Pulse: (!) 113 (03/21/19 1143)  Resp: 18 (03/21/19 1143)  BP: 135/60 (03/21/19 1143)  SpO2: 96 % (03/21/19 1143) Vital Signs (24h Range):  Temp:  [98.1 °F (36.7 °C)-99 °F (37.2 °C)] 98.7 °F (37.1 °C)  Pulse:  [] 113  Resp:  [16-22] 18  SpO2:  [93 %-99 %] 96 %  BP: (131-189)/(59-92) 135/60     Weight: 78.8 kg (173 lb 11.6 oz)  Body mass index is 29.82 kg/m².  Body surface area is 1.89 meters squared.      Intake/Output - Last 3 Shifts       03/19 0700 - 03/20 0659 03/20 0700 - 03/21 0659 03/21 0700 - 03/22 0659    P.O. 1310 1070 480    I.V. (mL/kg) 2764.5 (35) 2865 (36.4) 481.3 (6.1)    Blood  540     IV Piggyback 500      Total Intake(mL/kg) 4574.5 (58) 4475 (56.8) 961.3 (12.2)    Urine (mL/kg/hr) 6700 (3.5) 3350 (1.8) 1000 (1.4)    Stool 0  0    Total Output 6700 3350 1000    Net -2125.5 +1125 -38.8           Urine Occurrence 1 x      Stool Occurrence 0 x  0 x          Physical Exam   Constitutional: She is oriented to person, place, and time. She appears well-developed and well-nourished.   HENT:   Head: Normocephalic and atraumatic.   Eyes: Conjunctivae and EOM are normal. Pupils are equal, round, and reactive to light.   Neck: Normal range of motion. Neck supple.   Cardiovascular: Normal rate, regular rhythm, normal heart sounds and intact distal pulses.   Pulmonary/Chest: Effort normal and breath sounds normal.   Abdominal: Soft.  Bowel sounds are normal.   Musculoskeletal: She exhibits edema.   Trace edema to BLE. Edema to left knee 2/2 fall which is improving.   Neurological: She is alert and oriented to person, place, and time.   Skin: Skin is warm and dry.   Psychiatric: She has a normal mood and affect. Her behavior is normal. Judgment and thought content normal.       Significant Labs:   CBC:   Recent Labs   Lab 03/20/19  0412 03/21/19  0356   WBC 6.22 14.10*   HGB 11.4* 9.9*   HCT 36.1* 31.1*   * 127*    and CMP:   Recent Labs   Lab 03/20/19  0412 03/21/19  0356   * 139   K 4.7 4.2    106   CO2 22* 26   * 200*   BUN 11 19   CREATININE 0.7 0.7   CALCIUM 9.2 8.3*   PROT 6.4 5.6*   ALBUMIN 3.4* 3.0*   BILITOT 0.4 0.5   ALKPHOS 106 96   AST 13 50*   ALT 11 13   ANIONGAP 11 7*   EGFRNONAA >60.0 >60.0       Diagnostic Results:  None    Assessment/Plan:     * Status post autologous bone marrow transplant    - see Multiple Myeloma  - admitted for Daija Auto SCT  - today is Day +1     Multiple myeloma    -IgG kappa MM; ISS 2; standard risk cytogenetics; with lytic bone lesions at presentation; initiate VRd (21 day cycle) with Dr. Juan at [facility]; currently mid cycle  5    -she has achieved VGPR  -pre transplant eval with no contraindication for transplant   -never smoker so plan to follow up LL nodule at repeat PET scan at day +100  -Admitted for Daija auto SCT. Today is Day +1, tolerated stem cells yesterday without difficulty  -Neupogen and ppx antimicrobials per protocol.          Hypomagnesemia    - Mag 2.1 today  - replace PRN per protocol     Anemia associated with chemotherapy    - hgb 9.9  - transfuse PRBC for hgb <7     Moderate malnutrition    - dietician consulted       Left knee pain    - s/p fall ~ 1 week ago  - x-ray with edema, no fractures  - tramadol prn ordered for pain      Essential hypertension    - continue home losartan-HCTZ  - BP elevated since admission, Norvasc 5 mg daily started 3/20/19  - BP  improved today     Type 2 diabetes mellitus without complication, without long-term current use of insulin    - will hold oral anti diabetic medications while inpatient  - blood glucose checks ACHS  - s/s insulin  - DM diet  - blood glucoses 100s to 200s, elevated today due to high dose steroids prior to stem cell infusion yesterday           VTE Risk Mitigation (From admission, onward)        Ordered     enoxaparin injection 40 mg  Daily      03/18/19 1702     heparin (porcine) injection 1,600 Units  As needed (PRN)      03/18/19 1754     IP VTE HIGH RISK PATIENT  Once      03/18/19 1702          Disposition: pending recovery from transplant     Trina Musa, NP  Bone Marrow Transplant  [facility]

## 2019-03-21 NOTE — ASSESSMENT & PLAN NOTE
- continue home losartan-HCTZ  - BP elevated since admission, Norvasc 5 mg daily started 3/20/19  - BP improved today

## 2019-03-22 LAB
ABO + RH BLD: NORMAL
ACANTHOCYTES BLD QL SMEAR: PRESENT
ALBUMIN SERPL BCP-MCNC: 3.1 G/DL
ALP SERPL-CCNC: 82 U/L
ALT SERPL W/O P-5'-P-CCNC: 12 U/L
ANION GAP SERPL CALC-SCNC: 6 MMOL/L
AST SERPL-CCNC: 20 U/L
BASOPHILS # BLD AUTO: ABNORMAL K/UL
BASOPHILS NFR BLD: 0 %
BILIRUB SERPL-MCNC: 0.7 MG/DL
BLD GP AB SCN CELLS X3 SERPL QL: NORMAL
BUN SERPL-MCNC: 14 MG/DL
BURR CELLS BLD QL SMEAR: ABNORMAL
CALCIUM SERPL-MCNC: 8.5 MG/DL
CHLORIDE SERPL-SCNC: 105 MMOL/L
CO2 SERPL-SCNC: 25 MMOL/L
CREAT SERPL-MCNC: 0.7 MG/DL
DIFFERENTIAL METHOD: ABNORMAL
DOHLE BOD BLD QL SMEAR: PRESENT
EOSINOPHIL # BLD AUTO: ABNORMAL K/UL
EOSINOPHIL NFR BLD: 0 %
ERYTHROCYTE [DISTWIDTH] IN BLOOD BY AUTOMATED COUNT: 18 %
EST. GFR  (AFRICAN AMERICAN): >60 ML/MIN/1.73 M^2
EST. GFR  (NON AFRICAN AMERICAN): >60 ML/MIN/1.73 M^2
GLUCOSE SERPL-MCNC: 179 MG/DL
HCT VFR BLD AUTO: 31.3 %
HGB BLD-MCNC: 9.6 G/DL
HYPOCHROMIA BLD QL SMEAR: ABNORMAL
IMM GRANULOCYTES # BLD AUTO: ABNORMAL K/UL
IMM GRANULOCYTES NFR BLD AUTO: ABNORMAL %
LYMPHOCYTES # BLD AUTO: ABNORMAL K/UL
LYMPHOCYTES NFR BLD: 9 %
MAGNESIUM SERPL-MCNC: 2 MG/DL
MCH RBC QN AUTO: 25.9 PG
MCHC RBC AUTO-ENTMCNC: 30.7 G/DL
MCV RBC AUTO: 84 FL
MEGAKARYOCYTIC FRAGMENTS: ABNORMAL
MONOCYTES # BLD AUTO: ABNORMAL K/UL
MONOCYTES NFR BLD: 2 %
NEUTROPHILS NFR BLD: 89 %
NRBC BLD-RTO: 0 /100 WBC
OVALOCYTES BLD QL SMEAR: ABNORMAL
PHOSPHATE SERPL-MCNC: 2.7 MG/DL
PLATELET # BLD AUTO: 161 K/UL
PLATELET BLD QL SMEAR: ABNORMAL
PMV BLD AUTO: 11.9 FL
POCT GLUCOSE: 199 MG/DL (ref 70–110)
POCT GLUCOSE: 214 MG/DL (ref 70–110)
POCT GLUCOSE: 233 MG/DL (ref 70–110)
POCT GLUCOSE: 258 MG/DL (ref 70–110)
POIKILOCYTOSIS BLD QL SMEAR: SLIGHT
POTASSIUM SERPL-SCNC: 4.4 MMOL/L
PROT SERPL-MCNC: 5.7 G/DL
RBC # BLD AUTO: 3.71 M/UL
SCHISTOCYTES BLD QL SMEAR: ABNORMAL
SCHISTOCYTES BLD QL SMEAR: PRESENT
SODIUM SERPL-SCNC: 136 MMOL/L
TOXIC GRANULES BLD QL SMEAR: PRESENT
WBC # BLD AUTO: 1.19 K/UL

## 2019-03-22 PROCEDURE — 85027 COMPLETE CBC AUTOMATED: CPT

## 2019-03-22 PROCEDURE — 25000003 PHARM REV CODE 250: Performed by: NURSE PRACTITIONER

## 2019-03-22 PROCEDURE — 86850 RBC ANTIBODY SCREEN: CPT

## 2019-03-22 PROCEDURE — 63600175 PHARM REV CODE 636 W HCPCS: Performed by: INTERNAL MEDICINE

## 2019-03-22 PROCEDURE — 83735 ASSAY OF MAGNESIUM: CPT

## 2019-03-22 PROCEDURE — 25000003 PHARM REV CODE 250: Performed by: INTERNAL MEDICINE

## 2019-03-22 PROCEDURE — 80053 COMPREHEN METABOLIC PANEL: CPT

## 2019-03-22 PROCEDURE — 84100 ASSAY OF PHOSPHORUS: CPT

## 2019-03-22 PROCEDURE — 20600001 HC STEP DOWN PRIVATE ROOM

## 2019-03-22 PROCEDURE — 63600175 PHARM REV CODE 636 W HCPCS: Performed by: NURSE PRACTITIONER

## 2019-03-22 PROCEDURE — 99233 SBSQ HOSP IP/OBS HIGH 50: CPT | Mod: ,,, | Performed by: INTERNAL MEDICINE

## 2019-03-22 PROCEDURE — 99233 PR SUBSEQUENT HOSPITAL CARE,LEVL III: ICD-10-PCS | Mod: ,,, | Performed by: INTERNAL MEDICINE

## 2019-03-22 PROCEDURE — 85007 BL SMEAR W/DIFF WBC COUNT: CPT

## 2019-03-22 RX ADMIN — ACYCLOVIR 800 MG: 200 CAPSULE ORAL at 09:03

## 2019-03-22 RX ADMIN — ONDANSETRON 8 MG: 8 TABLET, ORALLY DISINTEGRATING ORAL at 09:03

## 2019-03-22 RX ADMIN — OLOPATADINE HYDROCHLORIDE 1 DROP: 2 SOLUTION OPHTHALMIC at 09:03

## 2019-03-22 RX ADMIN — ENOXAPARIN SODIUM 40 MG: 100 INJECTION SUBCUTANEOUS at 06:03

## 2019-03-22 RX ADMIN — FLUCONAZOLE 400 MG: 200 TABLET ORAL at 09:03

## 2019-03-22 RX ADMIN — INSULIN ASPART 3 UNITS: 100 INJECTION, SOLUTION INTRAVENOUS; SUBCUTANEOUS at 07:03

## 2019-03-22 RX ADMIN — SODIUM CHLORIDE AND POTASSIUM CHLORIDE: 9; 1.49 INJECTION, SOLUTION INTRAVENOUS at 12:03

## 2019-03-22 RX ADMIN — LEVOFLOXACIN 500 MG: 500 TABLET, FILM COATED ORAL at 09:03

## 2019-03-22 RX ADMIN — GABAPENTIN 400 MG: 400 CAPSULE ORAL at 09:03

## 2019-03-22 RX ADMIN — INSULIN ASPART 2 UNITS: 100 INJECTION, SOLUTION INTRAVENOUS; SUBCUTANEOUS at 12:03

## 2019-03-22 RX ADMIN — PANTOPRAZOLE SODIUM 40 MG: 40 TABLET, DELAYED RELEASE ORAL at 09:03

## 2019-03-22 RX ADMIN — AMLODIPINE BESYLATE 5 MG: 5 TABLET ORAL at 09:03

## 2019-03-22 RX ADMIN — GABAPENTIN 400 MG: 400 CAPSULE ORAL at 03:03

## 2019-03-22 RX ADMIN — ONDANSETRON 8 MG: 8 TABLET, ORALLY DISINTEGRATING ORAL at 06:03

## 2019-03-22 RX ADMIN — LOSARTAN POTASSIUM AND HYDROCHLOROTHIAZIDE 1 TABLET: 12.5; 1 TABLET ORAL at 09:03

## 2019-03-22 RX ADMIN — INSULIN ASPART 1 UNITS: 100 INJECTION, SOLUTION INTRAVENOUS; SUBCUTANEOUS at 09:03

## 2019-03-22 NOTE — PLAN OF CARE
Problem: Adult Inpatient Plan of Care  Goal: Plan of Care Review  Outcome: Ongoing (interventions implemented as appropriate)  Plan of care reviewed with the patient at the beginning of the shift. She is day +2 of an auto transplant. She has complaints of intermittent nausea that is well controlled with PO Zofran. PO intake encouraged as tolerated. IVF infusing. She denies diarrhea. Last BM 2 days ago. Mucous membranes are intact. Saliva substitutes and salt and soda rinses provided. Blood glucose monitored ac and hs, did not require coverage overnight. Pt has left knee swelling and discomfort from a previous fall at home. Fall precautions maintained. Bed alarm on, pt does not use call light. Bedside commode in use. Fall precautions reviewed with her several times. Pt remained free from falls and injury this shift. Bed locked in lowest position, side rails up x2, call light within reach. Instructed pt to call for assistance as needed. Pt verbalized understanding. Vitals stable. Pt afebrile overnight. Neutropenic precautions maintained. No acute issues overnight. Will continue to monitor.

## 2019-03-22 NOTE — ASSESSMENT & PLAN NOTE
-IgG kappa MM; ISS 2; standard risk cytogenetics; with lytic bone lesions at presentation; initiate VRd (21 day cycle) with Dr. Juan at [facility]; currently mid cycle  5    -she has achieved VGPR  -pre transplant eval with no contraindication for transplant   -never smoker so plan to follow up LL nodule at repeat PET scan at day +100  -Admitted for Daija auto SCT. Today is Day +2  -Neupogen and ppx antimicrobials per protocol.

## 2019-03-22 NOTE — SUBJECTIVE & OBJECTIVE
Subjective:     Interval History: Day +2 Daija auto, gabapentin increased for neuropathy pain. Continued nausea, re-enforced asking for antiemetics. BP and glucose improved. VSS    Objective:     Vital Signs (Most Recent):  Temp: 98.3 °F (36.8 °C) (03/22/19 1150)  Pulse: (!) 117 (03/22/19 1150)  Resp: 17 (03/22/19 1150)  BP: 132/75 (03/22/19 1150)  SpO2: 98 % (03/22/19 1150) Vital Signs (24h Range):  Temp:  [98.1 °F (36.7 °C)-98.4 °F (36.9 °C)] 98.3 °F (36.8 °C)  Pulse:  [] 117  Resp:  [16-18] 17  SpO2:  [95 %-98 %] 98 %  BP: (124-136)/(58-75) 132/75     Weight: 78.4 kg (172 lb 11.7 oz)  Body mass index is 29.65 kg/m².  Body surface area is 1.88 meters squared.      Intake/Output - Last 3 Shifts       03/20 0700 - 03/21 0659 03/21 0700 - 03/22 0659 03/22 0700 - 03/23 0659    P.O. 1070 1500     I.V. (mL/kg) 2865 (36.4) 1723.8 (22)     Blood 540      IV Piggyback       Total Intake(mL/kg) 4475 (56.8) 3223.8 (41.2)     Urine (mL/kg/hr) 3350 (1.8) 3200 (1.7)     Stool  0     Total Output 3350 3200     Net +1125 +23.8            Stool Occurrence  0 x           Physical Exam   Constitutional: She is oriented to person, place, and time. She appears well-developed and well-nourished.   HENT:   Head: Normocephalic and atraumatic.   Eyes: Conjunctivae and EOM are normal. Pupils are equal, round, and reactive to light.   Neck: Normal range of motion. Neck supple.   Cardiovascular: Normal rate, regular rhythm, normal heart sounds and intact distal pulses.   Pulmonary/Chest: Effort normal and breath sounds normal.   Abdominal: Soft. Bowel sounds are normal.   Musculoskeletal: She exhibits edema.   Trace edema to BLE. Edema to left knee 2/2 fall which is improving.   Neurological: She is alert and oriented to person, place, and time.   Skin: Skin is warm and dry.   Psychiatric: She has a normal mood and affect. Her behavior is normal. Judgment and thought content normal.       Significant Labs:   CBC:   Recent Labs   Lab  03/21/19  0356 03/22/19  0430   WBC 14.10* 1.19*   HGB 9.9* 9.6*   HCT 31.1* 31.3*   * 161    and CMP:   Recent Labs   Lab 03/21/19  0356 03/22/19  0430    136   K 4.2 4.4    105   CO2 26 25   * 179*   BUN 19 14   CREATININE 0.7 0.7   CALCIUM 8.3* 8.5*   PROT 5.6* 5.7*   ALBUMIN 3.0* 3.1*   BILITOT 0.5 0.7   ALKPHOS 96 82   AST 50* 20   ALT 13 12   ANIONGAP 7* 6*   EGFRNONAA >60.0 >60.0       Diagnostic Results:  None

## 2019-03-22 NOTE — ASSESSMENT & PLAN NOTE
- will hold oral anti diabetic medications while inpatient  - blood glucose checks ACHS  - s/s insulin  - DM diet  - blood glucoses 100s to 200s,

## 2019-03-22 NOTE — PLAN OF CARE
MDR's with Dr Alfaro.  Patient is day+2 post her Daija autoSCT.  She c/o nausea.  Tolerated transplant without complication.  D/c pending engraftment.  CM will continue to follow.    D/c plan: home with family support

## 2019-03-22 NOTE — PROGRESS NOTES
[facility]  Hematology  Bone Marrow Transplant  Progress Note    Patient Name: Lori Rivero  Admission Date: 3/18/2019  Hospital Length of Stay: 4 days  Code Status: Full Code    Subjective:     Interval History: Day +2 Daija auto, gabapentin increased for neuropathy pain. Continued nausea, re-enforced asking for antiemetics. BP and glucose improved. VSS    Objective:     Vital Signs (Most Recent):  Temp: 98.3 °F (36.8 °C) (03/22/19 1150)  Pulse: (!) 117 (03/22/19 1150)  Resp: 17 (03/22/19 1150)  BP: 132/75 (03/22/19 1150)  SpO2: 98 % (03/22/19 1150) Vital Signs (24h Range):  Temp:  [98.1 °F (36.7 °C)-98.4 °F (36.9 °C)] 98.3 °F (36.8 °C)  Pulse:  [] 117  Resp:  [16-18] 17  SpO2:  [95 %-98 %] 98 %  BP: (124-136)/(58-75) 132/75     Weight: 78.4 kg (172 lb 11.7 oz)  Body mass index is 29.65 kg/m².  Body surface area is 1.88 meters squared.      Intake/Output - Last 3 Shifts       03/20 0700 - 03/21 0659 03/21 0700 - 03/22 0659 03/22 0700 - 03/23 0659    P.O. 1070 1500     I.V. (mL/kg) 2865 (36.4) 1723.8 (22)     Blood 540      IV Piggyback       Total Intake(mL/kg) 4475 (56.8) 3223.8 (41.2)     Urine (mL/kg/hr) 3350 (1.8) 3200 (1.7)     Stool  0     Total Output 3350 3200     Net +1125 +23.8            Stool Occurrence  0 x           Physical Exam   Constitutional: She is oriented to person, place, and time. She appears well-developed and well-nourished.   HENT:   Head: Normocephalic and atraumatic.   Eyes: Conjunctivae and EOM are normal. Pupils are equal, round, and reactive to light.   Neck: Normal range of motion. Neck supple.   Cardiovascular: Normal rate, regular rhythm, normal heart sounds and intact distal pulses.   Pulmonary/Chest: Effort normal and breath sounds normal.   Abdominal: Soft. Bowel sounds are normal.   Musculoskeletal: She exhibits edema.   Trace edema to BLE. Edema to left knee 2/2 fall which is improving.   Neurological: She is alert and oriented to person, place,  and time.   Skin: Skin is warm and dry.   Psychiatric: She has a normal mood and affect. Her behavior is normal. Judgment and thought content normal.       Significant Labs:   CBC:   Recent Labs   Lab 03/21/19  0356 03/22/19  0430   WBC 14.10* 1.19*   HGB 9.9* 9.6*   HCT 31.1* 31.3*   * 161    and CMP:   Recent Labs   Lab 03/21/19  0356 03/22/19  0430    136   K 4.2 4.4    105   CO2 26 25   * 179*   BUN 19 14   CREATININE 0.7 0.7   CALCIUM 8.3* 8.5*   PROT 5.6* 5.7*   ALBUMIN 3.0* 3.1*   BILITOT 0.5 0.7   ALKPHOS 96 82   AST 50* 20   ALT 13 12   ANIONGAP 7* 6*   EGFRNONAA >60.0 >60.0       Diagnostic Results:  None    Assessment/Plan:     * Status post autologous bone marrow transplant    - see Multiple Myeloma  - admitted for Daija Auto SCT  - today is Day +2     Multiple myeloma    -IgG kappa MM; ISS 2; standard risk cytogenetics; with lytic bone lesions at presentation; initiate VRd (21 day cycle) with Dr. Juan at [facility]; currently mid cycle  5    -she has achieved VGPR  -pre transplant eval with no contraindication for transplant   -never smoker so plan to follow up LL nodule at repeat PET scan at day +100  -Admitted for Daija auto SCT. Today is Day +2  -Neupogen and ppx antimicrobials per protocol.          Hypomagnesemia    - Mag 2.0 today  - replace PRN per protocol     Anemia associated with chemotherapy    - hgb 9.6  - transfuse PRBC for hgb <7     Moderate malnutrition    - dietician following       Left knee pain    - s/p fall prior to admit  - x-ray with edema, no fractures  - tramadol prn ordered for pain      Essential hypertension    - continue home losartan-HCTZ  - BP elevated since admission, Norvasc 5 mg daily started 3/20/19  - BP improved today     Type 2 diabetes mellitus without complication, without long-term current use of insulin    - will hold oral anti diabetic medications while inpatient  - blood glucose checks ACHS  - s/s insulin  - DM diet  - blood glucoses 100s  to 200s,           VTE Risk Mitigation (From admission, onward)        Ordered     enoxaparin injection 40 mg  Daily      03/18/19 1702     heparin (porcine) injection 1,600 Units  As needed (PRN)      03/18/19 1754     IP VTE HIGH RISK PATIENT  Once      03/18/19 1702          Disposition: pending recovery from transplant    Trina Musa NP  Bone Marrow Transplant  [facility]

## 2019-03-22 NOTE — PLAN OF CARE
Problem: Adult Inpatient Plan of Care  Goal: Plan of Care Review  Outcome: Ongoing (interventions implemented as appropriate)  Pt involved in plan of care and communicating needs throughout shift.  Pt up in room with standby assist & use of cane.  Pt with complaints of nausea; prn Zofran given per orders. Pt fairly fatigued today with poor appetite, boost given. Neutropenic precautions maintained.  Pt  day +2 s/p auto transplant.  All VSS; no acute events so far this shift.  Pt remaining free from falls or injury throughout shift; bed in lowest position; call light within reach.  Pt instructed to call for assistance as needed.  Q1H rounding done on pt.

## 2019-03-23 LAB
ACANTHOCYTES BLD QL SMEAR: PRESENT
ALBUMIN SERPL BCP-MCNC: 3.1 G/DL
ALP SERPL-CCNC: 83 U/L
ALT SERPL W/O P-5'-P-CCNC: 15 U/L
ANION GAP SERPL CALC-SCNC: 6 MMOL/L
ANISOCYTOSIS BLD QL SMEAR: SLIGHT
AST SERPL-CCNC: 18 U/L
BASOPHILS # BLD AUTO: 0 K/UL
BASOPHILS NFR BLD: 0 %
BILIRUB SERPL-MCNC: 0.8 MG/DL
BUN SERPL-MCNC: 11 MG/DL
BURR CELLS BLD QL SMEAR: ABNORMAL
CALCIUM SERPL-MCNC: 8.5 MG/DL
CHLORIDE SERPL-SCNC: 104 MMOL/L
CO2 SERPL-SCNC: 26 MMOL/L
CREAT SERPL-MCNC: 0.6 MG/DL
DIFFERENTIAL METHOD: ABNORMAL
DOHLE BOD BLD QL SMEAR: PRESENT
EOSINOPHIL # BLD AUTO: 0 K/UL
EOSINOPHIL NFR BLD: 0 %
ERYTHROCYTE [DISTWIDTH] IN BLOOD BY AUTOMATED COUNT: 17.7 %
EST. GFR  (AFRICAN AMERICAN): >60 ML/MIN/1.73 M^2
EST. GFR  (NON AFRICAN AMERICAN): >60 ML/MIN/1.73 M^2
GLUCOSE SERPL-MCNC: 189 MG/DL
HCT VFR BLD AUTO: 30.9 %
HGB BLD-MCNC: 10.1 G/DL
HYPOCHROMIA BLD QL SMEAR: ABNORMAL
IMM GRANULOCYTES # BLD AUTO: 0 K/UL
IMM GRANULOCYTES NFR BLD AUTO: 0 %
LYMPHOCYTES # BLD AUTO: 0.1 K/UL
LYMPHOCYTES NFR BLD: 7.6 %
MAGNESIUM SERPL-MCNC: 1.8 MG/DL
MCH RBC QN AUTO: 26.9 PG
MCHC RBC AUTO-ENTMCNC: 32.7 G/DL
MCV RBC AUTO: 82 FL
MONOCYTES # BLD AUTO: 0 K/UL
MONOCYTES NFR BLD: 4.3 %
NEUTROPHILS # BLD AUTO: 0.8 K/UL
NEUTROPHILS NFR BLD: 88.1 %
NRBC BLD-RTO: 0 /100 WBC
OVALOCYTES BLD QL SMEAR: ABNORMAL
PHOSPHATE SERPL-MCNC: 3.1 MG/DL
PLATELET # BLD AUTO: 154 K/UL
PLATELET BLD QL SMEAR: ABNORMAL
PMV BLD AUTO: 10.9 FL
POCT GLUCOSE: 188 MG/DL (ref 70–110)
POCT GLUCOSE: 211 MG/DL (ref 70–110)
POCT GLUCOSE: 220 MG/DL (ref 70–110)
POCT GLUCOSE: 259 MG/DL (ref 70–110)
POIKILOCYTOSIS BLD QL SMEAR: SLIGHT
POLYCHROMASIA BLD QL SMEAR: ABNORMAL
POTASSIUM SERPL-SCNC: 4.5 MMOL/L
PROT SERPL-MCNC: 5.8 G/DL
RBC # BLD AUTO: 3.75 M/UL
SCHISTOCYTES BLD QL SMEAR: ABNORMAL
SCHISTOCYTES BLD QL SMEAR: PRESENT
SODIUM SERPL-SCNC: 136 MMOL/L
TOXIC GRANULES BLD QL SMEAR: PRESENT
WBC # BLD AUTO: 0.92 K/UL

## 2019-03-23 PROCEDURE — 97165 OT EVAL LOW COMPLEX 30 MIN: CPT

## 2019-03-23 PROCEDURE — 25000003 PHARM REV CODE 250: Performed by: NURSE PRACTITIONER

## 2019-03-23 PROCEDURE — 20600001 HC STEP DOWN PRIVATE ROOM

## 2019-03-23 PROCEDURE — 80053 COMPREHEN METABOLIC PANEL: CPT

## 2019-03-23 PROCEDURE — 63600175 PHARM REV CODE 636 W HCPCS: Performed by: INTERNAL MEDICINE

## 2019-03-23 PROCEDURE — 25000003 PHARM REV CODE 250: Performed by: STUDENT IN AN ORGANIZED HEALTH CARE EDUCATION/TRAINING PROGRAM

## 2019-03-23 PROCEDURE — A9155 ARTIFICIAL SALIVA: HCPCS | Performed by: INTERNAL MEDICINE

## 2019-03-23 PROCEDURE — 25000003 PHARM REV CODE 250: Performed by: INTERNAL MEDICINE

## 2019-03-23 PROCEDURE — 99233 SBSQ HOSP IP/OBS HIGH 50: CPT | Mod: GC,,, | Performed by: INTERNAL MEDICINE

## 2019-03-23 PROCEDURE — 84100 ASSAY OF PHOSPHORUS: CPT

## 2019-03-23 PROCEDURE — 85025 COMPLETE CBC W/AUTO DIFF WBC: CPT

## 2019-03-23 PROCEDURE — 99233 PR SUBSEQUENT HOSPITAL CARE,LEVL III: ICD-10-PCS | Mod: GC,,, | Performed by: INTERNAL MEDICINE

## 2019-03-23 PROCEDURE — 63600175 PHARM REV CODE 636 W HCPCS: Performed by: NURSE PRACTITIONER

## 2019-03-23 PROCEDURE — 83735 ASSAY OF MAGNESIUM: CPT

## 2019-03-23 RX ORDER — GABAPENTIN 300 MG/1
600 CAPSULE ORAL 3 TIMES DAILY
Status: DISCONTINUED | OUTPATIENT
Start: 2019-03-23 | End: 2019-03-31

## 2019-03-23 RX ADMIN — SODIUM CHLORIDE AND POTASSIUM CHLORIDE: 9; 1.49 INJECTION, SOLUTION INTRAVENOUS at 01:03

## 2019-03-23 RX ADMIN — Medication 30 ML: at 05:03

## 2019-03-23 RX ADMIN — ACYCLOVIR 800 MG: 200 CAPSULE ORAL at 09:03

## 2019-03-23 RX ADMIN — LOSARTAN POTASSIUM AND HYDROCHLOROTHIAZIDE 1 TABLET: 12.5; 1 TABLET ORAL at 08:03

## 2019-03-23 RX ADMIN — Medication 400 MG: at 05:03

## 2019-03-23 RX ADMIN — AMLODIPINE BESYLATE 5 MG: 5 TABLET ORAL at 08:03

## 2019-03-23 RX ADMIN — Medication 30 ML: at 09:03

## 2019-03-23 RX ADMIN — ACYCLOVIR 800 MG: 200 CAPSULE ORAL at 08:03

## 2019-03-23 RX ADMIN — GABAPENTIN 600 MG: 300 CAPSULE ORAL at 09:03

## 2019-03-23 RX ADMIN — Medication 30 ML: at 01:03

## 2019-03-23 RX ADMIN — GABAPENTIN 600 MG: 300 CAPSULE ORAL at 03:03

## 2019-03-23 RX ADMIN — GABAPENTIN 400 MG: 400 CAPSULE ORAL at 08:03

## 2019-03-23 RX ADMIN — FLUCONAZOLE 400 MG: 200 TABLET ORAL at 08:03

## 2019-03-23 RX ADMIN — Medication 400 MG: at 11:03

## 2019-03-23 RX ADMIN — PANTOPRAZOLE SODIUM 40 MG: 40 TABLET, DELAYED RELEASE ORAL at 08:03

## 2019-03-23 RX ADMIN — INSULIN ASPART 2 UNITS: 100 INJECTION, SOLUTION INTRAVENOUS; SUBCUTANEOUS at 05:03

## 2019-03-23 RX ADMIN — INSULIN ASPART 3 UNITS: 100 INJECTION, SOLUTION INTRAVENOUS; SUBCUTANEOUS at 01:03

## 2019-03-23 RX ADMIN — LEVOFLOXACIN 500 MG: 500 TABLET, FILM COATED ORAL at 08:03

## 2019-03-23 RX ADMIN — OLOPATADINE HYDROCHLORIDE 1 DROP: 2 SOLUTION OPHTHALMIC at 09:03

## 2019-03-23 RX ADMIN — ENOXAPARIN SODIUM 40 MG: 100 INJECTION SUBCUTANEOUS at 05:03

## 2019-03-23 RX ADMIN — ONDANSETRON 8 MG: 8 TABLET, ORALLY DISINTEGRATING ORAL at 09:03

## 2019-03-23 RX ADMIN — INSULIN ASPART 2 UNITS: 100 INJECTION, SOLUTION INTRAVENOUS; SUBCUTANEOUS at 09:03

## 2019-03-23 RX ADMIN — Medication 30 ML: at 08:03

## 2019-03-23 NOTE — SUBJECTIVE & OBJECTIVE
Subjective:     Interval History: Day +3 Daija auto   gabapentin increased to 600 mg TID for neuropathy pain  Continued nausea, re-enforced asking for antiemetics  Denied any fever, chills, abdominal pain, diarrhea or mouth sores  BP and glucose improved. VSS    Objective:     Vital Signs (Most Recent):  Temp: 98.6 °F (37 °C) (03/23/19 1147)  Pulse: 89 (03/23/19 1147)  Resp: 18 (03/23/19 1147)  BP: 123/66 (03/23/19 1147)  SpO2: (!) 94 % (03/23/19 1147) Vital Signs (24h Range):  Temp:  [98.3 °F (36.8 °C)-99 °F (37.2 °C)] 98.6 °F (37 °C)  Pulse:  [] 89  Resp:  [18-20] 18  SpO2:  [94 %-98 %] 94 %  BP: (112-134)/(55-66) 123/66     Weight: 77 kg (169 lb 13.8 oz)  Body mass index is 29.16 kg/m².  Body surface area is 1.86 meters squared.      Intake/Output - Last 3 Shifts       03/21 0700 - 03/22 0659 03/22 0700 - 03/23 0659 03/23 0700 - 03/24 0659    P.O. 1500 1080     I.V. (mL/kg) 1723.8 (22) 1746.3 (22.7) 611.3 (7.9)    Blood       Total Intake(mL/kg) 3223.8 (41.2) 2826.3 (36.7) 611.3 (7.9)    Urine (mL/kg/hr) 3200 (1.7) 3500 (1.9) 200 (0.4)    Stool 0 0 0    Total Output 3200 3500 200    Net +23.8 -673.8 +411.3           Urine Occurrence   2 x    Stool Occurrence 0 x 2 x 2 x          Physical Exam   Constitutional: She is oriented to person, place, and time. She appears well-developed and well-nourished.   HENT:   Head: Normocephalic and atraumatic.   Eyes: Conjunctivae and EOM are normal. Pupils are equal, round, and reactive to light.   Neck: Normal range of motion. Neck supple.   Cardiovascular: Normal rate, regular rhythm, normal heart sounds and intact distal pulses.   Pulmonary/Chest: Effort normal and breath sounds normal.   Abdominal: Soft. Bowel sounds are normal.   Musculoskeletal: She exhibits edema.   Trace edema to BLE. Edema to left knee 2/2 fall which is improving.   Neurological: She is alert and oriented to person, place, and time.   Skin: Skin is warm and dry.   Psychiatric: She has a normal  mood and affect. Her behavior is normal. Judgment and thought content normal.       Significant Labs:   CBC:   Recent Labs   Lab 03/22/19  0430 03/23/19  0349   WBC 1.19* 0.92*   HGB 9.6* 10.1*   HCT 31.3* 30.9*    154    and CMP:   Recent Labs   Lab 03/22/19  0430 03/23/19  0349    136   K 4.4 4.5    104   CO2 25 26   * 189*   BUN 14 11   CREATININE 0.7 0.6   CALCIUM 8.5* 8.5*   PROT 5.7* 5.8*   ALBUMIN 3.1* 3.1*   BILITOT 0.7 0.8   ALKPHOS 82 83   AST 20 18   ALT 12 15   ANIONGAP 6* 6*   EGFRNONAA >60.0 >60.0       Diagnostic Results:  None

## 2019-03-23 NOTE — PROGRESS NOTES
[facility]  Hematology  Bone Marrow Transplant  Progress Note    Patient Name: Lori Rivero  Admission Date: 3/18/2019  Hospital Length of Stay: 5 days  Code Status: Full Code    Subjective:     Interval History: Day +3 Daija auto   gabapentin increased to 600 mg TID for neuropathy pain  Continued nausea, re-enforced asking for antiemetics  Denied any fever, chills, abdominal pain, diarrhea or mouth sores  BP and glucose improved. VSS    Objective:     Vital Signs (Most Recent):  Temp: 98.6 °F (37 °C) (03/23/19 1147)  Pulse: 89 (03/23/19 1147)  Resp: 18 (03/23/19 1147)  BP: 123/66 (03/23/19 1147)  SpO2: (!) 94 % (03/23/19 1147) Vital Signs (24h Range):  Temp:  [98.3 °F (36.8 °C)-99 °F (37.2 °C)] 98.6 °F (37 °C)  Pulse:  [] 89  Resp:  [18-20] 18  SpO2:  [94 %-98 %] 94 %  BP: (112-134)/(55-66) 123/66     Weight: 77 kg (169 lb 13.8 oz)  Body mass index is 29.16 kg/m².  Body surface area is 1.86 meters squared.      Intake/Output - Last 3 Shifts       03/21 0700 - 03/22 0659 03/22 0700 - 03/23 0659 03/23 0700 - 03/24 0659    P.O. 1500 1080     I.V. (mL/kg) 1723.8 (22) 1746.3 (22.7) 611.3 (7.9)    Blood       Total Intake(mL/kg) 3223.8 (41.2) 2826.3 (36.7) 611.3 (7.9)    Urine (mL/kg/hr) 3200 (1.7) 3500 (1.9) 200 (0.4)    Stool 0 0 0    Total Output 3200 3500 200    Net +23.8 -673.8 +411.3           Urine Occurrence   2 x    Stool Occurrence 0 x 2 x 2 x          Physical Exam   Constitutional: She is oriented to person, place, and time. She appears well-developed and well-nourished.   HENT:   Head: Normocephalic and atraumatic.   Eyes: Conjunctivae and EOM are normal. Pupils are equal, round, and reactive to light.   Neck: Normal range of motion. Neck supple.   Cardiovascular: Normal rate, regular rhythm, normal heart sounds and intact distal pulses.   Pulmonary/Chest: Effort normal and breath sounds normal.   Abdominal: Soft. Bowel sounds are normal.   Musculoskeletal: She exhibits edema.    Trace edema to BLE. Edema to left knee 2/2 fall which is improving.   Neurological: She is alert and oriented to person, place, and time.   Skin: Skin is warm and dry.   Psychiatric: She has a normal mood and affect. Her behavior is normal. Judgment and thought content normal.       Significant Labs:   CBC:   Recent Labs   Lab 03/22/19  0430 03/23/19  0349   WBC 1.19* 0.92*   HGB 9.6* 10.1*   HCT 31.3* 30.9*    154    and CMP:   Recent Labs   Lab 03/22/19  0430 03/23/19  0349    136   K 4.4 4.5    104   CO2 25 26   * 189*   BUN 14 11   CREATININE 0.7 0.6   CALCIUM 8.5* 8.5*   PROT 5.7* 5.8*   ALBUMIN 3.1* 3.1*   BILITOT 0.7 0.8   ALKPHOS 82 83   AST 20 18   ALT 12 15   ANIONGAP 6* 6*   EGFRNONAA >60.0 >60.0       Diagnostic Results:  None    Assessment/Plan:     * Status post autologous bone marrow transplant    - see Multiple Myeloma  - admitted for Daija Auto SCT  - today is Day +3     Hypomagnesemia    - Mag 1.8 today  - replace PRN per protocol     Anemia associated with chemotherapy    - hgb 10.1  - transfuse PRBC for hgb <7     Moderate malnutrition    - dietician following       Left knee pain    - s/p fall prior to admit  - x-ray with edema, no fractures  - tramadol prn ordered for pain      Essential hypertension    - continue home losartan-HCTZ  - BP elevated since admission, Norvasc 5 mg daily started 3/20/19  - BP improved today     Type 2 diabetes mellitus without complication, without long-term current use of insulin    - will hold oral anti diabetic medications while inpatient  - blood glucose checks ACHS  - s/s insulin  - DM diet  - blood glucoses 100s to 200s,       Multiple myeloma    -IgG kappa MM; ISS 2; standard risk cytogenetics; with lytic bone lesions at presentation; initiate VRd (21 day cycle) with Dr. Juan at [facility]; currently mid cycle  5    -she has achieved VGPR  -pre transplant eval with no contraindication for transplant   -never smoker so plan to follow  up LL nodule at repeat PET scan at day +100  -Admitted for Adija auto SCT. Today is Day +3  -Neupogen and ppx antimicrobials per protocol.              VTE Risk Mitigation (From admission, onward)        Ordered     enoxaparin injection 40 mg  Daily      03/18/19 1702     heparin (porcine) injection 1,600 Units  As needed (PRN)      03/18/19 1754     IP VTE HIGH RISK PATIENT  Once      03/18/19 1702          Disposition:     Jose Martin Egan MD  Bone Marrow Transplant  [facility]

## 2019-03-23 NOTE — PT/OT/SLP EVAL
Occupational Therapy   Evaluation    Name: Lori Rivero  MRN: [id number]  Admitting Diagnosis:  Status post autologous bone marrow transplant      Recommendations:     Discharge Recommendations: home health OT  Discharge Equipment Recommendations:  (TBD)  Barriers to discharge:  None    Assessment:     Lori Rivero is a 63 y.o. female with a medical diagnosis of Status post autologous bone marrow transplant.  She presents with L knee pain affecting her quality of movements during transfers and functional mobility. Pt tolerated LE dressing well requiring SBA for safety with retrieval of shoes on floor. Pt reported that she has been utilizing [facility] with nursing assistance. Pt in good spirits on this date. Pt will benefit from skilled OT 3x/wk to increase her self care independence upon returning home. Performance deficits affecting function: weakness, impaired endurance, impaired functional mobilty, decreased lower extremity function.      Rehab Prognosis: Good; patient would benefit from acute skilled OT services to address these deficits and reach maximum level of function.       Plan:     Patient to be seen 3 x/week to address the above listed problems via self-care/home management, therapeutic activities, therapeutic exercises  · Plan of Care Expires: 04/21/19  · Plan of Care Reviewed with: patient    Subjective     Chief Complaint: L knee pain  Patient/Family Comments/goals: Pt agreeable to OT POC. No personal goals stated.    Occupational Profile:  Living Environment: Pt lives alone in a 2nd floor apartment with [address] and bilateral handrail. Pt reported of no difficulty with stair management with use of HR. Bathroom set up:  Tub shower combo   Previous level of function: I in all ADLs/IADLs; Pt does not drive  Falls: 1 fall while utilizing steps at daughter's house with no handrails   Roles and Routines: Homemaker, Mother; Loves to watch TV and decorate the home  Equipment Used at Home:  cane,  straight  Assistance upon Discharge: Pt will have assistance from daughter upon discharge. Pt will be living with daughter in a SSH with 3 [address] and no handrails upon discharge.     Pain/Comfort:  · Pain Rating 1: 8/10  · Location - Side 1: Left  · Location - Orientation 1: generalized  · Location 1: knee  · Pain Addressed 1: Pre-medicate for activity, Reposition  · Pain Rating Post-Intervention 1: 8/10    Patients cultural, spiritual, Oriental orthodox conflicts given the current situation: no    Objective:     Communicated with: RN prior to session.  Patient found supine with central line upon OT entry to room.    General Precautions: Standard, fall   Orthopedic Precautions:N/A   Braces: N/A     Occupational Performance:    Bed Mobility:    · Patient completed Rolling/Turning to Left with  supervision  · Patient completed Rolling/Turning to Right with supervision  · Patient completed Scooting/Bridging with supervision  · Patient completed Supine to Sit with supervision    Functional Mobility/Transfers:  · Patient completed Sit <> Stand Transfer from bed with contact guard assistance  with  hand-held assist for BUE  · Patient completed Bed <> Chair Transfer using Step Transfer technique with contact guard assistance with hand-held assist  · Functional Mobility: Pt ambulated ~10 ft with BUE HHA to reach sofa in room to prepare for breakfast with no noted LOBs. Unsteady gait noted. No RBs required.     Activities of Daily Living:  · Grooming: Set up assistance to complete facial hygiene in sitting   · Lower Body Dressing: stand by assistance to bend down and reach for shoes next to bed and don on BLE with no noted LOBs    Cognitive/Visual Perceptual:  Cognitive/Psychosocial Skills:     -       Oriented to: Person, Place, Time and Situation   -       Follows Commands/attention:Follows multistep  commands  -       Communication: clear/fluent  -       Memory: No Deficits noted  -       Safety awareness/insight to disability:  intact   -       Mood/Affect/Coping skills/emotional control: Appropriate to situation and Agitated  Visual/Perceptual:      -Intact no deficits noted    Physical Exam:  Balance:    -       Sitting- Mod I  Standing: CGA  Postural examination/scapula alignment:    -       No postural abnormalities identified  Skin integrity: Visible skin intact  Edema:  Mild BLE  Sensation:    -       Intact  Upper Extremity Range of Motion:     -       Right Upper Extremity: WFL  -       Left Upper Extremity: WFL  Upper Extremity Strength:    -       Right Upper Extremity: WFL  -       Left Upper Extremity: WFL   Strength:    -       Right Upper Extremity: WFL  -       Left Upper Extremity: WFL  Fine Motor Coordination:    -       Intact    AMPAC 6 Click ADL:  AMPAC Total Score: 21    Treatment & Education:  - Role of OT/ OT POC  - Self care safety/ independence  - Functional transfer/ mobility safety  - Bed mobility safety  - Pursed lip breathing  - Importance of sitting UIC      Education:    Patient left sofa with all lines intact, call button in reach and RN notified    GOALS:   Multidisciplinary Problems     Occupational Therapy Goals        Problem: Occupational Therapy Goal    Goal Priority Disciplines Outcome Interventions   Occupational Therapy Goal     OT, PT/OT Ongoing (interventions implemented as appropriate)    Description:  Goals to be met by: 04/03/2019    Patient will increase functional independence with ADLs by performing:    UE Dressing with Stand-by Assistance.  LE Dressing with Modified [place].  Grooming while standing at sink with Stand-by Assistance with AD as needed.  Toileting from toilet with Stand-by Assistance for hygiene and clothing management.   Step transfer with Stand-by Assistance with AD as needed to prepare for household mobility to complete occupations of choice upon returning home.  Toilet transfer to toilet with Stand-by Assistance.  Upper extremity exercise program x15 reps per  handout, with independence to increase strength for functional activities.                      History:     Past Medical History:   Diagnosis Date    Depression     DM2 (diabetes mellitus, type 2)     Glaucoma     HTN (hypertension)     Myeloma     Therapy     after mother's death       Past Surgical History:   Procedure Laterality Date    Biopsy-bone marrow Left 2/7/2019    Performed by Saúl Alva MD at [facility] OR 2ND FLR    Insertion,catheter,tunneled N/A 3/14/2019    Performed by [facility] Diagnostic Provider at [facility] OR 2ND FLR       Time Tracking:     OT Date of Treatment: 03/23/19  OT Start Time: 0820  OT Stop Time: 0841  OT Total Time (min): 21 min    Billable Minutes:Evaluation 21    Stacy Valdivia OT  3/23/2019

## 2019-03-23 NOTE — PLAN OF CARE
Problem: Occupational Therapy Goal  Goal: Occupational Therapy Goal  Goals to be met by: 04/03/2019    Patient will increase functional independence with ADLs by performing:    UE Dressing with Stand-by Assistance.  LE Dressing with Modified [place].  Grooming while standing at sink with Stand-by Assistance with AD as needed.  Toileting from toilet with Stand-by Assistance for hygiene and clothing management.   Step transfer with Stand-by Assistance with AD as needed to prepare for household mobility to complete occupations of choice upon returning home.  Toilet transfer to toilet with Stand-by Assistance.  Upper extremity exercise program x15 reps per handout, with independence to increase strength for functional activities.    Outcome: Ongoing (interventions implemented as appropriate)  OT POC implemented on this date. OT recommending home with HHOT services.     Comments: Stacy Valdivia OTR/L

## 2019-03-23 NOTE — PLAN OF CARE
Problem: Adult Inpatient Plan of Care  Goal: Plan of Care Review  Outcome: Ongoing (interventions implemented as appropriate)  Pt day +3 of an auto stem cell transplant. Pt remains free from injury. Pt up with OT to chair; walker ordered. Pt remains afebrile; tmax 99. VSS and NAD. Pt with minimal PO intake. Pt remains on IVF with 20mEq of K.  Blood glucose monitored with meals; SSI as needed. No reports of nausea. Gabapentin dose increased. Will continue to monitor

## 2019-03-23 NOTE — ASSESSMENT & PLAN NOTE
-IgG kappa MM; ISS 2; standard risk cytogenetics; with lytic bone lesions at presentation; initiate VRd (21 day cycle) with Dr. Juan at [facility]; currently mid cycle  5    -she has achieved VGPR  -pre transplant eval with no contraindication for transplant   -never smoker so plan to follow up LL nodule at repeat PET scan at day +100  -Admitted for Daija auto SCT. Today is Day +3  -Neupogen and ppx antimicrobials per protocol.

## 2019-03-24 PROBLEM — D61.810 PANCYTOPENIA DUE TO CHEMOTHERAPY: Status: ACTIVE | Noted: 2019-03-24

## 2019-03-24 LAB
ALBUMIN SERPL BCP-MCNC: 3.1 G/DL (ref 3.5–5.2)
ALP SERPL-CCNC: 77 U/L (ref 55–135)
ALT SERPL W/O P-5'-P-CCNC: 15 U/L (ref 10–44)
ANION GAP SERPL CALC-SCNC: 7 MMOL/L (ref 8–16)
AST SERPL-CCNC: 18 U/L (ref 10–40)
BASOPHILS # BLD AUTO: 0.01 K/UL (ref 0–0.2)
BASOPHILS NFR BLD: 1.6 % (ref 0–1.9)
BILIRUB SERPL-MCNC: 0.8 MG/DL (ref 0.1–1)
BUN SERPL-MCNC: 11 MG/DL (ref 8–23)
CALCIUM SERPL-MCNC: 8.6 MG/DL (ref 8.7–10.5)
CHLORIDE SERPL-SCNC: 102 MMOL/L (ref 95–110)
CO2 SERPL-SCNC: 24 MMOL/L (ref 23–29)
CREAT SERPL-MCNC: 0.6 MG/DL (ref 0.5–1.4)
DIFFERENTIAL METHOD: ABNORMAL
EOSINOPHIL # BLD AUTO: 0 K/UL (ref 0–0.5)
EOSINOPHIL NFR BLD: 0 % (ref 0–8)
ERYTHROCYTE [DISTWIDTH] IN BLOOD BY AUTOMATED COUNT: 17.4 % (ref 11.5–14.5)
EST. GFR  (AFRICAN AMERICAN): >60 ML/MIN/1.73 M^2
EST. GFR  (NON AFRICAN AMERICAN): >60 ML/MIN/1.73 M^2
GLUCOSE SERPL-MCNC: 185 MG/DL (ref 70–110)
HCT VFR BLD AUTO: 30.8 % (ref 37–48.5)
HGB BLD-MCNC: 10 G/DL (ref 12–16)
IMM GRANULOCYTES # BLD AUTO: 0 K/UL (ref 0–0.04)
IMM GRANULOCYTES NFR BLD AUTO: 0 % (ref 0–0.5)
LYMPHOCYTES # BLD AUTO: 0.1 K/UL (ref 1–4.8)
LYMPHOCYTES NFR BLD: 9.7 % (ref 18–48)
MAGNESIUM SERPL-MCNC: 1.8 MG/DL (ref 1.6–2.6)
MCH RBC QN AUTO: 26.6 PG (ref 27–31)
MCHC RBC AUTO-ENTMCNC: 32.5 G/DL (ref 32–36)
MCV RBC AUTO: 82 FL (ref 82–98)
MONOCYTES # BLD AUTO: 0 K/UL (ref 0.3–1)
MONOCYTES NFR BLD: 4.8 % (ref 4–15)
NEUTROPHILS # BLD AUTO: 0.5 K/UL (ref 1.8–7.7)
NEUTROPHILS NFR BLD: 83.9 % (ref 38–73)
NRBC BLD-RTO: 0 /100 WBC
PHOSPHATE SERPL-MCNC: 3.2 MG/DL (ref 2.7–4.5)
PLATELET # BLD AUTO: 123 K/UL (ref 150–350)
PMV BLD AUTO: 11.2 FL (ref 9.2–12.9)
POCT GLUCOSE: 207 MG/DL (ref 70–110)
POCT GLUCOSE: 208 MG/DL (ref 70–110)
POCT GLUCOSE: 229 MG/DL (ref 70–110)
POTASSIUM SERPL-SCNC: 4.2 MMOL/L (ref 3.5–5.1)
PROT SERPL-MCNC: 5.8 G/DL (ref 6–8.4)
RBC # BLD AUTO: 3.76 M/UL (ref 4–5.4)
SODIUM SERPL-SCNC: 133 MMOL/L (ref 136–145)
WBC # BLD AUTO: 0.62 K/UL (ref 3.9–12.7)

## 2019-03-24 PROCEDURE — 25000003 PHARM REV CODE 250: Performed by: INTERNAL MEDICINE

## 2019-03-24 PROCEDURE — 99233 SBSQ HOSP IP/OBS HIGH 50: CPT | Mod: GC,,, | Performed by: INTERNAL MEDICINE

## 2019-03-24 PROCEDURE — 63600175 PHARM REV CODE 636 W HCPCS: Performed by: INTERNAL MEDICINE

## 2019-03-24 PROCEDURE — 20600001 HC STEP DOWN PRIVATE ROOM

## 2019-03-24 PROCEDURE — 25000003 PHARM REV CODE 250: Performed by: STUDENT IN AN ORGANIZED HEALTH CARE EDUCATION/TRAINING PROGRAM

## 2019-03-24 PROCEDURE — A9155 ARTIFICIAL SALIVA: HCPCS | Performed by: INTERNAL MEDICINE

## 2019-03-24 PROCEDURE — 25000003 PHARM REV CODE 250: Performed by: NURSE PRACTITIONER

## 2019-03-24 PROCEDURE — 80053 COMPREHEN METABOLIC PANEL: CPT

## 2019-03-24 PROCEDURE — 63600175 PHARM REV CODE 636 W HCPCS: Performed by: NURSE PRACTITIONER

## 2019-03-24 PROCEDURE — 84100 ASSAY OF PHOSPHORUS: CPT

## 2019-03-24 PROCEDURE — 97530 THERAPEUTIC ACTIVITIES: CPT

## 2019-03-24 PROCEDURE — 85025 COMPLETE CBC W/AUTO DIFF WBC: CPT

## 2019-03-24 PROCEDURE — 83735 ASSAY OF MAGNESIUM: CPT

## 2019-03-24 PROCEDURE — 99233 PR SUBSEQUENT HOSPITAL CARE,LEVL III: ICD-10-PCS | Mod: GC,,, | Performed by: INTERNAL MEDICINE

## 2019-03-24 PROCEDURE — 97161 PT EVAL LOW COMPLEX 20 MIN: CPT

## 2019-03-24 RX ADMIN — GABAPENTIN 600 MG: 300 CAPSULE ORAL at 08:03

## 2019-03-24 RX ADMIN — ACYCLOVIR 800 MG: 200 CAPSULE ORAL at 09:03

## 2019-03-24 RX ADMIN — INSULIN ASPART 2 UNITS: 100 INJECTION, SOLUTION INTRAVENOUS; SUBCUTANEOUS at 12:03

## 2019-03-24 RX ADMIN — ENOXAPARIN SODIUM 40 MG: 100 INJECTION SUBCUTANEOUS at 04:03

## 2019-03-24 RX ADMIN — Medication 400 MG: at 10:03

## 2019-03-24 RX ADMIN — Medication 30 ML: at 08:03

## 2019-03-24 RX ADMIN — Medication 30 ML: at 04:03

## 2019-03-24 RX ADMIN — Medication 30 ML: at 09:03

## 2019-03-24 RX ADMIN — LEVOFLOXACIN 500 MG: 500 TABLET, FILM COATED ORAL at 09:03

## 2019-03-24 RX ADMIN — GABAPENTIN 600 MG: 300 CAPSULE ORAL at 09:03

## 2019-03-24 RX ADMIN — SODIUM CHLORIDE AND POTASSIUM CHLORIDE: 9; 1.49 INJECTION, SOLUTION INTRAVENOUS at 03:03

## 2019-03-24 RX ADMIN — INSULIN ASPART 2 UNITS: 100 INJECTION, SOLUTION INTRAVENOUS; SUBCUTANEOUS at 09:03

## 2019-03-24 RX ADMIN — GABAPENTIN 600 MG: 300 CAPSULE ORAL at 02:03

## 2019-03-24 RX ADMIN — OLOPATADINE HYDROCHLORIDE 1 DROP: 2 SOLUTION OPHTHALMIC at 09:03

## 2019-03-24 RX ADMIN — INSULIN ASPART 2 UNITS: 100 INJECTION, SOLUTION INTRAVENOUS; SUBCUTANEOUS at 04:03

## 2019-03-24 RX ADMIN — PANTOPRAZOLE SODIUM 40 MG: 40 TABLET, DELAYED RELEASE ORAL at 09:03

## 2019-03-24 RX ADMIN — Medication 400 MG: at 06:03

## 2019-03-24 RX ADMIN — Medication 30 ML: at 12:03

## 2019-03-24 RX ADMIN — FLUCONAZOLE 400 MG: 200 TABLET ORAL at 09:03

## 2019-03-24 RX ADMIN — AMLODIPINE BESYLATE 5 MG: 5 TABLET ORAL at 09:03

## 2019-03-24 RX ADMIN — ACYCLOVIR 800 MG: 200 CAPSULE ORAL at 08:03

## 2019-03-24 RX ADMIN — LOSARTAN POTASSIUM AND HYDROCHLOROTHIAZIDE 1 TABLET: 12.5; 1 TABLET ORAL at 09:03

## 2019-03-24 NOTE — ASSESSMENT & PLAN NOTE
Her WBC is 620 and ANC is 500  Hb stable at 10.0  Platelets at 123  Transfuse Hb for <7 and platelets <10

## 2019-03-24 NOTE — PLAN OF CARE
Problem: Adult Inpatient Plan of Care  Goal: Plan of Care Review  Outcome: Ongoing (interventions implemented as appropriate)  Day +4 auto transplant. Pt AAOx4, ambulates with assistance. Afebrile. VSS. IVF continued. UO adequate, one BM this shift. No skin issues noted. No complaints of pain. Tolerating diet. Complained of minimal nausea, no PRN required. Accuchecks performed throughout shift, PRN SSI administered as ordered. Pt turns and repositions self, remains free from falls. Pt with nonskid footwear on, bed in lowest position and locked with bed rails up x2. Call light within reach. See flow sheet for further assessment. Will continue to monitor.

## 2019-03-24 NOTE — PROGRESS NOTES
[facility]  Hematology  Bone Marrow Transplant  Progress Note    Patient Name: Lori Rivero  Admission Date: 3/18/2019  Hospital Length of Stay: 6 days  Code Status: Full Code    Subjective:     Interval History: Day +4 Daija auto   Gabapentin increased to 600 mg TID for neuropathy pain and she is feeling better  Continued to have mild nausea but tolerable, re-enforced asking for antiemetics  Denied any fever, chills, abdominal pain, diarrhea or mouth sores  BP and glucose improved. VSS    Objective:     Vital Signs (Most Recent):  Temp: 97.7 °F (36.5 °C) (03/24/19 1122)  Pulse: 91 (03/24/19 1122)  Resp: 20 (03/24/19 1122)  BP: 130/60 (03/24/19 1122)  SpO2: 99 % (03/24/19 1122) Vital Signs (24h Range):  Temp:  [97.7 °F (36.5 °C)-99 °F (37.2 °C)] 97.7 °F (36.5 °C)  Pulse:  [] 91  Resp:  [18-20] 20  SpO2:  [96 %-100 %] 99 %  BP: (123-185)/(56-86) 130/60     Weight: 76.1 kg (167 lb 10.6 oz)  Body mass index is 28.78 kg/m².  Body surface area is 1.85 meters squared.      Intake/Output - Last 3 Shifts       03/22 0700 - 03/23 0659 03/23 0700 - 03/24 0659 03/24 0700 - 03/25 0659    P.O. 1080 1050 120    I.V. (mL/kg) 1746.3 (22.7) 1817.5 (23.9)     Total Intake(mL/kg) 2826.3 (36.7) 2867.5 (37.7) 120 (1.6)    Urine (mL/kg/hr) 3500 (1.9) 2450 (1.3) 500 (0.8)    Stool 0 0     Total Output 3500 2450 500    Net -673.8 +417.5 -380           Urine Occurrence  2 x     Stool Occurrence 2 x 2 x           Physical Exam   Constitutional: She is oriented to person, place, and time. She appears well-developed and well-nourished.   HENT:   Head: Normocephalic and atraumatic.   Eyes: Pupils are equal, round, and reactive to light. Conjunctivae and EOM are normal.   Neck: Normal range of motion. Neck supple.   Cardiovascular: Normal rate, regular rhythm, normal heart sounds and intact distal pulses.   Pulmonary/Chest: Effort normal and breath sounds normal.   Abdominal: Soft. Bowel sounds are normal.    Musculoskeletal: She exhibits edema.   Trace edema to BLE. Edema to left knee 2/2 fall which is improving.   Neurological: She is alert and oriented to person, place, and time.   Skin: Skin is warm and dry.   Psychiatric: She has a normal mood and affect. Her behavior is normal. Judgment and thought content normal.       Significant Labs:   CBC:   Recent Labs   Lab 03/23/19  0349 03/24/19  0403   WBC 0.92* 0.62*   HGB 10.1* 10.0*   HCT 30.9* 30.8*    123*    and CMP:   Recent Labs   Lab 03/23/19  0349 03/24/19  0403    133*   K 4.5 4.2    102   CO2 26 24   * 185*   BUN 11 11   CREATININE 0.6 0.6   CALCIUM 8.5* 8.6*   PROT 5.8* 5.8*   ALBUMIN 3.1* 3.1*   BILITOT 0.8 0.8   ALKPHOS 83 77   AST 18 18   ALT 15 15   ANIONGAP 6* 7*   EGFRNONAA >60.0 >60.0       Diagnostic Results:  None    Assessment/Plan:     * Status post autologous bone marrow transplant  - see Multiple Myeloma  - admitted for Daija Auto SCT  - today is Day +4    Pancytopenia due to chemotherapy  Her WBC is 620 and ANC is 500  Hb stable at 10.0  Platelets at 123  Transfuse Hb for <7 and platelets <10    Hypomagnesemia  - Mag 1.8 today  - replace PRN per protocol    Anemia associated with chemotherapy  - hgb 10.0  - transfuse PRBC for hgb <7    Moderate malnutrition  - dietician following      Left knee pain  - s/p fall prior to admit  - x-ray with edema, no fractures  - tramadol prn ordered for pain     Essential hypertension  - continue home losartan-HCTZ  - BP elevated since admission, Norvasc 5 mg daily started 3/20/19  - BP improved today    Type 2 diabetes mellitus without complication, without long-term current use of insulin  - will hold oral anti diabetic medications while inpatient  - blood glucose checks ACHS  - s/s insulin  - DM diet  - blood glucoses 100s to 200s,      Multiple myeloma  -IgG kappa MM; ISS 2; standard risk cytogenetics; with lytic bone lesions at presentation; initiate VRd (21 day cycle) with   Drake at [facility]; currently mid cycle  5    -she has achieved VGPR  -pre transplant eval with no contraindication for transplant   -never smoker so plan to follow up LL nodule at repeat PET scan at day +100  -Admitted for Daija auto SCT. Today is Day +4  -Neupogen and ppx antimicrobials per protocol.             VTE Risk Mitigation (From admission, onward)        Ordered     enoxaparin injection 40 mg  Daily      03/18/19 1702     heparin (porcine) injection 1,600 Units  As needed (PRN)      03/18/19 1754     IP VTE HIGH RISK PATIENT  Once      03/18/19 1702          Disposition:     Jose Martin Egan MD  Bone Marrow Transplant  [facility]

## 2019-03-24 NOTE — SUBJECTIVE & OBJECTIVE
Subjective:     Interval History: Day +4 Daija auto   Gabapentin increased to 600 mg TID for neuropathy pain and she is feeling better  Continued to have mild nausea but tolerable, re-enforced asking for antiemetics  Denied any fever, chills, abdominal pain, diarrhea or mouth sores  BP and glucose improved. VSS    Objective:     Vital Signs (Most Recent):  Temp: 97.7 °F (36.5 °C) (03/24/19 1122)  Pulse: 91 (03/24/19 1122)  Resp: 20 (03/24/19 1122)  BP: 130/60 (03/24/19 1122)  SpO2: 99 % (03/24/19 1122) Vital Signs (24h Range):  Temp:  [97.7 °F (36.5 °C)-99 °F (37.2 °C)] 97.7 °F (36.5 °C)  Pulse:  [] 91  Resp:  [18-20] 20  SpO2:  [96 %-100 %] 99 %  BP: (123-185)/(56-86) 130/60     Weight: 76.1 kg (167 lb 10.6 oz)  Body mass index is 28.78 kg/m².  Body surface area is 1.85 meters squared.      Intake/Output - Last 3 Shifts       03/22 0700 - 03/23 0659 03/23 0700 - 03/24 0659 03/24 0700 - 03/25 0659    P.O. 1080 1050 120    I.V. (mL/kg) 1746.3 (22.7) 1817.5 (23.9)     Total Intake(mL/kg) 2826.3 (36.7) 2867.5 (37.7) 120 (1.6)    Urine (mL/kg/hr) 3500 (1.9) 2450 (1.3) 500 (0.8)    Stool 0 0     Total Output 3500 2450 500    Net -673.8 +417.5 -380           Urine Occurrence  2 x     Stool Occurrence 2 x 2 x           Physical Exam   Constitutional: She is oriented to person, place, and time. She appears well-developed and well-nourished.   HENT:   Head: Normocephalic and atraumatic.   Eyes: Pupils are equal, round, and reactive to light. Conjunctivae and EOM are normal.   Neck: Normal range of motion. Neck supple.   Cardiovascular: Normal rate, regular rhythm, normal heart sounds and intact distal pulses.   Pulmonary/Chest: Effort normal and breath sounds normal.   Abdominal: Soft. Bowel sounds are normal.   Musculoskeletal: She exhibits edema.   Trace edema to BLE. Edema to left knee 2/2 fall which is improving.   Neurological: She is alert and oriented to person, place, and time.   Skin: Skin is warm and dry.    Psychiatric: She has a normal mood and affect. Her behavior is normal. Judgment and thought content normal.       Significant Labs:   CBC:   Recent Labs   Lab 03/23/19  0349 03/24/19  0403   WBC 0.92* 0.62*   HGB 10.1* 10.0*   HCT 30.9* 30.8*    123*    and CMP:   Recent Labs   Lab 03/23/19  0349 03/24/19  0403    133*   K 4.5 4.2    102   CO2 26 24   * 185*   BUN 11 11   CREATININE 0.6 0.6   CALCIUM 8.5* 8.6*   PROT 5.8* 5.8*   ALBUMIN 3.1* 3.1*   BILITOT 0.8 0.8   ALKPHOS 83 77   AST 18 18   ALT 15 15   ANIONGAP 6* 7*   EGFRNONAA >60.0 >60.0       Diagnostic Results:  None

## 2019-03-24 NOTE — PLAN OF CARE
Problem: Adult Inpatient Plan of Care  Goal: Plan of Care Review  Lori Rivero is a 63 y.o. female [place] [facility] [place] for autologous bone marrow transplant which was initiated on 3/20/19. Lori Rivero tolerated evaluation well today. She has c/o (L) knee pain from a fall at home from 2 weeks ago, states it's slowly improving but there is still some mild swelling/warmth at knee. Pain is increased with terminal L knee extension, demonstrated to patient and then had her perform LAQ x 15 reps with AAROM, also showed her SLR and SAQ to perform in bed. She was able to ambulate 200 ft in hallways with RW and SBA-CGA of therapist, pt wearing mask when out of room 2* neutropenic precautions. Pt stating it felt good to ambulate; ordered RW from C&D to come up to room, pt safe to mobilize with nsg and rolling walker. Would benefit from HHPT upon discharge to home as well as a rolling walker for home to improve for gait safety (previously using cane but unsteady). Discussed PT role, POC, goals and recommendations with patient; verbalized understanding. Lori Rivero would benefit from acute PT services to promote mobility during this admission and improve return to PLOF.    Heriberto Jose, PT  3/24/2019

## 2019-03-24 NOTE — PT/OT/SLP EVAL
Physical Therapy  Evaluation/Treatment  Lori Rivero   [id number]    Time Tracking:     PT Received On: 03/24/19   PT Start Time: 0804   PT Stop Time: 0826   PT Total Time (min): 22 min    Billable Minutes: Evaluation 10 and Therapeutic Activity 12      Recommendations:     Discharge recommendations: Home with HHPT     Equipment recommendations: Rolling Walker (has a straight cane but unsteady with cane, balance/mobility is much improved with RW)    Barriers to Discharge: Inaccessible home environment (3-4 MANUELA at her daughter's home upon d/c)    Patient Information:     Recent Surgery: s/p autologous BMT on 3/20/19    General Precautions: Standard, fall, neutropenic    Orthopedic Precautions: None    Assessment:     Lori Rivero is a 63 y.o. female admitted to [facility] on 3/18/19 for autologous bone marrow transplant which was initiated on 3/20/19. Lori Rivero tolerated evaluation well today. She has c/o (L) knee pain from a fall at home from 2 weeks ago, states it's slowly improving but there is still some mild swelling/warmth at knee. Pain is increased with terminal L knee extension, demonstrated to patient and then had her perform LAQ x 15 reps with AAROM, also showed her SLR and SAQ to perform in bed. She was able to ambulate 200 ft in hallways with RW and SBA-CGA of therapist, pt wearing mask when out of room 2* neutropenic precautions. Pt stating it felt good to ambulate; ordered RW from C&D to come up to room, pt safe to mobilize with nsg and rolling walker. Discussed PT role, POC, goals and recommendations with patient; verbalized understanding. Lori Rivero would benefit from acute PT services to promote mobility during this admission and improve return to PLOF.    Problem List: weakness, decreased endurance, impaired self-care skills, impaired mobility, decreased sitting or standing balance, gait instability, pain and decreased (L) knee ROM    Rehab Prognosis: Good; patient would benefit from  acute skilled PT services to address these deficits and reach maximum level of function.    Plan:     Patient to be seen 3 x/week to address the above listed problems via gait training, therapeutic activities, therapeutic exercises    Plan of Care Expires: 04/22/19  Plan of Care reviewed with: patient    Subjective:     Communicated with RN prior to evaluation, appropriate to see for evaluation.    Pt found sitting up on sofa in room upon PT entry to room, agreeable to evaluation.    Does this patient have any cultural, spiritual, Congregation conflicts given the current situation? Patient has no barriers to learning. Patient verbalizes understanding of his/her program and goals and demonstrates them correctly. No cultural, spiritual, or educational needs identified.    Past Medical History:   Diagnosis Date    Depression     DM2 (diabetes mellitus, type 2)     Glaucoma     HTN (hypertension)     Myeloma     Therapy     after mother's death     Past Surgical History:   Procedure Laterality Date    Biopsy-bone marrow Left 2/7/2019    Performed by Saúl Alva MD at [facility] OR 2ND FLR    Insertion,catheter,tunneled N/A 3/14/2019    Performed by [facility] Diagnostic Provider at [facility] OR 2ND FLR     Living Environment:  Pt typically lives alone in a 2nd story apartment with a flight of stairs to enter; however, upon d/c she will live at her daughter's home which is a 1 SH with 3 MANUELA, no handrails. Will have access to tub/shower for bathing.    PLOF:  Prior to admission, patient was ambulatory using straight cane since her fall 2 weeks ago. She finds herself unsteady with using the straight cane but no falls since using. She does not drive, performing all basic home-bound ADL's independently.    DME:  Patient owns or has access to the following DME: Single [employer]    Upon discharge, patient will have assistance from daughter.    Objective:     Patient found with: central line (subclavian)    Pain:  Pain Rating 1:  6/10 at (L) knee with rest; increases with (L) knee terminal ext, decreases with rest  Pain Rating Post-Intervention 1: 7/10 at end of walking trial, (L) knee    Cognitive Exam:  Patient is oriented to Person, Place, Time and Situation.  Patient follows 100% of single-step commands.    Sensation:   Intact    Edema:  Mild swelling and warmth over (L) knee compared to (R) side    Lower Extremity Range of Motion:  Right Lower Extremity: WFL  Left Lower Extremity: WFL except pain with terminal knee extension (but does have full range passively)    Lower Extremity Strength:  Right Lower Extremity: WFL  Left Lower Extremity: hip flex 3+/5, knee flex 3+/5, knee ext 3-/5, ankle DF 4/5    Functional Mobility:    · Bed Mobility:  · NT; seated on sofa before and after session    · Transfers:  · Sit to Stand: CGA from sofa with RW x 1 trial  · Stand to Sit: SBA to sofa with RW x 1 trial    · Gait:  · 200 feet ft in hallways with RW and SBA-CGA of therapist, pt wearing mask when out of room 2* neutropenic precautions. Pt stating it felt good to ambulate. Does require hands on her at times due to unsteadiness with walker; tends to keep RW too close to body, cueing to keep walker further away    · Assist level: Contact-Guard Assist  · Device: Rolling walker    · Balance:  · Static Sit: Independent at edge of sofa  · Static Stand: Stand-By Assist with Rolling walker    Additional Therapeutic Activity/Exercises:     1. Discussed PT role, POC, goals and recommendations with patient and/or family; verbalized understanding.    2. Pain is increased with terminal L knee extension, demonstrated to patient and then had her perform LAQ x 15 reps with AAROM, also showed her SLR and SAQ to perform in bed.    3. Ordered RW from C&D to come up to room, pt safe to mobilize with nsg and rolling walker.    AM-PAC 6 CLICK MOBILITY  Turning over in bed (including adjusting bedclothes, sheets and blankets)?: 4  Sitting down on and standing up from a  chair with arms (e.g., wheelchair, bedside commode, etc.): 4  Moving from lying on back to sitting on the side of the bed?: 4  Moving to and from a bed to a chair (including a wheelchair)?: 4  Need to walk in hospital room?: 3  Climbing 3-5 steps with a railing?: 3  Basic Mobility Total Score: 22    Patient was left sitting up on sofa with all lines intact, call button in reach and RN notified.    Clinical Decision Making for Evaluation Complexity:  1. Body System(s) Examination: 1-2  2. Clinical Presentation: Evolving  3. Evaluation Complexity: Low    GOALS:   Multidisciplinary Problems     Physical Therapy Goals        Problem: Physical Therapy Goal    Goal Priority Disciplines Outcome Goal Variances Interventions   Physical Therapy Goal     PT, PT/OT      Description:  Goals to be met by: 19     Patient will increase functional independence with mobility by performin. Sit to stand transfer with Modified [place] with RW - Not met  2. Gait  x 200 feet with Supervision using Rolling Walker.   3. Ascend/descend 3 stairs with no handrails, with therapist hand-held assist x 1 - Not met  4. Lower extremity exercise program (geared towards L quad strengthening) x 15 reps per handout, with supervision - Not met                   Heriberto Jose, PT  3/24/2019

## 2019-03-24 NOTE — ASSESSMENT & PLAN NOTE
-IgG kappa MM; ISS 2; standard risk cytogenetics; with lytic bone lesions at presentation; initiate VRd (21 day cycle) with Dr. Juan at [facility]; currently mid cycle  5    -she has achieved VGPR  -pre transplant eval with no contraindication for transplant   -never smoker so plan to follow up LL nodule at repeat PET scan at day +100  -Admitted for Daija auto SCT. Today is Day +4  -Neupogen and ppx antimicrobials per protocol.

## 2019-03-24 NOTE — PLAN OF CARE
Problem: Adult Inpatient Plan of Care  Goal: Plan of Care Review  Outcome: Ongoing (interventions implemented as appropriate)  Plan of care reviewed with the patient at the beginning of the shift. She is day +4 of an auto transplant. She has complaints of intermittent nausea that is well controlled with PO Zofran. PO intake is minimal, encouraged as tolerated. IVF infusing. She denies diarrhea. Last BM yesterday. Mucous membranes are intact. Saliva substitutes and salt and soda rinses provided. Blood glucose monitored ac and hs, did not require coverage overnight. Pt has left knee swelling and discomfort from a previous fall at home. Fall precautions maintained. Bed alarm on, pt does not use call light. Alarm has gone off three times. Reinforced several times to use the call light. Bedside commode in use. Fall precautions reviewed with her several times. Pt remained free from falls and injury this shift. Bed locked in lowest position, side rails up x2, call light within reach. Instructed pt to call for assistance as needed. Pt verbalized understanding. Vitals stable. Pt afebrile overnight. Neutropenic precautions maintained. No acute issues overnight. Will continue to monitor.

## 2019-03-25 LAB
ABO + RH BLD: NORMAL
ALBUMIN SERPL BCP-MCNC: 3 G/DL (ref 3.5–5.2)
ALP SERPL-CCNC: 71 U/L (ref 55–135)
ALT SERPL W/O P-5'-P-CCNC: 16 U/L (ref 10–44)
ANION GAP SERPL CALC-SCNC: 9 MMOL/L (ref 8–16)
ANISOCYTOSIS BLD QL SMEAR: SLIGHT
AST SERPL-CCNC: 17 U/L (ref 10–40)
BASOPHILS # BLD AUTO: 0 K/UL (ref 0–0.2)
BASOPHILS NFR BLD: 0 % (ref 0–1.9)
BILIRUB SERPL-MCNC: 0.7 MG/DL (ref 0.1–1)
BLD GP AB SCN CELLS X3 SERPL QL: NORMAL
BUN SERPL-MCNC: 12 MG/DL (ref 8–23)
CALCIUM SERPL-MCNC: 8.4 MG/DL (ref 8.7–10.5)
CHLORIDE SERPL-SCNC: 104 MMOL/L (ref 95–110)
CO2 SERPL-SCNC: 23 MMOL/L (ref 23–29)
CREAT SERPL-MCNC: 0.6 MG/DL (ref 0.5–1.4)
DIFFERENTIAL METHOD: ABNORMAL
EOSINOPHIL # BLD AUTO: 0 K/UL (ref 0–0.5)
EOSINOPHIL NFR BLD: 0 % (ref 0–8)
ERYTHROCYTE [DISTWIDTH] IN BLOOD BY AUTOMATED COUNT: 17.2 % (ref 11.5–14.5)
EST. GFR  (AFRICAN AMERICAN): >60 ML/MIN/1.73 M^2
EST. GFR  (NON AFRICAN AMERICAN): >60 ML/MIN/1.73 M^2
GLUCOSE SERPL-MCNC: 181 MG/DL (ref 70–110)
HCT VFR BLD AUTO: 30.2 % (ref 37–48.5)
HGB BLD-MCNC: 9.9 G/DL (ref 12–16)
HYPOCHROMIA BLD QL SMEAR: ABNORMAL
IMM GRANULOCYTES # BLD AUTO: 0 K/UL (ref 0–0.04)
IMM GRANULOCYTES NFR BLD AUTO: 0 % (ref 0–0.5)
LYMPHOCYTES # BLD AUTO: 0 K/UL (ref 1–4.8)
LYMPHOCYTES NFR BLD: 17.4 % (ref 18–48)
MAGNESIUM SERPL-MCNC: 1.8 MG/DL (ref 1.6–2.6)
MCH RBC QN AUTO: 26.4 PG (ref 27–31)
MCHC RBC AUTO-ENTMCNC: 32.8 G/DL (ref 32–36)
MCV RBC AUTO: 81 FL (ref 82–98)
MONOCYTES # BLD AUTO: 0 K/UL (ref 0.3–1)
MONOCYTES NFR BLD: 13 % (ref 4–15)
NEUTROPHILS # BLD AUTO: 0.2 K/UL (ref 1.8–7.7)
NEUTROPHILS NFR BLD: 69.6 % (ref 38–73)
NRBC BLD-RTO: 0 /100 WBC
OVALOCYTES BLD QL SMEAR: ABNORMAL
PHOSPHATE SERPL-MCNC: 3 MG/DL (ref 2.7–4.5)
PLATELET # BLD AUTO: 88 K/UL (ref 150–350)
PLATELET BLD QL SMEAR: ABNORMAL
PMV BLD AUTO: 11 FL (ref 9.2–12.9)
POCT GLUCOSE: 173 MG/DL (ref 70–110)
POCT GLUCOSE: 175 MG/DL (ref 70–110)
POCT GLUCOSE: 191 MG/DL (ref 70–110)
POCT GLUCOSE: 261 MG/DL (ref 70–110)
POIKILOCYTOSIS BLD QL SMEAR: SLIGHT
POLYCHROMASIA BLD QL SMEAR: ABNORMAL
POTASSIUM SERPL-SCNC: 4.1 MMOL/L (ref 3.5–5.1)
PROT SERPL-MCNC: 5.5 G/DL (ref 6–8.4)
RBC # BLD AUTO: 3.75 M/UL (ref 4–5.4)
SCHISTOCYTES BLD QL SMEAR: ABNORMAL
SODIUM SERPL-SCNC: 136 MMOL/L (ref 136–145)
WBC # BLD AUTO: 0.23 K/UL (ref 3.9–12.7)

## 2019-03-25 PROCEDURE — 99233 PR SUBSEQUENT HOSPITAL CARE,LEVL III: ICD-10-PCS | Mod: ,,, | Performed by: INTERNAL MEDICINE

## 2019-03-25 PROCEDURE — 85025 COMPLETE CBC W/AUTO DIFF WBC: CPT

## 2019-03-25 PROCEDURE — 63600175 PHARM REV CODE 636 W HCPCS: Performed by: NURSE PRACTITIONER

## 2019-03-25 PROCEDURE — 84100 ASSAY OF PHOSPHORUS: CPT

## 2019-03-25 PROCEDURE — 63600175 PHARM REV CODE 636 W HCPCS: Performed by: INTERNAL MEDICINE

## 2019-03-25 PROCEDURE — 20600001 HC STEP DOWN PRIVATE ROOM

## 2019-03-25 PROCEDURE — 86850 RBC ANTIBODY SCREEN: CPT

## 2019-03-25 PROCEDURE — 80053 COMPREHEN METABOLIC PANEL: CPT

## 2019-03-25 PROCEDURE — 99233 SBSQ HOSP IP/OBS HIGH 50: CPT | Mod: ,,, | Performed by: INTERNAL MEDICINE

## 2019-03-25 PROCEDURE — 25000003 PHARM REV CODE 250: Performed by: NURSE PRACTITIONER

## 2019-03-25 PROCEDURE — A9155 ARTIFICIAL SALIVA: HCPCS | Performed by: INTERNAL MEDICINE

## 2019-03-25 PROCEDURE — 25000003 PHARM REV CODE 250: Performed by: INTERNAL MEDICINE

## 2019-03-25 PROCEDURE — 83735 ASSAY OF MAGNESIUM: CPT

## 2019-03-25 PROCEDURE — 25000003 PHARM REV CODE 250: Performed by: STUDENT IN AN ORGANIZED HEALTH CARE EDUCATION/TRAINING PROGRAM

## 2019-03-25 RX ORDER — ONDANSETRON 2 MG/ML
8 INJECTION INTRAMUSCULAR; INTRAVENOUS EVERY 8 HOURS
Status: DISCONTINUED | OUTPATIENT
Start: 2019-03-25 | End: 2019-04-01

## 2019-03-25 RX ADMIN — ONDANSETRON 8 MG: 2 INJECTION INTRAMUSCULAR; INTRAVENOUS at 02:03

## 2019-03-25 RX ADMIN — Medication 30 ML: at 02:03

## 2019-03-25 RX ADMIN — AMLODIPINE BESYLATE 5 MG: 5 TABLET ORAL at 08:03

## 2019-03-25 RX ADMIN — Medication 30 ML: at 05:03

## 2019-03-25 RX ADMIN — ONDANSETRON 8 MG: 2 INJECTION INTRAMUSCULAR; INTRAVENOUS at 08:03

## 2019-03-25 RX ADMIN — LEVOFLOXACIN 500 MG: 500 TABLET, FILM COATED ORAL at 08:03

## 2019-03-25 RX ADMIN — OLOPATADINE HYDROCHLORIDE 1 DROP: 2 SOLUTION OPHTHALMIC at 08:03

## 2019-03-25 RX ADMIN — FLUCONAZOLE 400 MG: 200 TABLET ORAL at 08:03

## 2019-03-25 RX ADMIN — SODIUM CHLORIDE AND POTASSIUM CHLORIDE: 9; 1.49 INJECTION, SOLUTION INTRAVENOUS at 05:03

## 2019-03-25 RX ADMIN — Medication 30 ML: at 08:03

## 2019-03-25 RX ADMIN — PANTOPRAZOLE SODIUM 40 MG: 40 TABLET, DELAYED RELEASE ORAL at 08:03

## 2019-03-25 RX ADMIN — ACYCLOVIR 800 MG: 200 CAPSULE ORAL at 08:03

## 2019-03-25 RX ADMIN — Medication 400 MG: at 06:03

## 2019-03-25 RX ADMIN — LOSARTAN POTASSIUM AND HYDROCHLOROTHIAZIDE 1 TABLET: 12.5; 1 TABLET ORAL at 08:03

## 2019-03-25 RX ADMIN — ONDANSETRON 8 MG: 2 INJECTION INTRAMUSCULAR; INTRAVENOUS at 10:03

## 2019-03-25 RX ADMIN — GABAPENTIN 600 MG: 300 CAPSULE ORAL at 08:03

## 2019-03-25 RX ADMIN — TRAMADOL HYDROCHLORIDE 50 MG: 50 TABLET, COATED ORAL at 03:03

## 2019-03-25 RX ADMIN — ENOXAPARIN SODIUM 40 MG: 100 INJECTION SUBCUTANEOUS at 05:03

## 2019-03-25 RX ADMIN — GABAPENTIN 600 MG: 300 CAPSULE ORAL at 02:03

## 2019-03-25 RX ADMIN — INSULIN ASPART 3 UNITS: 100 INJECTION, SOLUTION INTRAVENOUS; SUBCUTANEOUS at 08:03

## 2019-03-25 NOTE — ASSESSMENT & PLAN NOTE
- continue home losartan-HCTZ  - BP elevated since admission, Norvasc 5 mg daily started 3/20/19  - BP improved

## 2019-03-25 NOTE — ASSESSMENT & PLAN NOTE
Malnutrition in the context of Chronic Illness/Injury    Related to (etiology):  Hypermetabolic condition and inadequate energy intake    Signs and Symptoms (as evidenced by):  Energy Intake: <75% of estimated energy requirement for 6 months  Body Fat Depletion: moderate depletion of triceps   Weight Loss: 23% x 6 months   Fluid Accumulation: mild    Interventions:  Commercial beverage    Nutrition Diagnosis Status:  Continues

## 2019-03-25 NOTE — ASSESSMENT & PLAN NOTE
Her WBC is 0.23 and ANC is 158  Hb stable at 9.9  Platelets at 88  Transfuse Hb for <7 and platelets <10

## 2019-03-25 NOTE — PROGRESS NOTES
[facility]  Hematology  Bone Marrow Transplant  Progress Note    Patient Name: Lori Rivero  Admission Date: 3/18/2019  Hospital Length of Stay: 7 days  Code Status: Full Code    Subjective:     Interval History: Day +5 Daija Auto, gabapentin increased over the weekend to 600 mg TID and pain improved per patient. Patient reports nausea and emesis, will schedule Zofran ATC and change to IV. 3 BM's in the past 24 hours, not liquid diarrhea. Ambulated with PT who rec HH PT. VSS, afebrile    Objective:     Vital Signs (Most Recent):  Temp: 99.2 °F (37.3 °C) (03/25/19 1231)  Pulse: 99 (03/25/19 1231)  Resp: 18 (03/25/19 1231)  BP: 123/63 (03/25/19 1231)  SpO2: 99 % (03/25/19 1231) Vital Signs (24h Range):  Temp:  [98.2 °F (36.8 °C)-99.2 °F (37.3 °C)] 99.2 °F (37.3 °C)  Pulse:  [] 99  Resp:  [16-18] 18  SpO2:  [95 %-99 %] 99 %  BP: (118-130)/(59-71) 123/63     Weight: 75.3 kg (165 lb 14.3 oz)  Body mass index is 28.48 kg/m².  Body surface area is 1.84 meters squared.      Intake/Output - Last 3 Shifts       03/23 0700 - 03/24 0659 03/24 0700 - 03/25 0659 03/25 0700 - 03/26 0659    P.O. 1050 660     I.V. (mL/kg) 1817.5 (23.9) 1888.8 (25.1)     Total Intake(mL/kg) 2867.5 (37.7) 2548.8 (33.9)     Urine (mL/kg/hr) 2450 (1.3) 2450 (1.4)     Stool 0 0     Total Output 2450 2450     Net +417.5 +98.8            Urine Occurrence 2 x  1 x    Stool Occurrence 2 x 3 x 1 x          Physical Exam   Constitutional: She is oriented to person, place, and time. She appears well-developed and well-nourished.   HENT:   Head: Normocephalic and atraumatic.   Eyes: Pupils are equal, round, and reactive to light. Conjunctivae and EOM are normal.   Neck: Normal range of motion. Neck supple.   Cardiovascular: Normal rate, regular rhythm, normal heart sounds and intact distal pulses.   Pulmonary/Chest: Effort normal and breath sounds normal.   Abdominal: Soft. Bowel sounds are normal.   Musculoskeletal: She exhibits  edema.   Trace edema to BLE. Edema to left knee 2/2 fall which is improving.   Neurological: She is alert and oriented to person, place, and time.   Skin: Skin is warm and dry.   Psychiatric: She has a normal mood and affect. Her behavior is normal. Judgment and thought content normal.       Significant Labs:   CBC:   Recent Labs   Lab 03/24/19  0403 03/25/19  0445   WBC 0.62* 0.23*   HGB 10.0* 9.9*   HCT 30.8* 30.2*   * 88*    and CMP:   Recent Labs   Lab 03/24/19  0403 03/25/19  0445   * 136   K 4.2 4.1    104   CO2 24 23   * 181*   BUN 11 12   CREATININE 0.6 0.6   CALCIUM 8.6* 8.4*   PROT 5.8* 5.5*   ALBUMIN 3.1* 3.0*   BILITOT 0.8 0.7   ALKPHOS 77 71   AST 18 17   ALT 15 16   ANIONGAP 7* 9   EGFRNONAA >60.0 >60.0       Diagnostic Results:  None    Assessment/Plan:     * Status post autologous bone marrow transplant  - see Multiple Myeloma  - admitted for Daija Auto SCT  - today is Day +5    Multiple myeloma  -IgG kappa MM; ISS 2; standard risk cytogenetics; with lytic bone lesions at presentation; initiate VRd (21 day cycle) with Dr. Juan at [facility]; currently mid cycle  5    -she has achieved VGPR  -pre transplant eval with no contraindication for transplant   -never smoker so plan to follow up LL nodule at repeat PET scan at day +100  -Admitted for Daija auto SCT. Today is Day +5  -Neupogen and ppx antimicrobials per protocol.         Pancytopenia due to chemotherapy  Her WBC is 0.23 and ANC is 158  Hb stable at 9.9  Platelets at 88  Transfuse Hb for <7 and platelets <10    Hypomagnesemia  - Mag 1.8 today  - replace PRN per protocol    Anemia associated with chemotherapy  - hgb 9.9  - transfuse PRBC for hgb <7    Moderate malnutrition  - dietician following      Left knee pain  - s/p fall prior to admit  - x-ray with edema, no fractures  - tramadol prn ordered for pain     Essential hypertension  - continue home losartan-HCTZ  - BP elevated since admission, Norvasc 5 mg daily started  3/20/19  - BP improved     Type 2 diabetes mellitus without complication, without long-term current use of insulin  - will hold oral anti diabetic medications while inpatient  - blood glucose checks ACHS  - s/s insulin  - DM diet  - blood glucoses 100s to 200s,          VTE Risk Mitigation (From admission, onward)        Ordered     enoxaparin injection 40 mg  Daily      03/18/19 1702     heparin (porcine) injection 1,600 Units  As needed (PRN)      03/18/19 1754     IP VTE HIGH RISK PATIENT  Once      03/18/19 1702          Disposition: pending recovery from transplant    Trnia Musa NP  Bone Marrow Transplant  [facility]

## 2019-03-25 NOTE — PLAN OF CARE
Problem: Adult Inpatient Plan of Care  Goal: Plan of Care Review      Recommendations    Recommendation/Intervention:     1. Continue Diabetic diet + Boost Glucose Control with meals.   2. Encourage po intake.   3. RD following.     Goals: Intake/tolerate >/=85% EEN/EPN with no further weight loss  Nutrition Goal Status: progressing towards goal

## 2019-03-25 NOTE — SUBJECTIVE & OBJECTIVE
Subjective:     Interval History: Day +5 Daija Auto, gabapentin increased over the weekend to 600 mg TID and pain improved per patient. Patient reports nausea and emesis, will schedule Zofran ATC and change to IV. 3 BM's in the past 24 hours, not liquid diarrhea. Ambulated with PT who rec HH PT. VSS, afebrile    Objective:     Vital Signs (Most Recent):  Temp: 99.2 °F (37.3 °C) (03/25/19 1231)  Pulse: 99 (03/25/19 1231)  Resp: 18 (03/25/19 1231)  BP: 123/63 (03/25/19 1231)  SpO2: 99 % (03/25/19 1231) Vital Signs (24h Range):  Temp:  [98.2 °F (36.8 °C)-99.2 °F (37.3 °C)] 99.2 °F (37.3 °C)  Pulse:  [] 99  Resp:  [16-18] 18  SpO2:  [95 %-99 %] 99 %  BP: (118-130)/(59-71) 123/63     Weight: 75.3 kg (165 lb 14.3 oz)  Body mass index is 28.48 kg/m².  Body surface area is 1.84 meters squared.      Intake/Output - Last 3 Shifts       03/23 0700 - 03/24 0659 03/24 0700 - 03/25 0659 03/25 0700 - 03/26 0659    P.O. 1050 660     I.V. (mL/kg) 1817.5 (23.9) 1888.8 (25.1)     Total Intake(mL/kg) 2867.5 (37.7) 2548.8 (33.9)     Urine (mL/kg/hr) 2450 (1.3) 2450 (1.4)     Stool 0 0     Total Output 2450 2450     Net +417.5 +98.8            Urine Occurrence 2 x  1 x    Stool Occurrence 2 x 3 x 1 x          Physical Exam   Constitutional: She is oriented to person, place, and time. She appears well-developed and well-nourished.   HENT:   Head: Normocephalic and atraumatic.   Eyes: Pupils are equal, round, and reactive to light. Conjunctivae and EOM are normal.   Neck: Normal range of motion. Neck supple.   Cardiovascular: Normal rate, regular rhythm, normal heart sounds and intact distal pulses.   Pulmonary/Chest: Effort normal and breath sounds normal.   Abdominal: Soft. Bowel sounds are normal.   Musculoskeletal: She exhibits edema.   Trace edema to BLE. Edema to left knee 2/2 fall which is improving.   Neurological: She is alert and oriented to person, place, and time.   Skin: Skin is warm and dry.   Psychiatric: She has a  normal mood and affect. Her behavior is normal. Judgment and thought content normal.       Significant Labs:   CBC:   Recent Labs   Lab 03/24/19  0403 03/25/19  0445   WBC 0.62* 0.23*   HGB 10.0* 9.9*   HCT 30.8* 30.2*   * 88*    and CMP:   Recent Labs   Lab 03/24/19  0403 03/25/19  0445   * 136   K 4.2 4.1    104   CO2 24 23   * 181*   BUN 11 12   CREATININE 0.6 0.6   CALCIUM 8.6* 8.4*   PROT 5.8* 5.5*   ALBUMIN 3.1* 3.0*   BILITOT 0.8 0.7   ALKPHOS 77 71   AST 18 17   ALT 15 16   ANIONGAP 7* 9   EGFRNONAA >60.0 >60.0       Diagnostic Results:  None

## 2019-03-25 NOTE — PLAN OF CARE
Problem: Adult Inpatient Plan of Care  Goal: Plan of Care Review  Outcome: Ongoing (interventions implemented as appropriate)  Patient day +5 auto SCT. AAOx4, VSS, afebrile, and without injury. Fall precautions maintained. Patient instructed on how to contact the nurse. Patient on room air without distress; patient on diabetic diet with poor appetite. No complaints of nausea; complaints of pain addressed with PRN. Up to bedside commode to use bathroom; watery BMs x2 reported to Dr. Rodas and Maria Guadalupe NP.  POCTs continued; insulin administered PRN. Neutropenic precautions maintained. Questions and concerns have been addressed; will continue to monitor.

## 2019-03-25 NOTE — PROGRESS NOTES
"[facility]  Adult Nutrition  Progress Note    SUMMARY       Recommendations    Recommendation/Intervention:     1. Continue Diabetic diet + Boost Glucose Control with meals.   2. Encourage po intake.   3. RD following.     Goals: Intake/tolerate >/=85% EEN/EPN with no further weight loss  Nutrition Goal Status: progressing towards goal  Communication of RD Recs: (POC)    Reason for Assessment    Reason For Assessment: RD follow-up  Diagnosis: cancer diagnosis/related complications(SCT candidate)  Relevant Medical History: T2DM, HTN, myeloma  General Information Comments: Day +5 of an auto transplant. Pt reports that she does not have much of an appetite. Per chart review, pt eating 0-25% of meals. States that she drinks 3 Boost/day. C/o nausea, vomiting, and diarrhea - RN is aware. NFPE completed 3/19. Pt continues with fat/muscle wasting. Pt with moderate malnutrition.  Nutrition Discharge Planning: Adequate PO intake to prevent further weight loss    Nutrition Risk Screen    Nutrition Risk Screen: no indicators present    Nutrition/Diet History    Patient Reported Diet/Restrictions/Preferences: (food safety)  Spiritual, Cultural Beliefs, Jewish Practices, Values that Affect Care: no  Factors Affecting Nutritional Intake: decreased appetite, nausea/vomiting    Anthropometrics    Temp: 98.2 °F (36.8 °C)  Height Method: Stated  Height: 5' 4" (162.6 cm)  Height (inches): 64 in  Weight Method: Standard Scale  Weight: 75.3 kg (165 lb 14.3 oz)  Weight (lb): 165.9 lb  Ideal Body Weight (IBW), Female: 120 lb  % Ideal Body Weight, Female (lb): 147.16 lb  BMI (Calculated): 30.4  BMI Grade: 30 - 34.9- obesity - grade I  Usual Body Weight (UBW), k.5 kg(since 2018 per pt)  % Usual Body Weight: 76.81  % Weight Change From Usual Weight: -23.35 %     Lab/Procedures/Meds    Pertinent Labs Reviewed: reviewed  Pertinent Labs Comments: glucose 181  Pertinent Medications Reviewed: reviewed  Pertinent " Medications Comments: acyclovir, amlodipine, pantoprazole, IVF    Estimated/Assessed Needs    Weight Used For Calorie Calculations: 80.1 kg (176 lb 9.4 oz)  Energy Calorie Requirements (kcal): 2003  Energy Need Method: Kcal/kg(25)  Protein Requirements:  gm (1.2-1.4 gm/kg)  Weight Used For Protein Calculations: 80.1 kg (176 lb 9.4 oz)  Fluid Requirements (mL): per MD     RDA Method (mL): 2003  CHO Requirement: 50% kcal from CHO      Nutrition Prescription Ordered    Current Diet Order: Diabetic   Oral Nutrition Supplement: Boost Glucose Control     Evaluation of Received Nutrient/Fluid Intake    I/O: -1.2L since admit  Comments: LBM 3/25  Tolerance: not tolerating(pt with N/V today)  % Intake of Estimated Energy Needs: 25 - 50 %  % Meal Intake: 0 - 25 %    Nutrition Risk    Level of Risk/Frequency of Follow-up: low     Assessment and Plan    Moderate malnutrition  Malnutrition in the context of Chronic Illness/Injury    Related to (etiology):  Hypermetabolic condition and inadequate energy intake    Signs and Symptoms (as evidenced by):  Energy Intake: <75% of estimated energy requirement for 6 months  Body Fat Depletion: moderate depletion of triceps   Weight Loss: 23% x 6 months   Fluid Accumulation: mild    Interventions:  Commercial beverage    Nutrition Diagnosis Status:  Continues     Monitor and Evaluation    Food and Nutrient Intake: energy intake, food and beverage intake  Food and Nutrient Adminstration: diet order  Knowledge/Beliefs/Attitudes: food and nutrition knowledge/skill  Physical Activity and Function: nutrition-related ADLs and IADLs  Anthropometric Measurements: weight, weight change, body mass index  Biochemical Data, Medical Tests and Procedures: electrolyte and renal panel, gastrointestinal profile, glucose/endocrine profile, inflammatory profile  Nutrition-Focused Physical Findings: overall appearance     Malnutrition Assessment  Malnutrition Type: chronic illness          Weight Loss  (Malnutrition): greater than 10% in 6 months  Energy Intake (Malnutrition): less than 75% for greater than or equal to 3 months   Upper Arm Region (Subcutaneous Fat Loss): moderate depletion   Clavicle Bone Region (Muscle Loss): mild depletion   Edema (Fluid Accumulation): 2-->mild             Nutrition Follow-Up    RD Follow-up?: Yes

## 2019-03-25 NOTE — ASSESSMENT & PLAN NOTE
-IgG kappa MM; ISS 2; standard risk cytogenetics; with lytic bone lesions at presentation; initiate VRd (21 day cycle) with Dr. Juan at [facility]; currently mid cycle  5    -she has achieved VGPR  -pre transplant eval with no contraindication for transplant   -never smoker so plan to follow up LL nodule at repeat PET scan at day +100  -Admitted for Daija auto SCT. Today is Day +5  -Neupogen and ppx antimicrobials per protocol.

## 2019-03-25 NOTE — PLAN OF CARE
Problem: Adult Inpatient Plan of Care  Goal: Plan of Care Review  Outcome: Ongoing (interventions implemented as appropriate)  Plan of care reviewed with the patient at the beginning of the shift. She is day +5 of an auto transplant. She has complaints of intermittent nausea that is well controlled with PO Zofran. PO intake is minimal, encouraged as tolerated. IVF infusing. She is beginning to have loose stools. Educated her to notify nursing when she has liquid bowel movements. Mucous membranes are intact. Saliva substitutes and salt and soda rinses provided. Blood glucose monitored ac and hs, did not require coverage overnight. Pt has left knee swelling and discomfort from a previous fall at home. Fall precautions maintained. Bed alarm on, pt does not use call light. Alarm has gone off three times. Reinforced several times to use the call light. Bedside commode in use. Fall precautions reviewed with her several times. Pt remained free from falls and injury this shift. Bed locked in lowest position, side rails up x2, call light within reach. Instructed pt to call for assistance as needed. Pt verbalized understanding. Vitals stable. Pt afebrile overnight. Neutropenic precautions maintained. No acute issues overnight. Will continue to monitor.

## 2019-03-25 NOTE — PLAN OF CARE
MDR's with Dr Alfaro.  Patient is day + 5 post her Daija autoSCT.  Counts trending down.   today.  Patient c/o ongoing n/v.  Antiemetics scheduled and IVF continued.  Dietary following.  PT/OT following with rec's for HH at this time.  D/c pending engraftment.  CM will continue to follow for needs.

## 2019-03-26 PROBLEM — R11.2 CINV (CHEMOTHERAPY-INDUCED NAUSEA AND VOMITING): Status: ACTIVE | Noted: 2019-03-26

## 2019-03-26 PROBLEM — T45.1X5A CINV (CHEMOTHERAPY-INDUCED NAUSEA AND VOMITING): Status: ACTIVE | Noted: 2019-03-26

## 2019-03-26 LAB
ALBUMIN SERPL BCP-MCNC: 3 G/DL (ref 3.5–5.2)
ALP SERPL-CCNC: 70 U/L (ref 55–135)
ALT SERPL W/O P-5'-P-CCNC: 16 U/L (ref 10–44)
ANION GAP SERPL CALC-SCNC: 8 MMOL/L (ref 8–16)
ANISOCYTOSIS BLD QL SMEAR: SLIGHT
AST SERPL-CCNC: 18 U/L (ref 10–40)
BASOPHILS # BLD AUTO: 0 K/UL (ref 0–0.2)
BASOPHILS NFR BLD: 0 % (ref 0–1.9)
BILIRUB SERPL-MCNC: 0.5 MG/DL (ref 0.1–1)
BUN SERPL-MCNC: 11 MG/DL (ref 8–23)
BURR CELLS BLD QL SMEAR: ABNORMAL
CALCIUM SERPL-MCNC: 8.4 MG/DL (ref 8.7–10.5)
CHLORIDE SERPL-SCNC: 104 MMOL/L (ref 95–110)
CO2 SERPL-SCNC: 23 MMOL/L (ref 23–29)
CREAT SERPL-MCNC: 0.6 MG/DL (ref 0.5–1.4)
DIFFERENTIAL METHOD: ABNORMAL
EOSINOPHIL # BLD AUTO: 0 K/UL (ref 0–0.5)
EOSINOPHIL NFR BLD: 0 % (ref 0–8)
ERYTHROCYTE [DISTWIDTH] IN BLOOD BY AUTOMATED COUNT: 17.2 % (ref 11.5–14.5)
EST. GFR  (AFRICAN AMERICAN): >60 ML/MIN/1.73 M^2
EST. GFR  (NON AFRICAN AMERICAN): >60 ML/MIN/1.73 M^2
GLUCOSE SERPL-MCNC: 159 MG/DL (ref 70–110)
HCT VFR BLD AUTO: 30 % (ref 37–48.5)
HGB BLD-MCNC: 9.6 G/DL (ref 12–16)
HYPOCHROMIA BLD QL SMEAR: ABNORMAL
IMM GRANULOCYTES # BLD AUTO: 0 K/UL (ref 0–0.04)
IMM GRANULOCYTES NFR BLD AUTO: 0 % (ref 0–0.5)
LYMPHOCYTES # BLD AUTO: 0 K/UL (ref 1–4.8)
LYMPHOCYTES NFR BLD: 21.4 % (ref 18–48)
MAGNESIUM SERPL-MCNC: 1.7 MG/DL (ref 1.6–2.6)
MCH RBC QN AUTO: 26.2 PG (ref 27–31)
MCHC RBC AUTO-ENTMCNC: 32 G/DL (ref 32–36)
MCV RBC AUTO: 82 FL (ref 82–98)
MONOCYTES # BLD AUTO: 0 K/UL (ref 0.3–1)
MONOCYTES NFR BLD: 21.4 % (ref 4–15)
NEUTROPHILS # BLD AUTO: 0.1 K/UL (ref 1.8–7.7)
NEUTROPHILS NFR BLD: 57.2 % (ref 38–73)
NRBC BLD-RTO: 0 /100 WBC
OVALOCYTES BLD QL SMEAR: ABNORMAL
PHOSPHATE SERPL-MCNC: 3.1 MG/DL (ref 2.7–4.5)
PLATELET # BLD AUTO: 63 K/UL (ref 150–350)
PMV BLD AUTO: 11.2 FL (ref 9.2–12.9)
POCT GLUCOSE: 138 MG/DL (ref 70–110)
POCT GLUCOSE: 147 MG/DL (ref 70–110)
POCT GLUCOSE: 159 MG/DL (ref 70–110)
POCT GLUCOSE: 188 MG/DL (ref 70–110)
POIKILOCYTOSIS BLD QL SMEAR: SLIGHT
POLYCHROMASIA BLD QL SMEAR: ABNORMAL
POTASSIUM SERPL-SCNC: 3.9 MMOL/L (ref 3.5–5.1)
PROT SERPL-MCNC: 5.5 G/DL (ref 6–8.4)
RBC # BLD AUTO: 3.66 M/UL (ref 4–5.4)
SCHISTOCYTES BLD QL SMEAR: ABNORMAL
SODIUM SERPL-SCNC: 135 MMOL/L (ref 136–145)
WBC # BLD AUTO: 0.14 K/UL (ref 3.9–12.7)

## 2019-03-26 PROCEDURE — 84100 ASSAY OF PHOSPHORUS: CPT

## 2019-03-26 PROCEDURE — 97116 GAIT TRAINING THERAPY: CPT

## 2019-03-26 PROCEDURE — A9155 ARTIFICIAL SALIVA: HCPCS | Performed by: INTERNAL MEDICINE

## 2019-03-26 PROCEDURE — 63600175 PHARM REV CODE 636 W HCPCS: Performed by: INTERNAL MEDICINE

## 2019-03-26 PROCEDURE — 63600175 PHARM REV CODE 636 W HCPCS: Performed by: NURSE PRACTITIONER

## 2019-03-26 PROCEDURE — 25000003 PHARM REV CODE 250: Performed by: INTERNAL MEDICINE

## 2019-03-26 PROCEDURE — 99232 PR SUBSEQUENT HOSPITAL CARE,LEVL II: ICD-10-PCS | Mod: ,,, | Performed by: INTERNAL MEDICINE

## 2019-03-26 PROCEDURE — 25000003 PHARM REV CODE 250: Performed by: NURSE PRACTITIONER

## 2019-03-26 PROCEDURE — 25000003 PHARM REV CODE 250: Performed by: STUDENT IN AN ORGANIZED HEALTH CARE EDUCATION/TRAINING PROGRAM

## 2019-03-26 PROCEDURE — 80053 COMPREHEN METABOLIC PANEL: CPT

## 2019-03-26 PROCEDURE — 85025 COMPLETE CBC W/AUTO DIFF WBC: CPT

## 2019-03-26 PROCEDURE — 83735 ASSAY OF MAGNESIUM: CPT

## 2019-03-26 PROCEDURE — 99232 SBSQ HOSP IP/OBS MODERATE 35: CPT | Mod: ,,, | Performed by: INTERNAL MEDICINE

## 2019-03-26 PROCEDURE — 20600001 HC STEP DOWN PRIVATE ROOM

## 2019-03-26 RX ORDER — INSULIN ASPART 100 [IU]/ML
1-10 INJECTION, SOLUTION INTRAVENOUS; SUBCUTANEOUS
Status: DISCONTINUED | OUTPATIENT
Start: 2019-03-26 | End: 2019-04-02 | Stop reason: HOSPADM

## 2019-03-26 RX ADMIN — ACYCLOVIR 800 MG: 200 CAPSULE ORAL at 08:03

## 2019-03-26 RX ADMIN — Medication 400 MG: at 06:03

## 2019-03-26 RX ADMIN — FLUCONAZOLE 400 MG: 200 TABLET ORAL at 08:03

## 2019-03-26 RX ADMIN — ONDANSETRON 8 MG: 2 INJECTION INTRAMUSCULAR; INTRAVENOUS at 02:03

## 2019-03-26 RX ADMIN — Medication 30 ML: at 09:03

## 2019-03-26 RX ADMIN — GABAPENTIN 600 MG: 300 CAPSULE ORAL at 08:03

## 2019-03-26 RX ADMIN — ENOXAPARIN SODIUM 40 MG: 100 INJECTION SUBCUTANEOUS at 05:03

## 2019-03-26 RX ADMIN — GABAPENTIN 600 MG: 300 CAPSULE ORAL at 09:03

## 2019-03-26 RX ADMIN — ONDANSETRON 8 MG: 2 INJECTION INTRAMUSCULAR; INTRAVENOUS at 06:03

## 2019-03-26 RX ADMIN — SODIUM CHLORIDE AND POTASSIUM CHLORIDE: 9; 1.49 INJECTION, SOLUTION INTRAVENOUS at 05:03

## 2019-03-26 RX ADMIN — Medication 30 ML: at 12:03

## 2019-03-26 RX ADMIN — ACYCLOVIR 800 MG: 200 CAPSULE ORAL at 09:03

## 2019-03-26 RX ADMIN — Medication 30 ML: at 08:03

## 2019-03-26 RX ADMIN — GABAPENTIN 600 MG: 300 CAPSULE ORAL at 02:03

## 2019-03-26 RX ADMIN — ONDANSETRON 8 MG: 2 INJECTION INTRAMUSCULAR; INTRAVENOUS at 09:03

## 2019-03-26 RX ADMIN — Medication 30 ML: at 05:03

## 2019-03-26 RX ADMIN — LEVOFLOXACIN 500 MG: 500 TABLET, FILM COATED ORAL at 08:03

## 2019-03-26 RX ADMIN — LOSARTAN POTASSIUM AND HYDROCHLOROTHIAZIDE 1 TABLET: 12.5; 1 TABLET ORAL at 08:03

## 2019-03-26 RX ADMIN — SODIUM CHLORIDE AND POTASSIUM CHLORIDE: 9; 1.49 INJECTION, SOLUTION INTRAVENOUS at 07:03

## 2019-03-26 RX ADMIN — INSULIN ASPART 2 UNITS: 100 INJECTION, SOLUTION INTRAVENOUS; SUBCUTANEOUS at 12:03

## 2019-03-26 RX ADMIN — INSULIN ASPART 2 UNITS: 100 INJECTION, SOLUTION INTRAVENOUS; SUBCUTANEOUS at 08:03

## 2019-03-26 RX ADMIN — TRAMADOL HYDROCHLORIDE 50 MG: 50 TABLET, COATED ORAL at 05:03

## 2019-03-26 RX ADMIN — PANTOPRAZOLE SODIUM 40 MG: 40 TABLET, DELAYED RELEASE ORAL at 08:03

## 2019-03-26 RX ADMIN — Medication 400 MG: at 10:03

## 2019-03-26 RX ADMIN — AMLODIPINE BESYLATE 5 MG: 5 TABLET ORAL at 08:03

## 2019-03-26 RX ADMIN — OLOPATADINE HYDROCHLORIDE 1 DROP: 2 SOLUTION OPHTHALMIC at 08:03

## 2019-03-26 NOTE — ASSESSMENT & PLAN NOTE
Her WBC is 0.14 and ANC is 80  Hb stable at 9.6  Platelets at 63  Transfuse Hb for <7 and platelets <10

## 2019-03-26 NOTE — PLAN OF CARE
Problem: Adult Inpatient Plan of Care  Goal: Plan of Care Review  Outcome: Ongoing (interventions implemented as appropriate)  Patient day +6 auto SCT. AAOx4, VSS, afebrile, and without injury. Fall precautions maintained. Patient instructed on how to contact the nurse. Patient on room air without distress; patient on diabetic diet with poor appetite. No complaints of nausea; complaints of pain addressed with PRN. Up to bedside commode; soft/loose BMs x1. POCTs continued; insulin administered PRN. Neutropenic precautions maintained. Questions and concerns have been addressed; will continue to monitor.

## 2019-03-26 NOTE — PLAN OF CARE
Problem: Adult Inpatient Plan of Care  Goal: Plan of Care Review  Outcome: Ongoing (interventions implemented as appropriate)  Plan of care reviewed with the patient at the beginning of the shift. She is day +6 of an auto transplant. She awaiting engraftment and count recovery. She has complaints of intermittent nausea that is well controlled with scheduled IV Zofran. PO intake is minimal, encouraged as tolerated. IVF infusing. She had two liquid bowel movements yesterday, none overnight. Stool sample for cdiff testing pending collection. Mucous membranes are intact. Saliva substitutes and salt and soda rinses provided. Blood glucose monitored ac and hs, did not require coverage overnight. Pt has left knee swelling and discomfort from a previous fall at home. Cane and walker at the bedside. Fall precautions maintained. Reinforced several times to use the call light for assistance as needed. Bedside commode in use. Fall precautions reviewed with her several times. Pt remained free from falls and injury this shift. Bed locked in lowest position, side rails up x2, call light within reach. Instructed pt to call for assistance as needed. Pt verbalized understanding. Vitals stable. Pt afebrile overnight. Neutropenic precautions maintained. No acute issues overnight. Will continue to monitor.

## 2019-03-26 NOTE — PT/OT/SLP PROGRESS
"Physical Therapy Treatment    Patient Name:  Lori Rivero   MRN:  [id number]    Recommendations:     Discharge Recommendations:  home health PT   Discharge Equipment Recommendations: walker, rolling   Barriers to discharge: None    Assessment:     Lori Rivero is a 63 y.o. female admitted with a medical diagnosis of Status post autologous bone marrow transplant.  She presents with the following impairments/functional limitations:  weakness, impaired endurance, impaired functional mobilty, gait instability, impaired balance, pain, decreased safety awareness, decreased coordination, decreased lower extremity function, impaired self care skills, edema, decreased ROM.    Pt was agreeable to gait training, noted gait deficits, including decreased B LE mm strength, increased UE WBing through AD, and decreased step width.  Pt has a history of falls.  Concerns regarding pt safety in the home, though pt stated that she expects to be discharged to her daughter's house.      Rehab Prognosis: Fair; patient would benefit from acute skilled PT services to address these deficits and reach maximum level of function.    Recent Surgery: * No surgery found *      Plan:     During this hospitalization, patient to be seen 3 x/week to address the identified rehab impairments via gait training, therapeutic activities, therapeutic exercises, neuromuscular re-education and progress toward the following goals:    · Plan of Care Expires:  04/22/19    Subjective     Chief Complaint: B foot pain  Patient/Family Comments/goals: To reduce weakness  Pain/Comfort:  · Pain Rating 1: 8/10  · Location - Side 1: Bilateral  · Location 1: foot  · Pain Addressed 1: Distraction      Objective:     Communicated with nursing prior to session.  Patient found supine with central line upon PT entry to room.     "I'm a little weak and shaky."  "They feel like lead." - referring to B feet    General Precautions: Standard, fall, neutropenic   Orthopedic " Precautions:N/A   Braces: N/A     Functional Mobility:  · Bed Mobility:     · Scooting: independence  · Supine to Sit: independence    · Transfers:     · Sit to Stand:  minimum assistance with no AD  · Bed to Chair: contact guard assistance with  rolling walker  using  Stand Pivot    · Gait: Pt amb 164', CGA, RW, verbal cuing to decrease UE WBing through AD, verbal cuing to increase step width, decreased gait speed noted    · Balance: CGA: dynamic standing balance with AD      AM-PAC 6 CLICK MOBILITY  Turning over in bed (including adjusting bedclothes, sheets and blankets)?: 4  Sitting down on and standing up from a chair with arms (e.g., wheelchair, bedside commode, etc.): 3  Moving from lying on back to sitting on the side of the bed?: 4  Moving to and from a bed to a chair (including a wheelchair)?: 3  Need to walk in hospital room?: 3  Climbing 3-5 steps with a railing?: 3  Basic Mobility Total Score: 20       Therapeutic Activities and Exercises:   Whiteboard updated  Refused therex    Patient left up in chair with all lines intact, call button in reach and nursing notified.    GOALS:   Multidisciplinary Problems     Physical Therapy Goals        Problem: Physical Therapy Goal    Goal Priority Disciplines Outcome Goal Variances Interventions   Physical Therapy Goal     PT, PT/OT Ongoing (interventions implemented as appropriate)     Description:  Goals to be met by: 19     Patient will increase functional independence with mobility by performin. Sit to stand transfer with Supervision.  2. Gait  x 200 feet with CGA using Rolling Walker.   3. Ascend/descend 3 stairs with no handrails, with CGA.  4. Pt to perform and be compliant with exercise program to reduce complications associated with prolonged bed rest and decreased mobility.                    Time Tracking:     PT Received On: 19  PT Start Time: 1144     PT Stop Time: 1202  PT Total Time (min): 18 min     Billable Minutes: Gait Training  12    Treatment Type: Treatment  PT/PTA: PT           Aide Calix, PT  03/26/2019

## 2019-03-26 NOTE — SUBJECTIVE & OBJECTIVE
Subjective:     Interval History:   Day +6 Daija AutoSCT. VSS. Afebrile. ANC 80. CINV well controlled with scheduled zofran and PRN compazine/ativan. Increased to MDSSI PRN for hyperglycemia.      Objective:     Vital Signs (Most Recent):  Temp: 98.3 °F (36.8 °C) (03/26/19 1237)  Pulse: 103 (03/26/19 1237)  Resp: 18 (03/26/19 1237)  BP: (!) 102/59 (03/26/19 1237)  SpO2: 99 % (03/26/19 1237) Vital Signs (24h Range):  Temp:  [98.1 °F (36.7 °C)-99.1 °F (37.3 °C)] 98.3 °F (36.8 °C)  Pulse:  [] 103  Resp:  [16-18] 18  SpO2:  [95 %-99 %] 99 %  BP: (102-135)/(59-71) 102/59     Weight: 75.4 kg (166 lb 3.6 oz)  Body mass index is 28.53 kg/m².  Body surface area is 1.85 meters squared.        Intake/Output - Last 3 Shifts       03/24 0700 - 03/25 0659 03/25 0700 - 03/26 0659 03/26 0700 - 03/27 0659    P.O. 660 1060     I.V. (mL/kg) 1888.8 (25.1) 1668.8 (22.1) 975 (12.9)    Total Intake(mL/kg) 2548.8 (33.9) 2728.8 (36.2) 975 (12.9)    Urine (mL/kg/hr) 2450 (1.4) 1050 (0.6) 250 (0.5)    Stool 0      Total Output 2450 1050 250    Net +98.8 +1678.8 +725           Urine Occurrence  1 x 3 x    Stool Occurrence 3 x 1 x           Physical Exam   Constitutional: She is oriented to person, place, and time. She appears well-developed and well-nourished.   HENT:   Head: Normocephalic and atraumatic.   Right Ear: External ear normal.   Left Ear: External ear normal.   Mouth/Throat: Oropharynx is clear and moist.   Eyes: Pupils are equal, round, and reactive to light. Conjunctivae and EOM are normal.   Neck: Normal range of motion. Neck supple.   Cardiovascular: Normal rate, regular rhythm, normal heart sounds and intact distal pulses.   Pulmonary/Chest: Effort normal and breath sounds normal.   Abdominal: Soft. Bowel sounds are normal.   Musculoskeletal: She exhibits edema.   Trace edema to BLE. Edema to left knee 2/2 fall which is improving.   Neurological: She is alert and oriented to person, place, and time.   Skin: Skin is warm  and dry.   Psychiatric: She has a normal mood and affect. Her behavior is normal. Judgment and thought content normal.   Nursing note and vitals reviewed.      Significant Labs:   CBC:   Recent Labs   Lab 03/25/19 0445 03/26/19  0336   WBC 0.23* 0.14*   HGB 9.9* 9.6*   HCT 30.2* 30.0*   PLT 88* 63*    and CMP:   Recent Labs   Lab 03/25/19 0445 03/26/19  0336    135*   K 4.1 3.9    104   CO2 23 23   * 159*   BUN 12 11   CREATININE 0.6 0.6   CALCIUM 8.4* 8.4*   PROT 5.5* 5.5*   ALBUMIN 3.0* 3.0*   BILITOT 0.7 0.5   ALKPHOS 71 70   AST 17 18   ALT 16 16   ANIONGAP 9 8   EGFRNONAA >60.0 >60.0       Diagnostic Results:  I have reviewed all pertinent imaging results/findings within the past 24 hours.

## 2019-03-26 NOTE — ASSESSMENT & PLAN NOTE
-IgG kappa MM; ISS 2; standard risk cytogenetics; with lytic bone lesions at presentation; initiate VRd (21 day cycle) with [place]. Juan [place]; currently mid cycle  5    -she has achieved VGPR  -pre transplant eval with no contraindication for transplant   -never smoker so plan to follow up LL nodule at repeat PET scan at day +100  -Admitted for Daija auto SCT. Today is Day +6  -Neupogen and ppx antimicrobials per protocol.

## 2019-03-26 NOTE — ASSESSMENT & PLAN NOTE
- continue home losartan-HCTZ  - BP elevated since admission, Norvasc 5 mg daily started 3/20/19  - BP WNL

## 2019-03-26 NOTE — ASSESSMENT & PLAN NOTE
- see Multiple Myeloma  - admitted for Daija Auto SCT  - today is Day +6  - H. Mat's transplant was Wednesday, 3/20/19 at 1330. She received 9 bags and a CD34 dose of 3.61 x 10^6/kg.

## 2019-03-26 NOTE — ASSESSMENT & PLAN NOTE
- will hold oral anti diabetic medications while inpatient  - blood glucose checks ACHS  - increased to MDS 3/26  - DM diet

## 2019-03-26 NOTE — PLAN OF CARE
Problem: Physical Therapy Goal  Goal: Physical Therapy Goal  Goals to be met by: 19     Patient will increase functional independence with mobility by performin. Sit to stand transfer with Supervision.  2. Gait  x 200 feet with CGA using Rolling Walker.   3. Ascend/descend 3 stairs with no handrails, with CGA.  4. Pt to perform and be compliant with exercise program to reduce complications associated with prolonged bed rest and decreased mobility.  Outcome: Ongoing (interventions implemented as appropriate)  All goals modified to more accurately reflect CLOF.

## 2019-03-26 NOTE — PROGRESS NOTES
[facility]  Hematology  Bone Marrow Transplant  Progress Note    Patient Name: Lori Rivero  Admission Date: 3/18/2019  Hospital Length of Stay: 8 days  Code Status: Full Code    Subjective:     Interval History:   Day +6 Daija AutoSCT. VSS. Afebrile. ANC 80. CINV well controlled with scheduled zofran and PRN compazine/ativan. Increased to MDSSI PRN for hyperglycemia.      Objective:     Vital Signs (Most Recent):  Temp: 98.3 °F (36.8 °C) (03/26/19 1237)  Pulse: 103 (03/26/19 1237)  Resp: 18 (03/26/19 1237)  BP: (!) 102/59 (03/26/19 1237)  SpO2: 99 % (03/26/19 1237) Vital Signs (24h Range):  Temp:  [98.1 °F (36.7 °C)-99.1 °F (37.3 °C)] 98.3 °F (36.8 °C)  Pulse:  [] 103  Resp:  [16-18] 18  SpO2:  [95 %-99 %] 99 %  BP: (102-135)/(59-71) 102/59     Weight: 75.4 kg (166 lb 3.6 oz)  Body mass index is 28.53 kg/m².  Body surface area is 1.85 meters squared.        Intake/Output - Last 3 Shifts       03/24 0700 - 03/25 0659 03/25 0700 - 03/26 0659 03/26 0700 - 03/27 0659    P.O. 660 1060     I.V. (mL/kg) 1888.8 (25.1) 1668.8 (22.1) 975 (12.9)    Total Intake(mL/kg) 2548.8 (33.9) 2728.8 (36.2) 975 (12.9)    Urine (mL/kg/hr) 2450 (1.4) 1050 (0.6) 250 (0.5)    Stool 0      Total Output 2450 1050 250    Net +98.8 +1678.8 +725           Urine Occurrence  1 x 3 x    Stool Occurrence 3 x 1 x           Physical Exam   Constitutional: She is oriented to person, place, and time. She appears well-developed and well-nourished.   HENT:   Head: Normocephalic and atraumatic.   Right Ear: External ear normal.   Left Ear: External ear normal.   Mouth/Throat: Oropharynx is clear and moist.   Eyes: Pupils are equal, round, and reactive to light. Conjunctivae and EOM are normal.   Neck: Normal range of motion. Neck supple.   Cardiovascular: Normal rate, regular rhythm, normal heart sounds and intact distal pulses.   Pulmonary/Chest: Effort normal and breath sounds normal.   Abdominal: Soft. Bowel sounds are  normal.   Musculoskeletal: She exhibits edema.   Trace edema to BLE. Edema to left knee 2/2 fall which is improving.   Neurological: She is alert and oriented to person, place, and time.   Skin: Skin is warm and dry.   Psychiatric: She has a normal mood and affect. Her behavior is normal. Judgment and thought content normal.   Nursing note and vitals reviewed.      Significant Labs:   CBC:   Recent Labs   Lab 03/25/19  0445 03/26/19  0336   WBC 0.23* 0.14*   HGB 9.9* 9.6*   HCT 30.2* 30.0*   PLT 88* 63*    and CMP:   Recent Labs   Lab 03/25/19  0445 03/26/19  0336    135*   K 4.1 3.9    104   CO2 23 23   * 159*   BUN 12 11   CREATININE 0.6 0.6   CALCIUM 8.4* 8.4*   PROT 5.5* 5.5*   ALBUMIN 3.0* 3.0*   BILITOT 0.7 0.5   ALKPHOS 71 70   AST 17 18   ALT 16 16   ANIONGAP 9 8   EGFRNONAA >60.0 >60.0       Diagnostic Results:  I have reviewed all pertinent imaging results/findings within the past 24 hours.    Assessment/Plan:     * Status post autologous bone marrow transplant  - see Multiple Myeloma  - admitted for Daija Auto SCT  - today is Day +6  - H. Mat's transplant was Wednesday, 3/20/19 at 1330. She received 9 bags and a CD34 dose of 3.61 x 10^6/kg.    CINV (chemotherapy-induced nausea and vomiting)  - continue scheduled zofran and PRN compazine/ativan  - no vomiting in past 24hrs    Pancytopenia due to chemotherapy  Her WBC is 0.14 and ANC is 80  Hb stable at 9.6  Platelets at 63  Transfuse Hb for <7 and platelets <10    Hypomagnesemia  - replace PRN per protocol    Anemia associated with chemotherapy  - hgb 9.6  - transfuse PRBC for hgb <7    Moderate malnutrition  - dietician following      Left knee pain  - s/p fall prior to admit  - x-ray with edema, no fractures  - tramadol prn ordered for pain     Chemotherapy-induced peripheral neuropathy  - continue gabapentin and tramadol     Essential hypertension  - continue home losartan-HCTZ  - BP elevated since admission, Norvasc 5 mg daily  started 3/20/19  - BP WNL    Type 2 diabetes mellitus without complication, without long-term current use of insulin  - will hold oral anti diabetic medications while inpatient  - blood glucose checks ACHS  - increased to MDSSI 3/26  - DM diet      Multiple myeloma  -IgG kappa MM; ISS 2; standard risk cytogenetics; with lytic bone lesions at presentation; initiate VRd (21 day cycle) with Dr. Juan at [facility]; currently mid cycle  5    -she has achieved VGPR  -pre transplant eval with no contraindication for transplant   -never smoker so plan to follow up LL nodule at repeat PET scan at day +100  -Admitted for Daija auto SCT. Today is Day +6  -Neupogen and ppx antimicrobials per protocol.             VTE Risk Mitigation (From admission, onward)        Ordered     enoxaparin injection 40 mg  Daily      03/18/19 1702     heparin (porcine) injection 1,600 Units  As needed (PRN)      03/18/19 1754     IP VTE HIGH RISK PATIENT  Once      03/18/19 1702          Disposition: inpatient    Carolina Lovell NP  Bone Marrow Transplant  [facility]

## 2019-03-27 PROBLEM — D64.81 ANEMIA ASSOCIATED WITH CHEMOTHERAPY: Status: RESOLVED | Noted: 2019-03-19 | Resolved: 2019-03-27

## 2019-03-27 PROBLEM — T45.1X5A ANEMIA ASSOCIATED WITH CHEMOTHERAPY: Status: RESOLVED | Noted: 2019-03-19 | Resolved: 2019-03-27

## 2019-03-27 LAB
ALBUMIN SERPL BCP-MCNC: 2.8 G/DL (ref 3.5–5.2)
ALP SERPL-CCNC: 63 U/L (ref 55–135)
ALT SERPL W/O P-5'-P-CCNC: 15 U/L (ref 10–44)
ANION GAP SERPL CALC-SCNC: 11 MMOL/L (ref 8–16)
ANISOCYTOSIS BLD QL SMEAR: SLIGHT
AST SERPL-CCNC: 15 U/L (ref 10–40)
BASOPHILS NFR BLD: 0 % (ref 0–1.9)
BILIRUB SERPL-MCNC: 0.4 MG/DL (ref 0.1–1)
BUN SERPL-MCNC: 12 MG/DL (ref 8–23)
CALCIUM SERPL-MCNC: 8.5 MG/DL (ref 8.7–10.5)
CHLORIDE SERPL-SCNC: 104 MMOL/L (ref 95–110)
CO2 SERPL-SCNC: 21 MMOL/L (ref 23–29)
CREAT SERPL-MCNC: 0.6 MG/DL (ref 0.5–1.4)
DIFFERENTIAL METHOD: ABNORMAL
EOSINOPHIL NFR BLD: 0 % (ref 0–8)
ERYTHROCYTE [DISTWIDTH] IN BLOOD BY AUTOMATED COUNT: 16.8 % (ref 11.5–14.5)
EST. GFR  (AFRICAN AMERICAN): >60 ML/MIN/1.73 M^2
EST. GFR  (NON AFRICAN AMERICAN): >60 ML/MIN/1.73 M^2
GLUCOSE SERPL-MCNC: 137 MG/DL (ref 70–110)
HCT VFR BLD AUTO: 28.9 % (ref 37–48.5)
HGB BLD-MCNC: 9.5 G/DL (ref 12–16)
IMM GRANULOCYTES # BLD AUTO: ABNORMAL K/UL (ref 0–0.04)
IMM GRANULOCYTES NFR BLD AUTO: ABNORMAL % (ref 0–0.5)
LYMPHOCYTES NFR BLD: 40 % (ref 18–48)
MAGNESIUM SERPL-MCNC: 1.7 MG/DL (ref 1.6–2.6)
MCH RBC QN AUTO: 26.5 PG (ref 27–31)
MCHC RBC AUTO-ENTMCNC: 32.9 G/DL (ref 32–36)
MCV RBC AUTO: 81 FL (ref 82–98)
MONOCYTES NFR BLD: 10 % (ref 4–15)
NEUTROPHILS NFR BLD: 50 % (ref 38–73)
NRBC BLD-RTO: 0 /100 WBC
OVALOCYTES BLD QL SMEAR: ABNORMAL
PHOSPHATE SERPL-MCNC: 3.3 MG/DL (ref 2.7–4.5)
PLATELET # BLD AUTO: 33 K/UL (ref 150–350)
PLATELET BLD QL SMEAR: ABNORMAL
PMV BLD AUTO: 12 FL (ref 9.2–12.9)
POCT GLUCOSE: 113 MG/DL (ref 70–110)
POCT GLUCOSE: 139 MG/DL (ref 70–110)
POCT GLUCOSE: 174 MG/DL (ref 70–110)
POCT GLUCOSE: 181 MG/DL (ref 70–110)
POIKILOCYTOSIS BLD QL SMEAR: SLIGHT
POLYCHROMASIA BLD QL SMEAR: ABNORMAL
POTASSIUM SERPL-SCNC: 3.9 MMOL/L (ref 3.5–5.1)
PROT SERPL-MCNC: 5.3 G/DL (ref 6–8.4)
RBC # BLD AUTO: 3.58 M/UL (ref 4–5.4)
SODIUM SERPL-SCNC: 136 MMOL/L (ref 136–145)
WBC # BLD AUTO: 0.12 K/UL (ref 3.9–12.7)

## 2019-03-27 PROCEDURE — 25000003 PHARM REV CODE 250: Performed by: NURSE PRACTITIONER

## 2019-03-27 PROCEDURE — 83735 ASSAY OF MAGNESIUM: CPT

## 2019-03-27 PROCEDURE — 99232 SBSQ HOSP IP/OBS MODERATE 35: CPT | Mod: ,,, | Performed by: INTERNAL MEDICINE

## 2019-03-27 PROCEDURE — 20600001 HC STEP DOWN PRIVATE ROOM

## 2019-03-27 PROCEDURE — 85027 COMPLETE CBC AUTOMATED: CPT

## 2019-03-27 PROCEDURE — 63600175 PHARM REV CODE 636 W HCPCS: Performed by: NURSE PRACTITIONER

## 2019-03-27 PROCEDURE — 25000003 PHARM REV CODE 250: Performed by: STUDENT IN AN ORGANIZED HEALTH CARE EDUCATION/TRAINING PROGRAM

## 2019-03-27 PROCEDURE — 97535 SELF CARE MNGMENT TRAINING: CPT

## 2019-03-27 PROCEDURE — 80053 COMPREHEN METABOLIC PANEL: CPT

## 2019-03-27 PROCEDURE — A9155 ARTIFICIAL SALIVA: HCPCS | Performed by: INTERNAL MEDICINE

## 2019-03-27 PROCEDURE — 99232 PR SUBSEQUENT HOSPITAL CARE,LEVL II: ICD-10-PCS | Mod: ,,, | Performed by: INTERNAL MEDICINE

## 2019-03-27 PROCEDURE — 97530 THERAPEUTIC ACTIVITIES: CPT

## 2019-03-27 PROCEDURE — 25000003 PHARM REV CODE 250: Performed by: INTERNAL MEDICINE

## 2019-03-27 PROCEDURE — 63600175 PHARM REV CODE 636 W HCPCS: Performed by: INTERNAL MEDICINE

## 2019-03-27 PROCEDURE — 84100 ASSAY OF PHOSPHORUS: CPT

## 2019-03-27 PROCEDURE — 85007 BL SMEAR W/DIFF WBC COUNT: CPT

## 2019-03-27 RX ORDER — CETIRIZINE HYDROCHLORIDE 5 MG/1
10 TABLET ORAL DAILY
Status: DISCONTINUED | OUTPATIENT
Start: 2019-03-27 | End: 2019-04-02 | Stop reason: HOSPADM

## 2019-03-27 RX ADMIN — OLOPATADINE HYDROCHLORIDE 1 DROP: 2 SOLUTION OPHTHALMIC at 09:03

## 2019-03-27 RX ADMIN — ONDANSETRON 8 MG: 2 INJECTION INTRAMUSCULAR; INTRAVENOUS at 09:03

## 2019-03-27 RX ADMIN — ONDANSETRON 8 MG: 2 INJECTION INTRAMUSCULAR; INTRAVENOUS at 05:03

## 2019-03-27 RX ADMIN — ACYCLOVIR 800 MG: 200 CAPSULE ORAL at 09:03

## 2019-03-27 RX ADMIN — GABAPENTIN 600 MG: 300 CAPSULE ORAL at 09:03

## 2019-03-27 RX ADMIN — Medication 10 ML: at 06:03

## 2019-03-27 RX ADMIN — Medication 400 MG: at 09:03

## 2019-03-27 RX ADMIN — INSULIN ASPART 2 UNITS: 100 INJECTION, SOLUTION INTRAVENOUS; SUBCUTANEOUS at 01:03

## 2019-03-27 RX ADMIN — Medication 30 ML: at 09:03

## 2019-03-27 RX ADMIN — LOSARTAN POTASSIUM AND HYDROCHLOROTHIAZIDE 1 TABLET: 12.5; 1 TABLET ORAL at 09:03

## 2019-03-27 RX ADMIN — TRAMADOL HYDROCHLORIDE 50 MG: 50 TABLET, COATED ORAL at 09:03

## 2019-03-27 RX ADMIN — ONDANSETRON 8 MG: 2 INJECTION INTRAMUSCULAR; INTRAVENOUS at 02:03

## 2019-03-27 RX ADMIN — FILGRASTIM 480 MCG: 480 INJECTION, SOLUTION INTRAVENOUS; SUBCUTANEOUS at 09:03

## 2019-03-27 RX ADMIN — Medication 30 ML: at 12:03

## 2019-03-27 RX ADMIN — SODIUM CHLORIDE AND POTASSIUM CHLORIDE: 9; 1.49 INJECTION, SOLUTION INTRAVENOUS at 09:03

## 2019-03-27 RX ADMIN — Medication 30 ML: at 06:03

## 2019-03-27 RX ADMIN — CETIRIZINE HYDROCHLORIDE 10 MG: 5 TABLET ORAL at 10:03

## 2019-03-27 RX ADMIN — PANTOPRAZOLE SODIUM 40 MG: 40 TABLET, DELAYED RELEASE ORAL at 09:03

## 2019-03-27 RX ADMIN — Medication 400 MG: at 12:03

## 2019-03-27 RX ADMIN — Medication 10 ML: at 10:03

## 2019-03-27 RX ADMIN — GABAPENTIN 600 MG: 300 CAPSULE ORAL at 02:03

## 2019-03-27 RX ADMIN — FLUCONAZOLE 400 MG: 200 TABLET ORAL at 09:03

## 2019-03-27 RX ADMIN — INSULIN ASPART 1 UNITS: 100 INJECTION, SOLUTION INTRAVENOUS; SUBCUTANEOUS at 09:03

## 2019-03-27 RX ADMIN — LEVOFLOXACIN 500 MG: 500 TABLET, FILM COATED ORAL at 09:03

## 2019-03-27 RX ADMIN — AMLODIPINE BESYLATE 5 MG: 5 TABLET ORAL at 09:03

## 2019-03-27 RX ADMIN — TRAMADOL HYDROCHLORIDE 50 MG: 50 TABLET, COATED ORAL at 12:03

## 2019-03-27 NOTE — ASSESSMENT & PLAN NOTE
Her WBC is 0.12 and ANC is 60  Hb stable at 9.5  Platelets at 33  Transfuse Hb for <7 and platelets <10  Lovenox on hold for thromboctyopenia

## 2019-03-27 NOTE — PT/OT/SLP PROGRESS
"Occupational Therapy   Treatment    Name: Lori Rivero  MRN: [id number]  Admitting Diagnosis:  Status post autologous bone marrow transplant       Recommendations:     Discharge Recommendations: home health OT  Discharge Equipment Recommendations:  walker, rolling  Barriers to discharge:  None    Assessment:     Lori Rivero is a 63 y.o. female with a medical diagnosis of Status post autologous bone marrow transplant.  Pt lethargic but agreed to session. Pt progressing well towards all goals set in therapy. Pt met several functional goals during today's session. Pt tolerated OT session well with no c/o increased pain with activity. Pt will continue to benefit from skilled OT to increase her self care independence and improve quality of life. Performance deficits affecting function are weakness, impaired endurance, impaired functional mobilty, gait instability.     Rehab Prognosis:  Good; patient would benefit from acute skilled OT services to address these deficits and reach maximum level of function.       Plan:     Patient to be seen 3 x/week to address the above listed problems via self-care/home management, therapeutic activities, therapeutic exercises  · Plan of Care Expires: 04/21/19  · Plan of Care Reviewed with: patient    Subjective     When asked about pt's goals, pt stated, "I wish I can get my feet unfrozen so I can walk again."    Pain/Comfort:  · Pain Rating 1: 0/10(c/o weakness and diarrhea)  · Pain Rating Post-Intervention 1: 0/10  · Pain Addressed 2: Cessation of Activity, Nurse notified    Objective:     Communicated with: RN prior to session.  Patient found supine with central line upon OT entry to room.    General Precautions: Standard, fall, neutropenic   Orthopedic Precautions:N/A   Braces: N/A     Occupational Performance:     Bed Mobility:  HOB Elevated  · Patient completed Rolling/Turning to Left with  supervision  · Patient completed Rolling/Turning to Right with " supervision  · Patient completed Scooting/Bridging with supervision  · Patient completed Supine to Sit with supervision     Functional Mobility/Transfers:  · Patient completed Sit <> Stand Transfer from bed with contact guard assistance  with  rolling walker   · Patient completed Bed <> Chair Transfer using Step Transfer technique with stand by assistance with rolling walker  · Patient completed Toilet Transfer Step Transfer technique with stand by assistance with  rolling walker and grab bars to ascend/ decend  · Functional Mobility: Pt ambulated to restroom and back to bedside chair with SBA and RW. No LOB noted. No RBs required.     Activities of Daily Living:  · Feeding:  set up assistance for medication management seated in chair  · Grooming: stand by assistance to complete dental and facial hygiene in standing with RW; Pt tolerated ~4 minutes of standing during functional activities  · Lower Body Dressing: supervision to don/doff BLE socks EOB  · Toileting: stand by assistance to complete wiping and clothing management in sitting      [facility] 6 Click ADL: 23    Treatment & Education:  - Role of OT/ OT POC  - Self care safety/ independence  - Functional transfer/ mobility safety  - Bed mobility safety  - Importance of sitting UIC  - Energy conservation techniques such as taking rest breaks as needed   - Importance of UE and LE movement to prevent stiffness/tone  - Pt completed 10 reps of the following exercises while seated in chair to improve endurance and build strength for increased independence in self care tasks and functional transfers: Chest press, Arm raises, Forward punces  - Exercises written on white board         Patient left up in chair with all lines intact, call button in reach and RN notifiedEducation:      GOALS:   Multidisciplinary Problems     Occupational Therapy Goals        Problem: Occupational Therapy Goal    Goal Priority Disciplines Outcome Interventions   Occupational Therapy Goal      OT, PT/OT Ongoing (interventions implemented as appropriate)    Description:  Goals to be met by: 04/03/2019    Patient will increase functional independence with ADLs by performing:    UE Dressing with Stand-by Assistance.  LE Dressing with Modified [place].- Progressing   Grooming while standing at sink with Stand-by Assistance with AD as needed.- Met on 3/27  Toileting from toilet with Stand-by Assistance for hygiene and clothing management. - Met on 3/27  Step transfer with Stand-by Assistance with AD as needed to prepare for household mobility to complete occupations of choice upon returning home.  Toilet transfer to toilet with Stand-by Assistance.- Met on 3/27  Upper extremity exercise program x15 reps per handout, with independence to increase strength for functional activities.                       Time Tracking:     OT Date of Treatment: 03/27/19  OT Start Time: 1042  OT Stop Time: 1106  OT Total Time (min): 24 min    Billable Minutes:Self Care/Home Management 15  Therapeutic Activity 9    Stacy Valdivia OT  3/27/2019

## 2019-03-27 NOTE — PLAN OF CARE
Problem: Adult Inpatient Plan of Care  Goal: Plan of Care Review  Outcome: Ongoing (interventions implemented as appropriate)  Pt. Day +7 for AutoSCT.  Pt. with nonskid footwear on with bed in lowest position and locked with bed rails up x2.  Pt. instructed to call prior to getting OOB.  Pt. with call light within reach and verbalized understanding.  Pt. received electrolyte replacements today.  Pt. with a poor appetite.  Pt. With lovenox D/C'd today.  Will continue to monitor pt.

## 2019-03-27 NOTE — PLAN OF CARE
Problem: Adult Inpatient Plan of Care  Goal: Plan of Care Review  Outcome: Ongoing (interventions implemented as appropriate)  Patient day +7 auto SCT. AAOx4, VSS, afebrile, and without injury. Fall precautions maintained. Instructed on how to contact the nurse, call light in reach. Patient on room air without distress. Denies nausea; reports of pain addressed with PRN PO meds and Salt and Soda gargle for beginning of sore throat.. Up to bedside commode;watery BM x1. Glucose monitoring continued; no SS insulin coverage needed.IVF continued as ordered. Neutropenic precautions maintained. Questions and concerns  addressed; will continue to monitor.

## 2019-03-27 NOTE — ASSESSMENT & PLAN NOTE
-IgG kappa MM; ISS 2; standard risk cytogenetics; with lytic bone lesions at presentation; initiate VRd (21 day cycle) with Dr. Juan at [facility]; currently mid cycle  5    -she has achieved VGPR  -pre transplant eval with no contraindication for transplant   -never smoker so plan to follow up LL nodule at repeat PET scan at day +100  -Admitted for Daija auto SCT. Today is Day +7  -Neupogen and ppx antimicrobials per protocol.

## 2019-03-27 NOTE — SUBJECTIVE & OBJECTIVE
Subjective:     Interval History:   Day +7 Daija AutoSCT. VSS. Afebrile. ANC 60. Lovenox held due to thrombocytopenia. NAEON.     Objective:     Vital Signs (Most Recent):  Temp: 98.8 °F (37.1 °C) (03/27/19 1141)  Pulse: 104 (03/27/19 1141)  Resp: 18 (03/27/19 1141)  BP: 107/61 (03/27/19 1141)  SpO2: 98 % (03/27/19 1141) Vital Signs (24h Range):  Temp:  [97.9 °F (36.6 °C)-98.8 °F (37.1 °C)] 98.8 °F (37.1 °C)  Pulse:  [] 104  Resp:  [17-20] 18  SpO2:  [95 %-99 %] 98 %  BP: ()/(51-67) 107/61     Weight: 75.4 kg (166 lb 1.9 oz)  Body mass index is 28.51 kg/m².  Body surface area is 1.85 meters squared.        Intake/Output - Last 3 Shifts       03/25 0700 - 03/26 0659 03/26 0700 - 03/27 0659 03/27 0700 - 03/28 0659    P.O. 1060  240    I.V. (mL/kg) 1668.8 (22.1) 2581.3 (34.3) 688.3 (9.1)    Total Intake(mL/kg) 2728.8 (36.2) 2581.3 (34.3) 928.3 (12.3)    Urine (mL/kg/hr) 1050 (0.6) 2150 (1.2) 300 (0.5)    Other   350    Stool  0     Total Output 1050 2150 650    Net +1678.8 +431.3 +278.3           Urine Occurrence 1 x 3 x     Stool Occurrence 1 x 1 x           Physical Exam   Constitutional: She is oriented to person, place, and time. She appears well-developed and well-nourished.   HENT:   Head: Normocephalic and atraumatic.   Right Ear: External ear normal.   Left Ear: External ear normal.   Mouth/Throat: Oropharynx is clear and moist.   Eyes: Pupils are equal, round, and reactive to light. Conjunctivae and EOM are normal.   Neck: Normal range of motion. Neck supple.   Cardiovascular: Normal rate, regular rhythm, normal heart sounds and intact distal pulses.   Pulmonary/Chest: Effort normal and breath sounds normal.   Abdominal: Soft. Bowel sounds are normal.   Musculoskeletal: She exhibits edema.   Trace edema to BLE. Edema to left knee 2/2 fall which is improving.   Neurological: She is alert and oriented to person, place, and time.   Skin: Skin is warm and dry.   Psychiatric: She has a normal mood and  affect. Her behavior is normal. Judgment and thought content normal.   Nursing note and vitals reviewed.      Significant Labs:   CBC:   Recent Labs   Lab 03/26/19  0336 03/27/19 0325   WBC 0.14* 0.12*   HGB 9.6* 9.5*   HCT 30.0* 28.9*   PLT 63* 33*    and CMP:   Recent Labs   Lab 03/26/19 0336 03/27/19 0325   * 136   K 3.9 3.9    104   CO2 23 21*   * 137*   BUN 11 12   CREATININE 0.6 0.6   CALCIUM 8.4* 8.5*   PROT 5.5* 5.3*   ALBUMIN 3.0* 2.8*   BILITOT 0.5 0.4   ALKPHOS 70 63   AST 18 15   ALT 16 15   ANIONGAP 8 11   EGFRNONAA >60.0 >60.0       Diagnostic Results:  I have reviewed all pertinent imaging results/findings within the past 24 hours.

## 2019-03-27 NOTE — PLAN OF CARE
Problem: Occupational Therapy Goal  Goal: Occupational Therapy Goal  Goals to be met by: 04/03/2019    Patient will increase functional independence with ADLs by performing:    UE Dressing with Stand-by Assistance.  LE Dressing with Modified [place].- Progressing   Grooming while standing at sink with Stand-by Assistance with AD as needed.- Met on 3/27  Toileting from toilet with Stand-by Assistance for hygiene and clothing management. - Met on 3/27  Step transfer with Stand-by Assistance with AD as needed to prepare for household mobility to complete occupations of choice upon returning home.  Toilet transfer to toilet with Stand-by Assistance.- Met on 3/27  Upper extremity exercise program x15 reps per handout, with independence to increase strength for functional activities.     Outcome: Ongoing (interventions implemented as appropriate)  Pt lethargic but agreed to session. Pt progressing well towards all goals set in therapy. Pt met several functional goals during today's session. Pt tolerated OT session well with no c/o increased pain with activity. Pt will continue to benefit from skilled OT to increase her self care independence and improve quality of life.     Comments: Stacy Valdivia OTR/L

## 2019-03-27 NOTE — ASSESSMENT & PLAN NOTE
- see Multiple Myeloma  - admitted for Daija Auto SCT  - today is Day +7  - HJocelyn Rivero's transplant was Wednesday, 3/20/19 at 1330. She received 9 bags and a CD34 dose of 3.61 x 10^6/kg.

## 2019-03-28 ENCOUNTER — EPISODE CHANGES (OUTPATIENT)
Dept: HEMATOLOGY/ONCOLOGY | Facility: CLINIC | Age: 64
End: 2019-03-28

## 2019-03-28 LAB
ABO + RH BLD: NORMAL
ALBUMIN SERPL BCP-MCNC: 2.8 G/DL (ref 3.5–5.2)
ALP SERPL-CCNC: 65 U/L (ref 55–135)
ALT SERPL W/O P-5'-P-CCNC: 16 U/L (ref 10–44)
ANION GAP SERPL CALC-SCNC: 8 MMOL/L (ref 8–16)
ANISOCYTOSIS BLD QL SMEAR: SLIGHT
AST SERPL-CCNC: 15 U/L (ref 10–40)
BASOPHILS # BLD AUTO: 0 K/UL (ref 0–0.2)
BASOPHILS NFR BLD: 0 % (ref 0–1.9)
BILIRUB SERPL-MCNC: 0.4 MG/DL (ref 0.1–1)
BLD GP AB SCN CELLS X3 SERPL QL: NORMAL
BUN SERPL-MCNC: 9 MG/DL (ref 8–23)
CALCIUM SERPL-MCNC: 8.5 MG/DL (ref 8.7–10.5)
CHLORIDE SERPL-SCNC: 105 MMOL/L (ref 95–110)
CO2 SERPL-SCNC: 24 MMOL/L (ref 23–29)
CREAT SERPL-MCNC: 0.6 MG/DL (ref 0.5–1.4)
DIFFERENTIAL METHOD: ABNORMAL
EOSINOPHIL # BLD AUTO: 0 K/UL (ref 0–0.5)
EOSINOPHIL NFR BLD: 0 % (ref 0–8)
ERYTHROCYTE [DISTWIDTH] IN BLOOD BY AUTOMATED COUNT: 16.7 % (ref 11.5–14.5)
EST. GFR  (AFRICAN AMERICAN): >60 ML/MIN/1.73 M^2
EST. GFR  (NON AFRICAN AMERICAN): >60 ML/MIN/1.73 M^2
GLUCOSE SERPL-MCNC: 146 MG/DL (ref 70–110)
HCT VFR BLD AUTO: 29.1 % (ref 37–48.5)
HGB BLD-MCNC: 9.2 G/DL (ref 12–16)
HYPOCHROMIA BLD QL SMEAR: ABNORMAL
IMM GRANULOCYTES # BLD AUTO: 0 K/UL (ref 0–0.04)
IMM GRANULOCYTES NFR BLD AUTO: 0 % (ref 0–0.5)
LYMPHOCYTES # BLD AUTO: 0 K/UL (ref 1–4.8)
LYMPHOCYTES NFR BLD: 42.9 % (ref 18–48)
MAGNESIUM SERPL-MCNC: 1.7 MG/DL (ref 1.6–2.6)
MCH RBC QN AUTO: 26.4 PG (ref 27–31)
MCHC RBC AUTO-ENTMCNC: 31.6 G/DL (ref 32–36)
MCV RBC AUTO: 83 FL (ref 82–98)
MONOCYTES # BLD AUTO: 0 K/UL (ref 0.3–1)
MONOCYTES NFR BLD: 28.6 % (ref 4–15)
NEUTROPHILS # BLD AUTO: 0 K/UL (ref 1.8–7.7)
NEUTROPHILS NFR BLD: 28.5 % (ref 38–73)
NRBC BLD-RTO: 0 /100 WBC
OVALOCYTES BLD QL SMEAR: ABNORMAL
PHOSPHATE SERPL-MCNC: 2.9 MG/DL (ref 2.7–4.5)
PLATELET # BLD AUTO: 13 K/UL (ref 150–350)
PMV BLD AUTO: 10.8 FL (ref 9.2–12.9)
POCT GLUCOSE: 137 MG/DL (ref 70–110)
POCT GLUCOSE: 151 MG/DL (ref 70–110)
POCT GLUCOSE: 171 MG/DL (ref 70–110)
POIKILOCYTOSIS BLD QL SMEAR: SLIGHT
POLYCHROMASIA BLD QL SMEAR: ABNORMAL
POTASSIUM SERPL-SCNC: 3.9 MMOL/L (ref 3.5–5.1)
PROT SERPL-MCNC: 5.2 G/DL (ref 6–8.4)
RBC # BLD AUTO: 3.49 M/UL (ref 4–5.4)
SODIUM SERPL-SCNC: 137 MMOL/L (ref 136–145)
WBC # BLD AUTO: 0.07 K/UL (ref 3.9–12.7)

## 2019-03-28 PROCEDURE — 20600001 HC STEP DOWN PRIVATE ROOM

## 2019-03-28 PROCEDURE — A9155 ARTIFICIAL SALIVA: HCPCS | Performed by: INTERNAL MEDICINE

## 2019-03-28 PROCEDURE — 25000003 PHARM REV CODE 250: Performed by: NURSE PRACTITIONER

## 2019-03-28 PROCEDURE — 97110 THERAPEUTIC EXERCISES: CPT

## 2019-03-28 PROCEDURE — 84100 ASSAY OF PHOSPHORUS: CPT

## 2019-03-28 PROCEDURE — 86850 RBC ANTIBODY SCREEN: CPT

## 2019-03-28 PROCEDURE — 25000003 PHARM REV CODE 250: Performed by: STUDENT IN AN ORGANIZED HEALTH CARE EDUCATION/TRAINING PROGRAM

## 2019-03-28 PROCEDURE — 83735 ASSAY OF MAGNESIUM: CPT

## 2019-03-28 PROCEDURE — 63600175 PHARM REV CODE 636 W HCPCS: Mod: JG | Performed by: INTERNAL MEDICINE

## 2019-03-28 PROCEDURE — 99232 SBSQ HOSP IP/OBS MODERATE 35: CPT | Mod: ,,, | Performed by: INTERNAL MEDICINE

## 2019-03-28 PROCEDURE — 63600175 PHARM REV CODE 636 W HCPCS: Performed by: INTERNAL MEDICINE

## 2019-03-28 PROCEDURE — 25000003 PHARM REV CODE 250: Performed by: INTERNAL MEDICINE

## 2019-03-28 PROCEDURE — 80053 COMPREHEN METABOLIC PANEL: CPT

## 2019-03-28 PROCEDURE — 99232 PR SUBSEQUENT HOSPITAL CARE,LEVL II: ICD-10-PCS | Mod: ,,, | Performed by: INTERNAL MEDICINE

## 2019-03-28 PROCEDURE — 85025 COMPLETE CBC W/AUTO DIFF WBC: CPT

## 2019-03-28 PROCEDURE — 63600175 PHARM REV CODE 636 W HCPCS: Performed by: NURSE PRACTITIONER

## 2019-03-28 RX ADMIN — Medication 30 ML: at 09:03

## 2019-03-28 RX ADMIN — ONDANSETRON 8 MG: 2 INJECTION INTRAMUSCULAR; INTRAVENOUS at 09:03

## 2019-03-28 RX ADMIN — OLOPATADINE HYDROCHLORIDE 1 DROP: 2 SOLUTION OPHTHALMIC at 08:03

## 2019-03-28 RX ADMIN — LEVOFLOXACIN 500 MG: 500 TABLET, FILM COATED ORAL at 08:03

## 2019-03-28 RX ADMIN — FILGRASTIM 480 MCG: 480 INJECTION, SOLUTION INTRAVENOUS; SUBCUTANEOUS at 08:03

## 2019-03-28 RX ADMIN — Medication 10 ML: at 03:03

## 2019-03-28 RX ADMIN — GABAPENTIN 600 MG: 300 CAPSULE ORAL at 03:03

## 2019-03-28 RX ADMIN — SODIUM CHLORIDE AND POTASSIUM CHLORIDE: 9; 1.49 INJECTION, SOLUTION INTRAVENOUS at 08:03

## 2019-03-28 RX ADMIN — GABAPENTIN 600 MG: 300 CAPSULE ORAL at 08:03

## 2019-03-28 RX ADMIN — INSULIN ASPART 2 UNITS: 100 INJECTION, SOLUTION INTRAVENOUS; SUBCUTANEOUS at 01:03

## 2019-03-28 RX ADMIN — ONDANSETRON 8 MG: 2 INJECTION INTRAMUSCULAR; INTRAVENOUS at 06:03

## 2019-03-28 RX ADMIN — INSULIN ASPART 1 UNITS: 100 INJECTION, SOLUTION INTRAVENOUS; SUBCUTANEOUS at 10:03

## 2019-03-28 RX ADMIN — FLUCONAZOLE 400 MG: 200 TABLET ORAL at 08:03

## 2019-03-28 RX ADMIN — Medication 400 MG: at 08:03

## 2019-03-28 RX ADMIN — CETIRIZINE HYDROCHLORIDE 10 MG: 5 TABLET ORAL at 08:03

## 2019-03-28 RX ADMIN — Medication 10 ML: at 08:03

## 2019-03-28 RX ADMIN — SODIUM CHLORIDE AND POTASSIUM CHLORIDE: 9; 1.49 INJECTION, SOLUTION INTRAVENOUS at 06:03

## 2019-03-28 RX ADMIN — Medication 400 MG: at 06:03

## 2019-03-28 RX ADMIN — LOSARTAN POTASSIUM AND HYDROCHLOROTHIAZIDE 1 TABLET: 12.5; 1 TABLET ORAL at 08:03

## 2019-03-28 RX ADMIN — ACYCLOVIR 800 MG: 200 CAPSULE ORAL at 08:03

## 2019-03-28 RX ADMIN — AMLODIPINE BESYLATE 5 MG: 5 TABLET ORAL at 08:03

## 2019-03-28 RX ADMIN — PANTOPRAZOLE SODIUM 40 MG: 40 TABLET, DELAYED RELEASE ORAL at 08:03

## 2019-03-28 RX ADMIN — Medication 30 ML: at 08:03

## 2019-03-28 RX ADMIN — Medication 30 ML: at 05:03

## 2019-03-28 RX ADMIN — ONDANSETRON 8 MG: 2 INJECTION INTRAMUSCULAR; INTRAVENOUS at 01:03

## 2019-03-28 NOTE — PLAN OF CARE
Problem: Physical Therapy Goal  Goal: Physical Therapy Goal  Goals to be met by: 19     Patient will increase functional independence with mobility by performin. Sit to stand transfer with Supervision.  2. Gait  x 200 feet with CGA using Rolling Walker.   3. Ascend/descend 3 stairs with no handrails, with CGA.  4. Pt to perform and be compliant with exercise program to reduce complications associated with prolonged bed rest and decreased mobility.   Outcome: Ongoing (interventions implemented as appropriate)  Exercise program established with handout

## 2019-03-28 NOTE — ASSESSMENT & PLAN NOTE
Her WBC is 0.07 and ANC is 0  Hb stable at 9.2  Platelets at 13  Transfuse Hb for <7 and platelets <10  Lovenox on hold for thromboctyopenia

## 2019-03-28 NOTE — PLAN OF CARE
Problem: Adult Inpatient Plan of Care  Goal: Plan of Care Review  Outcome: Ongoing (interventions implemented as appropriate)  Plan of care reviewed with the patient at the beginning of the shift. She is day +8 of an auto transplant. She awaiting engraftment and count recovery. Tramadol given for bone pain from Neupogen. She has complaints of intermittent nausea that is well controlled with scheduled IV Zofran. PO intake is minimal, encouraged as tolerated. IVF infusing. She had one liquid bowel movements overnight. Mucous membranes are intact, however, she complains of pain when swallowing. Snyder given. Saliva substitutes and salt and soda rinses provided. Blood glucose monitored ac and hs, insulin given as ordered. Pt has left knee swelling and discomfort from a previous fall at home. Cane and walker at the bedside. Fall precautions maintained. Reinforced several times to use the call light for assistance as needed. Bedside commode in use. Fall precautions reviewed with her several times. Pt remained free from falls and injury this shift. Bed locked in lowest position, side rails up x2, call light within reach. Instructed pt to call for assistance as needed. Pt verbalized understanding. Vitals stable. Pt afebrile overnight. Neutropenic precautions maintained. No acute issues overnight. Will continue to monitor.

## 2019-03-28 NOTE — PLAN OF CARE
Problem: Adult Inpatient Plan of Care  Goal: Plan of Care Review  Outcome: Ongoing (interventions implemented as appropriate)  Pt. Day +8 for AutoSCT.  Pt. with nonskid footwear on with bed in lowest position and locked with bed rails up x2.  Pt. instructed to call prior to getting OOB.  Pt. with call light within reach and verbalized understanding.  Pt. received electrolyte replacements today.  Pt. with a poor appetite.  Pt. With 4 loose BM's today and pending to collect a c.diff.    Will continue to monitor pt.

## 2019-03-28 NOTE — ASSESSMENT & PLAN NOTE
- see Multiple Myeloma  - admitted for Daija Auto SCT  - today is Day +8  - HJocelyn Rivero's transplant was Wednesday, 3/20/19 at 1330. She received 9 bags and a CD34 dose of 3.61 x 10^6/kg.

## 2019-03-28 NOTE — ASSESSMENT & PLAN NOTE
- will hold oral anti diabetic medications while inpatient  - blood glucose checks ACHS  - increased to [facility] 3/26  - DM diet  - glucose <200

## 2019-03-28 NOTE — PLAN OF CARE
MDR's with Dr Alfaro.  Patient is day + 8 post her Daija auto SCT.  ANC 0 and awaiting count recovery.  Patient c/o generalized bone pain and throat pain.  Planning for potential d/c early next week if engrafted and stable.  CM will continue to follow.    D/c plan: aLtasha Johansen

## 2019-03-28 NOTE — ASSESSMENT & PLAN NOTE
- continue scheduled zofran and PRN compazine/ativan  - no vomiting in past 48 hrs, denies nausea this am

## 2019-03-28 NOTE — SUBJECTIVE & OBJECTIVE
Subjective:     Interval History: Day +8 Daija Auto. VSS, ANC 0, using [place] for throat pain, erythema but no ulcers noted. Reports bone pain from Neupogen, treated with Zyrtec and Ultram. Denies nausea, vomiting, frequent BM's but denies diarrhea.    Objective:     Vital Signs (Most Recent):  Temp: 98.8 °F (37.1 °C) (03/28/19 0736)  Pulse: 103 (03/28/19 0736)  Resp: 18 (03/28/19 0736)  BP: 129/64 (03/28/19 0736)  SpO2: 98 % (03/28/19 0736) Vital Signs (24h Range):  Temp:  [98.6 °F (37 °C)-99.2 °F (37.3 °C)] 98.8 °F (37.1 °C)  Pulse:  [] 103  Resp:  [16-20] 18  SpO2:  [96 %-99 %] 98 %  BP: (107-129)/(58-67) 129/64     Weight: 75.6 kg (166 lb 10.7 oz)  Body mass index is 28.61 kg/m².  Body surface area is 1.85 meters squared.      Intake/Output - Last 3 Shifts       03/26 0700 - 03/27 0659 03/27 0700 - 03/28 0659 03/28 0700 - 03/29 0659    P.O.  780 120    I.V. (mL/kg) 2581.3 (34.3) 1729.5 (22.9) 441 (5.8)    Total Intake(mL/kg) 2581.3 (34.3) 2509.5 (33.2) 561 (7.4)    Urine (mL/kg/hr) 2150 (1.2) 1000 (0.6) 200 (0.6)    Other  750 300    Stool 0      Total Output 2150 1750 500    Net +431.3 +759.5 +61           Urine Occurrence 3 x      Stool Occurrence 1 x            Physical Exam   Constitutional: She is oriented to person, place, and time. She appears well-developed and well-nourished.   HENT:   Head: Normocephalic and atraumatic.   Right Ear: External ear normal.   Left Ear: External ear normal.   Mouth/Throat: Posterior oropharyngeal erythema present.   Eyes: Pupils are equal, round, and reactive to light. Conjunctivae and EOM are normal.   Neck: Normal range of motion. Neck supple.   Cardiovascular: Normal rate, regular rhythm, normal heart sounds and intact distal pulses.   Pulmonary/Chest: Effort normal and breath sounds normal.   Abdominal: Soft. Bowel sounds are normal.   Musculoskeletal: She exhibits edema.   Trace edema to BLE. Edema to left knee 2/2 fall which is improving.   Neurological: She is  alert and oriented to person, place, and time.   Skin: Skin is warm and dry.   Vascath intact to RCW, no redness or drainage   Psychiatric: She has a normal mood and affect. Her behavior is normal. Judgment and thought content normal.   Nursing note and vitals reviewed.      Significant Labs:   CBC:   Recent Labs   Lab 03/27/19  0325 03/28/19  0400   WBC 0.12* 0.07*   HGB 9.5* 9.2*   HCT 28.9* 29.1*   PLT 33* 13*    and CMP:   Recent Labs   Lab 03/27/19  0325 03/28/19  0400    137   K 3.9 3.9    105   CO2 21* 24   * 146*   BUN 12 9   CREATININE 0.6 0.6   CALCIUM 8.5* 8.5*   PROT 5.3* 5.2*   ALBUMIN 2.8* 2.8*   BILITOT 0.4 0.4   ALKPHOS 63 65   AST 15 15   ALT 15 16   ANIONGAP 11 8   EGFRNONAA >60.0 >60.0       Diagnostic Results:  None

## 2019-03-28 NOTE — ASSESSMENT & PLAN NOTE
-IgG kappa MM; ISS 2; standard risk cytogenetics; with lytic bone lesions at presentation; initiate VRd (21 day cycle) with Dr. Juan at [facility]; currently mid cycle  5    -she has achieved VGPR  -pre transplant eval with no contraindication for transplant   -never smoker so plan to follow up LL nodule at repeat PET scan at day +100  -Admitted for Daija auto SCT. Today is Day +8  -Neupogen and ppx antimicrobials per protocol.

## 2019-03-28 NOTE — PROGRESS NOTES
[facility]  Hematology  Bone Marrow Transplant  Progress Note    Patient Name: Lori Rivero  Admission Date: 3/18/2019  Hospital Length of Stay: 10 days  Code Status: Full Code    Subjective:     Interval History: Day +8 Daija Auto. VSS, ANC 0, using Dukes for throat pain, erythema but no ulcers noted. Reports bone pain from Neupogen, treated with Zyrtec and Ultram. Denies nausea, vomiting, frequent BM's but denies diarrhea.    Objective:     Vital Signs (Most Recent):  Temp: 98.8 °F (37.1 °C) (03/28/19 0736)  Pulse: 103 (03/28/19 0736)  Resp: 18 (03/28/19 0736)  BP: 129/64 (03/28/19 0736)  SpO2: 98 % (03/28/19 0736) Vital Signs (24h Range):  Temp:  [98.6 °F (37 °C)-99.2 °F (37.3 °C)] 98.8 °F (37.1 °C)  Pulse:  [] 103  Resp:  [16-20] 18  SpO2:  [96 %-99 %] 98 %  BP: (107-129)/(58-67) 129/64     Weight: 75.6 kg (166 lb 10.7 oz)  Body mass index is 28.61 kg/m².  Body surface area is 1.85 meters squared.      Intake/Output - Last 3 Shifts       03/26 0700 - 03/27 0659 03/27 0700 - 03/28 0659 03/28 0700 - 03/29 0659    P.O.  780 120    I.V. (mL/kg) 2581.3 (34.3) 1729.5 (22.9) 441 (5.8)    Total Intake(mL/kg) 2581.3 (34.3) 2509.5 (33.2) 561 (7.4)    Urine (mL/kg/hr) 2150 (1.2) 1000 (0.6) 200 (0.6)    Other  750 300    Stool 0      Total Output 2150 1750 500    Net +431.3 +759.5 +61           Urine Occurrence 3 x      Stool Occurrence 1 x            Physical Exam   Constitutional: She is oriented to person, place, and time. She appears well-developed and well-nourished.   HENT:   Head: Normocephalic and atraumatic.   Right Ear: External ear normal.   Left Ear: External ear normal.   Mouth/Throat: Posterior oropharyngeal erythema present.   Eyes: Pupils are equal, round, and reactive to light. Conjunctivae and EOM are normal.   Neck: Normal range of motion. Neck supple.   Cardiovascular: Normal rate, regular rhythm, normal heart sounds and intact distal pulses.   Pulmonary/Chest: Effort normal  and breath sounds normal.   Abdominal: Soft. Bowel sounds are normal.   Musculoskeletal: She exhibits edema.   Trace edema to BLE. Edema to left knee 2/2 fall which is improving.   Neurological: She is alert and oriented to person, place, and time.   Skin: Skin is warm and dry.   Vascath intact to RCW, no redness or drainage   Psychiatric: She has a normal mood and affect. Her behavior is normal. Judgment and thought content normal.   Nursing note and vitals reviewed.      Significant Labs:   CBC:   Recent Labs   Lab 03/27/19  0325 03/28/19  0400   WBC 0.12* 0.07*   HGB 9.5* 9.2*   HCT 28.9* 29.1*   PLT 33* 13*    and CMP:   Recent Labs   Lab 03/27/19 0325 03/28/19  0400    137   K 3.9 3.9    105   CO2 21* 24   * 146*   BUN 12 9   CREATININE 0.6 0.6   CALCIUM 8.5* 8.5*   PROT 5.3* 5.2*   ALBUMIN 2.8* 2.8*   BILITOT 0.4 0.4   ALKPHOS 63 65   AST 15 15   ALT 15 16   ANIONGAP 11 8   EGFRNONAA >60.0 >60.0       Diagnostic Results:  None    Assessment/Plan:     * Status post autologous bone marrow transplant  - see Multiple Myeloma  - admitted for Daija Auto SCT  - today is Day +8  - CECILY Rivero's transplant was Wednesday, 3/20/19 at 1330. She received 9 bags and a CD34 dose of 3.61 x 10^6/kg.    Multiple myeloma  -IgG kappa MM; ISS 2; standard risk cytogenetics; with lytic bone lesions at presentation; initiate VRd (21 day cycle) with Dr. Juan at [facility]; currently mid cycle  5    -she has achieved VGPR  -pre transplant eval with no contraindication for transplant   -never smoker so plan to follow up LL nodule at repeat PET scan at day +100  -Admitted for Daija auto SCT. Today is Day +8  -Neupogen and ppx antimicrobials per protocol.         Pancytopenia due to chemotherapy  Her WBC is 0.07 and ANC is 0  Hb stable at 9.2  Platelets at 13  Transfuse Hb for <7 and platelets <10  Lovenox on hold for thromboctyopenia    CINV (chemotherapy-induced nausea and vomiting)  - continue scheduled zofran and PRN  compazine/ativan  - no vomiting in past 48 hrs, denies nausea this am    Chemotherapy-induced peripheral neuropathy  - continue gabapentin (increased to 600 mg TID during hospital stay) and tramadol     Hypomagnesemia  - replace PRN per protocol    Moderate malnutrition  - dietician following      Left knee pain  - s/p fall prior to admit  - x-ray with edema, no fractures  - tramadol prn ordered for pain     Essential hypertension  - continue home losartan-HCTZ  - BP elevated since admission, Norvasc 5 mg daily started 3/20/19  - BP WNL    Type 2 diabetes mellitus without complication, without long-term current use of insulin  - will hold oral anti diabetic medications while inpatient  - blood glucose checks ACHS  - increased to MDSSI 3/26  - DM diet  - glucose <200          VTE Risk Mitigation (From admission, onward)        Ordered     heparin (porcine) injection 1,600 Units  As needed (PRN)      03/18/19 1754     IP VTE HIGH RISK PATIENT  Once      03/18/19 1702          Disposition: pending count recovery post transplant    Trina Musa NP  Bone Marrow Transplant  [facility]

## 2019-03-28 NOTE — PT/OT/SLP PROGRESS
"Physical Therapy Treatment    Patient Name:  Lori Rivero   MRN:  [id number]    Recommendations:     Discharge Recommendations:  home health PT   Discharge Equipment Recommendations: walker, rolling   Barriers to discharge: None    Assessment:     Lori Rivero is a 63 y.o. female admitted with a medical diagnosis of Status post autologous bone marrow transplant.  She presents with the following impairments/functional limitations:  weakness, impaired endurance, impaired functional mobilty, gait instability, impaired balance, impaired self care skills, decreased lower extremity function, pain.    Pt refused gait training today, reminded pt the importance of being compliant with daily walking program, though pt continued to refuse.  WBC are extremely low, so therefore PT allowed pt to remain in her hospital room.  Established an exercise program to be perfomed 3xs/day, handout provided.  10x sit<>stand testing indicates deficits in B LE mm strength.     Rehab Prognosis: Fair; patient would benefit from acute skilled PT services to address these deficits and reach maximum level of function.    Recent Surgery: * No surgery found *      Plan:     During this hospitalization, patient to be seen 3 x/week to address the identified rehab impairments via gait training, therapeutic activities, therapeutic exercises, neuromuscular re-education and progress toward the following goals:    · Plan of Care Expires:  04/22/19    Subjective     Chief Complaint: Generalized pain  Patient/Family Comments/goals: To reduce pain  Pain/Comfort:  · Pain Rating 1: 8/10  · Location - Orientation 1: generalized  · Location 1: (whole body)  · Pain Addressed 1: Cessation of Activity, Distraction      Objective:     Communicated with nursing prior to session.  Patient found supine with central line upon PT entry to room.     "Horrible, my mouth is sore."    General Precautions: Standard, neutropenic, fall   Orthopedic Precautions:N/A "   Braces: N/A     Functional Mobility:  · Bed Mobility:     · Scooting: independence  · Supine to Sit: I with HOB elevated by 25 deg    · Transfers:     · Sit to Stand:  stand by assistance with rolling walker and ed on hand placement and reach back prior to sitting    · Balance: I: dynamic sitting balance EOB; SBA: static standing balance with AD      AM-PAC 6 CLICK MOBILITY  Turning over in bed (including adjusting bedclothes, sheets and blankets)?: 4  Sitting down on and standing up from a chair with arms (e.g., wheelchair, bedside commode, etc.): 3  Moving from lying on back to sitting on the side of the bed?: 4  Moving to and from a bed to a chair (including a wheelchair)?: 3  Need to walk in hospital room?: 3  Climbing 3-5 steps with a railing?: 3  Basic Mobility Total Score: 20       Therapeutic Activities and Exercises:   Whiteboard updated  Ankle pumps: 30 reps x 2 sets, in sit   LAQs: 20 reps x 2 sets each LE perf individually, in sit, L LE with A to facilitate full ROM, pt hold L LE in kn ext and demo eccentric lowering  Hip abd/add: 30 reps x 2 sets perf B, in sit  Hip flex: 20 reps x 2 sets each LE perf individually, in sit, attempted to initially use hands to lift LE, ed on activating LE mm  Cat/camels: 20 reps, in reclined sitting posture, tactile cuing to facilitate tech  10x sit<>stand: 1 min 26.33 sec, 7/10 RPE following    Patient left sitting EOB with all lines intact and call button in reach.    GOALS:   Multidisciplinary Problems     Physical Therapy Goals        Problem: Physical Therapy Goal    Goal Priority Disciplines Outcome Goal Variances Interventions   Physical Therapy Goal     PT, PT/OT Ongoing (interventions implemented as appropriate)     Description:  Goals to be met by: 19     Patient will increase functional independence with mobility by performin. Sit to stand transfer with Supervision.  2. Gait  x 200 feet with CGA using Rolling Walker.   3. Ascend/descend 3 stairs  with no handrails, with CGA.  4. Pt to perform and be compliant with exercise program to reduce complications associated with prolonged bed rest and decreased mobility.                    Time Tracking:     PT Received On: 03/28/19  PT Start Time: 1201     PT Stop Time: 1230  PT Total Time (min): 29 min     Billable Minutes: Therapeutic Exercise 28    Treatment Type: Treatment  PT/PTA: PT           Aide Calix, PT  03/28/2019

## 2019-03-29 LAB
ALBUMIN SERPL BCP-MCNC: 2.7 G/DL (ref 3.5–5.2)
ALP SERPL-CCNC: 62 U/L (ref 55–135)
ALT SERPL W/O P-5'-P-CCNC: 14 U/L (ref 10–44)
ANION GAP SERPL CALC-SCNC: 11 MMOL/L (ref 8–16)
ANISOCYTOSIS BLD QL SMEAR: SLIGHT
AST SERPL-CCNC: 12 U/L (ref 10–40)
BASOPHILS # BLD AUTO: 0 K/UL (ref 0–0.2)
BASOPHILS NFR BLD: 0 % (ref 0–1.9)
BILIRUB SERPL-MCNC: 0.4 MG/DL (ref 0.1–1)
BLD PROD TYP BPU: NORMAL
BLOOD UNIT EXPIRATION DATE: NORMAL
BLOOD UNIT TYPE CODE: 600
BLOOD UNIT TYPE: NORMAL
BUN SERPL-MCNC: 9 MG/DL (ref 8–23)
CALCIUM SERPL-MCNC: 8.5 MG/DL (ref 8.7–10.5)
CHLORIDE SERPL-SCNC: 107 MMOL/L (ref 95–110)
CO2 SERPL-SCNC: 22 MMOL/L (ref 23–29)
CODING SYSTEM: NORMAL
CREAT SERPL-MCNC: 0.5 MG/DL (ref 0.5–1.4)
DIFFERENTIAL METHOD: ABNORMAL
DISPENSE STATUS: NORMAL
EOSINOPHIL # BLD AUTO: 0 K/UL (ref 0–0.5)
EOSINOPHIL NFR BLD: 0 % (ref 0–8)
ERYTHROCYTE [DISTWIDTH] IN BLOOD BY AUTOMATED COUNT: 16.5 % (ref 11.5–14.5)
EST. GFR  (AFRICAN AMERICAN): >60 ML/MIN/1.73 M^2
EST. GFR  (NON AFRICAN AMERICAN): >60 ML/MIN/1.73 M^2
GLUCOSE SERPL-MCNC: 138 MG/DL (ref 70–110)
HCT VFR BLD AUTO: 28 % (ref 37–48.5)
HGB BLD-MCNC: 9 G/DL (ref 12–16)
HYPOCHROMIA BLD QL SMEAR: ABNORMAL
IMM GRANULOCYTES # BLD AUTO: 0 K/UL (ref 0–0.04)
IMM GRANULOCYTES NFR BLD AUTO: 0 % (ref 0–0.5)
LYMPHOCYTES # BLD AUTO: 0.1 K/UL (ref 1–4.8)
LYMPHOCYTES NFR BLD: 63.6 % (ref 18–48)
MAGNESIUM SERPL-MCNC: 1.6 MG/DL (ref 1.6–2.6)
MCH RBC QN AUTO: 26.7 PG (ref 27–31)
MCHC RBC AUTO-ENTMCNC: 32.1 G/DL (ref 32–36)
MCV RBC AUTO: 83 FL (ref 82–98)
MONOCYTES # BLD AUTO: 0 K/UL (ref 0.3–1)
MONOCYTES NFR BLD: 27.3 % (ref 4–15)
NEUTROPHILS # BLD AUTO: 0 K/UL (ref 1.8–7.7)
NEUTROPHILS NFR BLD: 9.1 % (ref 38–73)
NRBC BLD-RTO: 0 /100 WBC
NUM UNITS TRANS WBC-POOR PLATPHERESIS: NORMAL
OVALOCYTES BLD QL SMEAR: ABNORMAL
PHOSPHATE SERPL-MCNC: 2.7 MG/DL (ref 2.7–4.5)
PLATELET # BLD AUTO: 3 K/UL (ref 150–350)
PLATELET BLD QL SMEAR: ABNORMAL
PMV BLD AUTO: ABNORMAL FL (ref 9.2–12.9)
POCT GLUCOSE: 130 MG/DL (ref 70–110)
POCT GLUCOSE: 137 MG/DL (ref 70–110)
POCT GLUCOSE: 141 MG/DL (ref 70–110)
POCT GLUCOSE: 168 MG/DL (ref 70–110)
POCT GLUCOSE: 88 MG/DL (ref 70–110)
POIKILOCYTOSIS BLD QL SMEAR: SLIGHT
POLYCHROMASIA BLD QL SMEAR: ABNORMAL
POTASSIUM SERPL-SCNC: 3.8 MMOL/L (ref 3.5–5.1)
PROT SERPL-MCNC: 5.3 G/DL (ref 6–8.4)
RBC # BLD AUTO: 3.37 M/UL (ref 4–5.4)
SCHISTOCYTES BLD QL SMEAR: ABNORMAL
SODIUM SERPL-SCNC: 140 MMOL/L (ref 136–145)
WBC # BLD AUTO: 0.11 K/UL (ref 3.9–12.7)

## 2019-03-29 PROCEDURE — 25000003 PHARM REV CODE 250: Performed by: NURSE PRACTITIONER

## 2019-03-29 PROCEDURE — A9155 ARTIFICIAL SALIVA: HCPCS | Performed by: INTERNAL MEDICINE

## 2019-03-29 PROCEDURE — 84100 ASSAY OF PHOSPHORUS: CPT

## 2019-03-29 PROCEDURE — 25000003 PHARM REV CODE 250: Performed by: INTERNAL MEDICINE

## 2019-03-29 PROCEDURE — 99233 SBSQ HOSP IP/OBS HIGH 50: CPT | Mod: ,,, | Performed by: INTERNAL MEDICINE

## 2019-03-29 PROCEDURE — 83735 ASSAY OF MAGNESIUM: CPT

## 2019-03-29 PROCEDURE — 99233 PR SUBSEQUENT HOSPITAL CARE,LEVL III: ICD-10-PCS | Mod: ,,, | Performed by: INTERNAL MEDICINE

## 2019-03-29 PROCEDURE — P9037 PLATE PHERES LEUKOREDU IRRAD: HCPCS

## 2019-03-29 PROCEDURE — 63600175 PHARM REV CODE 636 W HCPCS: Performed by: NURSE PRACTITIONER

## 2019-03-29 PROCEDURE — 63600175 PHARM REV CODE 636 W HCPCS: Performed by: INTERNAL MEDICINE

## 2019-03-29 PROCEDURE — 25000003 PHARM REV CODE 250: Performed by: STUDENT IN AN ORGANIZED HEALTH CARE EDUCATION/TRAINING PROGRAM

## 2019-03-29 PROCEDURE — 80053 COMPREHEN METABOLIC PANEL: CPT

## 2019-03-29 PROCEDURE — 85025 COMPLETE CBC W/AUTO DIFF WBC: CPT

## 2019-03-29 PROCEDURE — 20600001 HC STEP DOWN PRIVATE ROOM

## 2019-03-29 PROCEDURE — 63600175 PHARM REV CODE 636 W HCPCS: Mod: JG | Performed by: INTERNAL MEDICINE

## 2019-03-29 PROCEDURE — 36430 TRANSFUSION BLD/BLD COMPNT: CPT

## 2019-03-29 RX ORDER — ACETAMINOPHEN 325 MG/1
650 TABLET ORAL EVERY 6 HOURS PRN
Status: DISCONTINUED | OUTPATIENT
Start: 2019-03-29 | End: 2019-04-02 | Stop reason: HOSPADM

## 2019-03-29 RX ORDER — HYDROCODONE BITARTRATE AND ACETAMINOPHEN 500; 5 MG/1; MG/1
TABLET ORAL
Status: DISCONTINUED | OUTPATIENT
Start: 2019-03-29 | End: 2019-04-01

## 2019-03-29 RX ORDER — DIPHENHYDRAMINE HCL 25 MG
25 CAPSULE ORAL EVERY 6 HOURS PRN
Status: DISCONTINUED | OUTPATIENT
Start: 2019-03-29 | End: 2019-04-02 | Stop reason: HOSPADM

## 2019-03-29 RX ADMIN — GABAPENTIN 600 MG: 300 CAPSULE ORAL at 09:03

## 2019-03-29 RX ADMIN — SODIUM CHLORIDE AND POTASSIUM CHLORIDE: 9; 1.49 INJECTION, SOLUTION INTRAVENOUS at 10:03

## 2019-03-29 RX ADMIN — SODIUM CHLORIDE AND POTASSIUM CHLORIDE: 9; 1.49 INJECTION, SOLUTION INTRAVENOUS at 09:03

## 2019-03-29 RX ADMIN — ONDANSETRON 8 MG: 2 INJECTION INTRAMUSCULAR; INTRAVENOUS at 05:03

## 2019-03-29 RX ADMIN — LOSARTAN POTASSIUM AND HYDROCHLOROTHIAZIDE 1 TABLET: 12.5; 1 TABLET ORAL at 09:03

## 2019-03-29 RX ADMIN — DIPHENHYDRAMINE HYDROCHLORIDE 25 MG: 25 CAPSULE ORAL at 09:03

## 2019-03-29 RX ADMIN — FLUCONAZOLE 400 MG: 200 TABLET ORAL at 09:03

## 2019-03-29 RX ADMIN — TRAMADOL HYDROCHLORIDE 50 MG: 50 TABLET, COATED ORAL at 10:03

## 2019-03-29 RX ADMIN — FILGRASTIM 480 MCG: 480 INJECTION, SOLUTION INTRAVENOUS; SUBCUTANEOUS at 10:03

## 2019-03-29 RX ADMIN — Medication 30 ML: at 08:03

## 2019-03-29 RX ADMIN — PANTOPRAZOLE SODIUM 40 MG: 40 TABLET, DELAYED RELEASE ORAL at 09:03

## 2019-03-29 RX ADMIN — AMLODIPINE BESYLATE 5 MG: 5 TABLET ORAL at 09:03

## 2019-03-29 RX ADMIN — Medication 30 ML: at 05:03

## 2019-03-29 RX ADMIN — ACYCLOVIR 800 MG: 200 CAPSULE ORAL at 09:03

## 2019-03-29 RX ADMIN — ONDANSETRON 8 MG: 2 INJECTION INTRAMUSCULAR; INTRAVENOUS at 09:03

## 2019-03-29 RX ADMIN — GABAPENTIN 600 MG: 300 CAPSULE ORAL at 08:03

## 2019-03-29 RX ADMIN — CETIRIZINE HYDROCHLORIDE 10 MG: 5 TABLET ORAL at 09:03

## 2019-03-29 RX ADMIN — Medication 30 ML: at 09:03

## 2019-03-29 RX ADMIN — ACYCLOVIR 800 MG: 200 CAPSULE ORAL at 08:03

## 2019-03-29 RX ADMIN — ACETAMINOPHEN 650 MG: 325 TABLET ORAL at 09:03

## 2019-03-29 RX ADMIN — GABAPENTIN 600 MG: 300 CAPSULE ORAL at 02:03

## 2019-03-29 RX ADMIN — ONDANSETRON 8 MG: 2 INJECTION INTRAMUSCULAR; INTRAVENOUS at 02:03

## 2019-03-29 RX ADMIN — Medication 30 ML: at 02:03

## 2019-03-29 RX ADMIN — LEVOFLOXACIN 500 MG: 500 TABLET, FILM COATED ORAL at 09:03

## 2019-03-29 RX ADMIN — OLOPATADINE HYDROCHLORIDE 1 DROP: 2 SOLUTION OPHTHALMIC at 09:03

## 2019-03-29 NOTE — ASSESSMENT & PLAN NOTE
Her WBC is 0.11 and ANC is 0  Hb stable at 9.0  Platelets at 3,000  Transfuse Hb for <7 and platelets <10  Lovenox on hold for thrombocytopenia  Will transfuse 1 unit platelets today

## 2019-03-29 NOTE — ASSESSMENT & PLAN NOTE
-IgG kappa MM; ISS 2; standard risk cytogenetics; with lytic bone lesions at presentation; initiate VRd (21 day cycle) with Dr. Juan at [facility]; currently mid cycle  5    -she has achieved VGPR  -pre transplant eval with no contraindication for transplant   -never smoker so plan to follow up LL nodule at repeat PET scan at day +100  -Admitted for Daija auto SCT. Today is Day +9  -Neupogen and ppx antimicrobials per protocol.

## 2019-03-29 NOTE — ASSESSMENT & PLAN NOTE
- will hold oral anti diabetic medications while inpatient  - blood glucose checks ACHS  - increased to [facility] 3/26  - DM diet  - glucose <200

## 2019-03-29 NOTE — SUBJECTIVE & OBJECTIVE
Subjective:     Interval History: Day +9 Daija Auto. ANC 0, afebrile on ppx antimicrobials. Receiving platelet transfusion today. Denies nausea, 2 BM's. Reports worsening throat pain, no lesions noted. Tolerated crushed pills and soft foods. Neuropathy pain stable.     Objective:     Vital Signs (Most Recent):  Temp: 98.7 °F (37.1 °C) (03/29/19 1055)  Pulse: 92 (03/29/19 0754)  Resp: 18 (03/29/19 1055)  BP: 131/60 (03/29/19 1055)  SpO2: 98 % (03/29/19 0754) Vital Signs (24h Range):  Temp:  [98.4 °F (36.9 °C)-99.3 °F (37.4 °C)] 98.7 °F (37.1 °C)  Pulse:  [] 92  Resp:  [18-20] 18  SpO2:  [95 %-99 %] 98 %  BP: (122-139)/(60-74) 131/60     Weight: 75.4 kg (166 lb 1.9 oz)  Body mass index is 28.51 kg/m².  Body surface area is 1.85 meters squared.      Intake/Output - Last 3 Shifts       03/27 0700 - 03/28 0659 03/28 0700 - 03/29 0659 03/29 0700 - 03/30 0659    P.O. 780 120     I.V. (mL/kg) 1729.5 (22.9) 1891.8 (25.1)     Blood   286    Total Intake(mL/kg) 2509.5 (33.2) 2011.8 (26.7) 286 (3.8)    Urine (mL/kg/hr) 1000 (0.6) 200 (0.1)     Other 750 1650     Stool  0     Total Output 1750 1850     Net +759.5 +161.8 +286           Urine Occurrence  2 x     Stool Occurrence  3 x           Physical Exam   Constitutional: She is oriented to person, place, and time. She appears well-developed and well-nourished.   HENT:   Head: Normocephalic and atraumatic.   Right Ear: External ear normal.   Left Ear: External ear normal.   Mouth/Throat: Posterior oropharyngeal erythema present.   Eyes: Pupils are equal, round, and reactive to light. Conjunctivae and EOM are normal.   Neck: Normal range of motion. Neck supple.   Cardiovascular: Normal rate, regular rhythm, normal heart sounds and intact distal pulses.   Pulmonary/Chest: Effort normal and breath sounds normal.   Abdominal: Soft. Bowel sounds are normal.   Musculoskeletal: She exhibits edema.   Trace edema to BLE. Edema to left knee 2/2 fall which is improving.    Neurological: She is alert and oriented to person, place, and time.   Skin: Skin is warm and dry.   Vascath intact to RCW, no redness or drainage   Psychiatric: She has a normal mood and affect. Her behavior is normal. Judgment and thought content normal.   Nursing note and vitals reviewed.      Significant Labs:   CBC:   Recent Labs   Lab 03/28/19  0400 03/29/19  0530   WBC 0.07* 0.11*   HGB 9.2* 9.0*   HCT 29.1* 28.0*   PLT 13* 3*    and CMP:   Recent Labs   Lab 03/28/19  0400 03/29/19  0530    140   K 3.9 3.8    107   CO2 24 22*   * 138*   BUN 9 9   CREATININE 0.6 0.5   CALCIUM 8.5* 8.5*   PROT 5.2* 5.3*   ALBUMIN 2.8* 2.7*   BILITOT 0.4 0.4   ALKPHOS 65 62   AST 15 12   ALT 16 14   ANIONGAP 8 11   EGFRNONAA >60.0 >60.0       Diagnostic Results:  None

## 2019-03-29 NOTE — PROGRESS NOTES
[facility]  Hematology  Bone Marrow Transplant  Progress Note    Patient Name: Lori Rivero  Admission Date: 3/18/2019  Hospital Length of Stay: 11 days  Code Status: Full Code    Subjective:     Interval History: Day +9 Daija Auto. ANC 0, afebrile on ppx antimicrobials. Receiving platelet transfusion today. Denies nausea, 2 BM's. Reports worsening throat pain, no lesions noted. Tolerated crushed pills and soft foods. Neuropathy pain stable.     Objective:     Vital Signs (Most Recent):  Temp: 98.7 °F (37.1 °C) (03/29/19 1055)  Pulse: 92 (03/29/19 0754)  Resp: 18 (03/29/19 1055)  BP: 131/60 (03/29/19 1055)  SpO2: 98 % (03/29/19 0754) Vital Signs (24h Range):  Temp:  [98.4 °F (36.9 °C)-99.3 °F (37.4 °C)] 98.7 °F (37.1 °C)  Pulse:  [] 92  Resp:  [18-20] 18  SpO2:  [95 %-99 %] 98 %  BP: (122-139)/(60-74) 131/60     Weight: 75.4 kg (166 lb 1.9 oz)  Body mass index is 28.51 kg/m².  Body surface area is 1.85 meters squared.      Intake/Output - Last 3 Shifts       03/27 0700 - 03/28 0659 03/28 0700 - 03/29 0659 03/29 0700 - 03/30 0659    P.O. 780 120     I.V. (mL/kg) 1729.5 (22.9) 1891.8 (25.1)     Blood   286    Total Intake(mL/kg) 2509.5 (33.2) 2011.8 (26.7) 286 (3.8)    Urine (mL/kg/hr) 1000 (0.6) 200 (0.1)     Other 750 1650     Stool  0     Total Output 1750 1850     Net +759.5 +161.8 +286           Urine Occurrence  2 x     Stool Occurrence  3 x           Physical Exam   Constitutional: She is oriented to person, place, and time. She appears well-developed and well-nourished.   HENT:   Head: Normocephalic and atraumatic.   Right Ear: External ear normal.   Left Ear: External ear normal.   Mouth/Throat: Posterior oropharyngeal erythema present.   Eyes: Pupils are equal, round, and reactive to light. Conjunctivae and EOM are normal.   Neck: Normal range of motion. Neck supple.   Cardiovascular: Normal rate, regular rhythm, normal heart sounds and intact distal pulses.   Pulmonary/Chest:  Effort normal and breath sounds normal.   Abdominal: Soft. Bowel sounds are normal.   Musculoskeletal: She exhibits edema.   Trace edema to BLE. Edema to left knee 2/2 fall which is improving.   Neurological: She is alert and oriented to person, place, and time.   Skin: Skin is warm and dry.   Vascath intact to RCW, no redness or drainage   Psychiatric: She has a normal mood and affect. Her behavior is normal. Judgment and thought content normal.   Nursing note and vitals reviewed.      Significant Labs:   CBC:   Recent Labs   Lab 03/28/19  0400 03/29/19  0530   WBC 0.07* 0.11*   HGB 9.2* 9.0*   HCT 29.1* 28.0*   PLT 13* 3*    and CMP:   Recent Labs   Lab 03/28/19  0400 03/29/19  0530    140   K 3.9 3.8    107   CO2 24 22*   * 138*   BUN 9 9   CREATININE 0.6 0.5   CALCIUM 8.5* 8.5*   PROT 5.2* 5.3*   ALBUMIN 2.8* 2.7*   BILITOT 0.4 0.4   ALKPHOS 65 62   AST 15 12   ALT 16 14   ANIONGAP 8 11   EGFRNONAA >60.0 >60.0       Diagnostic Results:  None    Assessment/Plan:     * Status post autologous bone marrow transplant  - see Multiple Myeloma  - admitted for Daija Auto SCT  - today is Day +9  - CECILY Rivero's transplant was Wednesday, 3/20/19 at 1330. She received 9 bags and a CD34 dose of 3.61 x 10^6/kg.    Multiple myeloma  -IgG kappa MM; ISS 2; standard risk cytogenetics; with lytic bone lesions at presentation; initiate VRd (21 day cycle) with Dr. Juan at [facility]; currently mid cycle  5    -she has achieved VGPR  -pre transplant eval with no contraindication for transplant   -never smoker so plan to follow up LL nodule at repeat PET scan at day +100  -Admitted for Daija auto SCT. Today is Day +9  -Neupogen and ppx antimicrobials per protocol.         Pancytopenia due to chemotherapy  Her WBC is 0.11 and ANC is 0  Hb stable at 9.0  Platelets at 3,000  Transfuse Hb for <7 and platelets <10  Lovenox on hold for thrombocytopenia  Will transfuse 1 unit platelets today    CINV (chemotherapy-induced nausea  and vomiting)  - continue scheduled zofran and PRN compazine/ativan  - denies nausea this am    Chemotherapy-induced peripheral neuropathy  - continue gabapentin (increased to 600 mg TID during hospital stay) and tramadol     Hypomagnesemia  - replace PRN per protocol    Moderate malnutrition  - dietician following      Left knee pain  - s/p fall prior to admit  - x-ray with edema, no fractures  - tramadol prn ordered for pain     Essential hypertension  - continue home losartan-HCTZ  - Norvasc 5 mg daily started 3/20  - BP wnl    Type 2 diabetes mellitus without complication, without long-term current use of insulin  - will hold oral anti diabetic medications while inpatient  - blood glucose checks ACHS  - increased to MDSSI 3/26  - DM diet  - glucose <200          VTE Risk Mitigation (From admission, onward)        Ordered     heparin (porcine) injection 1,600 Units  As needed (PRN)      03/18/19 1754     IP VTE HIGH RISK PATIENT  Once      03/18/19 1702          Disposition: pending recovery from transplant    Trina Musa NP  Bone Marrow Transplant  [facility]

## 2019-03-29 NOTE — PROGRESS NOTES
Admit Assessment    Patient Identification  Lori Rivero   :  1955  Admit Date:  3/18/2019  Attending Provider:  Camden Alfaro MD              Referral:   Pt was admitted to  with a diagnosis of Status post autologous bone marrow transplant, and was admitted this hospital stay due to Multiple myeloma [C90.00]  Multiple myeloma [C90.00].   is involved was referred to the Social Work Department via routine referral.  Patient presents as a 63 y.o. year old single female.    Persons interviewed: patient    Living Situation:  Pt. Currently resides at home alone. She states that she has been independent with all adl's prior to admission. Pt. States that her daughter is available to help with needs if necessary.     Resides at [address] [address] [place] [address], phone: [phone] (home).      (RETIRED) Functional Status Prior  Ambulation Prior: 1-->assistive equipment  Transferrin-->assistive equipment  Toiletin-->assistive equipment    Current or Past Agencies and Description of Services/Supplies    DME  Agency Name: unknown  Agency Phone Number: n/a  Equipment Currently Used at Home: cane, straight    Home Health  Agency Name: none  Agency Phone Number: n/a  Services: n/a    IV Infusion  Agency Name: none  Agency Phone Number: n/a    Nutrition: oral    Outpatient Pharmacy:     [id number] - [place], [place] - [address] [place] [address]  Phone: [phone] Fax: [phone]      Patient Preference of agencies include: none noted    Patient/Caregiver informed of right to choose providers or agencies.  Patient provides permission to release any necessary information to [facility] and to [facility] agencies as needed to facilitate patient care, treatment planning, and patient discharge planning.  Written and verbal resources provided.      Coping:  pt. States that she is doing well.           Adjustment to Diagnosis and Treatment: appropriate      Emotional/Behavioral/Cognitive Issues: none noted            History/Current Symptoms of Anxiety/Depression: No:   History/Current Substance Use:   Social History     Tobacco Use    Smoking status: Never Smoker    Smokeless tobacco: Never Used   Substance and Sexual Activity    Alcohol use: No     Frequency: Never    Drug use: No    Sexual activity: Not on file       Indications of Abuse/Neglect: No:   Abuse Screen (yes response referral indicated)  Feels Unsafe at Home or Work/School: no    Financial:  Payor/Plan Subscr  Sex Relation Sub. Ins. ID Effective Group Num   1. [place] SUKHDEV FRITZ 1955 Female  [id number]                                    [address]   2. [place] SU[place]DEV FRITZ 1955 Female  [id number]* 18                                    [address] [address]                            Other identified concerns/needs: none noted    Plan: to go to her daughters home at [place]. Daughter will be full time caregiver post transplant.     Interventions/Referrals: TBD  Patient/caregiver engaged in treatment planning process.     providing psychosocial and supportive counseling, resources, education, assistance and discharge planning as appropriate.  Patient/caregiver state understanding of  available resources,  following, remains available. Provided pt. With 'er phone # and encouraged pt. To call if needed. Will follow.

## 2019-03-29 NOTE — PLAN OF CARE
Problem: Adult Inpatient Plan of Care  Goal: Plan of Care Review  Outcome: Ongoing (interventions implemented as appropriate)  Side rails up x2; call bell in place; bed in lowest, locked position; skid proof socks on; no evidence of skin breakdown; care plan explained to patient; pt remains free of injury. Pt is on day +9 of an auto SCT. Pt tolerated po, voids, one BM mixed with urine unable to send for c diff, c/o sore throat tramadol given, denies n/v. Up to BSC and stated she did exercises PT had instructed her own. CBG monitoring maintained no insulin needed. Isolation precautions maintained. One unit of platelets completed without incident. VSS and afebrile.

## 2019-03-29 NOTE — PLAN OF CARE
Problem: Adult Inpatient Plan of Care  Goal: Plan of Care Review  Outcome: Ongoing (interventions implemented as appropriate)  Plan of care reviewed with patient at the beginning of shift. Pt is day +9 AUTO SCT. The patient is complaining of a sore throat and difficulty swallowing. Bedside commode in use, unable to obtain a stool sample as of now. Pt did have 2 bowel movements. Cane at bedside. VSS. Denying all other complaints. Bed in low locked position. Call bell in reach. Will continue to monitor.

## 2019-03-29 NOTE — ASSESSMENT & PLAN NOTE
- see Multiple Myeloma  - admitted for Daija Auto SCT  - today is Day +9  - HJocelyn Rivero's transplant was Wednesday, 3/20/19 at 1330. She received 9 bags and a CD34 dose of 3.61 x 10^6/kg.

## 2019-03-30 PROBLEM — R19.7 DIARRHEA: Status: ACTIVE | Noted: 2019-03-30

## 2019-03-30 LAB
ALBUMIN SERPL BCP-MCNC: 2.8 G/DL (ref 3.5–5.2)
ALP SERPL-CCNC: 60 U/L (ref 55–135)
ALT SERPL W/O P-5'-P-CCNC: 13 U/L (ref 10–44)
ANION GAP SERPL CALC-SCNC: 12 MMOL/L (ref 8–16)
ANISOCYTOSIS BLD QL SMEAR: SLIGHT
AST SERPL-CCNC: 9 U/L (ref 10–40)
BASOPHILS # BLD AUTO: 0 K/UL (ref 0–0.2)
BASOPHILS NFR BLD: 0 % (ref 0–1.9)
BILIRUB SERPL-MCNC: 0.4 MG/DL (ref 0.1–1)
BUN SERPL-MCNC: 10 MG/DL (ref 8–23)
BURR CELLS BLD QL SMEAR: ABNORMAL
CALCIUM SERPL-MCNC: 8.7 MG/DL (ref 8.7–10.5)
CHLORIDE SERPL-SCNC: 109 MMOL/L (ref 95–110)
CO2 SERPL-SCNC: 21 MMOL/L (ref 23–29)
CREAT SERPL-MCNC: 0.5 MG/DL (ref 0.5–1.4)
DIFFERENTIAL METHOD: ABNORMAL
EOSINOPHIL # BLD AUTO: 0 K/UL (ref 0–0.5)
EOSINOPHIL NFR BLD: 0 % (ref 0–8)
ERYTHROCYTE [DISTWIDTH] IN BLOOD BY AUTOMATED COUNT: 16.5 % (ref 11.5–14.5)
EST. GFR  (AFRICAN AMERICAN): >60 ML/MIN/1.73 M^2
EST. GFR  (NON AFRICAN AMERICAN): >60 ML/MIN/1.73 M^2
GLUCOSE SERPL-MCNC: 125 MG/DL (ref 70–110)
HCT VFR BLD AUTO: 27.5 % (ref 37–48.5)
HGB BLD-MCNC: 8.8 G/DL (ref 12–16)
IMM GRANULOCYTES # BLD AUTO: 0 K/UL (ref 0–0.04)
IMM GRANULOCYTES NFR BLD AUTO: 0 % (ref 0–0.5)
LYMPHOCYTES # BLD AUTO: 0.2 K/UL (ref 1–4.8)
LYMPHOCYTES NFR BLD: 62.1 % (ref 18–48)
MAGNESIUM SERPL-MCNC: 1.7 MG/DL (ref 1.6–2.6)
MCH RBC QN AUTO: 26.3 PG (ref 27–31)
MCHC RBC AUTO-ENTMCNC: 32 G/DL (ref 32–36)
MCV RBC AUTO: 82 FL (ref 82–98)
MONOCYTES # BLD AUTO: 0.1 K/UL (ref 0.3–1)
MONOCYTES NFR BLD: 34.5 % (ref 4–15)
NEUTROPHILS # BLD AUTO: 0 K/UL (ref 1.8–7.7)
NEUTROPHILS NFR BLD: 3.4 % (ref 38–73)
NRBC BLD-RTO: 0 /100 WBC
OVALOCYTES BLD QL SMEAR: ABNORMAL
PHOSPHATE SERPL-MCNC: 2.8 MG/DL (ref 2.7–4.5)
PLATELET # BLD AUTO: 26 K/UL (ref 150–350)
PLATELET BLD QL SMEAR: ABNORMAL
PMV BLD AUTO: 10.8 FL (ref 9.2–12.9)
POCT GLUCOSE: 115 MG/DL (ref 70–110)
POCT GLUCOSE: 124 MG/DL (ref 70–110)
POCT GLUCOSE: 127 MG/DL (ref 70–110)
POIKILOCYTOSIS BLD QL SMEAR: SLIGHT
POLYCHROMASIA BLD QL SMEAR: ABNORMAL
POTASSIUM SERPL-SCNC: 3.7 MMOL/L (ref 3.5–5.1)
PROT SERPL-MCNC: 5.6 G/DL (ref 6–8.4)
RBC # BLD AUTO: 3.35 M/UL (ref 4–5.4)
SODIUM SERPL-SCNC: 142 MMOL/L (ref 136–145)
WBC # BLD AUTO: 0.29 K/UL (ref 3.9–12.7)

## 2019-03-30 PROCEDURE — 83735 ASSAY OF MAGNESIUM: CPT

## 2019-03-30 PROCEDURE — 63600175 PHARM REV CODE 636 W HCPCS: Mod: JG | Performed by: INTERNAL MEDICINE

## 2019-03-30 PROCEDURE — A9155 ARTIFICIAL SALIVA: HCPCS | Performed by: INTERNAL MEDICINE

## 2019-03-30 PROCEDURE — 85025 COMPLETE CBC W/AUTO DIFF WBC: CPT

## 2019-03-30 PROCEDURE — 94799 UNLISTED PULMONARY SVC/PX: CPT

## 2019-03-30 PROCEDURE — 25000003 PHARM REV CODE 250: Performed by: NURSE PRACTITIONER

## 2019-03-30 PROCEDURE — 99233 PR SUBSEQUENT HOSPITAL CARE,LEVL III: ICD-10-PCS | Mod: ,,, | Performed by: INTERNAL MEDICINE

## 2019-03-30 PROCEDURE — 63600175 PHARM REV CODE 636 W HCPCS: Performed by: INTERNAL MEDICINE

## 2019-03-30 PROCEDURE — 20600001 HC STEP DOWN PRIVATE ROOM

## 2019-03-30 PROCEDURE — 99233 SBSQ HOSP IP/OBS HIGH 50: CPT | Mod: ,,, | Performed by: INTERNAL MEDICINE

## 2019-03-30 PROCEDURE — 25000003 PHARM REV CODE 250: Performed by: STUDENT IN AN ORGANIZED HEALTH CARE EDUCATION/TRAINING PROGRAM

## 2019-03-30 PROCEDURE — 84100 ASSAY OF PHOSPHORUS: CPT

## 2019-03-30 PROCEDURE — 94761 N-INVAS EAR/PLS OXIMETRY MLT: CPT

## 2019-03-30 PROCEDURE — 25000003 PHARM REV CODE 250: Performed by: INTERNAL MEDICINE

## 2019-03-30 PROCEDURE — 63600175 PHARM REV CODE 636 W HCPCS: Performed by: NURSE PRACTITIONER

## 2019-03-30 PROCEDURE — 80053 COMPREHEN METABOLIC PANEL: CPT

## 2019-03-30 RX ADMIN — GABAPENTIN 600 MG: 300 CAPSULE ORAL at 09:03

## 2019-03-30 RX ADMIN — Medication 10 ML: at 11:03

## 2019-03-30 RX ADMIN — ACYCLOVIR 800 MG: 200 CAPSULE ORAL at 08:03

## 2019-03-30 RX ADMIN — FILGRASTIM 480 MCG: 480 INJECTION, SOLUTION INTRAVENOUS; SUBCUTANEOUS at 09:03

## 2019-03-30 RX ADMIN — CETIRIZINE HYDROCHLORIDE 10 MG: 5 TABLET ORAL at 09:03

## 2019-03-30 RX ADMIN — LEVOFLOXACIN 500 MG: 500 TABLET, FILM COATED ORAL at 09:03

## 2019-03-30 RX ADMIN — Medication 30 ML: at 06:03

## 2019-03-30 RX ADMIN — Medication 10 ML: at 04:03

## 2019-03-30 RX ADMIN — SODIUM CHLORIDE AND POTASSIUM CHLORIDE: 9; 1.49 INJECTION, SOLUTION INTRAVENOUS at 05:03

## 2019-03-30 RX ADMIN — Medication 10 ML: at 05:03

## 2019-03-30 RX ADMIN — Medication 400 MG: at 09:03

## 2019-03-30 RX ADMIN — SODIUM CHLORIDE AND POTASSIUM CHLORIDE: 9; 1.49 INJECTION, SOLUTION INTRAVENOUS at 06:03

## 2019-03-30 RX ADMIN — FLUCONAZOLE 400 MG: 200 TABLET ORAL at 09:03

## 2019-03-30 RX ADMIN — OLOPATADINE HYDROCHLORIDE 1 DROP: 2 SOLUTION OPHTHALMIC at 01:03

## 2019-03-30 RX ADMIN — ACYCLOVIR 800 MG: 200 CAPSULE ORAL at 09:03

## 2019-03-30 RX ADMIN — TRAMADOL HYDROCHLORIDE 50 MG: 50 TABLET, COATED ORAL at 06:03

## 2019-03-30 RX ADMIN — Medication 30 ML: at 01:03

## 2019-03-30 RX ADMIN — PANTOPRAZOLE SODIUM 40 MG: 40 TABLET, DELAYED RELEASE ORAL at 09:03

## 2019-03-30 RX ADMIN — ONDANSETRON 8 MG: 2 INJECTION INTRAMUSCULAR; INTRAVENOUS at 05:03

## 2019-03-30 RX ADMIN — ONDANSETRON 8 MG: 2 INJECTION INTRAMUSCULAR; INTRAVENOUS at 01:03

## 2019-03-30 RX ADMIN — Medication 30 ML: at 08:03

## 2019-03-30 RX ADMIN — ONDANSETRON 8 MG: 2 INJECTION INTRAMUSCULAR; INTRAVENOUS at 09:03

## 2019-03-30 RX ADMIN — AMLODIPINE BESYLATE 5 MG: 5 TABLET ORAL at 09:03

## 2019-03-30 RX ADMIN — Medication 30 ML: at 09:03

## 2019-03-30 RX ADMIN — Medication 400 MG: at 05:03

## 2019-03-30 RX ADMIN — LOSARTAN POTASSIUM AND HYDROCHLOROTHIAZIDE 1 TABLET: 12.5; 1 TABLET ORAL at 09:03

## 2019-03-30 RX ADMIN — TRAMADOL HYDROCHLORIDE 50 MG: 50 TABLET, COATED ORAL at 09:03

## 2019-03-30 RX ADMIN — POTASSIUM CHLORIDE 20 MEQ: 1500 TABLET, EXTENDED RELEASE ORAL at 09:03

## 2019-03-30 RX ADMIN — GABAPENTIN 600 MG: 300 CAPSULE ORAL at 03:03

## 2019-03-30 RX ADMIN — GABAPENTIN 600 MG: 300 CAPSULE ORAL at 08:03

## 2019-03-30 NOTE — PLAN OF CARE
Problem: Adult Inpatient Plan of Care  Goal: Plan of Care Review  Outcome: Ongoing (interventions implemented as appropriate)  Plan of care reviewed with the patient at the beginning of the shift. Pt is day +10 AUTO SCT. The patient is complaining of loose stools. Still unable to obtained a sample due to urine and stool mix. Pt is also complaining of a sore throat. Bonner administered. Slept throughout most of shift. VSS. Bed in low position. Call bell in reach. Will continue to monitor.

## 2019-03-30 NOTE — SUBJECTIVE & OBJECTIVE
Subjective:     Interval History: Day +10 Daija Auto. + mucositis. Intermittent diarrhea, c diff not collected given urine in sample. Ate gumbo + grilled cheese last night.    Objective:     Vital Signs (Most Recent):  Temp: 98.9 °F (37.2 °C) (03/30/19 1556)  Pulse: 107 (03/30/19 1556)  Resp: 19 (03/30/19 1556)  BP: (!) 148/68 (03/30/19 1556)  SpO2: 95 % (03/30/19 1556) Vital Signs (24h Range):  Temp:  [98.3 °F (36.8 °C)-99.2 °F (37.3 °C)] 98.9 °F (37.2 °C)  Pulse:  [] 107  Resp:  [16-20] 19  SpO2:  [95 %-99 %] 95 %  BP: (130-148)/(63-70) 148/68     Weight: 75.2 kg (165 lb 12.6 oz)  Body mass index is 28.46 kg/m².  Body surface area is 1.84 meters squared.      Intake/Output - Last 3 Shifts       03/28 0700 - 03/29 0659 03/29 0700 - 03/30 0659 03/30 0700 - 03/31 0659    P.O. 120 780 360    I.V. (mL/kg) 1891.8 (25.1) 1793.8 (23.9)     Blood  286     Total Intake(mL/kg) 2011.8 (26.7) 2859.8 (38) 360 (4.8)    Urine (mL/kg/hr) 200 (0.1) 400 (0.2) 400 (0.6)    Other 1650 1000     Stool 0 0     Total Output 1850 1400 400    Net +161.8 +1459.8 -40           Urine Occurrence 2 x 1 x     Stool Occurrence 3 x 1 x           Physical Exam   Constitutional: She is oriented to person, place, and time. She appears well-developed and well-nourished.   HENT:   Head: Normocephalic and atraumatic.   Right Ear: External ear normal.   Left Ear: External ear normal.   Mouth/Throat: Posterior oropharyngeal erythema present.   Eyes: Pupils are equal, round, and reactive to light. Conjunctivae and EOM are normal.   Neck: Normal range of motion. Neck supple.   Cardiovascular: Normal rate, regular rhythm, normal heart sounds and intact distal pulses.   Pulmonary/Chest: Effort normal and breath sounds normal.   Abdominal: Soft. Bowel sounds are normal.   Musculoskeletal: She exhibits no edema.   Neurological: She is alert and oriented to person, place, and time.   Skin: Skin is warm and dry.   Vascath intact to RCW, no redness or  drainage   Psychiatric: She has a normal mood and affect. Her behavior is normal. Judgment and thought content normal.   Nursing note and vitals reviewed.      Significant Labs:   CBC:   Recent Labs   Lab 03/29/19  0530 03/30/19  0454   WBC 0.11* 0.29*   HGB 9.0* 8.8*   HCT 28.0* 27.5*   PLT 3* 26*    and CMP:   Recent Labs   Lab 03/29/19  0530 03/30/19  0454    142   K 3.8 3.7    109   CO2 22* 21*   * 125*   BUN 9 10   CREATININE 0.5 0.5   CALCIUM 8.5* 8.7   PROT 5.3* 5.6*   ALBUMIN 2.7* 2.8*   BILITOT 0.4 0.4   ALKPHOS 62 60   AST 12 9*   ALT 14 13   ANIONGAP 11 12   EGFRNONAA >60.0 >60.0       Diagnostic Results:  None

## 2019-03-30 NOTE — PLAN OF CARE
Problem: Adult Inpatient Plan of Care  Goal: Plan of Care Review  Outcome: Ongoing (interventions implemented as appropriate)  Side rails up x2; call bell in place; bed in lowest, locked position; skid proof socks on; no evidence of skin breakdown; care plan explained to patient; pt remains free of injury. Pt is on day +10 of an auto SCT. Pt tolerated po but with poor appetite, voids, one BM mixed with urine, c/o sore throat tramadol and dukes given, denies n/v, up to BSC. CBG monitoring maintained no insulin needed. Isolation precautions maintained. VSS and afebrile.

## 2019-03-30 NOTE — ASSESSMENT & PLAN NOTE
- will hold oral anti diabetic medications while inpatient  - blood glucose checks ACHS  - increased to [facility] 3/26  - DM diet  - glucose <200

## 2019-03-30 NOTE — ASSESSMENT & PLAN NOTE
- see Multiple Myeloma  - admitted for Daija Auto SCT  - today is Day +10  - HJocelyn Rivero's transplant was Wednesday, 3/20/19 at 1330. She received 9 bags and a CD34 dose of 3.61 x 10^6/kg.

## 2019-03-30 NOTE — ASSESSMENT & PLAN NOTE
-IgG kappa MM; ISS 2; standard risk cytogenetics; with lytic bone lesions at presentation; initiate VRd (21 day cycle) with Dr. Juan at [facility]; currently mid cycle  5    -she has achieved VGPR  -pre transplant eval with no contraindication for transplant   -never smoker so plan to follow up LL nodule at repeat PET scan at day +100  -Admitted for Daija auto SCT. Today is Day +10  -Neupogen and ppx antimicrobials per protocol.

## 2019-03-30 NOTE — PROGRESS NOTES
[facility]  Hematology  Bone Marrow Transplant  Progress Note    Patient Name: Lori Rivero  Admission Date: 3/18/2019  Hospital Length of Stay: 12 days  Code Status: Full Code    Subjective:     Interval History: Day +10 Daija Auto. + mucositis. Intermittent diarrhea, c diff not collected given urine in sample. Ate gumbo + grilled cheese last night.    Objective:     Vital Signs (Most Recent):  Temp: 98.9 °F (37.2 °C) (03/30/19 1556)  Pulse: 107 (03/30/19 1556)  Resp: 19 (03/30/19 1556)  BP: (!) 148/68 (03/30/19 1556)  SpO2: 95 % (03/30/19 1556) Vital Signs (24h Range):  Temp:  [98.3 °F (36.8 °C)-99.2 °F (37.3 °C)] 98.9 °F (37.2 °C)  Pulse:  [] 107  Resp:  [16-20] 19  SpO2:  [95 %-99 %] 95 %  BP: (130-148)/(63-70) 148/68     Weight: 75.2 kg (165 lb 12.6 oz)  Body mass index is 28.46 kg/m².  Body surface area is 1.84 meters squared.      Intake/Output - Last 3 Shifts       03/28 0700 - 03/29 0659 03/29 0700 - 03/30 0659 03/30 0700 - 03/31 0659    P.O. 120 780 360    I.V. (mL/kg) 1891.8 (25.1) 1793.8 (23.9)     Blood  286     Total Intake(mL/kg) 2011.8 (26.7) 2859.8 (38) 360 (4.8)    Urine (mL/kg/hr) 200 (0.1) 400 (0.2) 400 (0.6)    Other 1650 1000     Stool 0 0     Total Output 1850 1400 400    Net +161.8 +1459.8 -40           Urine Occurrence 2 x 1 x     Stool Occurrence 3 x 1 x           Physical Exam   Constitutional: She is oriented to person, place, and time. She appears well-developed and well-nourished.   HENT:   Head: Normocephalic and atraumatic.   Right Ear: External ear normal.   Left Ear: External ear normal.   Mouth/Throat: Posterior oropharyngeal erythema present.   Eyes: Pupils are equal, round, and reactive to light. Conjunctivae and EOM are normal.   Neck: Normal range of motion. Neck supple.   Cardiovascular: Normal rate, regular rhythm, normal heart sounds and intact distal pulses.   Pulmonary/Chest: Effort normal and breath sounds normal.   Abdominal: Soft. Bowel  sounds are normal.   Musculoskeletal: She exhibits no edema.   Neurological: She is alert and oriented to person, place, and time.   Skin: Skin is warm and dry.   Vascath intact to RCW, no redness or drainage   Psychiatric: She has a normal mood and affect. Her behavior is normal. Judgment and thought content normal.   Nursing note and vitals reviewed.      Significant Labs:   CBC:   Recent Labs   Lab 03/29/19  0530 03/30/19  0454   WBC 0.11* 0.29*   HGB 9.0* 8.8*   HCT 28.0* 27.5*   PLT 3* 26*    and CMP:   Recent Labs   Lab 03/29/19  0530 03/30/19  0454    142   K 3.8 3.7    109   CO2 22* 21*   * 125*   BUN 9 10   CREATININE 0.5 0.5   CALCIUM 8.5* 8.7   PROT 5.3* 5.6*   ALBUMIN 2.7* 2.8*   BILITOT 0.4 0.4   ALKPHOS 62 60   AST 12 9*   ALT 14 13   ANIONGAP 11 12   EGFRNONAA >60.0 >60.0       Diagnostic Results:  None    Assessment/Plan:     * Status post autologous bone marrow transplant  - see Multiple Myeloma  - admitted for Daija Auto SCT  - today is Day +10  - H. Mat's transplant was Wednesday, 3/20/19 at 1330. She received 9 bags and a CD34 dose of 3.61 x 10^6/kg.    Diarrhea  - Awaiting c diff collection  - On NS at 75cc/hr    CINV (chemotherapy-induced nausea and vomiting)  - continue scheduled zofran and PRN compazine/ativan  - denies nausea this am    Pancytopenia due to chemotherapy  Transfuse Hb for <7 and platelets <10  Lovenox on hold for thrombocytopenia    Hypomagnesemia  - replace PRN per protocol    Moderate malnutrition  - dietician following      Left knee pain  - s/p fall prior to admit  - x-ray with edema, no fractures  - tramadol prn ordered for pain     Chemotherapy-induced peripheral neuropathy  - continue gabapentin (increased to 600 mg TID during hospital stay) and tramadol     Essential hypertension  - continue home losartan-HCTZ  - Norvasc 5 mg daily started 3/20  - BP wnl    Type 2 diabetes mellitus without complication, without long-term current use of insulin  -  will hold oral anti diabetic medications while inpatient  - blood glucose checks ACHS  - increased to [facility] 3/26  - DM diet  - glucose <200      Multiple myeloma  -IgG kappa MM; ISS 2; standard risk cytogenetics; with lytic bone lesions at presentation; initiate VRd (21 day cycle) with Dr. Juan at [facility]; currently mid cycle  5    -she has achieved VGPR  -pre transplant eval with no contraindication for transplant   -never smoker so plan to follow up LL nodule at repeat PET scan at day +100  -Admitted for Daija auto SCT. Today is Day +10  -Neupogen and ppx antimicrobials per protocol.             VTE Risk Mitigation (From admission, onward)        Ordered     heparin (porcine) injection 1,600 Units  As needed (PRN)      03/18/19 1754     IP VTE HIGH RISK PATIENT  Once      03/18/19 1702          Disposition: pending count recovery    The following was staffed and discussed with supervising physician Dr. Feng.    Emily Simms MD  Hematology/Oncology fellow

## 2019-03-31 LAB
ABO + RH BLD: NORMAL
ALBUMIN SERPL BCP-MCNC: 2.7 G/DL (ref 3.5–5.2)
ALP SERPL-CCNC: 60 U/L (ref 55–135)
ALT SERPL W/O P-5'-P-CCNC: 10 U/L (ref 10–44)
ANION GAP SERPL CALC-SCNC: 12 MMOL/L (ref 8–16)
AST SERPL-CCNC: 7 U/L (ref 10–40)
BASOPHILS # BLD AUTO: 0 K/UL (ref 0–0.2)
BASOPHILS NFR BLD: 0 % (ref 0–1.9)
BILIRUB SERPL-MCNC: 0.5 MG/DL (ref 0.1–1)
BLD GP AB SCN CELLS X3 SERPL QL: NORMAL
BUN SERPL-MCNC: 11 MG/DL (ref 8–23)
CALCIUM SERPL-MCNC: 8.5 MG/DL (ref 8.7–10.5)
CHLORIDE SERPL-SCNC: 110 MMOL/L (ref 95–110)
CO2 SERPL-SCNC: 22 MMOL/L (ref 23–29)
CREAT SERPL-MCNC: 0.5 MG/DL (ref 0.5–1.4)
DIFFERENTIAL METHOD: ABNORMAL
EOSINOPHIL # BLD AUTO: 0 K/UL (ref 0–0.5)
EOSINOPHIL NFR BLD: 0 % (ref 0–8)
ERYTHROCYTE [DISTWIDTH] IN BLOOD BY AUTOMATED COUNT: 16.5 % (ref 11.5–14.5)
EST. GFR  (AFRICAN AMERICAN): >60 ML/MIN/1.73 M^2
EST. GFR  (NON AFRICAN AMERICAN): >60 ML/MIN/1.73 M^2
GLUCOSE SERPL-MCNC: 114 MG/DL (ref 70–110)
HCT VFR BLD AUTO: 26.4 % (ref 37–48.5)
HGB BLD-MCNC: 8.6 G/DL (ref 12–16)
IMM GRANULOCYTES # BLD AUTO: 0.01 K/UL (ref 0–0.04)
IMM GRANULOCYTES NFR BLD AUTO: 1.5 % (ref 0–0.5)
LYMPHOCYTES # BLD AUTO: 0.4 K/UL (ref 1–4.8)
LYMPHOCYTES NFR BLD: 53 % (ref 18–48)
MAGNESIUM SERPL-MCNC: 1.7 MG/DL (ref 1.6–2.6)
MCH RBC QN AUTO: 26.6 PG (ref 27–31)
MCHC RBC AUTO-ENTMCNC: 32.6 G/DL (ref 32–36)
MCV RBC AUTO: 82 FL (ref 82–98)
MONOCYTES # BLD AUTO: 0.2 K/UL (ref 0.3–1)
MONOCYTES NFR BLD: 31.8 % (ref 4–15)
NEUTROPHILS # BLD AUTO: 0.1 K/UL (ref 1.8–7.7)
NEUTROPHILS NFR BLD: 13.7 % (ref 38–73)
NRBC BLD-RTO: 0 /100 WBC
PHOSPHATE SERPL-MCNC: 2.5 MG/DL (ref 2.7–4.5)
PLATELET # BLD AUTO: 15 K/UL (ref 150–350)
PLATELET BLD QL SMEAR: ABNORMAL
PMV BLD AUTO: ABNORMAL FL (ref 9.2–12.9)
POCT GLUCOSE: 117 MG/DL (ref 70–110)
POCT GLUCOSE: 137 MG/DL (ref 70–110)
POCT GLUCOSE: 88 MG/DL (ref 70–110)
POCT GLUCOSE: 95 MG/DL (ref 70–110)
POTASSIUM SERPL-SCNC: 3.4 MMOL/L (ref 3.5–5.1)
PROT SERPL-MCNC: 5.3 G/DL (ref 6–8.4)
RBC # BLD AUTO: 3.23 M/UL (ref 4–5.4)
SODIUM SERPL-SCNC: 144 MMOL/L (ref 136–145)
WBC # BLD AUTO: 0.66 K/UL (ref 3.9–12.7)

## 2019-03-31 PROCEDURE — 20600001 HC STEP DOWN PRIVATE ROOM

## 2019-03-31 PROCEDURE — 25000003 PHARM REV CODE 250: Performed by: NURSE PRACTITIONER

## 2019-03-31 PROCEDURE — 84100 ASSAY OF PHOSPHORUS: CPT

## 2019-03-31 PROCEDURE — 25000003 PHARM REV CODE 250: Performed by: INTERNAL MEDICINE

## 2019-03-31 PROCEDURE — 63600175 PHARM REV CODE 636 W HCPCS: Performed by: NURSE PRACTITIONER

## 2019-03-31 PROCEDURE — 99233 PR SUBSEQUENT HOSPITAL CARE,LEVL III: ICD-10-PCS | Mod: ,,, | Performed by: INTERNAL MEDICINE

## 2019-03-31 PROCEDURE — 63600175 PHARM REV CODE 636 W HCPCS: Performed by: INTERNAL MEDICINE

## 2019-03-31 PROCEDURE — 25000003 PHARM REV CODE 250: Performed by: STUDENT IN AN ORGANIZED HEALTH CARE EDUCATION/TRAINING PROGRAM

## 2019-03-31 PROCEDURE — 80053 COMPREHEN METABOLIC PANEL: CPT

## 2019-03-31 PROCEDURE — 87449 NOS EACH ORGANISM AG IA: CPT

## 2019-03-31 PROCEDURE — 86901 BLOOD TYPING SEROLOGIC RH(D): CPT

## 2019-03-31 PROCEDURE — A9155 ARTIFICIAL SALIVA: HCPCS | Performed by: INTERNAL MEDICINE

## 2019-03-31 PROCEDURE — 83735 ASSAY OF MAGNESIUM: CPT

## 2019-03-31 PROCEDURE — 85025 COMPLETE CBC W/AUTO DIFF WBC: CPT

## 2019-03-31 PROCEDURE — 99233 SBSQ HOSP IP/OBS HIGH 50: CPT | Mod: ,,, | Performed by: INTERNAL MEDICINE

## 2019-03-31 RX ORDER — GABAPENTIN 300 MG/1
900 CAPSULE ORAL 3 TIMES DAILY
Status: DISCONTINUED | OUTPATIENT
Start: 2019-03-31 | End: 2019-04-02 | Stop reason: HOSPADM

## 2019-03-31 RX ADMIN — Medication 10 ML: at 02:03

## 2019-03-31 RX ADMIN — ONDANSETRON 8 MG: 2 INJECTION INTRAMUSCULAR; INTRAVENOUS at 10:03

## 2019-03-31 RX ADMIN — POTASSIUM & SODIUM PHOSPHATES POWDER PACK 280-160-250 MG 1 PACKET: 280-160-250 PACK at 06:03

## 2019-03-31 RX ADMIN — ONDANSETRON 8 MG: 2 INJECTION INTRAMUSCULAR; INTRAVENOUS at 02:03

## 2019-03-31 RX ADMIN — Medication 30 ML: at 08:03

## 2019-03-31 RX ADMIN — POTASSIUM CHLORIDE 20 MEQ: 1500 TABLET, EXTENDED RELEASE ORAL at 06:03

## 2019-03-31 RX ADMIN — ACYCLOVIR 800 MG: 200 CAPSULE ORAL at 08:03

## 2019-03-31 RX ADMIN — POTASSIUM CHLORIDE 20 MEQ: 1500 TABLET, EXTENDED RELEASE ORAL at 09:03

## 2019-03-31 RX ADMIN — AMLODIPINE BESYLATE 5 MG: 5 TABLET ORAL at 09:03

## 2019-03-31 RX ADMIN — GABAPENTIN 600 MG: 300 CAPSULE ORAL at 09:03

## 2019-03-31 RX ADMIN — OLOPATADINE HYDROCHLORIDE 1 DROP: 2 SOLUTION OPHTHALMIC at 09:03

## 2019-03-31 RX ADMIN — FLUCONAZOLE 400 MG: 200 TABLET ORAL at 09:03

## 2019-03-31 RX ADMIN — CETIRIZINE HYDROCHLORIDE 10 MG: 5 TABLET ORAL at 09:03

## 2019-03-31 RX ADMIN — Medication 10 ML: at 05:03

## 2019-03-31 RX ADMIN — TRAMADOL HYDROCHLORIDE 50 MG: 50 TABLET, COATED ORAL at 02:03

## 2019-03-31 RX ADMIN — Medication 400 MG: at 06:03

## 2019-03-31 RX ADMIN — SODIUM CHLORIDE AND POTASSIUM CHLORIDE: 9; 1.49 INJECTION, SOLUTION INTRAVENOUS at 04:03

## 2019-03-31 RX ADMIN — GABAPENTIN 600 MG: 300 CAPSULE ORAL at 02:03

## 2019-03-31 RX ADMIN — Medication 30 ML: at 09:03

## 2019-03-31 RX ADMIN — FILGRASTIM 480 MCG: 480 INJECTION, SOLUTION INTRAVENOUS; SUBCUTANEOUS at 09:03

## 2019-03-31 RX ADMIN — ACYCLOVIR 800 MG: 200 CAPSULE ORAL at 09:03

## 2019-03-31 RX ADMIN — POTASSIUM & SODIUM PHOSPHATES POWDER PACK 280-160-250 MG 1 PACKET: 280-160-250 PACK at 09:03

## 2019-03-31 RX ADMIN — Medication 30 ML: at 02:03

## 2019-03-31 RX ADMIN — ONDANSETRON 8 MG: 2 INJECTION INTRAMUSCULAR; INTRAVENOUS at 05:03

## 2019-03-31 RX ADMIN — GABAPENTIN 900 MG: 300 CAPSULE ORAL at 08:03

## 2019-03-31 RX ADMIN — SODIUM CHLORIDE AND POTASSIUM CHLORIDE: 9; 1.49 INJECTION, SOLUTION INTRAVENOUS at 05:03

## 2019-03-31 RX ADMIN — Medication 30 ML: at 05:03

## 2019-03-31 RX ADMIN — PANTOPRAZOLE SODIUM 40 MG: 40 TABLET, DELAYED RELEASE ORAL at 09:03

## 2019-03-31 RX ADMIN — Medication 400 MG: at 09:03

## 2019-03-31 RX ADMIN — LOSARTAN POTASSIUM AND HYDROCHLOROTHIAZIDE 1 TABLET: 12.5; 1 TABLET ORAL at 09:03

## 2019-03-31 RX ADMIN — POTASSIUM & SODIUM PHOSPHATES POWDER PACK 280-160-250 MG 1 PACKET: 280-160-250 PACK at 05:03

## 2019-03-31 RX ADMIN — POTASSIUM & SODIUM PHOSPHATES POWDER PACK 280-160-250 MG 1 PACKET: 280-160-250 PACK at 02:03

## 2019-03-31 RX ADMIN — LEVOFLOXACIN 500 MG: 500 TABLET, FILM COATED ORAL at 09:03

## 2019-03-31 NOTE — PROGRESS NOTES
[facility]  Hematology  Bone Marrow Transplant  Progress Note    Patient Name: Lori Rivero  Admission Date: 3/18/2019  Hospital Length of Stay: 13 days  Code Status: Full Code    Subjective:     Interval History: Day +11 Daija Auto. Decreased PO intake, had 4-5 diarrhea episodes yesterday, unable to collect c diff. None overnight. Denies any pain. Afebrile. Mucositis improving.    Objective:     Vital Signs (Most Recent):  Temp: 98.6 °F (37 °C) (03/31/19 0723)  Pulse: 93 (03/31/19 0723)  Resp: 18 (03/31/19 0723)  BP: 132/64 (03/31/19 0723)  SpO2: 95 % (03/31/19 0723) Vital Signs (24h Range):  Temp:  [98.3 °F (36.8 °C)-98.9 °F (37.2 °C)] 98.6 °F (37 °C)  Pulse:  [] 93  Resp:  [16-19] 18  SpO2:  [95 %-97 %] 95 %  BP: (130-148)/(63-69) 132/64     Weight: 75.1 kg (165 lb 9.1 oz)  Body mass index is 28.42 kg/m².  Body surface area is 1.84 meters squared.      Intake/Output - Last 3 Shifts       03/29 0700 - 03/30 0659 03/30 0700 - 03/31 0659 03/31 0700 - 04/01 0659    P.O. 780 1170     I.V. (mL/kg) 1793.8 (23.9) 1788.8 (23.8)     Blood 286      Total Intake(mL/kg) 2859.8 (38) 2958.8 (39.4)     Urine (mL/kg/hr) 400 (0.2) 950 (0.5) 200 (0.9)    Other 1000 700     Stool 0 1 0    Total Output 1400 1651 200    Net +1459.8 +1307.8 -200           Urine Occurrence 1 x 1 x     Stool Occurrence 1 x 1 x 1 x          Physical Exam   Constitutional: She is oriented to person, place, and time. She appears well-developed and well-nourished.   HENT:   Head: Normocephalic and atraumatic.   Right Ear: External ear normal.   Left Ear: External ear normal.   Mouth/Throat: No posterior oropharyngeal erythema.   Eyes: Pupils are equal, round, and reactive to light. Conjunctivae and EOM are normal.   Neck: Normal range of motion. Neck supple.   Cardiovascular: Normal rate, regular rhythm, normal heart sounds and intact distal pulses.   Pulmonary/Chest: Effort normal and breath sounds normal.   Abdominal: Soft.  Bowel sounds are normal.   Musculoskeletal: She exhibits no edema.   Neurological: She is alert and oriented to person, place, and time.   Skin: Skin is warm and dry.   Vascath intact to RCW, no redness or drainage   Psychiatric: She has a normal mood and affect. Her behavior is normal. Judgment and thought content normal.   Nursing note and vitals reviewed.      Significant Labs:   CBC:   Recent Labs   Lab 03/30/19  0454 03/31/19  0440   WBC 0.29* 0.66*   HGB 8.8* 8.6*   HCT 27.5* 26.4*   PLT 26* 15*    and CMP:   Recent Labs   Lab 03/30/19  0454 03/31/19  0440    144   K 3.7 3.4*    110   CO2 21* 22*   * 114*   BUN 10 11   CREATININE 0.5 0.5   CALCIUM 8.7 8.5*   PROT 5.6* 5.3*   ALBUMIN 2.8* 2.7*   BILITOT 0.4 0.5   ALKPHOS 60 60   AST 9* 7*   ALT 13 10   ANIONGAP 12 12   EGFRNONAA >60.0 >60.0       Diagnostic Results:  None    Assessment/Plan:     * Status post autologous bone marrow transplant  - see Multiple Myeloma  - admitted for Daija Auto SCT  - today is Day +11  - HJocelyn Rivero's transplant was Wednesday, 3/20/19 at 1330. She received 9 bags and a CD34 dose of 3.61 x 10^6/kg.    Diarrhea  - Awaiting c diff collection  - On NS at 75cc/hr    CINV (chemotherapy-induced nausea and vomiting)  - continue scheduled zofran and PRN compazine/ativan  - denies nausea this am    Pancytopenia due to chemotherapy  Transfuse Hb for <7 and platelets <10  Lovenox on hold for thrombocytopenia    Hypomagnesemia  - replace PRN per protocol    Moderate malnutrition  - dietician following      Left knee pain  - s/p fall prior to admit  - x-ray with edema, no fractures  - tramadol prn ordered for pain     Chemotherapy-induced peripheral neuropathy  - continue gabapentin (increased to 600 mg TID during hospital stay) and tramadol     Essential hypertension  - continue home losartan-HCTZ  - Norvasc 5 mg daily started 3/20  - BP wnl    Type 2 diabetes mellitus without complication, without long-term current use of  insulin  - will hold oral anti diabetic medications while inpatient  - blood glucose checks ACHS  - increased to [facility] 3/26  - DM diet  - glucose <200      Multiple myeloma  -IgG kappa MM; ISS 2; standard risk cytogenetics; with lytic bone lesions at presentation; initiate VRd (21 day cycle) with Dr. Juan at [facility]; currently mid cycle  5    -she has achieved VGPR  -pre transplant eval with no contraindication for transplant   -never smoker so plan to follow up LL nodule at repeat PET scan at day +100  -Admitted for Daija auto SCT. Today is Day +11, counts improving  -Neupogen and ppx antimicrobials per protocol.             VTE Risk Mitigation (From admission, onward)        Ordered     heparin (porcine) injection 1,600 Units  As needed (PRN)      03/18/19 1754     IP VTE HIGH RISK PATIENT  Once      03/18/19 1702          Disposition: pending count recovery    The following was staffed and discussed with supervising physician Dr. Feng.    Emily Simms MD  Hematology/Oncology fellow

## 2019-03-31 NOTE — ASSESSMENT & PLAN NOTE
-IgG kappa MM; ISS 2; standard risk cytogenetics; with lytic bone lesions at presentation; initiate VRd (21 day cycle) with Dr. Juan at [facility]; currently mid cycle  5    -she has achieved VGPR  -pre transplant eval with no contraindication for transplant   -never smoker so plan to follow up LL nodule at repeat PET scan at day +100  -Admitted for Daija auto SCT. Today is Day +11, counts improving  -Neupogen and ppx antimicrobials per protocol.

## 2019-03-31 NOTE — PLAN OF CARE
Problem: Adult Inpatient Plan of Care  Goal: Plan of Care Review  Outcome: Ongoing (interventions implemented as appropriate)  Plan of care reviewed with the patient at the beginning of shift. Pt is Day +11 Auto. Complaining of a sore throat. PRN dukes administered. Up to bedside commode. Still unable to obtain a stool sample. No BM during night shift. No other complaints. VSS. Bed in low locked position. Call bell within reach. Will continue to monitor.

## 2019-03-31 NOTE — ASSESSMENT & PLAN NOTE
- see Multiple Myeloma  - admitted for Daija Auto SCT  - today is Day +11  - HJocelyn Rivero's transplant was Wednesday, 3/20/19 at 1330. She received 9 bags and a CD34 dose of 3.61 x 10^6/kg.

## 2019-03-31 NOTE — SUBJECTIVE & OBJECTIVE
Subjective:     Interval History: Day +11 Daija Auto. Decreased PO intake, had 4-5 diarrhea episodes yesterday, unable to collect c diff. None overnight. Denies any pain. Afebrile. Mucositis improving.    Objective:     Vital Signs (Most Recent):  Temp: 98.6 °F (37 °C) (03/31/19 0723)  Pulse: 93 (03/31/19 0723)  Resp: 18 (03/31/19 0723)  BP: 132/64 (03/31/19 0723)  SpO2: 95 % (03/31/19 0723) Vital Signs (24h Range):  Temp:  [98.3 °F (36.8 °C)-98.9 °F (37.2 °C)] 98.6 °F (37 °C)  Pulse:  [] 93  Resp:  [16-19] 18  SpO2:  [95 %-97 %] 95 %  BP: (130-148)/(63-69) 132/64     Weight: 75.1 kg (165 lb 9.1 oz)  Body mass index is 28.42 kg/m².  Body surface area is 1.84 meters squared.      Intake/Output - Last 3 Shifts       03/29 0700 - 03/30 0659 03/30 0700 - 03/31 0659 03/31 0700 - 04/01 0659    P.O. 780 1170     I.V. (mL/kg) 1793.8 (23.9) 1788.8 (23.8)     Blood 286      Total Intake(mL/kg) 2859.8 (38) 2958.8 (39.4)     Urine (mL/kg/hr) 400 (0.2) 950 (0.5) 200 (0.9)    Other 1000 700     Stool 0 1 0    Total Output 1400 1651 200    Net +1459.8 +1307.8 -200           Urine Occurrence 1 x 1 x     Stool Occurrence 1 x 1 x 1 x          Physical Exam   Constitutional: She is oriented to person, place, and time. She appears well-developed and well-nourished.   HENT:   Head: Normocephalic and atraumatic.   Right Ear: External ear normal.   Left Ear: External ear normal.   Mouth/Throat: No posterior oropharyngeal erythema.   Eyes: Pupils are equal, round, and reactive to light. Conjunctivae and EOM are normal.   Neck: Normal range of motion. Neck supple.   Cardiovascular: Normal rate, regular rhythm, normal heart sounds and intact distal pulses.   Pulmonary/Chest: Effort normal and breath sounds normal.   Abdominal: Soft. Bowel sounds are normal.   Musculoskeletal: She exhibits no edema.   Neurological: She is alert and oriented to person, place, and time.   Skin: Skin is warm and dry.   Vascath intact to RCW, no redness or  drainage   Psychiatric: She has a normal mood and affect. Her behavior is normal. Judgment and thought content normal.   Nursing note and vitals reviewed.      Significant Labs:   CBC:   Recent Labs   Lab 03/30/19  0454 03/31/19  0440   WBC 0.29* 0.66*   HGB 8.8* 8.6*   HCT 27.5* 26.4*   PLT 26* 15*    and CMP:   Recent Labs   Lab 03/30/19  0454 03/31/19  0440    144   K 3.7 3.4*    110   CO2 21* 22*   * 114*   BUN 10 11   CREATININE 0.5 0.5   CALCIUM 8.7 8.5*   PROT 5.6* 5.3*   ALBUMIN 2.8* 2.7*   BILITOT 0.4 0.5   ALKPHOS 60 60   AST 9* 7*   ALT 13 10   ANIONGAP 12 12   EGFRNONAA >60.0 >60.0       Diagnostic Results:  None

## 2019-03-31 NOTE — ASSESSMENT & PLAN NOTE
- will hold oral anti diabetic medications while inpatient  - blood glucose checks ACHS  - increased to [facility] 3/26  - DM diet  - glucose <200

## 2019-04-01 PROBLEM — E87.6 HYPOKALEMIA: Status: ACTIVE | Noted: 2019-04-01

## 2019-04-01 PROBLEM — E83.39 HYPOPHOSPHATEMIA: Status: ACTIVE | Noted: 2019-04-01

## 2019-04-01 LAB
ALBUMIN SERPL BCP-MCNC: 2.6 G/DL (ref 3.5–5.2)
ALP SERPL-CCNC: 56 U/L (ref 55–135)
ALT SERPL W/O P-5'-P-CCNC: 10 U/L (ref 10–44)
ANION GAP SERPL CALC-SCNC: 12 MMOL/L (ref 8–16)
ANISOCYTOSIS BLD QL SMEAR: SLIGHT
AST SERPL-CCNC: 6 U/L (ref 10–40)
BASOPHILS # BLD AUTO: 0.03 K/UL (ref 0–0.2)
BASOPHILS NFR BLD: 0.9 % (ref 0–1.9)
BILIRUB SERPL-MCNC: 0.4 MG/DL (ref 0.1–1)
BUN SERPL-MCNC: 10 MG/DL (ref 8–23)
BURR CELLS BLD QL SMEAR: ABNORMAL
C DIFF GDH STL QL: NEGATIVE
C DIFF TOX A+B STL QL IA: NEGATIVE
CALCIUM SERPL-MCNC: 8.5 MG/DL (ref 8.7–10.5)
CHLORIDE SERPL-SCNC: 110 MMOL/L (ref 95–110)
CO2 SERPL-SCNC: 23 MMOL/L (ref 23–29)
CREAT SERPL-MCNC: 0.5 MG/DL (ref 0.5–1.4)
DIFFERENTIAL METHOD: ABNORMAL
EOSINOPHIL # BLD AUTO: 0 K/UL (ref 0–0.5)
EOSINOPHIL NFR BLD: 0 % (ref 0–8)
ERYTHROCYTE [DISTWIDTH] IN BLOOD BY AUTOMATED COUNT: 16.8 % (ref 11.5–14.5)
EST. GFR  (AFRICAN AMERICAN): >60 ML/MIN/1.73 M^2
EST. GFR  (NON AFRICAN AMERICAN): >60 ML/MIN/1.73 M^2
GLUCOSE SERPL-MCNC: 89 MG/DL (ref 70–110)
HCT VFR BLD AUTO: 26 % (ref 37–48.5)
HGB BLD-MCNC: 8.4 G/DL (ref 12–16)
HYPOCHROMIA BLD QL SMEAR: ABNORMAL
IMM GRANULOCYTES # BLD AUTO: 0.07 K/UL (ref 0–0.04)
IMM GRANULOCYTES NFR BLD AUTO: 2.1 % (ref 0–0.5)
LYMPHOCYTES # BLD AUTO: 0.7 K/UL (ref 1–4.8)
LYMPHOCYTES NFR BLD: 20 % (ref 18–48)
MAGNESIUM SERPL-MCNC: 1.4 MG/DL (ref 1.6–2.6)
MCH RBC QN AUTO: 26.8 PG (ref 27–31)
MCHC RBC AUTO-ENTMCNC: 32.3 G/DL (ref 32–36)
MCV RBC AUTO: 83 FL (ref 82–98)
MONOCYTES # BLD AUTO: 0.9 K/UL (ref 0.3–1)
MONOCYTES NFR BLD: 25.8 % (ref 4–15)
NEUTROPHILS # BLD AUTO: 1.7 K/UL (ref 1.8–7.7)
NEUTROPHILS NFR BLD: 51.2 % (ref 38–73)
NRBC BLD-RTO: 0 /100 WBC
OVALOCYTES BLD QL SMEAR: ABNORMAL
PHOSPHATE SERPL-MCNC: 2.5 MG/DL (ref 2.7–4.5)
PLATELET # BLD AUTO: 16 K/UL (ref 150–350)
PMV BLD AUTO: 12.2 FL (ref 9.2–12.9)
POCT GLUCOSE: 81 MG/DL (ref 70–110)
POCT GLUCOSE: 81 MG/DL (ref 70–110)
POCT GLUCOSE: 85 MG/DL (ref 70–110)
POCT GLUCOSE: 94 MG/DL (ref 70–110)
POIKILOCYTOSIS BLD QL SMEAR: SLIGHT
POLYCHROMASIA BLD QL SMEAR: ABNORMAL
POTASSIUM SERPL-SCNC: 3.4 MMOL/L (ref 3.5–5.1)
PROT SERPL-MCNC: 5 G/DL (ref 6–8.4)
RBC # BLD AUTO: 3.14 M/UL (ref 4–5.4)
SCHISTOCYTES BLD QL SMEAR: ABNORMAL
SODIUM SERPL-SCNC: 145 MMOL/L (ref 136–145)
WBC # BLD AUTO: 3.3 K/UL (ref 3.9–12.7)

## 2019-04-01 PROCEDURE — 97116 GAIT TRAINING THERAPY: CPT

## 2019-04-01 PROCEDURE — 99233 SBSQ HOSP IP/OBS HIGH 50: CPT | Mod: ,,, | Performed by: INTERNAL MEDICINE

## 2019-04-01 PROCEDURE — 25000003 PHARM REV CODE 250: Performed by: STUDENT IN AN ORGANIZED HEALTH CARE EDUCATION/TRAINING PROGRAM

## 2019-04-01 PROCEDURE — 63600175 PHARM REV CODE 636 W HCPCS: Performed by: NURSE PRACTITIONER

## 2019-04-01 PROCEDURE — 97110 THERAPEUTIC EXERCISES: CPT

## 2019-04-01 PROCEDURE — A9155 ARTIFICIAL SALIVA: HCPCS | Performed by: INTERNAL MEDICINE

## 2019-04-01 PROCEDURE — 83735 ASSAY OF MAGNESIUM: CPT

## 2019-04-01 PROCEDURE — 97535 SELF CARE MNGMENT TRAINING: CPT

## 2019-04-01 PROCEDURE — 25000003 PHARM REV CODE 250: Performed by: NURSE PRACTITIONER

## 2019-04-01 PROCEDURE — G8979 MOBILITY GOAL STATUS: HCPCS | Mod: CK

## 2019-04-01 PROCEDURE — 63600175 PHARM REV CODE 636 W HCPCS: Performed by: INTERNAL MEDICINE

## 2019-04-01 PROCEDURE — 20600001 HC STEP DOWN PRIVATE ROOM

## 2019-04-01 PROCEDURE — 85025 COMPLETE CBC W/AUTO DIFF WBC: CPT

## 2019-04-01 PROCEDURE — 99233 PR SUBSEQUENT HOSPITAL CARE,LEVL III: ICD-10-PCS | Mod: ,,, | Performed by: INTERNAL MEDICINE

## 2019-04-01 PROCEDURE — G8980 MOBILITY D/C STATUS: HCPCS | Mod: CJ

## 2019-04-01 PROCEDURE — 25000003 PHARM REV CODE 250: Performed by: INTERNAL MEDICINE

## 2019-04-01 PROCEDURE — 84100 ASSAY OF PHOSPHORUS: CPT

## 2019-04-01 PROCEDURE — 80053 COMPREHEN METABOLIC PANEL: CPT

## 2019-04-01 RX ORDER — GABAPENTIN 300 MG/1
900 CAPSULE ORAL 3 TIMES DAILY
Qty: 270 CAPSULE | Refills: 11 | Status: SHIPPED | OUTPATIENT
Start: 2019-04-01 | End: 2023-05-26

## 2019-04-01 RX ORDER — ONDANSETRON 2 MG/ML
8 INJECTION INTRAMUSCULAR; INTRAVENOUS EVERY 8 HOURS PRN
Status: DISCONTINUED | OUTPATIENT
Start: 2019-04-01 | End: 2019-04-02 | Stop reason: HOSPADM

## 2019-04-01 RX ORDER — PANTOPRAZOLE SODIUM 40 MG/1
40 TABLET, DELAYED RELEASE ORAL DAILY
Qty: 30 TABLET | Refills: 2 | Status: SHIPPED | OUTPATIENT
Start: 2019-04-02 | End: 2020-04-01

## 2019-04-01 RX ORDER — ACYCLOVIR 800 MG/1
800 TABLET ORAL 2 TIMES DAILY
Qty: 60 TABLET | Refills: 11 | Status: SHIPPED | OUTPATIENT
Start: 2019-04-01 | End: 2021-07-02

## 2019-04-01 RX ORDER — MIRTAZAPINE 7.5 MG/1
7.5 TABLET, FILM COATED ORAL NIGHTLY PRN
Status: DISCONTINUED | OUTPATIENT
Start: 2019-04-01 | End: 2019-04-02 | Stop reason: HOSPADM

## 2019-04-01 RX ORDER — LOPERAMIDE HYDROCHLORIDE 2 MG/1
2 CAPSULE ORAL 4 TIMES DAILY PRN
Status: DISCONTINUED | OUTPATIENT
Start: 2019-04-01 | End: 2019-04-02 | Stop reason: HOSPADM

## 2019-04-01 RX ORDER — AMLODIPINE BESYLATE 5 MG/1
5 TABLET ORAL DAILY
Qty: 30 TABLET | Refills: 5 | Status: SHIPPED | OUTPATIENT
Start: 2019-04-02 | End: 2019-04-22 | Stop reason: ALTCHOICE

## 2019-04-01 RX ADMIN — CETIRIZINE HYDROCHLORIDE 10 MG: 5 TABLET ORAL at 09:04

## 2019-04-01 RX ADMIN — GABAPENTIN 900 MG: 300 CAPSULE ORAL at 09:04

## 2019-04-01 RX ADMIN — Medication 30 ML: at 09:04

## 2019-04-01 RX ADMIN — PANTOPRAZOLE SODIUM 40 MG: 40 TABLET, DELAYED RELEASE ORAL at 09:04

## 2019-04-01 RX ADMIN — POTASSIUM & SODIUM PHOSPHATES POWDER PACK 280-160-250 MG 1 PACKET: 280-160-250 PACK at 05:04

## 2019-04-01 RX ADMIN — POTASSIUM CHLORIDE 20 MEQ: 1500 TABLET, EXTENDED RELEASE ORAL at 06:04

## 2019-04-01 RX ADMIN — LOSARTAN POTASSIUM AND HYDROCHLOROTHIAZIDE 1 TABLET: 12.5; 1 TABLET ORAL at 09:04

## 2019-04-01 RX ADMIN — Medication 10 ML: at 09:04

## 2019-04-01 RX ADMIN — POTASSIUM & SODIUM PHOSPHATES POWDER PACK 280-160-250 MG 1 PACKET: 280-160-250 PACK at 02:04

## 2019-04-01 RX ADMIN — MAGNESIUM OXIDE TAB 400 MG (241.3 MG ELEMENTAL MG) 400 MG: 400 (241.3 MG) TAB at 02:04

## 2019-04-01 RX ADMIN — MIRTAZAPINE 7.5 MG: 7.5 TABLET ORAL at 09:04

## 2019-04-01 RX ADMIN — AMLODIPINE BESYLATE 5 MG: 5 TABLET ORAL at 09:04

## 2019-04-01 RX ADMIN — MAGNESIUM OXIDE TAB 400 MG (241.3 MG ELEMENTAL MG) 400 MG: 400 (241.3 MG) TAB at 06:04

## 2019-04-01 RX ADMIN — TRAMADOL HYDROCHLORIDE 50 MG: 50 TABLET, COATED ORAL at 09:04

## 2019-04-01 RX ADMIN — SODIUM CHLORIDE AND POTASSIUM CHLORIDE: 9; 1.49 INJECTION, SOLUTION INTRAVENOUS at 06:04

## 2019-04-01 RX ADMIN — ACYCLOVIR 800 MG: 200 CAPSULE ORAL at 09:04

## 2019-04-01 RX ADMIN — OLOPATADINE HYDROCHLORIDE 1 DROP: 2 SOLUTION OPHTHALMIC at 09:04

## 2019-04-01 RX ADMIN — POTASSIUM & SODIUM PHOSPHATES POWDER PACK 280-160-250 MG 1 PACKET: 280-160-250 PACK at 06:04

## 2019-04-01 RX ADMIN — MAGNESIUM OXIDE TAB 400 MG (241.3 MG ELEMENTAL MG) 400 MG: 400 (241.3 MG) TAB at 09:04

## 2019-04-01 RX ADMIN — GABAPENTIN 900 MG: 300 CAPSULE ORAL at 02:04

## 2019-04-01 RX ADMIN — TRAMADOL HYDROCHLORIDE 50 MG: 50 TABLET, COATED ORAL at 11:04

## 2019-04-01 RX ADMIN — POTASSIUM CHLORIDE 20 MEQ: 1500 TABLET, EXTENDED RELEASE ORAL at 09:04

## 2019-04-01 RX ADMIN — SODIUM CHLORIDE AND POTASSIUM CHLORIDE: 9; 1.49 INJECTION, SOLUTION INTRAVENOUS at 04:04

## 2019-04-01 RX ADMIN — Medication 30 ML: at 02:04

## 2019-04-01 RX ADMIN — POTASSIUM & SODIUM PHOSPHATES POWDER PACK 280-160-250 MG 1 PACKET: 280-160-250 PACK at 09:04

## 2019-04-01 RX ADMIN — Medication 30 ML: at 05:04

## 2019-04-01 RX ADMIN — ONDANSETRON 8 MG: 2 INJECTION INTRAMUSCULAR; INTRAVENOUS at 06:04

## 2019-04-01 NOTE — PHYSICIAN QUERY
PT Name: Lori Rivero  MR #: [id number]     PHYSICIAN QUERY -  ELECTROLYTE CLARIFICATION      CDS: Madisyn Junior RN  Contact information:[email or web address]    This form is a permanent document in the medical record.     Query Date: April 1, 2019    By submitting this query, we are merely seeking further clarification of documentation to reflect the severity of illness of your patient. Please utilize your independent clinical judgment when addressing the question(s) below.    The Medical record reflects the following:     Indicators   Supporting Clinical Findings Location in Medical Record   x Lab Value(s)  3/31 4/1   K+ 3.4 3.4     Lab Results   x Treatment                          Medication potassium chloride SA CR tablet 20 mEq  Ordered Dose: 20 mEq   Route: Oral   Frequency: Every 2 hours PRN for If potassium level is 3.3-3.5 mmol/L give 20meq every 2 hours x 2 doses MAR- Given 3/30, 3/31, 4/1 x2    Other       Provider, please specify the diagnosis or diagnoses that correspond(s) to the above indicators. [facility] all that apply:    [  x ] Hypokalemia   [   ] Other electrolyte disturbance (please specify): _______   [   ]  Clinically Undetermined       Please document in your progress notes daily for the duration of treatment until resolved, and include in your discharge summary.

## 2019-04-01 NOTE — ASSESSMENT & PLAN NOTE
- will hold oral anti diabetic medications while inpatient  - blood glucose checks ACHS  - increased to [facility] 3/26  - DM diet  - glucose <200

## 2019-04-01 NOTE — PLAN OF CARE
Problem: Adult Inpatient Plan of Care  Goal: Plan of Care Review  Outcome: Ongoing (interventions implemented as appropriate)  Side rails up x2; call bell in place; bed in lowest, locked position; skid proof socks on; no evidence of skin breakdown; care plan explained to patient; pt remains free of injury. Pt is on day +12 of an auto SCT. Pt tolerated po but with poor appetite, voids, one BM, c/o sore throat tramadol and dukes given, c/o painful numbness and tingling to BLE new orders for increased dose of Neurotin given, denies n/v, up to BSC. CBG monitoring maintained no insulin needed. Isolation precautions maintained. VSS and afebrile. Possible d/c home tomorrow.

## 2019-04-01 NOTE — SUBJECTIVE & OBJECTIVE
Subjective:     Interval History: Day +12 Dajia Auto, engrafted today with an ANC of 1700. Neupogen, levaquin and diflucan dc'd. Patient reports throat pain, nausea, appetite and diarhea are all improving. C.diff negative. Afebrile. VSS.     Objective:     Vital Signs (Most Recent):  Temp: 98.6 °F (37 °C) (04/01/19 0745)  Pulse: 92 (04/01/19 0745)  Resp: 18 (04/01/19 0745)  BP: (Abnormal) 140/65 (04/01/19 0745)  SpO2: 95 % (04/01/19 0745) Vital Signs (24h Range):  Temp:  [98.1 °F (36.7 °C)-98.9 °F (37.2 °C)] 98.6 °F (37 °C)  Pulse:  [] 92  Resp:  [16-20] 18  SpO2:  [95 %-99 %] 95 %  BP: (136-149)/(65-81) 140/65     Weight: 75.3 kg (166 lb 0.1 oz)  Body mass index is 28.49 kg/m².  Body surface area is 1.84 meters squared.      Intake/Output - Last 3 Shifts       03/30 0700 - 03/31 0659 03/31 0700 - 04/01 0659 04/01 0700 - 04/02 0659    P.O. 1170 790     I.V. (mL/kg) 1788.8 (23.8) 1828.8 (24.3)     Blood       Total Intake(mL/kg) 2958.8 (39.4) 2618.8 (34.8)     Urine (mL/kg/hr) 950 (0.5) 800 (0.4) 200 (0.5)    Other 700 700     Stool 1 0 0    Total Output 1651 1500 200    Net +1307.8 +1118.8 -200           Urine Occurrence 1 x      Stool Occurrence 1 x 2 x 1 x          Physical Exam   Constitutional: She is oriented to person, place, and time. She appears well-developed and well-nourished.   HENT:   Head: Normocephalic and atraumatic.   Right Ear: External ear normal.   Left Ear: External ear normal.   Mouth/Throat: No posterior oropharyngeal erythema.   Eyes: Pupils are equal, round, and reactive to light. Conjunctivae and EOM are normal.   Neck: Normal range of motion. Neck supple.   Cardiovascular: Normal rate, regular rhythm, normal heart sounds and intact distal pulses.   Pulmonary/Chest: Effort normal and breath sounds normal.   Abdominal: Soft. Bowel sounds are normal.   Musculoskeletal: She exhibits no edema.   Neurological: She is alert and oriented to person, place, and time.   Skin: Skin is warm and  dry.   Vascath intact to RCW, no redness or drainage   Psychiatric: She has a normal mood and affect. Her behavior is normal. Judgment and thought content normal.   Nursing note and vitals reviewed.      Significant Labs:   CBC:   Recent Labs   Lab 03/31/19 0440 04/01/19 0442   WBC 0.66* 3.30*   HGB 8.6* 8.4*   HCT 26.4* 26.0*   PLT 15* 16*    and CMP:   Recent Labs   Lab 03/31/19 0440 04/01/19 0442    145   K 3.4* 3.4*    110   CO2 22* 23   * 89   BUN 11 10   CREATININE 0.5 0.5   CALCIUM 8.5* 8.5*   PROT 5.3* 5.0*   ALBUMIN 2.7* 2.6*   BILITOT 0.5 0.4   ALKPHOS 60 56   AST 7* 6*   ALT 10 10   ANIONGAP 12 12   EGFRNONAA >60.0 >60.0       Diagnostic Results:  None

## 2019-04-01 NOTE — PLAN OF CARE
[facility]    HOME HEALTH ORDERS  FACE TO FACE ENCOUNTER    Patient Name: Lori Rivero  YOB: 1955    Hematologist/Oncologist: Dr. Saúl Alva  Address: [address] [place] [address]  Phone Number: [phone]  Fax: [phone]    Encounter Date: 04/01/2019    Admit to Home Health    Diagnoses:  Active Hospital Problems    Diagnosis  POA    *Status post autologous bone marrow transplant [Z94.81]  Not Applicable     Priority: 2     Multiple myeloma [C90.00]  Yes     Priority: 1 - High    Pancytopenia due to chemotherapy [D61.810]  No     Priority: 3     CINV (chemotherapy-induced nausea and vomiting) [R11.2, T45.1X5A]  No     Priority: 4     Chemotherapy-induced peripheral neuropathy [G62.0, T45.1X5A]  Yes     Priority: 5     Hypokalemia [E87.6]  No    Hypophosphatemia [E83.39]  No    Diarrhea [R19.7]  No    Moderate malnutrition [E44.0]  Yes    Hypomagnesemia [E83.42]  No    Type 2 diabetes mellitus without complication, without long-term current use of insulin [E11.9]  Yes    Essential hypertension [I10]  Yes    Left knee pain [M25.562]  Yes      Resolved Hospital Problems    Diagnosis Date Resolved POA    Anemia associated with chemotherapy [D64.81, T45.1X5A] 03/27/2019 Yes       Future Appointments   Date Time Provider Department Center   4/5/2019  2:30 PM LAB, HEMONC CANCER [facility]   4/5/2019  3:30 PM Pat Dutta NP [facility]   4/22/2019  8:30 AM Saúl Alva MD [facility]           I have seen and examined this patient face to face today. My clinical findings that support the need for the home health skilled services and home bound status are the following:  Weakness/numbness causing balance and gait disturbance due to Malignancy/Cancer making it taxing to leave home.    Allergies:Review of patient's allergies indicates:  No Known Allergies    Diet: diabetic diet: 2000  calorie    Activities: as tolerated    Nursing:   SN to complete comprehensive assessment including routine vital signs. Instruct on disease process and s/s of complications to report to MD. Review/verify medication list sent home with the patient at time of discharge  and instruct patient/caregiver as needed. Frequency may be adjusted depending on start of care date.    Notify MD if SBP > 160 or < 90; DBP > 90 or < 50; HR > 120 or < 50; Temp > 100.4      CONSULTS:    Physical Therapy to evaluate and treat. Evaluate for home safety and equipment needs; Establish/upgrade home exercise program. Perform / instruct on therapeutic exercises, gait training, transfer training, and Range of Motion.  Occupational Therapy to evaluate and treat. Evaluate home environment for safety and equipment needs. Perform/Instruct on transfers, ADL training, ROM, and therapeutic exercises.    Medications: Review discharge medications with patient and family and provide education.      Current Discharge Medication List      START taking these medications    Details   amLODIPine (NORVASC) 5 MG tablet Take 1 tablet (5 mg total) by mouth once daily.  Qty: 30 tablet, Refills: 5      nystatin (DUKE'S SOLUTION) Take 10 mLs by mouth 4 (four) times daily as needed (mouth pain).  Qty: 240 mL, Refills: 0      pantoprazole (PROTONIX) 40 MG tablet Take 1 tablet (40 mg total) by mouth once daily.  Qty: 30 tablet, Refills: 2         CONTINUE these medications which have CHANGED    Details   acyclovir (ZOVIRAX) 800 MG Tab Take 1 tablet (800 mg total) by mouth 2 (two) times daily.  Qty: 60 tablet, Refills: 11      gabapentin (NEURONTIN) 300 MG capsule Take 3 capsules (900 mg total) by mouth 3 (three) times daily.  Qty: 270 capsule, Refills: 11    Associated Diagnoses: Chemotherapy-induced peripheral neuropathy         CONTINUE these medications which have NOT CHANGED    Details   glipiZIDE (GLUCOTROL) 10 MG tablet Take 1 tablet by mouth daily  Refills: 2       glyBURIDE (DIABETA) 5 MG tablet Take 5 mg by mouth.      JANUMET  mg per tablet Take 1 tablet twice a day with meals  Refills: 5      losartan-hydrochlorothiazide 100-12.5 mg (HYZAAR) 100-12.5 mg Tab Take 1 tablet by mouth daily  Refills: 2      multivit,iron,minerals/lutein (CENTRUM SILVER ULTRA WOMEN'S ORAL) Take 1 tablet by mouth once daily.      PAZEO 0.7 % Drop Place 1 drop into both eyes every morning.  Refills: 5      tiZANidine (ZANAFLEX) 4 MG tablet Take 4 mg by mouth 3 (three) times daily as needed (back muscle spasms).       ondansetron (ZOFRAN-ODT) 8 MG TbDL Take 8 mg by mouth every 8 (eight) hours as needed (nausea).   Refills: 6      prochlorperazine (COMPAZINE) 10 MG tablet Take 10 mg by mouth 4 (four) times daily as needed (nausea/vomiting).   Refills: 6         STOP taking these medications       aspirin (ECOTRIN) 81 MG EC tablet Comments:   Reason for Stopping:         dexamethasone (DECADRON) 4 MG Tab Comments:   Reason for Stopping:               I certify that this patient is confined to her home and needs physical therapy and occupational therapy.    Trina Musa NP  Hematology/BMT

## 2019-04-01 NOTE — ASSESSMENT & PLAN NOTE
Transfuse Hb for <7 and platelets <10  - hgb 8.4 and platelets 16,000 today  Lovenox on hold for thrombocytopenia

## 2019-04-01 NOTE — ASSESSMENT & PLAN NOTE
- see Multiple Myeloma  - admitted for Daija Auto SCT  - today is Day +12, engrafted today with ANC 1700  - H. Mat's transplant was Wednesday, 3/20/19 at 1330. She received 9 bags and a CD34 dose of 3.61 x 10^6/kg.

## 2019-04-01 NOTE — PROGRESS NOTES
"[facility]  Adult Nutrition  Progress Note    SUMMARY       Recommendations    Recommendation/Intervention: 1. Continue Diabetic diet + Boost Glucose Control with meals. 2. Encourage po intake. 3. RD following.   Goals: 1. Pt's intake % EEN and EPN daily.  Nutrition Goal Status: progressing towards goal  Communication of RD Recs: (POC)    Reason for Assessment    Reason For Assessment: RD follow-up  Diagnosis: cancer diagnosis/related complications(SCT candidate)  Relevant Medical History: T2DM, HTN, myeloma  General Information Comments: Day +12 s/p auto transplant. NFPE 3/19 indicates moderate malnutrition with severe wt loss x 6 months. Pt has improved po intake meals (%). Provided education on food safety and diet s/p stem cell transplant (with pamphlet).  Nutrition Discharge Planning: Adequate PO intake to prevent further weight loss. Education on food safety and diet s/p transplant completed.    Nutrition Risk Screen    Nutrition Risk Screen: no indicators present    Nutrition/Diet History    Patient Reported Diet/Restrictions/Preferences: (food safety)  Spiritual, Cultural Beliefs, Restorationist Practices, Values that Affect Care: no  Factors Affecting Nutritional Intake: decreased appetite, nausea/vomiting    Anthropometrics    Temp: 98.4 °F (36.9 °C)  Height Method: Stated  Height: 5' 4" (162.6 cm)  Height (inches): 64 in  Weight Method: Standard Scale  Weight: 75.3 kg (166 lb 0.1 oz)  Weight (lb): 166.01 lb  Ideal Body Weight (IBW), Female: 120 lb  % Ideal Body Weight, Female (lb): 147.16 lb  BMI (Calculated): 30.4  BMI Grade: 30 - 34.9- obesity - grade I  Usual Body Weight (UBW), k.5 kg(since 2018 per pt)  % Usual Body Weight: 76.81  % Weight Change From Usual Weight: -23.35 %       Lab/Procedures/Meds    Pertinent Labs Reviewed: reviewed  Pertinent Labs Comments: H/H 8.4/, K 3.4, PHos 2.5, Mag 1.4, Alb 2.6  Pertinent Medications Reviewed: reviewed  Pertinent " Medications Comments: pantoprazole      Estimated/Assessed Needs    Weight Used For Calorie Calculations: 80.1 kg (176 lb 9.4 oz)  Energy Calorie Requirements (kcal): 2003  Energy Need Method: Kcal/kg(25)  Protein Requirements:  gm (1.2-1.4 gm/kg)  Weight Used For Protein Calculations: 80.1 kg (176 lb 9.4 oz)  Fluid Requirements (mL): per MD     RDA Method (mL): 2003  CHO Requirement: 50% kcal from CHO      Nutrition Prescription Ordered    Current Diet Order: Diabetic   Oral Nutrition Supplement: Boost Glucose Control     Evaluation of Received Nutrient/Fluid Intake    IV Fluid (mL): 0  I/O: +5.7L since admission  Comments: LBM 3/31  Tolerance: not tolerating(pt with N/V today)  % Intake of Estimated Energy Needs: 75 - 100 %  % Meal Intake: 75 - 100 %    Nutrition Risk    Level of Risk/Frequency of Follow-up: low     Assessment and Plan  Moderate protein-calorie malnutrition  Malnutrition in the context of Chronic Illness/Injury     Related to (etiology):  Hypermetabolic condition and inadequate energy intake     Signs and Symptoms (as evidenced by):  Energy Intake: <75% of estimated energy requirement for 6 months  Body Fat Depletion: moderate depletion of triceps   Weight Loss: 23% x 6 months   Fluid Accumulation: mild     Interventions:  Collaboration of care.     Nutrition Diagnosis Status:  Continues      Monitor and Evaluation    Food and Nutrient Intake: energy intake, food and beverage intake  Food and Nutrient Adminstration: diet order  Knowledge/Beliefs/Attitudes: food and nutrition knowledge/skill  Physical Activity and Function: nutrition-related ADLs and IADLs  Anthropometric Measurements: weight, weight change, body mass index  Biochemical Data, Medical Tests and Procedures: electrolyte and renal panel, gastrointestinal profile, glucose/endocrine profile, inflammatory profile  Nutrition-Focused Physical Findings: overall appearance     Malnutrition Assessment  Malnutrition Type: chronic  illness          Weight Loss (Malnutrition): greater than 10% in 6 months  Energy Intake (Malnutrition): less than 75% for greater than or equal to 3 months   Upper Arm Region (Subcutaneous Fat Loss): moderate depletion   Clavicle Bone Region (Muscle Loss): mild depletion   Edema (Fluid Accumulation): 2-->mild             Nutrition Follow-Up    RD Follow-up?: Yes

## 2019-04-01 NOTE — PT/OT/SLP PROGRESS
"Physical Therapy Treatment    Patient Name:  Lori Rivero   MRN:  [id number]    Recommendations:     Discharge Recommendations:  home health PT   Discharge Equipment Recommendations: walker, rolling   Barriers to discharge: None    Assessment:     Lori Rivero is a 63 y.o. female admitted with a medical diagnosis of Status post autologous bone marrow transplant.  She presents with the following impairments/functional limitations:  weakness, impaired endurance, impaired functional mobilty, gait instability, impaired balance, decreased lower extremity function, pain, decreased ROM.    Pt was agreeable to amb outside of hospital room despite complaints of 10/10 pain in B LEs, nsg present when pt attested pain levels.  Amb had improved, as pt is able to amb further distances and complete amb up/down 1 step.  Pt will require use of RW for safety, and cont to be inhibited by pain and L LE weakness, at times giving way during gait training.  Clinically significant improvement in 10x sit<>stand testing.  Concerns regarding falls remain.     Rehab Prognosis: Good; patient would benefit from acute skilled PT services to address these deficits and reach maximum level of function.    Recent Surgery: * No surgery found *      Plan:     During this hospitalization, patient to be seen 3 x/week to address the identified rehab impairments via gait training, therapeutic activities, therapeutic exercises, neuromuscular re-education and progress toward the following goals:    · Plan of Care Expires:  04/22/19    Subjective     Chief Complaint: B LE pain and weakness  Patient/Family Comments/goals: To go home  Pain/Comfort:  · Pain Rating 1: 10/10  · Location - Side 1: Bilateral  · Location 1: leg  · Pain Addressed 1: Nurse notified, Distraction, Cessation of Activity      Objective:     Communicated with nursing prior to session.  Patient found up in chair with central line upon PT entry to room.     "This leg [L] is getting " "tired."    General Precautions: Standard, fall, neutropenic   Orthopedic Precautions:N/A   Braces: N/A     Functional Mobility:  · Transfers:     · Sit to Stand:  CGA from low chair, S from bed with no AD  · Bed to Chair: stand by assistance with  rolling walker  using  Stand Pivot    · Gait: Pt amb 252', SBA-CGA with RW, lateral trunk sway, inc WBing through UEs, intermittent L LE buckling    · Balance: SBA-CGA: dynamic standing balance with AD    · Stairs:  Pt ascended/descended 1 step with No Assistive Device with bilateral handrails with Contact Guard Assistance.       AM-PAC 6 CLICK MOBILITY  Turning over in bed (including adjusting bedclothes, sheets and blankets)?: 4  Sitting down on and standing up from a chair with arms (e.g., wheelchair, bedside commode, etc.): 3  Moving from lying on back to sitting on the side of the bed?: 4  Moving to and from a bed to a chair (including a wheelchair)?: 3  Need to walk in hospital room?: 3  Climbing 3-5 steps with a railing?: 3  Basic Mobility Total Score: 20       Therapeutic Activities and Exercises:   Whiteboard updated  Ankle pumps: 20 reps, in sit   LAQs: 20 reps each LE perf individually, in sit, lacks full ext on L LE with pain  Hip abd/add: 10 reps each LE perf individually, in sit  Hip flex: 20 reps each LE perf individually, in sit  Sit-ups: 20 reps, in reclined sitting posture   10x sit<>stand: 49.29 sec, S  6MWT: Pt amb 182' = 55.47m, 10/10 RPE following  Gait speed: 0.15 m/s    Patient left sitting EOB with all lines intact and call button in reach..    GOALS:   Multidisciplinary Problems     Physical Therapy Goals        Problem: Physical Therapy Goal    Goal Priority Disciplines Outcome Goal Variances Interventions   Physical Therapy Goal     PT, PT/OT Ongoing (interventions implemented as appropriate)     Description:  Goals to be met by: 19     Patient will increase functional independence with mobility by performin. Sit to stand transfer " with Supervision.  2. Gait  x 200 feet with CGA using Rolling Walker. - GOAL MET  3. Ascend/descend 3 stairs with no handrails, with CGA.  4. Pt to perform and be compliant with exercise program to reduce complications associated with prolonged bed rest and decreased mobility.   5. Pt to perf 10x sit<>stand: 1 min 20 sec, to demonstrate improvements in B LE mm strength. - GOAL MET  REVISED: 48 sec  6. Pt to perf 6MWT: amb 282', to demonstrate a clinically significant improvement in aerobic capacity.                         Time Tracking:     PT Received On: 04/01/19  PT Start Time: 0948     PT Stop Time: 1011  PT Total Time (min): 23 min     Billable Minutes: Gait Training 12 and Therapeutic Exercise 11    Treatment Type: Treatment  PT/PTA: PT           Aide Calix, PT  04/01/2019

## 2019-04-01 NOTE — PLAN OF CARE
Problem: Adult Inpatient Plan of Care  Goal: Plan of Care Review  Outcome: Ongoing (interventions implemented as appropriate)  Patient remained free from falls throughout shift, call bell within reach. Day +12 following an Auto SCT. Awaiting Cdiff sample results. No complaints of pain. Complains of generalized weakness. Vitals stable, will continue to monitor.

## 2019-04-01 NOTE — PT/OT/SLP PROGRESS
Occupational Therapy   Treatment    Name: Lori Rivero  MRN: [id number]  Admitting Diagnosis:  Status post autologous bone marrow transplant       Recommendations:     Discharge Recommendations: home health OT  Discharge Equipment Recommendations:  walker, rolling  Barriers to discharge:  None    Assessment:     Lori Rivero is a 63 y.o. female with a medical diagnosis of Status post autologous bone marrow transplant.  She presents with increased BLE weaknss and pain affecting her quality of movement during functional mobility. Pt tolerated OT session well with no c/o increased pain with UE exercises. Pt progressing well towards OT goals; however, still limited by BLE pain and weakness. Pt will continue to benefit from skilled OT to increase her self care independence.    Performance deficits affecting function are weakness, impaired endurance, impaired functional mobilty, pain, gait instability, impaired self care skills, decreased lower extremity function.     Rehab Prognosis:  Good; patient would benefit from acute skilled OT services to address these deficits and reach maximum level of function.       Plan:     Patient to be seen 3 x/week to address the above listed problems via self-care/home management, therapeutic activities, therapeutic exercises, neuromuscular re-education  · Plan of Care Expires: 04/21/19  · Plan of Care Reviewed with: patient    Subjective     Pain/Comfort:  · Pain Rating 1: (pt did not rate; c/o BLE pain at rest and after activity)  · Pain Addressed 1: Nurse notified, Reposition  · Pain Rating Post-Intervention 1: (pt did not rate)    Objective:     Communicated with: RN prior to session.  Patient found HOB elevated with central line upon OT entry to room.    General Precautions: Standard, fall, neutropenic   Orthopedic Precautions:N/A   Braces: N/A     Occupational Performance:     Bed Mobility: HOB elevated  · Patient completed Rolling/Turning to Right with  supervision  · Patient completed Scooting/Bridging with supervision  · Patient completed Supine to Sit with supervision     Functional Mobility/Transfers:  · Patient completed Sit <> Stand Transfer from bed with stand by assistance  with  rolling walker   · Patient completed Bed <> Chair Transfer using Step Transfer technique with stand by assistance with rolling walker  · Patient completed Toilet Transfer Step Transfer technique with contact guard assistance with  rolling walker and grab bars to descend and min A to ascend with use of grab bars and RW  · Functional Mobility: Pt ambulated to restroom with SBA and RW to complete self care tasks. No LOB noted. No RBs required.     Activities of Daily Living:  · Grooming: supervision to complete dental, hand, and facial hygiene in standing at sink with RW; forward flexed posture noted   · Lower Body Dressing: supervision to doff BLE socks; to don BLE shoes with a  in sitting  · Toileting: supervision for wiping in sitting      [facility] 6 Click ADL: 22    Treatment & Education:  - Role of OT/ OT POC  - Self care safety/ independence  - Functional transfer/ mobility safety  - Bed mobility safety  - Pursed lip breathing  - Importance of sitting UIC  - Exercises written on whiteboard  - Importance of UE and LE movement in chair to maintain strength and prevent stiffness/tone  - Pt completed 15 reps of the following exercises while seated in chair to improve endurance and build strength for increased independence in self care tasks and functional transfers: Arm raises, Chest press, Forward punches, Chair Sit ups, and shoulder roll x30 secs in forward/backward motion           Patient left with bed in chair position with all lines intact, call button in reach and RN notifiedEducation:      GOALS:   Multidisciplinary Problems     Occupational Therapy Goals        Problem: Occupational Therapy Goal    Goal Priority Disciplines Outcome Interventions   Occupational Therapy Goal      OT, PT/OT Ongoing (interventions implemented as appropriate)    Description:  Goals to be met by: 04/03/2019    Patient will increase functional independence with ADLs by performing:    UE Dressing with Stand-by Assistance.  LE Dressing with Modified [place].- Progressing   Grooming while standing at sink with Stand-by Assistance with AD as needed.- Met on 3/27  Toileting from toilet with Stand-by Assistance for hygiene and clothing management. - Met on 3/27  Step transfer with Stand-by Assistance with AD as needed to prepare for household mobility to complete occupations of choice upon returning home.  Toilet transfer to toilet with Stand-by Assistance.- Met on 3/27 Continue for consistency  Upper extremity exercise program x15 reps per handout, with independence to increase strength for functional activities.- Progressing                         Time Tracking:     OT Date of Treatment: 04/01/19  OT Start Time: 0821  OT Stop Time: 0853  OT Total Time (min): 32 min    Billable Minutes:Self Care/Home Management 20  Therapeutic Exercise 12    Stacy Valdivia OT  4/1/2019

## 2019-04-01 NOTE — PLAN OF CARE
Problem: Adult Inpatient Plan of Care  Goal: Plan of Care Review  Outcome: Ongoing (interventions implemented as appropriate)  Side rails up x2; call bell in place; bed in lowest, locked position; skid proof socks on; no evidence of skin breakdown; care plan explained to patient; pt remains free of injury. Pt is on day +11 of an auto SCT. Pt tolerated po but with poor appetite, voids, one BM c-diff sample sent, c/o sore throat tramadol and dukes given, c/o painful numbness and tingling to BLE new orders for increased dose of Neurotin ordered, denies n/v, up to BSC. CBG monitoring maintained no insulin needed. Isolation precautions maintained. VSS and afebrile.

## 2019-04-01 NOTE — PLAN OF CARE
Problem: Occupational Therapy Goal  Goal: Occupational Therapy Goal  Goals to be met by: 04/03/2019    Patient will increase functional independence with ADLs by performing:    UE Dressing with Stand-by Assistance.  LE Dressing with Modified [place].- Progressing   Grooming while standing at sink with Stand-by Assistance with AD as needed.- Met on 3/27  Toileting from toilet with Stand-by Assistance for hygiene and clothing management. - Met on 3/27  Step transfer with Stand-by Assistance with AD as needed to prepare for household mobility to complete occupations of choice upon returning home.  Toilet transfer to toilet with Stand-by Assistance.- Met on 3/27 Continue for consistency  Upper extremity exercise program x15 reps per handout, with independence to increase strength for functional activities.- Progressing       Outcome: Ongoing (interventions implemented as appropriate)  Pt progressing well towards OT goals. OT POC remains appropriate for patient on this date. Pt will continue to benefit from skilled OT to address ADLs, strength, and endurance to maximize her functional independence.      Comments: Stacy Valdivia OTR/L

## 2019-04-01 NOTE — ASSESSMENT & PLAN NOTE
-IgG kappa MM; ISS 2; standard risk cytogenetics; with lytic bone lesions at presentation; initiate VRd (21 day cycle) with Dr. Juan at [facility]; currently mid cycle  5    -she has achieved VGPR  -pre transplant eval with no contraindication for transplant   -never smoker so plan to follow up LL nodule at repeat PET scan at day +100  -Admitted for Daija auto SCT. Today is Day +12  - engrafted today, ANC 1700  -Neupogen and ppx antimicrobials stopped today, continue ppx acyclovir.

## 2019-04-01 NOTE — PROGRESS NOTES
[facility]  Hematology  Bone Marrow Transplant  Progress Note    Patient Name: Lori Rivero  Admission Date: 3/18/2019  Hospital Length of Stay: 14 days  Code Status: Full Code    Subjective:     Interval History: Day +12 Daija Auto, engrafted today with an ANC of 1700. Neupogen, levaquin and diflucan dc'd. Patient reports throat pain, nausea, appetite and diarhea are all improving. C.diff negative. Afebrile. VSS.     Objective:     Vital Signs (Most Recent):  Temp: 98.6 °F (37 °C) (04/01/19 0745)  Pulse: 92 (04/01/19 0745)  Resp: 18 (04/01/19 0745)  BP: (Abnormal) 140/65 (04/01/19 0745)  SpO2: 95 % (04/01/19 0745) Vital Signs (24h Range):  Temp:  [98.1 °F (36.7 °C)-98.9 °F (37.2 °C)] 98.6 °F (37 °C)  Pulse:  [] 92  Resp:  [16-20] 18  SpO2:  [95 %-99 %] 95 %  BP: (136-149)/(65-81) 140/65     Weight: 75.3 kg (166 lb 0.1 oz)  Body mass index is 28.49 kg/m².  Body surface area is 1.84 meters squared.      Intake/Output - Last 3 Shifts       03/30 0700 - 03/31 0659 03/31 0700 - 04/01 0659 04/01 0700 - 04/02 0659    P.O. 1170 790     I.V. (mL/kg) 1788.8 (23.8) 1828.8 (24.3)     Blood       Total Intake(mL/kg) 2958.8 (39.4) 2618.8 (34.8)     Urine (mL/kg/hr) 950 (0.5) 800 (0.4) 200 (0.5)    Other 700 700     Stool 1 0 0    Total Output 1651 1500 200    Net +1307.8 +1118.8 -200           Urine Occurrence 1 x      Stool Occurrence 1 x 2 x 1 x          Physical Exam   Constitutional: She is oriented to person, place, and time. She appears well-developed and well-nourished.   HENT:   Head: Normocephalic and atraumatic.   Right Ear: External ear normal.   Left Ear: External ear normal.   Mouth/Throat: No posterior oropharyngeal erythema.   Eyes: Pupils are equal, round, and reactive to light. Conjunctivae and EOM are normal.   Neck: Normal range of motion. Neck supple.   Cardiovascular: Normal rate, regular rhythm, normal heart sounds and intact distal pulses.   Pulmonary/Chest: Effort normal and  breath sounds normal.   Abdominal: Soft. Bowel sounds are normal.   Musculoskeletal: She exhibits no edema.   Neurological: She is alert and oriented to person, place, and time.   Skin: Skin is warm and dry.   Vascath intact to RCW, no redness or drainage   Psychiatric: She has a normal mood and affect. Her behavior is normal. Judgment and thought content normal.   Nursing note and vitals reviewed.      Significant Labs:   CBC:   Recent Labs   Lab 03/31/19 0440 04/01/19 0442   WBC 0.66* 3.30*   HGB 8.6* 8.4*   HCT 26.4* 26.0*   PLT 15* 16*    and CMP:   Recent Labs   Lab 03/31/19 0440 04/01/19 0442    145   K 3.4* 3.4*    110   CO2 22* 23   * 89   BUN 11 10   CREATININE 0.5 0.5   CALCIUM 8.5* 8.5*   PROT 5.3* 5.0*   ALBUMIN 2.7* 2.6*   BILITOT 0.5 0.4   ALKPHOS 60 56   AST 7* 6*   ALT 10 10   ANIONGAP 12 12   EGFRNONAA >60.0 >60.0       Diagnostic Results:  None    Assessment/Plan:     * Status post autologous bone marrow transplant  - see Multiple Myeloma  - admitted for Daija Auto SCT  - today is Day +12, engrafted today with ANC 1700  - HJocelyn Rivero's transplant was Wednesday, 3/20/19 at 1330. She received 9 bags and a CD34 dose of 3.61 x 10^6/kg.    Multiple myeloma  -IgG kappa MM; ISS 2; standard risk cytogenetics; with lytic bone lesions at presentation; initiate VRd (21 day cycle) with Dr. Juan at [facility]; currently mid cycle  5    -she has achieved VGPR  -pre transplant eval with no contraindication for transplant   -never smoker so plan to follow up LL nodule at repeat PET scan at day +100  -Admitted for Daija auto SCT. Today is Day +12  - engrafted today, ANC 1700  -Neupogen and ppx antimicrobials stopped today, continue ppx acyclovir.         Pancytopenia due to chemotherapy  Transfuse Hb for <7 and platelets <10  - hgb 8.4 and platelets 16,000 today  Lovenox on hold for thrombocytopenia    CINV (chemotherapy-induced nausea and vomiting)  - change scheduled zofran to PRN and continue  PRN compazine/ativan  - denies nausea this am    Chemotherapy-induced peripheral neuropathy  - continue gabapentin (increased to 900 mg TID from 600 mg TID on 3/31) and tramadol     Hypophosphatemia  - phos 2.5 today  - will replace per PRN electrolyte protocol    Hypokalemia  - potassium 3.4 today  - replace per PRN electrolyte protocol    Diarrhea  - c diff negative  - start Imodium PRN  - On NS at 75cc/hr    Hypomagnesemia  - mag 1.4 today  - replace PRN per protocol    Moderate malnutrition  - dietician following  - appetite improving      Left knee pain  - s/p fall prior to admit  - x-ray with edema, no fractures  - tramadol prn ordered for pain     Essential hypertension  - continue home losartan-HCTZ  - Norvasc 5 mg daily started 3/20  - BP wnl    Type 2 diabetes mellitus without complication, without long-term current use of insulin  - will hold oral anti diabetic medications while inpatient  - blood glucose checks ACHS  - increased to MDSSI 3/26  - DM diet  - glucose <200          VTE Risk Mitigation (From admission, onward)        Ordered     heparin (porcine) injection 1,600 Units  As needed (PRN)      03/18/19 1754     IP VTE HIGH RISK PATIENT  Once      03/18/19 1702          Disposition: discharge home tomorrow    Trina Musa NP  Bone Marrow Transplant  [facility]

## 2019-04-01 NOTE — PLAN OF CARE
Problem: Physical Therapy Goal  Goal: Physical Therapy Goal  Goals to be met by: 19     Patient will increase functional independence with mobility by performin. Sit to stand transfer with Supervision.  2. Gait  x 200 feet with CGA using Rolling Walker. - GOAL MET  3. Ascend/descend 3 stairs with no handrails, with CGA.  4. Pt to perform and be compliant with exercise program to reduce complications associated with prolonged bed rest and decreased mobility.   5. Pt to perf 10x sit<>stand: 1 min 20 sec, to demonstrate improvements in B LE mm strength. - GOAL MET  REVISED: 48 sec  6. Pt to perf 6MWT: amb 282', to demonstrate a clinically significant improvement in aerobic capacity.       Outcome: Ongoing (interventions implemented as appropriate)  Pt achieved 2 goals.

## 2019-04-02 VITALS
BODY MASS INDEX: 28.39 KG/M2 | HEART RATE: 81 BPM | SYSTOLIC BLOOD PRESSURE: 148 MMHG | WEIGHT: 166.31 LBS | DIASTOLIC BLOOD PRESSURE: 67 MMHG | TEMPERATURE: 99 F | RESPIRATION RATE: 16 BRPM | HEIGHT: 64 IN | OXYGEN SATURATION: 99 %

## 2019-04-02 LAB
ALBUMIN SERPL BCP-MCNC: 2.7 G/DL (ref 3.5–5.2)
ALP SERPL-CCNC: 59 U/L (ref 55–135)
ALT SERPL W/O P-5'-P-CCNC: 10 U/L (ref 10–44)
ANION GAP SERPL CALC-SCNC: 11 MMOL/L (ref 8–16)
ANISOCYTOSIS BLD QL SMEAR: SLIGHT
AST SERPL-CCNC: 6 U/L (ref 10–40)
BASOPHILS # BLD AUTO: 0.04 K/UL (ref 0–0.2)
BASOPHILS NFR BLD: 0.6 % (ref 0–1.9)
BILIRUB SERPL-MCNC: 0.3 MG/DL (ref 0.1–1)
BLD PROD TYP BPU: NORMAL
BLOOD UNIT EXPIRATION DATE: NORMAL
BLOOD UNIT TYPE CODE: 6200
BLOOD UNIT TYPE: NORMAL
BUN SERPL-MCNC: 8 MG/DL (ref 8–23)
BURR CELLS BLD QL SMEAR: ABNORMAL
CALCIUM SERPL-MCNC: 8.4 MG/DL (ref 8.7–10.5)
CHLORIDE SERPL-SCNC: 109 MMOL/L (ref 95–110)
CO2 SERPL-SCNC: 24 MMOL/L (ref 23–29)
CODING SYSTEM: NORMAL
CREAT SERPL-MCNC: 0.5 MG/DL (ref 0.5–1.4)
DIFFERENTIAL METHOD: ABNORMAL
DISPENSE STATUS: NORMAL
EOSINOPHIL # BLD AUTO: 0 K/UL (ref 0–0.5)
EOSINOPHIL NFR BLD: 0 % (ref 0–8)
ERYTHROCYTE [DISTWIDTH] IN BLOOD BY AUTOMATED COUNT: 16.7 % (ref 11.5–14.5)
EST. GFR  (AFRICAN AMERICAN): >60 ML/MIN/1.73 M^2
EST. GFR  (NON AFRICAN AMERICAN): >60 ML/MIN/1.73 M^2
GLUCOSE SERPL-MCNC: 87 MG/DL (ref 70–110)
HCT VFR BLD AUTO: 26.6 % (ref 37–48.5)
HGB BLD-MCNC: 8.6 G/DL (ref 12–16)
HYPOCHROMIA BLD QL SMEAR: ABNORMAL
IMM GRANULOCYTES # BLD AUTO: 0.14 K/UL (ref 0–0.04)
IMM GRANULOCYTES NFR BLD AUTO: 2.1 % (ref 0–0.5)
LYMPHOCYTES # BLD AUTO: 0.8 K/UL (ref 1–4.8)
LYMPHOCYTES NFR BLD: 12.5 % (ref 18–48)
MAGNESIUM SERPL-MCNC: 1.5 MG/DL (ref 1.6–2.6)
MCH RBC QN AUTO: 26.7 PG (ref 27–31)
MCHC RBC AUTO-ENTMCNC: 32.3 G/DL (ref 32–36)
MCV RBC AUTO: 83 FL (ref 82–98)
MONOCYTES # BLD AUTO: 1.5 K/UL (ref 0.3–1)
MONOCYTES NFR BLD: 23 % (ref 4–15)
NEUTROPHILS # BLD AUTO: 4.2 K/UL (ref 1.8–7.7)
NEUTROPHILS NFR BLD: 61.8 % (ref 38–73)
NRBC BLD-RTO: 0 /100 WBC
NUM UNITS TRANS WBC-POOR PLATPHERESIS: NORMAL
OVALOCYTES BLD QL SMEAR: ABNORMAL
PHOSPHATE SERPL-MCNC: 2.9 MG/DL (ref 2.7–4.5)
PLATELET # BLD AUTO: 26 K/UL (ref 150–350)
PLATELET BLD QL SMEAR: ABNORMAL
PMV BLD AUTO: 11.6 FL (ref 9.2–12.9)
POCT GLUCOSE: 86 MG/DL (ref 70–110)
POCT GLUCOSE: 87 MG/DL (ref 70–110)
POIKILOCYTOSIS BLD QL SMEAR: SLIGHT
POLYCHROMASIA BLD QL SMEAR: ABNORMAL
POTASSIUM SERPL-SCNC: 3.4 MMOL/L (ref 3.5–5.1)
PROT SERPL-MCNC: 5.2 G/DL (ref 6–8.4)
RBC # BLD AUTO: 3.22 M/UL (ref 4–5.4)
SODIUM SERPL-SCNC: 144 MMOL/L (ref 136–145)
TOXIC GRANULES BLD QL SMEAR: PRESENT
WBC # BLD AUTO: 6.71 K/UL (ref 3.9–12.7)

## 2019-04-02 PROCEDURE — 83735 ASSAY OF MAGNESIUM: CPT

## 2019-04-02 PROCEDURE — P9037 PLATE PHERES LEUKOREDU IRRAD: HCPCS

## 2019-04-02 PROCEDURE — 25000003 PHARM REV CODE 250: Performed by: STUDENT IN AN ORGANIZED HEALTH CARE EDUCATION/TRAINING PROGRAM

## 2019-04-02 PROCEDURE — 25000003 PHARM REV CODE 250: Performed by: INTERNAL MEDICINE

## 2019-04-02 PROCEDURE — 85025 COMPLETE CBC W/AUTO DIFF WBC: CPT

## 2019-04-02 PROCEDURE — 99233 PR SUBSEQUENT HOSPITAL CARE,LEVL III: ICD-10-PCS | Mod: ,,, | Performed by: INTERNAL MEDICINE

## 2019-04-02 PROCEDURE — 25000003 PHARM REV CODE 250: Performed by: NURSE PRACTITIONER

## 2019-04-02 PROCEDURE — 63600175 PHARM REV CODE 636 W HCPCS: Performed by: NURSE PRACTITIONER

## 2019-04-02 PROCEDURE — 36589 REMOVAL TUNNELED CV CATH: CPT | Mod: ,,, | Performed by: INTERNAL MEDICINE

## 2019-04-02 PROCEDURE — 63600175 PHARM REV CODE 636 W HCPCS: Performed by: INTERNAL MEDICINE

## 2019-04-02 PROCEDURE — 99233 SBSQ HOSP IP/OBS HIGH 50: CPT | Mod: ,,, | Performed by: INTERNAL MEDICINE

## 2019-04-02 PROCEDURE — A9155 ARTIFICIAL SALIVA: HCPCS | Performed by: INTERNAL MEDICINE

## 2019-04-02 PROCEDURE — 80053 COMPREHEN METABOLIC PANEL: CPT

## 2019-04-02 PROCEDURE — 36589 PR REMOVAL TUNNELED CV CATH W/O SUBQ PORT OR PUMP: ICD-10-PCS | Mod: ,,, | Performed by: INTERNAL MEDICINE

## 2019-04-02 PROCEDURE — 84100 ASSAY OF PHOSPHORUS: CPT

## 2019-04-02 RX ORDER — FUROSEMIDE 10 MG/ML
20 INJECTION INTRAMUSCULAR; INTRAVENOUS ONCE
Status: COMPLETED | OUTPATIENT
Start: 2019-04-02 | End: 2019-04-02

## 2019-04-02 RX ORDER — HYDROCODONE BITARTRATE AND ACETAMINOPHEN 500; 5 MG/1; MG/1
TABLET ORAL
Status: DISCONTINUED | OUTPATIENT
Start: 2019-04-02 | End: 2019-04-02 | Stop reason: HOSPADM

## 2019-04-02 RX ORDER — LIDOCAINE HYDROCHLORIDE 20 MG/ML
20 INJECTION, SOLUTION EPIDURAL; INFILTRATION; INTRACAUDAL; PERINEURAL ONCE
Status: DISCONTINUED | OUTPATIENT
Start: 2019-04-02 | End: 2019-04-02 | Stop reason: HOSPADM

## 2019-04-02 RX ADMIN — PANTOPRAZOLE SODIUM 40 MG: 40 TABLET, DELAYED RELEASE ORAL at 09:04

## 2019-04-02 RX ADMIN — Medication 30 ML: at 12:04

## 2019-04-02 RX ADMIN — LOSARTAN POTASSIUM AND HYDROCHLOROTHIAZIDE 1 TABLET: 12.5; 1 TABLET ORAL at 09:04

## 2019-04-02 RX ADMIN — MAGNESIUM OXIDE TAB 400 MG (241.3 MG ELEMENTAL MG) 400 MG: 400 (241.3 MG) TAB at 02:04

## 2019-04-02 RX ADMIN — OLOPATADINE HYDROCHLORIDE 1 DROP: 2 SOLUTION OPHTHALMIC at 09:04

## 2019-04-02 RX ADMIN — GABAPENTIN 900 MG: 300 CAPSULE ORAL at 09:04

## 2019-04-02 RX ADMIN — DIPHENHYDRAMINE HYDROCHLORIDE 25 MG: 25 CAPSULE ORAL at 09:04

## 2019-04-02 RX ADMIN — ACYCLOVIR 800 MG: 200 CAPSULE ORAL at 09:04

## 2019-04-02 RX ADMIN — ACETAMINOPHEN 650 MG: 325 TABLET ORAL at 09:04

## 2019-04-02 RX ADMIN — FUROSEMIDE 20 MG: 10 INJECTION, SOLUTION INTRAMUSCULAR; INTRAVENOUS at 11:04

## 2019-04-02 RX ADMIN — MAGNESIUM OXIDE TAB 400 MG (241.3 MG ELEMENTAL MG) 400 MG: 400 (241.3 MG) TAB at 05:04

## 2019-04-02 RX ADMIN — GABAPENTIN 900 MG: 300 CAPSULE ORAL at 02:04

## 2019-04-02 RX ADMIN — AMLODIPINE BESYLATE 5 MG: 5 TABLET ORAL at 09:04

## 2019-04-02 RX ADMIN — Medication 30 ML: at 09:04

## 2019-04-02 RX ADMIN — SODIUM CHLORIDE AND POTASSIUM CHLORIDE: 9; 1.49 INJECTION, SOLUTION INTRAVENOUS at 09:04

## 2019-04-02 RX ADMIN — MAGNESIUM OXIDE TAB 400 MG (241.3 MG ELEMENTAL MG) 400 MG: 400 (241.3 MG) TAB at 11:04

## 2019-04-02 RX ADMIN — POTASSIUM CHLORIDE 20 MEQ: 1500 TABLET, EXTENDED RELEASE ORAL at 11:04

## 2019-04-02 RX ADMIN — CETIRIZINE HYDROCHLORIDE 10 MG: 5 TABLET ORAL at 09:04

## 2019-04-02 RX ADMIN — TRAMADOL HYDROCHLORIDE 50 MG: 50 TABLET, COATED ORAL at 05:04

## 2019-04-02 RX ADMIN — POTASSIUM CHLORIDE 20 MEQ: 1500 TABLET, EXTENDED RELEASE ORAL at 05:04

## 2019-04-02 NOTE — DISCHARGE SUMMARY
[facility]  Hematology  Bone Marrow Transplant  Discharge Summary      Patient Name: Lori Rivero  MRN: [id number]  Admission Date: 3/18/2019  Hospital Length of Stay: 15 days  Discharge Date and Time: 4/2/2019  5:15 PM  Attending Physician: No att. providers found   Discharging Provider: Trina Musa NP  Primary Care Provider: Rachael Munoz MD    HPI: Ms. Leach is a 63-y-o female with a history of HTN, DM II, and IgG Kappa Multiple Myeloma. She is admitted for a planned Daija Auto SCT. She was referred to Dr. Alva for evaluation for SCT in December of 2018. She presented with lytic bone lesions and was mid cycle 5 of VRd, which had been initiated by Dr. Juan at [facility].  She completed pre transplant staging and achieved VGPR. Pre transplant eval with no overt contraindications to transplant. Her daughter will be her primary caretaker.   She denies fever, chills, nightsweats, bleeding, brusing, lymphadenopathy, signs/symptoms of splenomegaly.          * No surgery found *     Hospital Course: 03/18/2019: Patient admitted for Daija auto for Multiple Myeloma.  03/19/2019 Day -1 Daija Auto, tolerated Melphalan this am without difficulty. Knee x-ray done overnight for swelling and pain post fall at home showed mild edema but no fractures. Patient reports neuropathy pain and sciatica which is chronic.   03/20/2019: Day 0 Diaja Auto for Multiple Myeloma. Received Melphalan yesterday. Tolerated well. Started on norvasc yesterday for htn. States no BM since admission. PRN pericolace ordered. She will receive 9 bags and a CD34 dose of 3.61 x 10^6/kg at 1330 today.  03/21/2019 Day +1 Daija Auto. Tolerated stem cell infusion yesterday without difficulty. Complains of mild nausea this am, no diarrhea. Reports her main complaint is persistent neuropathy pain the LE making it difficult to walk. BM mild improvement. Weight and I&O stable.   03/22/2019 Day +2 Daija auto, gabapentin increased for neuropathy  pain. Continued nausea, re-enforced asking for antiemetics. BP and glucose improved. VSS  03/25/2019 Day +5 Daija Auto, gabapentin increased over the weekend to 600 mg TID and pain improved per patient. Patient reports nausea and emesis, will schedule Zofran ATC and change to IV. 3 BM's in the past 24 hours, not liquid diarrhea. Ambulated with PT who rec HH PT. VSS, afebrile  03/26/2019: Day +6 Daija AutoSCT. VSS. Afebrile. ANC 80. CINV well controlled with scheduled zofran and PRN compazine and PRN ativan. Increased to moderate dose SSI PRN for hyperglycemia.  03/27/2019: Day +7 Daija AutoSCT. VSS. Afebrile. ANC 60. Lovenox held for thrombocytopenia.   3/28/2019: Day +8 Daija Auto. VSS, ANC 0, using Dukes for throat pain, erythema but no ulcers noted. Reports bone pain from Neupogen, treated with Zyrtec and Ultram. Denies nausea, vomiting, frequent BM's but denies diarrhea.   03/29/2019 Day +9 Daija Auto. ANC 0, afebrile on ppx antimicrobials. Receiving platelet transfusion today. Denies nausea, 2 BM's. Reports worsening throat pain, no lesions noted. Tolerated crushed pills and soft foods. Neuropathy pain stable.   03/30/2019 Day +10. Diarrhea and mucositis stable. Await c diff collection  03/31/2019 Day +11, counts improving. Mucositis improving. Still await c diff collection.  04/01/2019 Day +12 Daija Auto, engrafted today with an ANC of 1700. Neupogen, levaquin and diflucan dc'd. Patient reports throat pain, nausea, appetite and diarhea are all improving. C.diff negative. Afebrile. VSS.   04/02/2019 Day +13 Daija Auto, ANC 4200 today, afebrile and VSS, NEON and no new complaints this am. Transfusing platelets today for Vascath removal. Discharge home with daughter later today. Follow-up in clinic on 4/5.    ADMISSION SUMMARY:   Admitted on 3/18 and received Melphalan on 3/19 and stem cell infusion on 3/20, she tolerated both without difficulty. She had a fall prior to admit, knee x-ray was unrevealing. She complained of  severe neuropathy pain on admit and gabapentin was increased from 300 mg TID to 900 mg TID during hospital stay. She experienced the expected side effects of nausea, diarrhea and throat pain which all improved prior to discharge. Engrafted on day +12, 4/1/19. She did not have a fever during stay and was on ppx antimicrobials per protocol. Patient was discharged home with home PT after platelet transfusion and Vascath removal, she will follow-up in clinic on 4/5/19.    Consults (From admission, onward)        Status Ordering Provider     Inpatient consult to Registered Dietitian/Nutritionist  Once     Provider:  (Not yet assigned)    Completed KUN BRAGG          Significant Diagnostic Studies: Labs:   CMP   Recent Labs   Lab 04/01/19  0442 04/02/19  0336    144   K 3.4* 3.4*    109   CO2 23 24   GLU 89 87   BUN 10 8   CREATININE 0.5 0.5   CALCIUM 8.5* 8.4*   PROT 5.0* 5.2*   ALBUMIN 2.6* 2.7*   BILITOT 0.4 0.3   ALKPHOS 56 59   AST 6* 6*   ALT 10 10   ANIONGAP 12 11   ESTGFRAFRICA >60.0 >60.0   EGFRNONAA >60.0 >60.0    and CBC   Recent Labs   Lab 04/01/19 0442 04/02/19  0336   WBC 3.30* 6.71   HGB 8.4* 8.6*   HCT 26.0* 26.6*   PLT 16* 26*       Pending Diagnostic Studies:     None        Final Active Diagnoses:    Diagnosis Date Noted POA    PRINCIPAL PROBLEM:  Status post autologous bone marrow transplant [Z94.81]  Not Applicable    Multiple myeloma [C90.00] 02/07/2019 Yes    Pancytopenia due to chemotherapy [D61.810] 03/24/2019 No    CINV (chemotherapy-induced nausea and vomiting) [R11.2, T45.1X5A] 03/26/2019 No    Chemotherapy-induced peripheral neuropathy [G62.0, T45.1X5A] 03/18/2019 Yes    Hypokalemia [E87.6] 04/01/2019 No    Hypophosphatemia [E83.39] 04/01/2019 No    Diarrhea [R19.7] 03/30/2019 No    Moderate malnutrition [E44.0] 03/19/2019 Yes    Hypomagnesemia [E83.42] 03/19/2019 No    Type 2 diabetes mellitus without complication, without long-term current use of insulin [E11.9]  "03/18/2019 Yes    Essential hypertension [I10] 03/18/2019 Yes    Left knee pain [M25.562] 03/18/2019 Yes      Problems Resolved During this Admission:    Diagnosis Date Noted Date Resolved POA    Anemia associated with chemotherapy [D64.81, T45.1X5A] 03/19/2019 03/27/2019 Yes      Discharged Condition: good    Disposition: Home or Self Care    Follow Up:     Future Appointments   Date Time Provider Department Center   4/5/2019  2:30 PM LAB, HEMONC CANCER BLDG [facility] LAB [facility]   4/5/2019  3:30 PM Pat Dutta NP [facility] BM [facility]   4/22/2019  8:30 AM Saúl Alva MD [facility]       Patient Instructions:      WALKER FOR HOME USE     Order Specific Question Answer Comments   Type of Walker: Adult (5'4"-6'6")    With wheels? Yes    Height: 5' 4" (1.626 m)    Weight: 75.4 kg (166 lb 5.4 oz)    Length of need (1-99 months): 12    Does patient have medical equipment at home? cane, straight    Please check all that apply: Patient is unable to safely ambulate without equipment.    Vendor: [facility]    Expected Date of Delivery: 4/2/2019      Notify your health care provider if you experience any of the following:  temperature >100.4     Notify your health care provider if you experience any of the following:  persistent nausea and vomiting or diarrhea     Notify your health care provider if you experience any of the following:  severe uncontrolled pain     Notify your health care provider if you experience any of the following:  redness, tenderness, or signs of infection (pain, swelling, redness, odor or green/yellow discharge around incision site)     Notify your health care provider if you experience any of the following:  difficulty breathing or increased cough     Notify your health care provider if you experience any of the following:  persistent dizziness, light-headedness, or visual disturbances     Remove dressing in 48 hours     Activity as tolerated "     Medications:  Reconciled Home Medications:      Medication List      Start taking these medications    amLODIPine 5 MG tablet  Commonly known as:  NORVASC  Take 1 tablet (5 mg total) by mouth once daily.     magic mouthwash diphen/antac/lidoc/nysta  Take 10 mLs by mouth 4 (four) times daily as needed (mouth pain).     pantoprazole 40 MG tablet  Commonly known as:  PROTONIX  Take 1 tablet (40 mg total) by mouth once daily.        Change how you take these medications    acyclovir 800 MG Tab  Commonly known as:  ZOVIRAX  Take 1 tablet (800 mg total) by mouth 2 (two) times daily.  What changed:    · medication strength  · how much to take     gabapentin 300 MG capsule  Commonly known as:  NEURONTIN  Take 3 capsules (900 mg total) by mouth 3 (three) times daily.  What changed:  how much to take        Continue taking these medications    CENTRUM SILVER ULTRA WOMEN'S ORAL  Take 1 tablet by mouth once daily.     glipiZIDE 10 MG tablet  Commonly known as:  GLUCOTROL  Take 1 tablet by mouth daily     JANUMET  mg per tablet  Generic drug:  SITagliptan-metformin  Take 1 tablet twice a day with meals     losartan-hydrochlorothiazide 100-12.5 mg 100-12.5 mg Tab  Commonly known as:  HYZAAR  Take 1 tablet by mouth daily     ondansetron 8 MG Tbdl  Commonly known as:  ZOFRAN-ODT  Take 8 mg by mouth every 8 (eight) hours as needed (nausea).     PAZEO 0.7 % Drop  Generic drug:  olopatadine  Place 1 drop into both eyes every morning.     prochlorperazine 10 MG tablet  Commonly known as:  COMPAZINE  Take 10 mg by mouth 4 (four) times daily as needed (nausea/vomiting).     tiZANidine 4 MG tablet  Commonly known as:  ZANAFLEX  Take 4 mg by mouth 3 (three) times daily as needed (back muscle spasms).        Stop taking these medications    aspirin 81 MG EC tablet  Commonly known as:  ECOTRIN     dexamethasone 4 MG Tab  Commonly known as:  DECADRON     glyBURIDE 5 MG tablet  Commonly known as:  DIABVJ Musa  NP  Bone Marrow Transplant  [facility]

## 2019-04-02 NOTE — PROCEDURES
DATE OF PROCEDURE: 4/2/19    PROCEDURE TYPE: Removal of right Internal Jugular Vein tunneled dialysis catheter.     INDICATION: s/p SCT    PROCEDURE NOTE: After informed consent was obtained, the area of Mrs. Rivero's catheter and right chest/neck were sterilely prepped and draped in usual fashion. Anesthesia was obtained with 2% lidocaine with epinephrine, and the subcutaneous cuff was blunt dissected free. The catheter was then removed in total, and a compression dressing was applied to the exit site. Mrs. Rivero tolerated the procedure well. There were no immediate complications. Estimated blood loss was less than 1 mL. No sedation was given.

## 2019-04-02 NOTE — ASSESSMENT & PLAN NOTE
Transfuse Hb for <7 and platelets <10  - hgb 8.6 and platelets 26,000 today  - Lovenox on hold for thrombocytopenia  - will transfuse 1 unit platelets today for Vascath removal

## 2019-04-02 NOTE — PLAN OF CARE
MDR' with Dr Feng.  Patient has engrafted and tolerating PO.  Planning to d/c home today with the support of her daughter.  CM reviewed f/u with patient.  PT/OT rec HH and a RW.  RW ordered and SW arranging HH needs.  Bedside rx delivery requested.  Chava will be puller prior to d/c.  No other needs anticipated.  The patient is in agreement with the plan of care.      Future Appointments   Date Time Provider Department Center   4/5/2019  2:30 PM LAB, HEMON CANCER [facility] LAB [facility]   4/5/2019  3:30 PM Pat Dutta NP [facility]   4/22/2019  8:30 AM Saúl Alva MD [facility]        04/02/19 1155   Final Note   Assessment Type Final Discharge Note   Anticipated Discharge Disposition Home-Health   What phone number can be called within the next 1-3 days to see how you are doing after discharge?   ([phone])   Hospital Follow Up  Appt(s) scheduled? Yes   Discharge plans and expectations educations in teach back method with documentation complete? Yes

## 2019-04-02 NOTE — PROGRESS NOTES
BMT Pharmacist Discharge Note     All discharge medications were reviewed with the patient. Five medications were sent to the [facility] for bedside delivery: Acyclovir, amlodipine, gabapentin, Duke's solution, and protonix. She will take her blood pressure and glucoses at home and bring a log into clinic. Amlodipine is a new medication for her and depending on her blood pressure levels at home, may be able to discontinue once she gets back to her normal diet and activity post transplant. Her gabapentin was increased while in the hospital from 300mg PO TID to 900mg PO TID. All questions were answered.      Medication Indication Morning Afternoon Evening/Night   Acyclovir 800mg  Viral infection prevention 1 tablet  1 tablet   Amlodipine 5mg  Blood pressure 1 tablet     Losartan-hydrochlorothiazide (Hyzaar) 100-12.5mg Blood pressure 1 tablet     Janumet 50-500mg Diabetes 1 tablet  1 tablet   Glipizide 10mg  Diabetes 1 tablet     Pantoprazole 40mg Heart burn 1 tablet     Gabapentin 300mg Neuropathy 3 capsules 3 capsules 3 capsules   Multivitamin Supplement 1 tablet     Pazeo 0.7% Eye drops 1 drop into each eye       AS NEEDED MEDICATIONS:  Ondansetron (Zofran) 8mg every 8 hours as needed for nausea/vomiting  Prochlorperazine (Compazine) 10mg four times a day as needed for nausea/vomiting  Duke's solution - swish and swallow 10mls four times a day as needed for mouth sores  Tizanidine (Zanaflex) 4mg three times a day as needed for back spasms      NO LONGER TAKE:   Aspirin  Dexamethasone        Joan Ryan, PharmD, BCPS, BCOP  Clinical Pharmacy Specialist   BMT/Hematology Oncology  SpectraLink: [id number]

## 2019-04-02 NOTE — ASSESSMENT & PLAN NOTE
- see Multiple Myeloma  - admitted for Daija Auto SCT  - CECILY Rivero's transplant was Wednesday, 3/20/19 at 1330. She received 9 bags and a CD34 dose of 3.61 x 10^6/kg.  - today is Day +13, engrafted day +12

## 2019-04-02 NOTE — ASSESSMENT & PLAN NOTE
- will hold oral anti diabetic medications while inpatient  - blood glucose checks ACHS  - increased to [facility] 3/26  - DM diet  - glucose <200

## 2019-04-02 NOTE — PROGRESS NOTES
Discharge teaching done with patient and daughter (caregiver) on BMT unit.  Approximately 30 minutes spent on discussion of post-transplant guidelines including:  Low microbial diet, safe food handling, dining out, home sanitation, outpatient plan of care, progression of recovery of cells and immune system, ways to prevent infection, fatigue, ways to avoid bleeding, pet and plant exposure and care, returning to work, smoking and alcohol consumption, sexual activity, sexual desire and response, immunizations, traveling, nutrition, personal hygiene, shingles, hand washing, oral care, eye care, signs and symptoms to report immediately,  and emotional changes post transplant.  Also discussed who to contact during business hours, after hours, and holidays.  Written materials supplied.  Patient and family given the opportunity to ask questions.  Verbalizes understanding.  All questions answered to their satisfaction.  Patient and family instructed to call with any questions or concerns. Patient given appt slip with instructions to return to clinic on April 5, 2019.    Trina Musa NP  Hematology/BMT

## 2019-04-02 NOTE — ASSESSMENT & PLAN NOTE
- continue home losartan-HCTZ  - Norvasc 5 mg daily started 3/20  - BP wnl  - continue these at home

## 2019-04-02 NOTE — PROGRESS NOTES
Road Test  Oxygen-Patient tolerating room air, no distress.  Ambulation-Patient up with stand by assistance and cane / walker.  Devices-Patient going home with cane and walker.  Tolerating-Diabetic diet.  Elimination-Patient voiding without difficulty.  Self Care-Performs self care without assistance.  Teaching-Verbal and written discharge teaching given.     Patient tolerates room air, ambulates with cane / walker and stand by assistance, diabetic diet, voiding without difficulty, and is going home with cane and walker. Tunneled CV cath to right chest removed. No bleeding present. Patient going home. Discharge paperwork discussed. Medications reviewed. Patient has all belongings and has no questions at this time. Patient declines transport and would like to go down to car in wheelchair with her son and daughter.

## 2019-04-02 NOTE — SUBJECTIVE & OBJECTIVE
Subjective:     Interval History: Day +13 Daija Auto, ANC 4200 today, afebrile and VSS, NEON and no new complaints this am. Transfusing platelets today for Vascath removal. Discharge home with daughter later today. Follow-up in clinic on 4/5.    Objective:     Vital Signs (Most Recent):  Temp: 99.1 °F (37.3 °C) (04/02/19 1135)  Pulse: 88 (04/02/19 1135)  Resp: 16 (04/02/19 1135)  BP: (Abnormal) 143/63 (04/02/19 1135)  SpO2: 97 % (04/02/19 1135) Vital Signs (24h Range):  Temp:  [98.2 °F (36.8 °C)-99.1 °F (37.3 °C)] 99.1 °F (37.3 °C)  Pulse:  [] 88  Resp:  [16-18] 16  SpO2:  [94 %-100 %] 97 %  BP: (135-156)/(63-72) 143/63     Weight: 75.4 kg (166 lb 5.4 oz)  Body mass index is 28.55 kg/m².  Body surface area is 1.85 meters squared.      Intake/Output - Last 3 Shifts       03/31 0700 - 04/01 0659 04/01 0700 - 04/02 0659 04/02 0700 - 04/03 0659    P.O. 790 720     I.V. (mL/kg) 1828.8 (24.3) 1621.3 (21.5)     Total Intake(mL/kg) 2618.8 (34.8) 2341.3 (31.1)     Urine (mL/kg/hr) 800 (0.4) 800 (0.4) 300 (0.9)    Other 700      Stool 0 1     Total Output 1500 801 300    Net +1118.8 +1540.3 -300           Urine Occurrence  1000 x     Stool Occurrence 2 x 3 x           Physical Exam   Constitutional: She is oriented to person, place, and time. She appears well-developed and well-nourished.   HENT:   Head: Normocephalic and atraumatic.   Right Ear: External ear normal.   Left Ear: External ear normal.   Mouth/Throat: No posterior oropharyngeal erythema.   Eyes: Pupils are equal, round, and reactive to light. Conjunctivae and EOM are normal.   Neck: Normal range of motion. Neck supple.   Cardiovascular: Normal rate, regular rhythm, normal heart sounds and intact distal pulses.   Pulmonary/Chest: Effort normal and breath sounds normal.   Abdominal: Soft. Bowel sounds are normal.   Musculoskeletal: She exhibits edema.   +1 BLE   Neurological: She is alert and oriented to person, place, and time.   Skin: Skin is warm and dry.    Vascath intact to RCW, no redness or drainage   Psychiatric: She has a normal mood and affect. Her behavior is normal. Judgment and thought content normal.   Nursing note and vitals reviewed.      Significant Labs:   CBC:   Recent Labs   Lab 04/01/19 0442 04/02/19  0336   WBC 3.30* 6.71   HGB 8.4* 8.6*   HCT 26.0* 26.6*   PLT 16* 26*    and CMP:   Recent Labs   Lab 04/01/19 0442 04/02/19  0336    144   K 3.4* 3.4*    109   CO2 23 24   GLU 89 87   BUN 10 8   CREATININE 0.5 0.5   CALCIUM 8.5* 8.4*   PROT 5.0* 5.2*   ALBUMIN 2.6* 2.7*   BILITOT 0.4 0.3   ALKPHOS 56 59   AST 6* 6*   ALT 10 10   ANIONGAP 12 11   EGFRNONAA >60.0 >60.0       Diagnostic Results:  None

## 2019-04-02 NOTE — PROGRESS NOTES
[facility]  Hematology  Bone Marrow Transplant  Progress Note    Patient Name: Lori Rivero  Admission Date: 3/18/2019  Hospital Length of Stay: 15 days  Code Status: Full Code    Subjective:     Interval History: Day +13 Daija Auto, ANC 4200 today, afebrile and VSS, NEON and no new complaints this am. Transfusing platelets today for Vascath removal. Discharge home with daughter later today. Follow-up in clinic on 4/5.    Objective:     Vital Signs (Most Recent):  Temp: 99.1 °F (37.3 °C) (04/02/19 1135)  Pulse: 88 (04/02/19 1135)  Resp: 16 (04/02/19 1135)  BP: (Abnormal) 143/63 (04/02/19 1135)  SpO2: 97 % (04/02/19 1135) Vital Signs (24h Range):  Temp:  [98.2 °F (36.8 °C)-99.1 °F (37.3 °C)] 99.1 °F (37.3 °C)  Pulse:  [] 88  Resp:  [16-18] 16  SpO2:  [94 %-100 %] 97 %  BP: (135-156)/(63-72) 143/63     Weight: 75.4 kg (166 lb 5.4 oz)  Body mass index is 28.55 kg/m².  Body surface area is 1.85 meters squared.      Intake/Output - Last 3 Shifts       03/31 0700 - 04/01 0659 04/01 0700 - 04/02 0659 04/02 0700 - 04/03 0659    P.O. 790 720     I.V. (mL/kg) 1828.8 (24.3) 1621.3 (21.5)     Total Intake(mL/kg) 2618.8 (34.8) 2341.3 (31.1)     Urine (mL/kg/hr) 800 (0.4) 800 (0.4) 300 (0.9)    Other 700      Stool 0 1     Total Output 1500 801 300    Net +1118.8 +1540.3 -300           Urine Occurrence  1000 x     Stool Occurrence 2 x 3 x           Physical Exam   Constitutional: She is oriented to person, place, and time. She appears well-developed and well-nourished.   HENT:   Head: Normocephalic and atraumatic.   Right Ear: External ear normal.   Left Ear: External ear normal.   Mouth/Throat: No posterior oropharyngeal erythema.   Eyes: Pupils are equal, round, and reactive to light. Conjunctivae and EOM are normal.   Neck: Normal range of motion. Neck supple.   Cardiovascular: Normal rate, regular rhythm, normal heart sounds and intact distal pulses.   Pulmonary/Chest: Effort normal and breath  sounds normal.   Abdominal: Soft. Bowel sounds are normal.   Musculoskeletal: She exhibits edema.   +1 BLE   Neurological: She is alert and oriented to person, place, and time.   Skin: Skin is warm and dry.   Vascath intact to RCW, no redness or drainage   Psychiatric: She has a normal mood and affect. Her behavior is normal. Judgment and thought content normal.   Nursing note and vitals reviewed.      Significant Labs:   CBC:   Recent Labs   Lab 04/01/19  0442 04/02/19  0336   WBC 3.30* 6.71   HGB 8.4* 8.6*   HCT 26.0* 26.6*   PLT 16* 26*    and CMP:   Recent Labs   Lab 04/01/19  0442 04/02/19  0336    144   K 3.4* 3.4*    109   CO2 23 24   GLU 89 87   BUN 10 8   CREATININE 0.5 0.5   CALCIUM 8.5* 8.4*   PROT 5.0* 5.2*   ALBUMIN 2.6* 2.7*   BILITOT 0.4 0.3   ALKPHOS 56 59   AST 6* 6*   ALT 10 10   ANIONGAP 12 11   EGFRNONAA >60.0 >60.0       Diagnostic Results:  None    Assessment/Plan:     * Status post autologous bone marrow transplant  - see Multiple Myeloma  - admitted for Daija Auto SCT  - CECILY Rivero's transplant was Wednesday, 3/20/19 at 1330. She received 9 bags and a CD34 dose of 3.61 x 10^6/kg.  - today is Day +13, engrafted day +12    Multiple myeloma  -IgG kappa MM; ISS 2; standard risk cytogenetics; with lytic bone lesions at presentation; initiate VRd (21 day cycle) with Dr. Juan at [facility]; currently mid cycle  5    -she has achieved VGPR  -pre transplant eval with no contraindication for transplant   -never smoker so plan to follow up LL nodule at repeat PET scan at day +100  -Admitted for Daija auto SCT. Today is Day +13  - engrafted day +12 (4/1) with an ANC 1700. ANC 4200 today  -continue ppx acyclovir.         Pancytopenia due to chemotherapy  Transfuse Hb for <7 and platelets <10  - hgb 8.6 and platelets 26,000 today  - Lovenox on hold for thrombocytopenia  - will transfuse 1 unit platelets today for Vascath removal    CINV (chemotherapy-induced nausea and vomiting)  - PRN  Zofran/compazine/ativan  - denies nausea this am    Chemotherapy-induced peripheral neuropathy  - continue gabapentin (increased to 900 mg TID from 600 mg TID on 3/31) and tramadol     Hypophosphatemia  - phos 2.9 today  - replace per PRN electrolyte protocol    Hypokalemia  - potassium 3.4 today  - replace per PRN electrolyte protocol    Diarrhea  - c diff negative  - Imodium PRN      Hypomagnesemia  - mag 1.5 today  - replace PRN per protocol    Moderate malnutrition  - dietician following  - appetite improving      Left knee pain  - s/p fall prior to admit  - x-ray with edema, no fractures  - tramadol prn ordered for pain     Essential hypertension  - continue home losartan-HCTZ  - Norvasc 5 mg daily started 3/20  - BP wnl  - continue these at home    Type 2 diabetes mellitus without complication, without long-term current use of insulin  - will hold oral anti diabetic medications while inpatient  - blood glucose checks ACHS  - increased to MDSSI 3/26  - DM diet  - glucose <200          VTE Risk Mitigation (From admission, onward)        Ordered     heparin (porcine) injection 1,600 Units  As needed (PRN)      03/18/19 1754     IP VTE HIGH RISK PATIENT  Once      03/18/19 1702          Disposition: discharge home later today after line removal and discharge teaching with patient and caregiver    Trina Musa NP  Bone Marrow Transplant  [facility]

## 2019-04-02 NOTE — PROGRESS NOTES
Consult received to arrange for HH for SN eval and PT/OT. Spoke with pt. And discussed HH. Pt. Does not have a preference in HH agency and is OK with a referral to [facility]. Referral for HH given to Jamin @ [facility] ([id number]). All necessary information given and orders received. HH will see pt. Beginning on Thursday (4/4/19). Pt. To dc to her daughters home ([address], [place] [id number])  Pt. And daughter aware of the plan. Will follow.

## 2019-04-02 NOTE — PLAN OF CARE
Problem: Adult Inpatient Plan of Care  Goal: Plan of Care Review  Outcome: Ongoing (interventions implemented as appropriate)  Patient is progressing and involved with plan of care. Frequent rounds made to assess pain and safety. Pt communicating needs throughout shift. Up with stand by assist. Poor appetite, voiding without difficulty. Pt had 2bm's overnight.  Pain controlled with PRN tramadol X1. IVF continued as ordered. Pt planning on going home today . Blood glucose below 200, no SSI given.  All vitals stable; no acute events this shift. Pt. Remaining free from falls or injury throughout shift; bed in lowest position; side rails up X2; call light within reach; pt instructed to call for assistance as needed - verbalized understanding. Q2h rounding on patient. Will continue to monitor.

## 2019-04-02 NOTE — ASSESSMENT & PLAN NOTE
-IgG kappa MM; ISS 2; standard risk cytogenetics; with lytic bone lesions at presentation; initiate VRd (21 day cycle) with Dr. Juan at [facility]; currently mid cycle  5    -she has achieved VGPR  -pre transplant eval with no contraindication for transplant   -never smoker so plan to follow up LL nodule at repeat PET scan at day +100  -Admitted for Daija auto SCT. Today is Day +13  - engrafted day +12 (4/1) with an ANC 1700. ANC 4200 today  -continue ppx acyclovir.

## 2019-04-03 PROBLEM — E83.42 HYPOMAGNESEMIA: Status: RESOLVED | Noted: 2019-03-19 | Resolved: 2019-04-03

## 2019-04-03 PROBLEM — E44.0 MODERATE MALNUTRITION: Status: RESOLVED | Noted: 2019-03-19 | Resolved: 2019-04-03

## 2019-04-03 PROBLEM — E87.6 HYPOKALEMIA: Status: RESOLVED | Noted: 2019-04-01 | Resolved: 2019-04-03

## 2019-04-03 PROBLEM — G47.01 INSOMNIA DUE TO MEDICAL CONDITION: Status: RESOLVED | Noted: 2019-02-10 | Resolved: 2019-04-03

## 2019-04-03 PROBLEM — E87.8 ELECTROLYTE ABNORMALITY: Status: ACTIVE | Noted: 2019-04-01

## 2019-04-03 NOTE — PROGRESS NOTES
SECTION OF HEMATOLOGY AND BONE MARROW TRANSPLANT  Return  Patient Visit   04/05/2019  Referred by:  No ref. provider found  Referred for:myeloma, SCT eval     CHIEF COMPLAINT:   Chief Complaint   Patient presents with    Multiple myeloma in remission       HISTORY OF PRESENT ILLNESS:   63 y.o. female with pmh as below referred dec 2018  for SCT evaluation for MM; with regard to oncologic history; IgG kappa MM; ISS 2; standard risk cytogenetics; with lytic bone lesions at presentation; initiated VRd (21 day cycle) with Dr. Juan at [facility]; completed 5 cycles.  Completed pretransplant staging showing VGPR and completed pre transplant eval with no overt contraindications to transplant.  Presents today for admission for Daija 200 Autologous Sct. Denies fever, chills, nightsweats, bleeding, lymphadenopathy, signs/symptoms of splenomegaly, chest pain, sob, n/v/d/c. Does have chronic neuropathy to bilateral feet.  She also has chronic sciatic pain down both legs with BLE edema (+1 pitting edema today). Pt also has pain and swelling to her left knee. States she fell a week ago and hit her knee. Did not see any provider after fall, no imaging was complete. Taking tylenol and muscle relaxer, pt states neither helping.    Transplant course (3/19/19-4/5/19):  Admitted on 3/18 and received Melphalan on 3/19 and stem cell infusion on 3/20, she tolerated both without difficulty. She had a fall prior to admit, knee x-ray was unrevealing. She complained of severe neuropathy pain on admit and gabapentin was increased from 300 mg TID to 900 mg TID during hospital stay. She experienced the expected side effects of nausea, diarrhea and throat pain which all improved prior to discharge. Engrafted on day +12, 4/1/19. She did not have a fever during stay and was on ppx antimicrobials per protocol. Patient was discharged home with home PT after platelet transfusion and Vascath removal, she will follow-up in clinic on 4/5/19.    Today: pt  presents for first visit after hospital discharge from [facility] auto SCT (see transplant course above). Today is day +16. She feels well today. She fell prior to admit and had ongoing knee pain. She states she was not discharged with any pain meds and is asking for some today. She states she had some n/v and that she may be taking her zofran too late. Her appetite is low but she is eating nonetheless. She has fatigue. She denies fevers, chills, diarrhea, constipation, sob, chest pain, rashes.       PAST MEDICAL HISTORY:   Past Medical History:   Diagnosis Date    Depression     DM2 (diabetes mellitus, type 2)     Glaucoma     HTN (hypertension)     Myeloma     Therapy     after mother's death       PAST SURGICAL HISTORY:   Past Surgical History:   Procedure Laterality Date    Biopsy-bone marrow Left 2/7/2019    Performed by Saúl Alva MD at [facility] OR 2ND FLR    Insertion,catheter,tunneled N/A 3/14/2019    Performed by [facility] Diagnostic Provider at [facility] OR 2ND FLR       PAST SOCIAL HISTORY:   reports that she has never smoked. She has never used smokeless tobacco. She reports that she does not drink alcohol or use drugs.    FAMILY HISTORY:  No family history on file.    CURRENT MEDICATIONS:   Current Outpatient Medications   Medication Sig    acyclovir (ZOVIRAX) 800 MG Tab Take 1 tablet (800 mg total) by mouth 2 (two) times daily.    amLODIPine (NORVASC) 5 MG tablet Take 1 tablet (5 mg total) by mouth once daily.    gabapentin (NEURONTIN) 300 MG capsule Take 3 capsules (900 mg total) by mouth 3 (three) times daily.    glipiZIDE (GLUCOTROL) 10 MG tablet Take 1 tablet by mouth daily    JANUMET  mg per tablet Take 1 tablet twice a day with meals    losartan-hydrochlorothiazide 100-12.5 mg (HYZAAR) 100-12.5 mg Tab Take 1 tablet by mouth daily    magic mouthwash diphen/antac/lidoc/nysta Take 10 mLs by mouth 4 (four) times daily as needed (mouth pain).    ondansetron (ZOFRAN-ODT) 8 MG TbDL Take 8 mg  by mouth every 8 (eight) hours as needed (nausea).     pantoprazole (PROTONIX) 40 MG tablet Take 1 tablet (40 mg total) by mouth once daily.    magnesium oxide (MAGOX) 400 mg (241.3 mg magnesium) tablet Take 1 tablet (400 mg total) by mouth 2 (two) times daily.    multivit,iron,minerals/lutein (CENTRUM SILVER ULTRA WOMEN'S ORAL) Take 1 tablet by mouth once daily.    PAZEO 0.7 % Drop Place 1 drop into both eyes every morning.    potassium chloride SA (K-DUR,KLOR-CON) 10 MEQ tablet Take 2 tablets (20 mEq total) by mouth 2 (two) times daily.    prochlorperazine (COMPAZINE) 10 MG tablet Take 10 mg by mouth 4 (four) times daily as needed (nausea/vomiting).     tiZANidine (ZANAFLEX) 4 MG tablet Take 4 mg by mouth 3 (three) times daily as needed (back muscle spasms).      No current facility-administered medications for this visit.      ALLERGIES:   Review of patient's allergies indicates:  No Known Allergies          REVIEW OF SYSTEMS:   General ROS: fatigue  Psychological ROS: negative  Ophthalmic ROS: negative  ENT ROS: negative  Allergy and Immunology ROS: negative  Hematological and Lymphatic ROS: negative  Endocrine ROS: negative  Respiratory ROS: negative  Cardiovascular ROS: negative  Gastrointestinal ROS: n/v  Genito-Urinary ROS: negative  Musculoskeletal ROS: chronic sciatic pain, bilateral swelling, pain to L knee still  Neurological ROS: see HPI  Dermatological ROS: bruising to L knee    PHYSICAL EXAM:   Vitals:    04/05/19 1540   BP: (!) 105/53   Pulse: 99   Temp: 98.5 °F (36.9 °C)       General - well developed, well nourished, no apparent distress  Head & Face - no sinus tenderness  Eyes - normal conjunctivae and lids   ENT - normal external auditory canals; oropharynx clear,  Normal dentition and gums  Neck - normal thyroid  Chest and Lung - normal respiratory effort, clear to auscultation bilaterally   Cardiovascular - RRR with no MGR, normal S1 and S2; no pedal edema  Abdomen -  soft, nontender, no  palpable hepatomegaly or splenomegaly  Lymph - no palpable lymphadenopathy  Extremities - unremarkable nails and digits  Heme - no bruising, petechiae, pallor  Skin - no rashes or lesions; edema 1+ ble; L knee tenderness  Psych - appropriate mood and affect      ECOG Performance Status: (foot note - ECOG PS provided by Eastern Cooperative Oncology Group) 1 - Symptomatic but completely ambulatory    Karnofsky Performance Score:  80%- Normal Activity with Effort: Some Symptoms of Disease  DATA:   Lab Results   Component Value Date    WBC 3.69 (L) 04/05/2019    HGB 9.6 (L) 04/05/2019    HCT 30.0 (L) 04/05/2019    MCV 84 04/05/2019     (L) 04/05/2019     Gran # (ANC)   Date Value Ref Range Status   04/02/2019 4.2 1.8 - 7.7 K/uL Final     Gran%   Date Value Ref Range Status   04/02/2019 61.8 38.0 - 73.0 % Final     CMP  Sodium   Date Value Ref Range Status   04/05/2019 143 136 - 145 mmol/L Final     Potassium   Date Value Ref Range Status   04/05/2019 3.0 (L) 3.5 - 5.1 mmol/L Final     Chloride   Date Value Ref Range Status   04/05/2019 102 95 - 110 mmol/L Final     CO2   Date Value Ref Range Status   04/05/2019 27 23 - 29 mmol/L Final     Glucose   Date Value Ref Range Status   04/05/2019 148 (H) 70 - 110 mg/dL Final     BUN, Bld   Date Value Ref Range Status   04/05/2019 15 8 - 23 mg/dL Final     Creatinine   Date Value Ref Range Status   04/05/2019 0.7 0.5 - 1.4 mg/dL Final     Calcium   Date Value Ref Range Status   04/05/2019 8.9 8.7 - 10.5 mg/dL Final     Total Protein   Date Value Ref Range Status   04/05/2019 5.7 (L) 6.0 - 8.4 g/dL Final     Albumin   Date Value Ref Range Status   04/05/2019 3.1 (L) 3.5 - 5.2 g/dL Final     Total Bilirubin   Date Value Ref Range Status   04/05/2019 0.3 0.1 - 1.0 mg/dL Final     Comment:     For infants and newborns, interpretation of results should be based  on gestational age, weight and in agreement with clinical  observations.  Premature Infant recommended reference  ranges:  Up to 24 hours.............<8.0 mg/dL  Up to 48 hours............<12.0 mg/dL  3-5 days..................<15.0 mg/dL  6-29 days.................<15.0 mg/dL       Alkaline Phosphatase   Date Value Ref Range Status   04/05/2019 61 55 - 135 U/L Final     AST   Date Value Ref Range Status   04/05/2019 13 10 - 40 U/L Final     ALT   Date Value Ref Range Status   04/05/2019 11 10 - 44 U/L Final     Anion Gap   Date Value Ref Range Status   04/05/2019 14 8 - 16 mmol/L Final     eGFR if    Date Value Ref Range Status   04/05/2019 >60.0 >60 mL/min/1.73 m^2 Final     eGFR if non    Date Value Ref Range Status   04/05/2019 >60.0 >60 mL/min/1.73 m^2 Final     Comment:     Calculation used to obtain the estimated glomerular filtration  rate (eGFR) is the CKD-EPI equation.        [place] Free Light Chains   Date Value Ref Range Status   02/19/2019 1.12 0.33 - 1.94 mg/dL Final     [place] Free Light Chains   Date Value Ref Range Status   02/19/2019 1.50 0.57 - 2.63 mg/dL Final     Kappa/Lambda FLC Ratio   Date Value Ref Range Status   02/19/2019 0.75 0.26 - 1.65 Final     IgG - Serum   Date Value Ref Range Status   02/19/2019 824 650 - 1600 mg/dL Final     Comment:     IgG Cord Blood Reference Range: 650-1600 mg/dL.     IgA   Date Value Ref Range Status   02/19/2019 164 40 - 350 mg/dL Final     Comment:     IgA Cord Blood Reference Range: <5 mg/dL.     IgM   Date Value Ref Range Status   02/19/2019 59 50 - 300 mg/dL Final     Comment:     IgM Cord Blood Reference Range: <25 mg/dL.     ASSESSMENT AND PLAN:   Encounter Diagnoses   Name Primary?    Status post autologous bone marrow transplant Yes    Multiple myeloma in remission     Pancytopenia due to chemotherapy     CINV (chemotherapy-induced nausea and vomiting)     Chemotherapy-induced peripheral neuropathy     Diarrhea, unspecified type     Electrolyte abnormality     Acute pain of left knee     Essential hypertension     Type 2  diabetes mellitus without complication, without long-term current use of insulin        Multiple myeloma s/p shabbir auto stem cell transplant  -IgG kappa MM; ISS 2; standard risk cytogenetics; with lytic bone lesions at presentation; initiate VRd (21 day cycle) with Dr. Juan at [facility]   -she achieved VGPR prior to transplant  -pre transplant eval with no contraindication for transplant   -never smoker so plan to follow up LL nodule at repeat PET scan at day +100  -received melphalan 200mg/m2 autoSCT on 3/20/19, 9 bags and a CD34 dose of 3.61 x 10^6/kg. Engrafted on day +12. Today is day +16  -likely plan for revlimid maintenance at day +100  -on acyclovir ppx    Pancytopenia due to chemotherapy  -transfuse Hb for <7 and platelets <10  -hgb 9.6, wbc 3.69, plt 109K today  -no indication to transfuse today    N/V  - 2/2 chemo  - continue prn antiemetics, but take them more frequently (before meals)    Neuropathy  -2/2 chemo  -continue increased dose of gabapentin (since 3/31/19) 900 mg TID    FEN/GI  -electrolyte abnormality: hypomagnesemia (1.3) and hypokalemia (3.0) today; started mag ox 400 mg bid and kcl 20 meq bid   -diarrhea 2/2 chemo: okay to use prn imodium as cdiff was negative    Left knee pain  -s/p fall prior to transplant admit  -xray was without fracture, showed edema  -tramadol was given, pain now improved. Pt with pain again and wanting prescription. Will ask Dr. Alva to order tramadol (4/5/19)    HTN  - continue losartan/hctz  - was started on norvasc 5 mg daily 3/20/19, continue    DM2  - on glipizide, glyburide, and janumet     Follow up:  - cbc, cmp, mg, phos, type and screen in 1 week with NP or Dr. Alva     - pt has appt with Dr. Alva on 4/22/19 for day ~30 visit, needs lab appt prior to visit    Pat Dutta DNP, NP  Hematology/Oncology

## 2019-04-04 NOTE — PT/OT/SLP DISCHARGE
Physical Therapy Discharge Summary    Name: Lori Rivero  MRN: [id number]   Principal Problem: Status post autologous bone marrow transplant     Patient Discharged from acute Physical Therapy on 19.  Please refer to prior PT noted date on 19 for functional status.     Assessment:     Patient appropriate for care in another setting.    Objective:     GOALS:   Multidisciplinary Problems     Physical Therapy Goals     Not on file          Multidisciplinary Problems (Resolved)        Problem: Physical Therapy Goal    Goal Priority Disciplines Outcome Goal Variances Interventions   Physical Therapy Goal   (Resolved)     PT, PT/OT Outcome(s) achieved     Description:  Goals to be met by: 19     Patient will increase functional independence with mobility by performin. Sit to stand transfer with Supervision.  2. Gait  x 200 feet with CGA using Rolling Walker. - GOAL MET  3. Ascend/descend 3 stairs with no handrails, with CGA.  4. Pt to perform and be compliant with exercise program to reduce complications associated with prolonged bed rest and decreased mobility.   5. Pt to perf 10x sit<>stand: 1 min 20 sec, to demonstrate improvements in B LE mm strength. - GOAL MET  REVISED: 48 sec  6. Pt to perf 6MWT: amb 282', to demonstrate a clinically significant improvement in aerobic capacity.                         Reasons for Discontinuation of Therapy Services  Satisfactory goal achievement.      Plan:     Patient Discharged to: Home with Home Health Service.    Aide Calix, PT  2019

## 2019-04-05 ENCOUNTER — TELEPHONE (OUTPATIENT)
Dept: HEMATOLOGY/ONCOLOGY | Facility: CLINIC | Age: 64
End: 2019-04-05

## 2019-04-05 ENCOUNTER — LAB VISIT (OUTPATIENT)
Dept: LAB | Facility: HOSPITAL | Age: 64
End: 2019-04-05
Attending: NURSE PRACTITIONER
Payer: MEDICARE

## 2019-04-05 ENCOUNTER — OFFICE VISIT (OUTPATIENT)
Dept: HEMATOLOGY/ONCOLOGY | Facility: CLINIC | Age: 64
End: 2019-04-05
Payer: MEDICARE

## 2019-04-05 VITALS
DIASTOLIC BLOOD PRESSURE: 53 MMHG | SYSTOLIC BLOOD PRESSURE: 105 MMHG | OXYGEN SATURATION: 99 % | HEART RATE: 99 BPM | HEIGHT: 64 IN | BODY MASS INDEX: 28.55 KG/M2 | TEMPERATURE: 99 F

## 2019-04-05 DIAGNOSIS — R11.2 CINV (CHEMOTHERAPY-INDUCED NAUSEA AND VOMITING): ICD-10-CM

## 2019-04-05 DIAGNOSIS — T45.1X5A CINV (CHEMOTHERAPY-INDUCED NAUSEA AND VOMITING): ICD-10-CM

## 2019-04-05 DIAGNOSIS — G62.0 CHEMOTHERAPY-INDUCED PERIPHERAL NEUROPATHY: ICD-10-CM

## 2019-04-05 DIAGNOSIS — E11.9 TYPE 2 DIABETES MELLITUS WITHOUT COMPLICATION, WITHOUT LONG-TERM CURRENT USE OF INSULIN: ICD-10-CM

## 2019-04-05 DIAGNOSIS — D61.810 PANCYTOPENIA DUE TO CHEMOTHERAPY: ICD-10-CM

## 2019-04-05 DIAGNOSIS — M25.562 ACUTE PAIN OF LEFT KNEE: ICD-10-CM

## 2019-04-05 DIAGNOSIS — C90.01 MULTIPLE MYELOMA IN REMISSION: ICD-10-CM

## 2019-04-05 DIAGNOSIS — C90.01 MULTIPLE MYELOMA IN REMISSION: Primary | ICD-10-CM

## 2019-04-05 DIAGNOSIS — Z94.81 STATUS POST AUTOLOGOUS BONE MARROW TRANSPLANT: Primary | ICD-10-CM

## 2019-04-05 DIAGNOSIS — T45.1X5A CHEMOTHERAPY-INDUCED PERIPHERAL NEUROPATHY: ICD-10-CM

## 2019-04-05 DIAGNOSIS — E87.8 ELECTROLYTE ABNORMALITY: ICD-10-CM

## 2019-04-05 DIAGNOSIS — C90.00 MULTIPLE MYELOMA: ICD-10-CM

## 2019-04-05 DIAGNOSIS — C90.00 MULTIPLE MYELOMA, REMISSION STATUS UNSPECIFIED: ICD-10-CM

## 2019-04-05 DIAGNOSIS — R19.7 DIARRHEA, UNSPECIFIED TYPE: ICD-10-CM

## 2019-04-05 DIAGNOSIS — I10 ESSENTIAL HYPERTENSION: ICD-10-CM

## 2019-04-05 DIAGNOSIS — Z76.82 STEM CELL TRANSPLANT CANDIDATE: ICD-10-CM

## 2019-04-05 LAB
ALBUMIN SERPL BCP-MCNC: 3.1 G/DL (ref 3.5–5.2)
ALP SERPL-CCNC: 61 U/L (ref 55–135)
ALT SERPL W/O P-5'-P-CCNC: 11 U/L (ref 10–44)
ANION GAP SERPL CALC-SCNC: 14 MMOL/L (ref 8–16)
AST SERPL-CCNC: 13 U/L (ref 10–40)
BASOPHILS # BLD AUTO: 0.03 K/UL (ref 0–0.2)
BASOPHILS NFR BLD: 0.8 % (ref 0–1.9)
BILIRUB SERPL-MCNC: 0.3 MG/DL (ref 0.1–1)
BUN SERPL-MCNC: 15 MG/DL (ref 8–23)
CALCIUM SERPL-MCNC: 8.9 MG/DL (ref 8.7–10.5)
CHLORIDE SERPL-SCNC: 102 MMOL/L (ref 95–110)
CO2 SERPL-SCNC: 27 MMOL/L (ref 23–29)
CREAT SERPL-MCNC: 0.7 MG/DL (ref 0.5–1.4)
DIFFERENTIAL METHOD: ABNORMAL
EOSINOPHIL # BLD AUTO: 0 K/UL (ref 0–0.5)
EOSINOPHIL NFR BLD: 0 % (ref 0–8)
ERYTHROCYTE [DISTWIDTH] IN BLOOD BY AUTOMATED COUNT: 17.2 % (ref 11.5–14.5)
EST. GFR  (AFRICAN AMERICAN): >60 ML/MIN/1.73 M^2
EST. GFR  (NON AFRICAN AMERICAN): >60 ML/MIN/1.73 M^2
GLUCOSE SERPL-MCNC: 148 MG/DL (ref 70–110)
HCT VFR BLD AUTO: 30 % (ref 37–48.5)
HGB BLD-MCNC: 9.6 G/DL (ref 12–16)
IMM GRANULOCYTES # BLD AUTO: 0.18 K/UL (ref 0–0.04)
IMM GRANULOCYTES NFR BLD AUTO: 4.9 % (ref 0–0.5)
LYMPHOCYTES # BLD AUTO: 0.3 K/UL (ref 1–4.8)
LYMPHOCYTES NFR BLD: 8.7 % (ref 18–48)
MAGNESIUM SERPL-MCNC: 1.3 MG/DL (ref 1.6–2.6)
MCH RBC QN AUTO: 26.7 PG (ref 27–31)
MCHC RBC AUTO-ENTMCNC: 32 G/DL (ref 32–36)
MCV RBC AUTO: 84 FL (ref 82–98)
MONOCYTES # BLD AUTO: 1.4 K/UL (ref 0.3–1)
MONOCYTES NFR BLD: 39 % (ref 4–15)
NEUTROPHILS # BLD AUTO: 1.7 K/UL (ref 1.8–7.7)
NEUTROPHILS NFR BLD: 46.6 % (ref 38–73)
NRBC BLD-RTO: 0 /100 WBC
PHOSPHATE SERPL-MCNC: 3.4 MG/DL (ref 2.7–4.5)
PLATELET # BLD AUTO: 109 K/UL (ref 150–350)
PMV BLD AUTO: 11.5 FL (ref 9.2–12.9)
POTASSIUM SERPL-SCNC: 3 MMOL/L (ref 3.5–5.1)
PROT SERPL-MCNC: 5.7 G/DL (ref 6–8.4)
RBC # BLD AUTO: 3.59 M/UL (ref 4–5.4)
SODIUM SERPL-SCNC: 143 MMOL/L (ref 136–145)
WBC # BLD AUTO: 3.69 K/UL (ref 3.9–12.7)

## 2019-04-05 PROCEDURE — 84100 ASSAY OF PHOSPHORUS: CPT

## 2019-04-05 PROCEDURE — 36415 COLL VENOUS BLD VENIPUNCTURE: CPT

## 2019-04-05 PROCEDURE — 99215 PR OFFICE/OUTPT VISIT, EST, LEVL V, 40-54 MIN: ICD-10-PCS | Mod: S$PBB,,, | Performed by: NURSE PRACTITIONER

## 2019-04-05 PROCEDURE — 99213 OFFICE O/P EST LOW 20 MIN: CPT | Mod: PBBFAC | Performed by: NURSE PRACTITIONER

## 2019-04-05 PROCEDURE — 80053 COMPREHEN METABOLIC PANEL: CPT

## 2019-04-05 PROCEDURE — 99215 OFFICE O/P EST HI 40 MIN: CPT | Mod: S$PBB,,, | Performed by: NURSE PRACTITIONER

## 2019-04-05 PROCEDURE — 99999 PR PBB SHADOW E&M-EST. PATIENT-LVL III: ICD-10-PCS | Mod: PBBFAC,,, | Performed by: NURSE PRACTITIONER

## 2019-04-05 PROCEDURE — 99999 PR PBB SHADOW E&M-EST. PATIENT-LVL III: CPT | Mod: PBBFAC,,, | Performed by: NURSE PRACTITIONER

## 2019-04-05 PROCEDURE — 83735 ASSAY OF MAGNESIUM: CPT

## 2019-04-05 PROCEDURE — 85025 COMPLETE CBC W/AUTO DIFF WBC: CPT

## 2019-04-05 RX ORDER — LANOLIN ALCOHOL/MO/W.PET/CERES
400 CREAM (GRAM) TOPICAL 2 TIMES DAILY
Qty: 60 TABLET | Refills: 0 | Status: SHIPPED | OUTPATIENT
Start: 2019-04-05 | End: 2021-07-02

## 2019-04-05 RX ORDER — POTASSIUM CHLORIDE 750 MG/1
20 TABLET, EXTENDED RELEASE ORAL 2 TIMES DAILY
Qty: 60 TABLET | Refills: 0 | Status: SHIPPED | OUTPATIENT
Start: 2019-04-05 | End: 2021-07-02

## 2019-04-05 NOTE — TELEPHONE ENCOUNTER
Spoke to home health nurse. Informed her that I would speak to Dr Alva and ask for orders.      ----- Message from Sean Soria sent at 4/5/2019 10:19 AM CDT -----  Contact: Joseph ([facility])  Would like a call to update, pt has possible drug interaction between 2 of her medications. Also the family wants to know if a DME order can be put in for a hospital bed and bedside cammode.       Contact:: [phone]

## 2019-04-05 NOTE — Clinical Note
Follow up:- cbc, cmp, mg, phos, type and screen in 1 week with NP or Dr. Alva   - pt has appt with Dr. Alva on 4/22/19 for day ~30 visit, needs lab appt prior to visit

## 2019-04-08 ENCOUNTER — TELEPHONE (OUTPATIENT)
Dept: HEMATOLOGY/ONCOLOGY | Facility: CLINIC | Age: 64
End: 2019-04-08

## 2019-04-08 NOTE — TELEPHONE ENCOUNTER
----- Message from Julia Laureano sent at 4/8/2019  3:58 PM CDT -----  Contact: Jose De Jesus from [facility]   Jose De Jesus from [facility] called to speak with nurse and states that pt daughter would like to speak with you pt has been sick all day and blood pressure 85/60 heart rate 102  Callback#[phone]  Thank You   RICKIEJocelyn Laureano    Patient is feeling weak due to nausea. She is afraid to drink. Instructed to come tomorrow to URGENT care. Patient will try to drink more fluids. Daughter will call if necessary.

## 2019-04-12 ENCOUNTER — OFFICE VISIT (OUTPATIENT)
Dept: HEMATOLOGY/ONCOLOGY | Facility: CLINIC | Age: 64
End: 2019-04-12
Payer: MEDICARE

## 2019-04-12 ENCOUNTER — LAB VISIT (OUTPATIENT)
Dept: LAB | Facility: HOSPITAL | Age: 64
End: 2019-04-12
Payer: MEDICARE

## 2019-04-12 ENCOUNTER — TELEPHONE (OUTPATIENT)
Dept: HEMATOLOGY/ONCOLOGY | Facility: CLINIC | Age: 64
End: 2019-04-12

## 2019-04-12 VITALS
DIASTOLIC BLOOD PRESSURE: 50 MMHG | SYSTOLIC BLOOD PRESSURE: 111 MMHG | OXYGEN SATURATION: 98 % | HEART RATE: 103 BPM | WEIGHT: 155.19 LBS | HEIGHT: 64 IN | TEMPERATURE: 99 F | BODY MASS INDEX: 26.49 KG/M2

## 2019-04-12 DIAGNOSIS — C90.01 MULTIPLE MYELOMA IN REMISSION: ICD-10-CM

## 2019-04-12 DIAGNOSIS — Z94.81 STATUS POST AUTOLOGOUS BONE MARROW TRANSPLANT: Primary | ICD-10-CM

## 2019-04-12 DIAGNOSIS — C90.00 MULTIPLE MYELOMA: ICD-10-CM

## 2019-04-12 DIAGNOSIS — R11.2 CINV (CHEMOTHERAPY-INDUCED NAUSEA AND VOMITING): ICD-10-CM

## 2019-04-12 DIAGNOSIS — G62.0 CHEMOTHERAPY-INDUCED PERIPHERAL NEUROPATHY: ICD-10-CM

## 2019-04-12 DIAGNOSIS — T45.1X5A CINV (CHEMOTHERAPY-INDUCED NAUSEA AND VOMITING): ICD-10-CM

## 2019-04-12 DIAGNOSIS — E87.8 ELECTROLYTE ABNORMALITY: ICD-10-CM

## 2019-04-12 DIAGNOSIS — R60.0 BILATERAL LEG EDEMA: ICD-10-CM

## 2019-04-12 DIAGNOSIS — I10 ESSENTIAL HYPERTENSION: ICD-10-CM

## 2019-04-12 DIAGNOSIS — R19.7 DIARRHEA, UNSPECIFIED TYPE: ICD-10-CM

## 2019-04-12 DIAGNOSIS — C90.00 MULTIPLE MYELOMA, REMISSION STATUS UNSPECIFIED: ICD-10-CM

## 2019-04-12 DIAGNOSIS — Z76.82 STEM CELL TRANSPLANT CANDIDATE: ICD-10-CM

## 2019-04-12 DIAGNOSIS — G47.00 INSOMNIA, UNSPECIFIED TYPE: ICD-10-CM

## 2019-04-12 DIAGNOSIS — R53.81 DEBILITY: ICD-10-CM

## 2019-04-12 DIAGNOSIS — M25.562 ACUTE PAIN OF LEFT KNEE: ICD-10-CM

## 2019-04-12 DIAGNOSIS — G89.3 CANCER ASSOCIATED PAIN: Primary | ICD-10-CM

## 2019-04-12 DIAGNOSIS — T45.1X5A CHEMOTHERAPY-INDUCED PERIPHERAL NEUROPATHY: ICD-10-CM

## 2019-04-12 DIAGNOSIS — E11.9 TYPE 2 DIABETES MELLITUS WITHOUT COMPLICATION, WITHOUT LONG-TERM CURRENT USE OF INSULIN: ICD-10-CM

## 2019-04-12 DIAGNOSIS — Z94.81 STATUS POST AUTOLOGOUS BONE MARROW TRANSPLANT: ICD-10-CM

## 2019-04-12 DIAGNOSIS — D61.810 PANCYTOPENIA DUE TO CHEMOTHERAPY: ICD-10-CM

## 2019-04-12 LAB
ABO + RH BLD: NORMAL
ALBUMIN SERPL BCP-MCNC: 3.2 G/DL (ref 3.5–5.2)
ALP SERPL-CCNC: 52 U/L (ref 55–135)
ALT SERPL W/O P-5'-P-CCNC: 11 U/L (ref 10–44)
ANION GAP SERPL CALC-SCNC: 17 MMOL/L (ref 8–16)
AST SERPL-CCNC: 16 U/L (ref 10–40)
BASOPHILS # BLD AUTO: 0.03 K/UL (ref 0–0.2)
BASOPHILS NFR BLD: 0.7 % (ref 0–1.9)
BILIRUB SERPL-MCNC: 0.6 MG/DL (ref 0.1–1)
BLD GP AB SCN CELLS X3 SERPL QL: NORMAL
BUN SERPL-MCNC: 13 MG/DL (ref 8–23)
CALCIUM SERPL-MCNC: 9 MG/DL (ref 8.7–10.5)
CHLORIDE SERPL-SCNC: 93 MMOL/L (ref 95–110)
CO2 SERPL-SCNC: 27 MMOL/L (ref 23–29)
CREAT SERPL-MCNC: 0.7 MG/DL (ref 0.5–1.4)
DIFFERENTIAL METHOD: ABNORMAL
EOSINOPHIL # BLD AUTO: 0 K/UL (ref 0–0.5)
EOSINOPHIL NFR BLD: 0 % (ref 0–8)
ERYTHROCYTE [DISTWIDTH] IN BLOOD BY AUTOMATED COUNT: 17.2 % (ref 11.5–14.5)
EST. GFR  (AFRICAN AMERICAN): >60 ML/MIN/1.73 M^2
EST. GFR  (NON AFRICAN AMERICAN): >60 ML/MIN/1.73 M^2
GLUCOSE SERPL-MCNC: 143 MG/DL (ref 70–110)
HCT VFR BLD AUTO: 33.6 % (ref 37–48.5)
HGB BLD-MCNC: 10.9 G/DL (ref 12–16)
IMM GRANULOCYTES # BLD AUTO: 0.01 K/UL (ref 0–0.04)
IMM GRANULOCYTES NFR BLD AUTO: 0.2 % (ref 0–0.5)
LYMPHOCYTES # BLD AUTO: 1.2 K/UL (ref 1–4.8)
LYMPHOCYTES NFR BLD: 28.2 % (ref 18–48)
MAGNESIUM SERPL-MCNC: 1.5 MG/DL (ref 1.6–2.6)
MCH RBC QN AUTO: 26.8 PG (ref 27–31)
MCHC RBC AUTO-ENTMCNC: 32.4 G/DL (ref 32–36)
MCV RBC AUTO: 83 FL (ref 82–98)
MONOCYTES # BLD AUTO: 0.5 K/UL (ref 0.3–1)
MONOCYTES NFR BLD: 10.9 % (ref 4–15)
NEUTROPHILS # BLD AUTO: 2.5 K/UL (ref 1.8–7.7)
NEUTROPHILS NFR BLD: 60 % (ref 38–73)
NRBC BLD-RTO: 0 /100 WBC
PHOSPHATE SERPL-MCNC: 3.4 MG/DL (ref 2.7–4.5)
PLATELET # BLD AUTO: 92 K/UL (ref 150–350)
PMV BLD AUTO: 11.4 FL (ref 9.2–12.9)
POTASSIUM SERPL-SCNC: 3.3 MMOL/L (ref 3.5–5.1)
PROT SERPL-MCNC: 6.4 G/DL (ref 6–8.4)
RBC # BLD AUTO: 4.07 M/UL (ref 4–5.4)
SODIUM SERPL-SCNC: 137 MMOL/L (ref 136–145)
WBC # BLD AUTO: 4.11 K/UL (ref 3.9–12.7)

## 2019-04-12 PROCEDURE — 85025 COMPLETE CBC W/AUTO DIFF WBC: CPT

## 2019-04-12 PROCEDURE — 99999 PR PBB SHADOW E&M-EST. PATIENT-LVL III: CPT | Mod: PBBFAC,,, | Performed by: NURSE PRACTITIONER

## 2019-04-12 PROCEDURE — 99215 PR OFFICE/OUTPT VISIT, EST, LEVL V, 40-54 MIN: ICD-10-PCS | Mod: S$PBB,,, | Performed by: NURSE PRACTITIONER

## 2019-04-12 PROCEDURE — 99213 OFFICE O/P EST LOW 20 MIN: CPT | Mod: PBBFAC,25 | Performed by: NURSE PRACTITIONER

## 2019-04-12 PROCEDURE — 99999 PR PBB SHADOW E&M-EST. PATIENT-LVL III: ICD-10-PCS | Mod: PBBFAC,,, | Performed by: NURSE PRACTITIONER

## 2019-04-12 PROCEDURE — 84100 ASSAY OF PHOSPHORUS: CPT

## 2019-04-12 PROCEDURE — 86850 RBC ANTIBODY SCREEN: CPT

## 2019-04-12 PROCEDURE — 80053 COMPREHEN METABOLIC PANEL: CPT

## 2019-04-12 PROCEDURE — 99215 OFFICE O/P EST HI 40 MIN: CPT | Mod: S$PBB,,, | Performed by: NURSE PRACTITIONER

## 2019-04-12 PROCEDURE — 36415 COLL VENOUS BLD VENIPUNCTURE: CPT

## 2019-04-12 PROCEDURE — 83735 ASSAY OF MAGNESIUM: CPT

## 2019-04-12 RX ORDER — TRAMADOL HYDROCHLORIDE 50 MG/1
50 TABLET ORAL EVERY 8 HOURS PRN
Qty: 30 TABLET | Refills: 0 | Status: SHIPPED | OUTPATIENT
Start: 2019-04-12

## 2019-04-12 NOTE — PROGRESS NOTES
SECTION OF HEMATOLOGY AND BONE MARROW TRANSPLANT  Return  Patient Visit   04/12/2019  Referred by:  No ref. provider found  Referred for:myeloma, SCT eval     CHIEF COMPLAINT:   Chief Complaint   Patient presents with    Multiple myeloma in remission       HISTORY OF PRESENT ILLNESS:   63 y.o. female with pmh as below referred dec 2018  for SCT evaluation for MM; with regard to oncologic history; IgG kappa MM; ISS 2; standard risk cytogenetics; with lytic bone lesions at presentation; initiated VRd (21 day cycle) with Dr. Juan at [facility]; completed 5 cycles.  Completed pretransplant staging showing VGPR and completed pre transplant eval with no overt contraindications to transplant.  Presents today for admission for [facility] Autologous Sct. Denies fever, chills, nightsweats, bleeding, lymphadenopathy, signs/symptoms of splenomegaly, chest pain, sob, n/v/d/c. Does have chronic neuropathy to bilateral feet.  She also has chronic sciatic pain down both legs with BLE edema (+1 pitting edema today). Pt also has pain and swelling to her left knee. States she fell a week ago and hit her knee. Did not see any provider after fall, no imaging was complete. Taking tylenol and muscle relaxer, pt states neither helping.    Transplant course (3/19/19-4/5/19):  Admitted on 3/18 and received Melphalan on 3/19 and stem cell infusion on 3/20, she tolerated both without difficulty. She had a fall prior to admit, knee x-ray was unrevealing. She complained of severe neuropathy pain on admit and gabapentin was increased from 300 mg TID to 900 mg TID during hospital stay. She experienced the expected side effects of nausea, diarrhea and throat pain which all improved prior to discharge. Engrafted on day +12, 4/1/19. She did not have a fever during stay and was on ppx antimicrobials per protocol. Patient was discharged home with home PT after platelet transfusion and Vascath removal, she will follow-up in clinic on 4/5/19.    Today: pt  presents for routine f/u from [facility] auto SCT for MM. Today is day +23 from transplant. She feels well today. She fell prior to admit and had ongoing knee pain. She has ran out of tramadol and her pain persists. She would like a prescription for this again as well as a shower chair. Her daughter would like to see if it's possible to speak with SW regarding funding/assistance with care. She continues to have swelling in BLE. She reports insomnia and is asking for sleep aid. She states her n/v has improved since taking her antiemetics around the clock. Her appetite is low but she continues to be able to eat meals. Her fatigue has improved. She denies fevers, chills, diarrhea, constipation, sob, chest pain, rashes.     PAST MEDICAL HISTORY:   Past Medical History:   Diagnosis Date    Depression     DM2 (diabetes mellitus, type 2)     Glaucoma     HTN (hypertension)     Myeloma     Therapy     after mother's death       PAST SURGICAL HISTORY:   Past Surgical History:   Procedure Laterality Date    Biopsy-bone marrow Left 2/7/2019    Performed by Saúl Alva MD at [facility] OR 2ND FLR    Insertion,catheter,tunneled N/A 3/14/2019    Performed by [facility] Diagnostic Provider at [facility] OR 2ND FLR       PAST SOCIAL HISTORY:   reports that she has never smoked. She has never used smokeless tobacco. She reports that she does not drink alcohol or use drugs.    FAMILY HISTORY:  History reviewed. No pertinent family history.    CURRENT MEDICATIONS:   Current Outpatient Medications   Medication Sig    acyclovir (ZOVIRAX) 800 MG Tab Take 1 tablet (800 mg total) by mouth 2 (two) times daily.    amLODIPine (NORVASC) 5 MG tablet Take 1 tablet (5 mg total) by mouth once daily.    gabapentin (NEURONTIN) 300 MG capsule Take 3 capsules (900 mg total) by mouth 3 (three) times daily.    glipiZIDE (GLUCOTROL) 10 MG tablet Take 1 tablet by mouth daily    JANUMET  mg per tablet Take 1 tablet twice a day with meals     losartan-hydrochlorothiazide 100-12.5 mg (HYZAAR) 100-12.5 mg Tab Take 1 tablet by mouth daily    magic mouthwash diphen/antac/lidoc/nysta Take 10 mLs by mouth 4 (four) times daily as needed (mouth pain).    magnesium oxide (MAGOX) 400 mg (241.3 mg magnesium) tablet Take 1 tablet (400 mg total) by mouth 2 (two) times daily.    multivit,iron,minerals/lutein (CENTRUM SILVER ULTRA WOMEN'S ORAL) Take 1 tablet by mouth once daily.    ondansetron (ZOFRAN-ODT) 8 MG TbDL Take 8 mg by mouth every 8 (eight) hours as needed (nausea).     pantoprazole (PROTONIX) 40 MG tablet Take 1 tablet (40 mg total) by mouth once daily.    PAZEO 0.7 % Drop Place 1 drop into both eyes every morning.    potassium chloride SA (K-DUR,KLOR-CON) 10 MEQ tablet Take 2 tablets (20 mEq total) by mouth 2 (two) times daily.    prochlorperazine (COMPAZINE) 10 MG tablet Take 10 mg by mouth 4 (four) times daily as needed (nausea/vomiting).     tiZANidine (ZANAFLEX) 4 MG tablet Take 4 mg by mouth 3 (three) times daily as needed (back muscle spasms).     traMADol (ULTRAM) 50 mg tablet Take 1 tablet (50 mg total) by mouth every 8 (eight) hours as needed for Pain.     No current facility-administered medications for this visit.      Review of patient's allergies indicates:  No Known Allergies    REVIEW OF SYSTEMS:   General ROS: fatigue  Psychological ROS: negative  Ophthalmic ROS: negative  ENT ROS: negative  Allergy and Immunology ROS: negative  Hematological and Lymphatic ROS: negative  Endocrine ROS: negative  Respiratory ROS: negative  Cardiovascular ROS: BLE swelling  Gastrointestinal ROS: n/v but better controlled with antiemetics now  Genito-Urinary ROS: negative  Musculoskeletal ROS: chronic sciatic pain, bilateral swelling, pain to L knee still  Neurological ROS: insomnia    PHYSICAL EXAM:   Vitals:    04/12/19 1500   BP: (!) 111/50   Pulse: 103   Temp: 98.5 °F (36.9 °C)       General - well developed, well nourished, no apparent  distress  Head & Face - no sinus tenderness  Eyes - normal conjunctivae and lids   ENT - normal external auditory canals; oropharynx clear,  Normal dentition and gums  Neck - normal thyroid  Chest and Lung - normal respiratory effort, clear to auscultation bilaterally   Cardiovascular - RRR with no MGR, normal S1 and S2; edema 1+ BLE with puffiness to bilateral feet  Abdomen -  soft, nontender, no palpable hepatomegaly or splenomegaly  Extremities - unremarkable nails and digits  Heme - no bruising, petechiae, pallor  Skin - no rashes or lesions  Psych - appropriate mood and affect      ECOG Performance Status: (foot note - ECOG PS provided by [employer]) 2 - Symptomatic, <50% confined to bed    Karnofsky Performance Score:  60%- Requires Occasional Assistance but is Able to Care for Needs  DATA:   Lab Results   Component Value Date    WBC 4.11 04/12/2019    HGB 10.9 (L) 04/12/2019    HCT 33.6 (L) 04/12/2019    MCV 83 04/12/2019    PLT 92 (L) 04/12/2019     Gran # (ANC)   Date Value Ref Range Status   04/12/2019 2.5 1.8 - 7.7 K/uL Final     Gran%   Date Value Ref Range Status   04/12/2019 60.0 38.0 - 73.0 % Final     CMP  Sodium   Date Value Ref Range Status   04/12/2019 137 136 - 145 mmol/L Final     Potassium   Date Value Ref Range Status   04/12/2019 3.3 (L) 3.5 - 5.1 mmol/L Final     Chloride   Date Value Ref Range Status   04/12/2019 93 (L) 95 - 110 mmol/L Final     CO2   Date Value Ref Range Status   04/12/2019 27 23 - 29 mmol/L Final     Glucose   Date Value Ref Range Status   04/12/2019 143 (H) 70 - 110 mg/dL Final     BUN, Bld   Date Value Ref Range Status   04/12/2019 13 8 - 23 mg/dL Final     Creatinine   Date Value Ref Range Status   04/12/2019 0.7 0.5 - 1.4 mg/dL Final     Calcium   Date Value Ref Range Status   04/12/2019 9.0 8.7 - 10.5 mg/dL Final     Total Protein   Date Value Ref Range Status   04/12/2019 6.4 6.0 - 8.4 g/dL Final     Albumin   Date Value Ref Range Status    04/12/2019 3.2 (L) 3.5 - 5.2 g/dL Final     Total Bilirubin   Date Value Ref Range Status   04/12/2019 0.6 0.1 - 1.0 mg/dL Final     Comment:     For infants and newborns, interpretation of results should be based  on gestational age, weight and in agreement with clinical  observations.  Premature Infant recommended reference ranges:  Up to 24 hours.............<8.0 mg/dL  Up to 48 hours............<12.0 mg/dL  3-5 days..................<15.0 mg/dL  6-29 days.................<15.0 mg/dL       Alkaline Phosphatase   Date Value Ref Range Status   04/12/2019 52 (L) 55 - 135 U/L Final     AST   Date Value Ref Range Status   04/12/2019 16 10 - 40 U/L Final     ALT   Date Value Ref Range Status   04/12/2019 11 10 - 44 U/L Final     Anion Gap   Date Value Ref Range Status   04/12/2019 17 (H) 8 - 16 mmol/L Final     eGFR if    Date Value Ref Range Status   04/12/2019 >60.0 >60 mL/min/1.73 m^2 Final     eGFR if non    Date Value Ref Range Status   04/12/2019 >60.0 >60 mL/min/1.73 m^2 Final     Comment:     Calculation used to obtain the estimated glomerular filtration  rate (eGFR) is the CKD-EPI equation.        [place] Free Light Chains   Date Value Ref Range Status   02/19/2019 1.12 0.33 - 1.94 mg/dL Final     Lambda Free Light Chains   Date Value Ref Range Status   02/19/2019 1.50 0.57 - 2.63 mg/dL Final     Kappa/Lambda FLC Ratio   Date Value Ref Range Status   02/19/2019 0.75 0.26 - 1.65 Final     IgG - Serum   Date Value Ref Range Status   02/19/2019 824 650 - 1600 mg/dL Final     Comment:     IgG Cord Blood Reference Range: 650-1600 mg/dL.     IgA   Date Value Ref Range Status   02/19/2019 164 40 - 350 mg/dL Final     Comment:     IgA Cord Blood Reference Range: <5 mg/dL.     IgM   Date Value Ref Range Status   02/19/2019 59 50 - 300 mg/dL Final     Comment:     IgM Cord Blood Reference Range: <25 mg/dL.     ASSESSMENT AND PLAN:   Encounter Diagnoses   Name Primary?    Status post  autologous bone marrow transplant Yes    Multiple myeloma in remission     Pancytopenia due to chemotherapy     CINV (chemotherapy-induced nausea and vomiting)     Chemotherapy-induced peripheral neuropathy     Electrolyte abnormality     Diarrhea, unspecified type     Acute pain of left knee     Essential hypertension     Type 2 diabetes mellitus without complication, without long-term current use of insulin     Debility     Bilateral leg edema     Insomnia, unspecified type        Multiple myeloma s/p shabbir auto stem cell transplant  -IgG kappa MM; ISS 2; standard risk cytogenetics; with lytic bone lesions at presentation; initiate VRd (21 day cycle) with Dr. Juan at [facility]   -she achieved VGPR prior to transplant  -pre transplant eval with no contraindication for transplant   -never smoker so plan to follow up LL nodule at repeat PET scan at day +100, will also need BM bx at that time  -received melphalan 200mg/m2 autoSCT on 3/20/19, 9 bags and a CD34 dose of 3.61 x 10^6/kg. Engrafted on day +12. Today is day +23  -likely plan for revlimid maintenance at day +100  -on acyclovir ppx    Pancytopenia due to chemotherapy  -transfuse Hb for <7 and platelets <10  -hgb 10.9, plt 92K, wbc normal today  -no indication to transfuse today    N/V  - 2/2 chemo  - continue prn antiemetics, but recommended to take them more frequently/before meals at last visit.  - now improved with around the clock antiemetics    Neuropathy  -2/2 chemo  -continue increased dose of gabapentin (since 3/31/19) 900 mg TID    FEN/GI  -electrolyte abnormality: hypomagnesemia (1.5) and hypokalemia (3.3) today; has been taking mag ox 400 mg bid and kcl 20 meq bid since last visit. Will continue at this time. Educated on potassium rich foods. Will recheck at next visit prior to increasing current replacements  -diarrhea 2/2 chemo: okay to use prn imodium as cdiff was negative. Resolved now    Left knee pain  -s/p fall prior to transplant  admit  -xray was without fracture, showed edema  -tramadol was given, pain now improved. Pt with pain again and wanting prescription. Dr. Alva to order tramadol (4/12/19)    HTN  - continue losartan/hctz  - was started on norvasc 5 mg daily 3/20/19, continue    Debility  -2/2 previous fall, left knee residual pain, and post transplant  -bedside commode and shower chair ordered  -caregiver with concern of needing to go back to work. Informed her of minimal 30 day 24 hour coverage in caregiver contract. Pt would like to speak with SW to discuss possible funds/assistance with financial burden involved with not being at work    DM2  - on glipizide, glyburide, and janumet     BLE edema  -puffy bilateral feet; 1+ ble edema  -elevate  -possibly order compression socks at next visit  -deferred therapy with lasix due to risk of dehydration in this pt with n/v     Insomnia  -recommended OTC melatonin or z-quil instead of prescription sleep aid at this time     Follow up:  -SW to call pt regarding financial burden to see if there is any possible assistance available  - pt has appt with Dr. Alva on 4/22/19 for day ~30 visit with labs prior (including myeloma labs)    Pat Dutta, SHARI, NP  Hematology/Oncology

## 2019-04-22 ENCOUNTER — TELEPHONE (OUTPATIENT)
Dept: HEMATOLOGY/ONCOLOGY | Facility: CLINIC | Age: 64
End: 2019-04-22

## 2019-04-22 ENCOUNTER — LAB VISIT (OUTPATIENT)
Dept: LAB | Facility: HOSPITAL | Age: 64
End: 2019-04-22
Attending: INTERNAL MEDICINE
Payer: MEDICARE

## 2019-04-22 ENCOUNTER — OFFICE VISIT (OUTPATIENT)
Dept: HEMATOLOGY/ONCOLOGY | Facility: CLINIC | Age: 64
End: 2019-04-22
Payer: MEDICARE

## 2019-04-22 VITALS
BODY MASS INDEX: 26.64 KG/M2 | DIASTOLIC BLOOD PRESSURE: 50 MMHG | SYSTOLIC BLOOD PRESSURE: 91 MMHG | HEIGHT: 64 IN | HEART RATE: 77 BPM | TEMPERATURE: 98 F | OXYGEN SATURATION: 100 % | RESPIRATION RATE: 17 BRPM

## 2019-04-22 DIAGNOSIS — C90.00 MULTIPLE MYELOMA, REMISSION STATUS UNSPECIFIED: ICD-10-CM

## 2019-04-22 DIAGNOSIS — Z76.82 STEM CELL TRANSPLANT CANDIDATE: ICD-10-CM

## 2019-04-22 DIAGNOSIS — C90.00 MULTIPLE MYELOMA: ICD-10-CM

## 2019-04-22 DIAGNOSIS — I95.9 HYPOTENSION, UNSPECIFIED HYPOTENSION TYPE: ICD-10-CM

## 2019-04-22 DIAGNOSIS — E87.8 ELECTROLYTE ABNORMALITY: ICD-10-CM

## 2019-04-22 DIAGNOSIS — C90.00 MULTIPLE MYELOMA, REMISSION STATUS UNSPECIFIED: Primary | ICD-10-CM

## 2019-04-22 DIAGNOSIS — C90.01 MULTIPLE MYELOMA IN REMISSION: ICD-10-CM

## 2019-04-22 DIAGNOSIS — Z94.81 STATUS POST AUTOLOGOUS BONE MARROW TRANSPLANT: ICD-10-CM

## 2019-04-22 DIAGNOSIS — Z94.84 HISTORY OF AUTO STEM CELL TRANSPLANT: ICD-10-CM

## 2019-04-22 DIAGNOSIS — Z52.001 DONOR OF STEM CELL: ICD-10-CM

## 2019-04-22 LAB
ABO + RH BLD: NORMAL
ALBUMIN SERPL BCP-MCNC: 3.1 G/DL (ref 3.5–5.2)
ALP SERPL-CCNC: 44 U/L (ref 55–135)
ALT SERPL W/O P-5'-P-CCNC: 8 U/L (ref 10–44)
ANION GAP SERPL CALC-SCNC: 14 MMOL/L (ref 8–16)
AST SERPL-CCNC: 12 U/L (ref 10–40)
BASOPHILS # BLD AUTO: 0.03 K/UL (ref 0–0.2)
BASOPHILS NFR BLD: 0.7 % (ref 0–1.9)
BILIRUB SERPL-MCNC: 0.8 MG/DL (ref 0.1–1)
BLD GP AB SCN CELLS X3 SERPL QL: NORMAL
BUN SERPL-MCNC: 16 MG/DL (ref 8–23)
CALCIUM SERPL-MCNC: 9.2 MG/DL (ref 8.7–10.5)
CHLORIDE SERPL-SCNC: 97 MMOL/L (ref 95–110)
CO2 SERPL-SCNC: 28 MMOL/L (ref 23–29)
CREAT SERPL-MCNC: 0.8 MG/DL (ref 0.5–1.4)
DIFFERENTIAL METHOD: ABNORMAL
EOSINOPHIL # BLD AUTO: 0.1 K/UL (ref 0–0.5)
EOSINOPHIL NFR BLD: 1.7 % (ref 0–8)
ERYTHROCYTE [DISTWIDTH] IN BLOOD BY AUTOMATED COUNT: 18.1 % (ref 11.5–14.5)
EST. GFR  (AFRICAN AMERICAN): >60 ML/MIN/1.73 M^2
EST. GFR  (NON AFRICAN AMERICAN): >60 ML/MIN/1.73 M^2
GLUCOSE SERPL-MCNC: 153 MG/DL (ref 70–110)
HCT VFR BLD AUTO: 34.5 % (ref 37–48.5)
HGB BLD-MCNC: 10.6 G/DL (ref 12–16)
IGA SERPL-MCNC: 280 MG/DL (ref 40–350)
IGG SERPL-MCNC: 992 MG/DL (ref 650–1600)
IGM SERPL-MCNC: 39 MG/DL (ref 50–300)
IMM GRANULOCYTES # BLD AUTO: 0.01 K/UL (ref 0–0.04)
IMM GRANULOCYTES NFR BLD AUTO: 0.2 % (ref 0–0.5)
LYMPHOCYTES # BLD AUTO: 0.8 K/UL (ref 1–4.8)
LYMPHOCYTES NFR BLD: 18.5 % (ref 18–48)
MAGNESIUM SERPL-MCNC: 1.4 MG/DL (ref 1.6–2.6)
MCH RBC QN AUTO: 26.5 PG (ref 27–31)
MCHC RBC AUTO-ENTMCNC: 30.7 G/DL (ref 32–36)
MCV RBC AUTO: 86 FL (ref 82–98)
MONOCYTES # BLD AUTO: 0.5 K/UL (ref 0.3–1)
MONOCYTES NFR BLD: 12.2 % (ref 4–15)
NEUTROPHILS # BLD AUTO: 2.8 K/UL (ref 1.8–7.7)
NEUTROPHILS NFR BLD: 66.7 % (ref 38–73)
NRBC BLD-RTO: 0 /100 WBC
PHOSPHATE SERPL-MCNC: 3.7 MG/DL (ref 2.7–4.5)
PLATELET # BLD AUTO: 73 K/UL (ref 150–350)
PMV BLD AUTO: 11.6 FL (ref 9.2–12.9)
POTASSIUM SERPL-SCNC: 3.1 MMOL/L (ref 3.5–5.1)
PROT SERPL-MCNC: 6.2 G/DL (ref 6–8.4)
RBC # BLD AUTO: 4 M/UL (ref 4–5.4)
SODIUM SERPL-SCNC: 139 MMOL/L (ref 136–145)
WBC # BLD AUTO: 4.17 K/UL (ref 3.9–12.7)

## 2019-04-22 PROCEDURE — 84100 ASSAY OF PHOSPHORUS: CPT

## 2019-04-22 PROCEDURE — 83520 IMMUNOASSAY QUANT NOS NONAB: CPT | Mod: 59

## 2019-04-22 PROCEDURE — 36415 COLL VENOUS BLD VENIPUNCTURE: CPT

## 2019-04-22 PROCEDURE — 84165 PROTEIN E-PHORESIS SERUM: CPT | Mod: 26,,, | Performed by: PATHOLOGY

## 2019-04-22 PROCEDURE — 86334 IMMUNOFIX E-PHORESIS SERUM: CPT

## 2019-04-22 PROCEDURE — 99999 PR PBB SHADOW E&M-EST. PATIENT-LVL IV: CPT | Mod: PBBFAC,,, | Performed by: INTERNAL MEDICINE

## 2019-04-22 PROCEDURE — 80053 COMPREHEN METABOLIC PANEL: CPT

## 2019-04-22 PROCEDURE — 86334 IMMUNOFIX E-PHORESIS SERUM: CPT | Mod: 26,,, | Performed by: PATHOLOGY

## 2019-04-22 PROCEDURE — 84165 PATHOLOGIST INTERPRETATION SPE: ICD-10-PCS | Mod: 26,,, | Performed by: PATHOLOGY

## 2019-04-22 PROCEDURE — 85025 COMPLETE CBC W/AUTO DIFF WBC: CPT

## 2019-04-22 PROCEDURE — 86850 RBC ANTIBODY SCREEN: CPT

## 2019-04-22 PROCEDURE — 99214 OFFICE O/P EST MOD 30 MIN: CPT | Mod: PBBFAC,25 | Performed by: INTERNAL MEDICINE

## 2019-04-22 PROCEDURE — 86334 PATHOLOGIST INTERPRETATION IFE: ICD-10-PCS | Mod: 26,,, | Performed by: PATHOLOGY

## 2019-04-22 PROCEDURE — 84165 PROTEIN E-PHORESIS SERUM: CPT

## 2019-04-22 PROCEDURE — 99215 OFFICE O/P EST HI 40 MIN: CPT | Mod: S$PBB,,, | Performed by: INTERNAL MEDICINE

## 2019-04-22 PROCEDURE — 99215 PR OFFICE/OUTPT VISIT, EST, LEVL V, 40-54 MIN: ICD-10-PCS | Mod: S$PBB,,, | Performed by: INTERNAL MEDICINE

## 2019-04-22 PROCEDURE — 83735 ASSAY OF MAGNESIUM: CPT

## 2019-04-22 PROCEDURE — 82784 ASSAY IGA/IGD/IGG/IGM EACH: CPT | Mod: 59

## 2019-04-22 PROCEDURE — 99999 PR PBB SHADOW E&M-EST. PATIENT-LVL IV: ICD-10-PCS | Mod: PBBFAC,,, | Performed by: INTERNAL MEDICINE

## 2019-04-22 NOTE — Clinical Note
cbc, cmp, serum free light chains, quantitative immunoglobulins, serum electropheresis, serum immunofixation and NP appt in 4 weeks

## 2019-04-22 NOTE — PROGRESS NOTES
SECTION OF HEMATOLOGY AND BONE MARROW TRANSPLANT  Return  Patient Visit   04/23/2019  Referred by:  No ref. provider found  Referred for:myeloma, SCT eval     CHIEF COMPLAINT:   No chief complaint on file.      HISTORY OF PRESENT ILLNESS:   63 y.o. female with pmh as below referred dec 2018  for SCT evaluation for MM; with regard to oncologic history; IgG kappa MM; ISS 2; standard risk cytogenetics; with lytic bone lesions at presentation; initiated VRd (21 day cycle) with Dr. Juan at [facility]; completed 5 cycles.  Completed pretransplant staging showing VGPR and completed pre transplant eval with no overt contraindications to transplant.  Presents today for admission for Shabbir 200 Autologous Sct. Denies fever, chills, nightsweats, bleeding, lymphadenopathy, signs/symptoms of splenomegaly, chest pain, sob, n/v/d/c. Does have chronic neuropathy to bilateral feet.  She also has chronic sciatic pain down both legs with BLE edema (+1 pitting edema today). Pt also has pain and swelling to her left knee. States she fell a week ago and hit her knee. Did not see any provider after fall, no imaging was complete. Taking tylenol and muscle relaxer, pt states neither helping.    Transplant course (3/19/19-4/5/19):  Admitted on 3/18 and received Melphalan on 3/19 and stem cell infusion on 3/20, she tolerated both without difficulty. She had a fall prior to admit, knee x-ray was unrevealing. She complained of severe neuropathy pain on admit and gabapentin was increased from 300 mg TID to 900 mg TID during hospital stay. She experienced the expected side effects of nausea, diarrhea and throat pain which all improved prior to discharge. Engrafted on day +12, 4/1/19. She did not have a fever during stay and was on ppx antimicrobials per protocol. Patient was discharged home with home PT after platelet transfusion and Vascath removal, she will follow-up in clinic on 4/5/19.    Today: pt presents for routine f/u from shabbir auto SCT for  MM. Today is day +34.  Still some fatigue and decreased appetite though significantly improved and approaching pre transplant levels.  Comes to clinic with her daughter.  Denies fever, chills, nightsweats, bleeding, brusing, lymphadenopathy, signs/symptoms of splenomegaly, focal pain .     PAST MEDICAL HISTORY:   Past Medical History:   Diagnosis Date    Depression     DM2 (diabetes mellitus, type 2)     Glaucoma     HTN (hypertension)     Myeloma     Therapy     after mother's death       PAST SURGICAL HISTORY:   Past Surgical History:   Procedure Laterality Date    Biopsy-bone marrow Left 2/7/2019    Performed by Saúl Alva MD at [facility] OR 2ND FLR    Insertion,catheter,tunneled N/A 3/14/2019    Performed by [facility] Diagnostic Provider at [facility] OR 2ND FLR       PAST SOCIAL HISTORY:   reports that she has never smoked. She has never used smokeless tobacco. She reports that she does not drink alcohol or use drugs.    FAMILY HISTORY:  History reviewed. No pertinent family history.    CURRENT MEDICATIONS:   Current Outpatient Medications   Medication Sig    acyclovir (ZOVIRAX) 800 MG Tab Take 1 tablet (800 mg total) by mouth 2 (two) times daily.    gabapentin (NEURONTIN) 300 MG capsule Take 3 capsules (900 mg total) by mouth 3 (three) times daily.    glipiZIDE (GLUCOTROL) 10 MG tablet Take 1 tablet by mouth daily    JANUMET  mg per tablet Take 1 tablet twice a day with meals    losartan-hydrochlorothiazide 100-12.5 mg (HYZAAR) 100-12.5 mg Tab Take 1 tablet by mouth daily    magic mouthwash diphen/antac/lidoc/nysta Take 10 mLs by mouth 4 (four) times daily as needed (mouth pain).    magnesium oxide (MAGOX) 400 mg (241.3 mg magnesium) tablet Take 1 tablet (400 mg total) by mouth 2 (two) times daily.    multivit,iron,minerals/lutein (CENTRUM SILVER ULTRA WOMEN'S ORAL) Take 1 tablet by mouth once daily.    ondansetron (ZOFRAN-ODT) 8 MG TbDL Take 8 mg by mouth every 8 (eight) hours as needed  (nausea).     pantoprazole (PROTONIX) 40 MG tablet Take 1 tablet (40 mg total) by mouth once daily.    PAZEO 0.7 % Drop Place 1 drop into both eyes every morning.    potassium chloride SA (K-DUR,KLOR-CON) 10 MEQ tablet Take 2 tablets (20 mEq total) by mouth 2 (two) times daily.    prochlorperazine (COMPAZINE) 10 MG tablet Take 10 mg by mouth 4 (four) times daily as needed (nausea/vomiting).     tiZANidine (ZANAFLEX) 4 MG tablet Take 4 mg by mouth 3 (three) times daily as needed (back muscle spasms).     traMADol (ULTRAM) 50 mg tablet Take 1 tablet (50 mg total) by mouth every 8 (eight) hours as needed for Pain.     No current facility-administered medications for this visit.      Review of patient's allergies indicates:  No Known Allergies    REVIEW OF SYSTEMS:   General ROS: fatigue  Psychological ROS: negative  Ophthalmic ROS: negative  ENT ROS: negative  Allergy and Immunology ROS: negative  Hematological and Lymphatic ROS: negative  Endocrine ROS: negative  Respiratory ROS: negative  Cardiovascular ROS: BLE swelling  Gastrointestinal ROS: n/v but better controlled with antiemetics now  Genito-Urinary ROS: negative  Musculoskeletal ROS: chronic sciatic pain, bilateral swelling, pain to L knee still  Neurological ROS: insomnia    PHYSICAL EXAM:   Vitals:    04/22/19 0849   BP: (!) 91/50   Pulse: 77   Resp: 17   Temp: 98.3 °F (36.8 °C)       General - well developed, well nourished, no apparent distress  Head & Face - no sinus tenderness  Eyes - normal conjunctivae and lids   ENT - normal external auditory canals; oropharynx clear,  Normal dentition and gums  Neck - normal thyroid  Chest and Lung - normal respiratory effort, clear to auscultation bilaterally   Cardiovascular - RRR with no MGR, normal S1 and S2; edema 1+ BLE with puffiness to bilateral feet  Abdomen -  soft, nontender, no palpable hepatomegaly or splenomegaly  Extremities - unremarkable nails and digits  Heme - no bruising, petechiae,  pallor  Skin - no rashes or lesions  Psych - appropriate mood and affect      ECOG Performance Status: (foot note - ECOG PS provided by [employer]) 2 - Symptomatic, <50% confined to bed    Karnofsky Performance Score:  60%- Requires Occasional Assistance but is Able to Care for Needs  DATA:   Lab Results   Component Value Date    WBC 4.17 04/22/2019    HGB 10.6 (L) 04/22/2019    HCT 34.5 (L) 04/22/2019    MCV 86 04/22/2019    PLT 73 (L) 04/22/2019     Gran # (ANC)   Date Value Ref Range Status   04/22/2019 2.8 1.8 - 7.7 K/uL Final     Gran%   Date Value Ref Range Status   04/22/2019 66.7 38.0 - 73.0 % Final     CMP  Sodium   Date Value Ref Range Status   04/22/2019 139 136 - 145 mmol/L Final     Potassium   Date Value Ref Range Status   04/22/2019 3.1 (L) 3.5 - 5.1 mmol/L Final     Chloride   Date Value Ref Range Status   04/22/2019 97 95 - 110 mmol/L Final     CO2   Date Value Ref Range Status   04/22/2019 28 23 - 29 mmol/L Final     Glucose   Date Value Ref Range Status   04/22/2019 153 (H) 70 - 110 mg/dL Final     BUN, Bld   Date Value Ref Range Status   04/22/2019 16 8 - 23 mg/dL Final     Creatinine   Date Value Ref Range Status   04/22/2019 0.8 0.5 - 1.4 mg/dL Final     Calcium   Date Value Ref Range Status   04/22/2019 9.2 8.7 - 10.5 mg/dL Final     Total Protein   Date Value Ref Range Status   04/22/2019 6.2 6.0 - 8.4 g/dL Final     Albumin   Date Value Ref Range Status   04/22/2019 3.1 (L) 3.5 - 5.2 g/dL Final     Total Bilirubin   Date Value Ref Range Status   04/22/2019 0.8 0.1 - 1.0 mg/dL Final     Comment:     For infants and newborns, interpretation of results should be based  on gestational age, weight and in agreement with clinical  observations.  Premature Infant recommended reference ranges:  Up to 24 hours.............<8.0 mg/dL  Up to 48 hours............<12.0 mg/dL  3-5 days..................<15.0 mg/dL  6-29 days.................<15.0 mg/dL       Alkaline Phosphatase    Date Value Ref Range Status   04/22/2019 44 (L) 55 - 135 U/L Final     AST   Date Value Ref Range Status   04/22/2019 12 10 - 40 U/L Final     ALT   Date Value Ref Range Status   04/22/2019 8 (L) 10 - 44 U/L Final     Anion Gap   Date Value Ref Range Status   04/22/2019 14 8 - 16 mmol/L Final     eGFR if    Date Value Ref Range Status   04/22/2019 >60.0 >60 mL/min/1.73 m^2 Final     eGFR if non    Date Value Ref Range Status   04/22/2019 >60.0 >60 mL/min/1.73 m^2 Final     Comment:     Calculation used to obtain the estimated glomerular filtration  rate (eGFR) is the CKD-EPI equation.        [place] Free Light Chains   Date Value Ref Range Status   04/22/2019 1.31 0.33 - 1.94 mg/dL Final   02/19/2019 1.12 0.33 - 1.94 mg/dL Final     Lambda Free Light Chains   Date Value Ref Range Status   04/22/2019 1.89 0.57 - 2.63 mg/dL Final   02/19/2019 1.50 0.57 - 2.63 mg/dL Final     Kappa/Lambda FLC Ratio   Date Value Ref Range Status   04/22/2019 0.69 0.26 - 1.65 Final   02/19/2019 0.75 0.26 - 1.65 Final     IgG - Serum   Date Value Ref Range Status   04/22/2019 992 650 - 1600 mg/dL Final     Comment:     IgG Cord Blood Reference Range: 650-1600 mg/dL.     IgA   Date Value Ref Range Status   04/22/2019 280 40 - 350 mg/dL Final     Comment:     IgA Cord Blood Reference Range: <5 mg/dL.     IgM   Date Value Ref Range Status   04/22/2019 39 (L) 50 - 300 mg/dL Final     Comment:     IgM Cord Blood Reference Range: <25 mg/dL.     ASSESSMENT AND PLAN:   Encounter Diagnoses   Name Primary?    Multiple myeloma, remission status unspecified Yes    History of auto stem cell transplant     Hypotension, unspecified hypotension type     Electrolyte abnormality        Multiple myeloma s/p shabbir auto stem cell transplant  -IgG kappa MM; ISS 2; standard risk cytogenetics; with lytic bone lesions at presentation; initiate VRd (21 day cycle) with Dr. Juan at [facility]   -she achieved VGPR prior to  transplant  -pre transplant eval with no contraindication for transplant   -never smoker so plan to follow up LL nodule at repeat PET scan at day +100, will also need BM bx at that time  -received melphalan 200mg/m2 autoSCT on 3/20/19, 9 bags and a CD34 dose of 3.61 x 10^6/kg. Engrafted on day +12. Today is day +33  -today's biochemical studies consistent with CR; plan for day +100 bone marrow/biochemical studies/PET scan at [facility]  -likely plan for revlimid maintenance at day +100  -on acyclovir ppx    Pancytopenia due to chemotherapy  -transfuse Hb for <7 and platelets <10     -no indication to transfuse today    N/V  -resolved     Neuropathy  -2/2 chemo  -continue increased dose of gabapentin (since 3/31/19) 900 mg TID    FEN/GI  - continue  mag ox 400 mg bid and kcl 20 meq bid    -anticipate improvement with improving appetite     Left knee pain  -s/p fall prior to transplant admit  -xray was without fracture, showed edema  -tramadol was given, pain now improved.     HTN  - continue losartan/hctz  - was started on norvasc 5 mg daily 3/20/19; discontinue (4/22/19) given relative hypotension today     Debility  -2/2 previous fall, left knee residual pain, and post transplant  -bedside commode and shower chair ordered  -caregiver with concern of needing to go back to work. Informed her of minimal 30 day 24 hour coverage in caregiver contract. Pt would like to speak with SW to discuss possible funds/assistance with financial burden involved with not being at work    DM2  - on glipizide, glyburide, and janumet          Insomnia  -recommended OTC melatonin or z-quil instead of prescription sleep aid at this time     Follow up:  -check in with Dr. Juan with cbc, cmp, mag in 1-2 weeks  -cbc, cmp, serum free light chains, quantitative immunoglobulins, serum electropheresis, serum immunofixation and appt in 4 weeks  -return next to [facility] for day +100 pet/marrow/bcs

## 2019-04-23 LAB
ALBUMIN SERPL ELPH-MCNC: 3.4 G/DL (ref 3.35–5.55)
ALPHA1 GLOB SERPL ELPH-MCNC: 0.34 G/DL (ref 0.17–0.41)
ALPHA2 GLOB SERPL ELPH-MCNC: 0.8 G/DL (ref 0.43–0.99)
B-GLOBULIN SERPL ELPH-MCNC: 0.8 G/DL (ref 0.5–1.1)
GAMMA GLOB SERPL ELPH-MCNC: 0.96 G/DL (ref 0.67–1.58)
INTERPRETATION SERPL IFE-IMP: NORMAL
KAPPA LC SER QL IA: 1.31 MG/DL (ref 0.33–1.94)
KAPPA LC/LAMBDA SER IA: 0.69 (ref 0.26–1.65)
LAMBDA LC SER QL IA: 1.89 MG/DL (ref 0.57–2.63)
PATHOLOGIST INTERPRETATION IFE: NORMAL
PATHOLOGIST INTERPRETATION SPE: NORMAL
PROT SERPL-MCNC: 6.3 G/DL (ref 6–8.4)

## 2019-04-30 DIAGNOSIS — C90.00 MULTIPLE MYELOMA, REMISSION STATUS UNSPECIFIED: Primary | ICD-10-CM

## 2019-05-01 ENCOUNTER — DOCUMENTATION ONLY (OUTPATIENT)
Dept: HEMATOLOGY/ONCOLOGY | Facility: CLINIC | Age: 64
End: 2019-05-01

## 2019-05-01 ENCOUNTER — TELEPHONE (OUTPATIENT)
Dept: ADMINISTRATIVE | Facility: CLINIC | Age: 64
End: 2019-05-01

## 2019-05-01 NOTE — PROGRESS NOTES
Spoke with pts. Daughter Samantha and discussed need for transfer tub bench and assistance at home with bathing and other adl's. Faxed order for tub bench to [facility] ([phone]) and [facility]. [facility] is $50. Also contacted [facility] and spoke with Jamin ([id number]), Aide and  have been ordered to assist pt. At home. Provided pts. Daughter with phone # for [facility] in Long term Care (Waiver program)-[phone]. Daughter aware of the above and in agreement with the plan. Will follow.

## 2019-05-09 DIAGNOSIS — C90.00 MULTIPLE MYELOMA, REMISSION STATUS UNSPECIFIED: Primary | ICD-10-CM

## 2019-05-13 DIAGNOSIS — C90.00 MULTIPLE MYELOMA, REMISSION STATUS UNSPECIFIED: Primary | ICD-10-CM

## 2019-05-13 DIAGNOSIS — N18.30 CHRONIC KIDNEY DISEASE, STAGE III (MODERATE): ICD-10-CM

## 2019-05-20 ENCOUNTER — TELEPHONE (OUTPATIENT)
Dept: HEMATOLOGY/ONCOLOGY | Facility: CLINIC | Age: 64
End: 2019-05-20

## 2019-05-20 PROBLEM — R19.7 DIARRHEA: Status: RESOLVED | Noted: 2019-03-30 | Resolved: 2019-05-20

## 2019-05-20 PROBLEM — G47.00 INSOMNIA: Status: ACTIVE | Noted: 2019-05-20

## 2019-05-20 PROBLEM — R53.81 DEBILITY: Status: ACTIVE | Noted: 2019-05-20

## 2019-05-20 PROBLEM — D75.9 CYTOPENIA: Status: ACTIVE | Noted: 2019-05-20

## 2019-05-20 PROBLEM — D61.810 PANCYTOPENIA DUE TO CHEMOTHERAPY: Status: RESOLVED | Noted: 2019-03-24 | Resolved: 2019-05-20

## 2019-05-20 NOTE — TELEPHONE ENCOUNTER
Spoke with daughter and rescheduled apps and recommenced that pt be seen by provider that follows up with her swollen legs.

## 2019-05-20 NOTE — TELEPHONE ENCOUNTER
----- Message from Pat Dutta NP sent at 5/20/2019 12:05 PM CDT -----  Contact: Self  Please call pt to see when she would like to reschedule. FYCATALINA she has an oncologist that she sees locally in case her swollen legs need to be evaluated.    THanks,  Pat Dutta DNP, NP  Hematology/Oncology      ----- Message -----  From: July Saenz  Sent: 5/20/2019  11:41 AM  To: [facility] Staff    Pt is calling to reschedule today's appts.  In addition, pt's leg is swollen and she has a questions about that.    She can be reached at [phone].    Thank you.

## 2019-05-20 NOTE — PROGRESS NOTES
SECTION OF HEMATOLOGY AND BONE MARROW TRANSPLANT  Return  Patient Visit   05/27/2019  Referred by:  No ref. provider found  Referred for:myeloma, SCT eval     CHIEF COMPLAINT:   Chief Complaint   Patient presents with    Multiple myeloma, remission status unspecified       HISTORY OF PRESENT ILLNESS:   63 y.o. female with pmh as below referred dec 2018  for SCT evaluation for MM; with regard to oncologic history; IgG kappa MM; ISS 2; standard risk cytogenetics; with lytic bone lesions at presentation; initiated VRd (21 day cycle) with Dr. Juan at [facility]; completed 5 cycles.  Completed pretransplant staging showing VGPR and completed pre transplant eval with no overt contraindications to transplant.  Presents today for admission for Daija 200 Autologous Sct. Denies fever, chills, nightsweats, bleeding, lymphadenopathy, signs/symptoms of splenomegaly, chest pain, sob, n/v/d/c. Does have chronic neuropathy to bilateral feet.  She also has chronic sciatic pain down both legs with BLE edema (+1 pitting edema today). Pt also has pain and swelling to her left knee. States she fell a week ago and hit her knee. Did not see any provider after fall, no imaging was complete. Taking tylenol and muscle relaxer, pt states neither helping.    Transplant course (3/19/19-4/5/19):  Admitted on 3/18 and received Melphalan on 3/19 and stem cell infusion on 3/20, she tolerated both without difficulty. She had a fall prior to admit, knee x-ray was unrevealing. She complained of severe neuropathy pain on admit and gabapentin was increased from 300 mg TID to 900 mg TID during hospital stay. She experienced the expected side effects of nausea, diarrhea and throat pain which all improved prior to discharge. Engrafted on day +12, 4/1/19. She did not have a fever during stay and was on ppx antimicrobials per protocol. Patient was discharged home with home PT after platelet transfusion and Vascath removal, she will follow-up in clinic on  4/5/19.    Today: pt presents with daughter for routine f/u from shabbir auto SCT for MM. Today is day +68. Fatigue resolved and appetite significantly improved. Continues with BLE edema, worse at night. Amenable to wearing compression socks. Continues to elevate as possible. Continues with some anxiety and nervousness related to fear of falling again. Denies fever, nightsweats, bleeding, bruising, lymphadenopathy, signs/symptoms of splenomegaly, focal pain .     PAST MEDICAL HISTORY:   Past Medical History:   Diagnosis Date    Depression     DM2 (diabetes mellitus, type 2)     Glaucoma     HTN (hypertension)     Myeloma     Pancytopenia due to chemotherapy 3/24/2019    Therapy     after mother's death       PAST SURGICAL HISTORY:   Past Surgical History:   Procedure Laterality Date    Biopsy-bone marrow Left 2/7/2019    Performed by Saúl Alva MD at [facility] OR [facility] F[facility]    Insertion,catheter,tunneled N/A 3/14/2019    Performed by [facility] Diagnostic Provider at [facility] OR 2ND FLR       PAST SOCIAL HISTORY:   reports that she has never smoked. She has never used smokeless tobacco. She reports that she does not drink alcohol or use drugs.    FAMILY HISTORY:  History reviewed. No pertinent family history.    CURRENT MEDICATIONS:   Current Outpatient Medications   Medication Sig    acyclovir (ZOVIRAX) 800 MG Tab Take 1 tablet (800 mg total) by mouth 2 (two) times daily.    gabapentin (NEURONTIN) 300 MG capsule Take 3 capsules (900 mg total) by mouth 3 (three) times daily.    glipiZIDE (GLUCOTROL) 10 MG tablet Take 1 tablet by mouth daily    JANUMET  mg per tablet Take 1 tablet twice a day with meals    losartan-hydrochlorothiazide 100-12.5 mg (HYZAAR) 100-12.5 mg Tab Take 1 tablet by mouth daily    magnesium oxide (MAGOX) 400 mg (241.3 mg magnesium) tablet Take 1 tablet (400 mg total) by mouth 2 (two) times daily.    multivit,iron,minerals/lutein (CENTRUM SILVER ULTRA WOMEN'S ORAL) Take 1 tablet by  mouth once daily.    ondansetron (ZOFRAN-ODT) 8 MG TbDL Take 8 mg by mouth every 8 (eight) hours as needed (nausea).     pantoprazole (PROTONIX) 40 MG tablet Take 1 tablet (40 mg total) by mouth once daily.    PAZEO 0.7 % Drop Place 1 drop into both eyes every morning.    potassium chloride SA (K-DUR,KLOR-CON) 10 MEQ tablet Take 2 tablets (20 mEq total) by mouth 2 (two) times daily.    prochlorperazine (COMPAZINE) 10 MG tablet Take 10 mg by mouth 4 (four) times daily as needed (nausea/vomiting).     tiZANidine (ZANAFLEX) 4 MG tablet Take 4 mg by mouth 3 (three) times daily as needed (back muscle spasms).     traMADol (ULTRAM) 50 mg tablet Take 1 tablet (50 mg total) by mouth every 8 (eight) hours as needed for Pain.    magic mouthwash diphen/antac/lidoc/nysta Take 10 mLs by mouth 4 (four) times daily as needed (mouth pain).     No current facility-administered medications for this visit.      Review of patient's allergies indicates:  No Known Allergies    REVIEW OF SYSTEMS:   General ROS: negative  Psychological ROS: negative  Ophthalmic ROS: negative  ENT ROS: negative  Allergy and Immunology ROS: negative  Hematological and Lymphatic ROS: negative  Endocrine ROS: negative  Respiratory ROS: negative  Cardiovascular ROS: BLE swelling  Gastrointestinal ROS: negative  Genito-Urinary ROS: negative  Musculoskeletal ROS: chronic sciatic pain, bilateral swelling, pain to L knee still  Neurological/psych ROS: anxiety/nervous    PHYSICAL EXAM:   Vitals:    05/27/19 0920   BP: (!) 142/79   Pulse: 75   Temp: 98.3 °F (36.8 °C)       General - well developed, well nourished, no apparent distress  Head & Face - no sinus tenderness  Eyes - normal conjunctivae and lids   ENT - normal external auditory canals; oropharynx clear,  Normal dentition and gums  Neck - normal thyroid  Chest and Lung - normal respiratory effort, clear to auscultation bilaterally   Cardiovascular - RRR with no MGR, normal S1 and S2; BLE trace  swelling, non pitting now  Abdomen -  soft, nontender, no palpable hepatomegaly or splenomegaly  Extremities - unremarkable nails and digits  Heme - no bruising, petechiae, pallor  Skin - no rashes or lesions  Psych - appropriate mood and affect, some anxiety      ECOG Performance Status: (foot note - ECOG PS provided by [employer]) 2 - Symptomatic, <50% confined to bed    Karnofsky Performance Score:  70%- Cares for Self: Unable to Carry on Normal Activity or Active Work  DATA:   Lab Results   Component Value Date    WBC 4.63 05/27/2019    HGB 9.6 (L) 05/27/2019    HCT 30.0 (L) 05/27/2019    MCV 88 05/27/2019     (L) 05/27/2019     Gran # (ANC)   Date Value Ref Range Status   05/27/2019 3.1 1.8 - 7.7 K/uL Final     Gran%   Date Value Ref Range Status   05/27/2019 67.4 38.0 - 73.0 % Final     CMP  Sodium   Date Value Ref Range Status   05/27/2019 144 136 - 145 mmol/L Final     Potassium   Date Value Ref Range Status   05/27/2019 3.2 (L) 3.5 - 5.1 mmol/L Final     Chloride   Date Value Ref Range Status   05/27/2019 109 95 - 110 mmol/L Final     CO2   Date Value Ref Range Status   05/27/2019 26 23 - 29 mmol/L Final     Glucose   Date Value Ref Range Status   05/27/2019 138 (H) 70 - 110 mg/dL Final     BUN, Bld   Date Value Ref Range Status   05/27/2019 9 8 - 23 mg/dL Final     Creatinine   Date Value Ref Range Status   05/27/2019 0.7 0.5 - 1.4 mg/dL Final     Calcium   Date Value Ref Range Status   05/27/2019 9.3 8.7 - 10.5 mg/dL Final     Total Protein   Date Value Ref Range Status   05/27/2019 5.9 (L) 6.0 - 8.4 g/dL Final     Albumin   Date Value Ref Range Status   05/27/2019 2.8 (L) 3.5 - 5.2 g/dL Final     Total Bilirubin   Date Value Ref Range Status   05/27/2019 0.3 0.1 - 1.0 mg/dL Final     Comment:     For infants and newborns, interpretation of results should be based  on gestational age, weight and in agreement with clinical  observations.  Premature Infant recommended  reference ranges:  Up to 24 hours.............<8.0 mg/dL  Up to 48 hours............<12.0 mg/dL  3-5 days..................<15.0 mg/dL  6-29 days.................<15.0 mg/dL       Alkaline Phosphatase   Date Value Ref Range Status   05/27/2019 43 (L) 55 - 135 U/L Final     AST   Date Value Ref Range Status   05/27/2019 9 (L) 10 - 40 U/L Final     ALT   Date Value Ref Range Status   05/27/2019 <5 (L) 10 - 44 U/L Final     Anion Gap   Date Value Ref Range Status   05/27/2019 9 8 - 16 mmol/L Final     eGFR if    Date Value Ref Range Status   05/27/2019 >60.0 >60 mL/min/1.73 m^2 Final     eGFR if non    Date Value Ref Range Status   05/27/2019 >60.0 >60 mL/min/1.73 m^2 Final     Comment:     Calculation used to obtain the estimated glomerular filtration  rate (eGFR) is the CKD-EPI equation.        Point Comfort Free Light Chains   Date Value Ref Range Status   04/22/2019 1.31 0.33 - 1.94 mg/dL Final   02/19/2019 1.12 0.33 - 1.94 mg/dL Final     Lambda Free Light Chains   Date Value Ref Range Status   04/22/2019 1.89 0.57 - 2.63 mg/dL Final   02/19/2019 1.50 0.57 - 2.63 mg/dL Final     Kappa/Lambda FLC Ratio   Date Value Ref Range Status   04/22/2019 0.69 0.26 - 1.65 Final   02/19/2019 0.75 0.26 - 1.65 Final     IgG - Serum   Date Value Ref Range Status   05/27/2019 977 650 - 1600 mg/dL Final     Comment:     IgG Cord Blood Reference Range: 650-1600 mg/dL.     IgA   Date Value Ref Range Status   05/27/2019 189 40 - 350 mg/dL Final     Comment:     IgA Cord Blood Reference Range: <5 mg/dL.     IgM   Date Value Ref Range Status   05/27/2019 21 (L) 50 - 300 mg/dL Final     Comment:     IgM Cord Blood Reference Range: <25 mg/dL.     ASSESSMENT AND PLAN:   Encounter Diagnoses   Name Primary?    Status post autologous bone marrow transplant Yes    Multiple myeloma in remission     Cytopenia     CINV (chemotherapy-induced nausea and vomiting)     Chemotherapy-induced peripheral neuropathy      Essential hypertension     Left knee pain, unspecified chronicity     Debility     Type 2 diabetes mellitus without complication, without long-term current use of insulin     Insomnia, unspecified type     Bilateral leg edema        Multiple myeloma s/p shabbir auto stem cell transplant  -IgG kappa MM; ISS 2; standard risk cytogenetics; with lytic bone lesions at presentation; initiate VRd (21 day cycle) with Dr. Juan at [facility]   -she achieved VGPR prior to transplant  -pre transplant eval with no contraindication for transplant   -never smoker so plan to follow up LL nodule at repeat PET scan at day +100  -received melphalan 200mg/m2 autoSCT on 3/20/19, 9 bags and a CD34 dose of 3.61 x 10^6/kg. Engrafted on day +12. Today is day +68  -last biochemical studies consistent with CR; plan for day +100 bone marrow/biochemical studies/PET scan on 6/27/19 (consent for marrow in media tab)  -likely plan for revlimid maintenance at day +100  -on acyclovir ppx through day +365    Cytopenias due to chemotherapy  -transfuse Hb for <7 and platelets <10  -no indication to transfuse today  -anemia and thrombocytopenia with hgb 9.6 and plt 124K and normal WBC today    N/V  -resolved     Neuropathy  -2/2 chemo  -continue increased dose of gabapentin (since 3/31/19) 900 mg TID    FEN/GI  -continue mag ox 400 mg bid, however level not checked today  -pt stopped kcl and K level 3.2 today, will restart 20 meq bid (5/27/19)  -anticipate improvement with improving appetite     HTN  -continue losartan/hctz  -was started on norvasc 5 mg daily 3/20/19; discontinued (4/22/19) given relative hypotension    Left knee pain  -s/p fall prior to transplant admit  -xray was without fracture, showed edema  -tramadol was given, pain now improved  -okay to receive cortisone injection locally     Debility  -2/2 previous fall, left knee residual pain, and post transplant  -bedside commode and shower chair ordered  -caregiver with concern of needing  to go back to work. Informed her of minimal 30 day 24 hour coverage in caregiver contract. Worked with SW to discuss possible funds/assistance with financial burden involved with not being at work    DM2  - on glipizide, glyburide, and janumet     Insomnia  -recommended OTC melatonin or z-quil instead of prescription sleep aid at this time, continue this     BLE edema  -non pitting and improving  -continue to elevate  -prescribed compression socks 5/27/19     Follow up:  -continue f/u with Dr. Juan with labs locally   -BM BX, PET scan, cbc, cmp, serum free light chains, quantitative immunoglobulins, serum electropheresis, serum immunofixation and appt with Dr. Alva for day +100 eval around 6/27/19    Pat Dutta, DNP, NP  Hematology/Oncology

## 2019-05-23 ENCOUNTER — TELEPHONE (OUTPATIENT)
Dept: HEMATOLOGY/ONCOLOGY | Facility: CLINIC | Age: 64
End: 2019-05-23

## 2019-05-23 NOTE — TELEPHONE ENCOUNTER
----- Message from Sean Soria sent at 5/23/2019  1:36 PM CDT -----  Contact: Patient  Pt wants to know if she's able to get her Cortizone shot again, please contact to advise.      Contact:: [phone]

## 2019-05-27 ENCOUNTER — OFFICE VISIT (OUTPATIENT)
Dept: HEMATOLOGY/ONCOLOGY | Facility: CLINIC | Age: 64
End: 2019-05-27
Payer: MEDICARE

## 2019-05-27 ENCOUNTER — LAB VISIT (OUTPATIENT)
Dept: LAB | Facility: HOSPITAL | Age: 64
End: 2019-05-27
Attending: INTERNAL MEDICINE
Payer: MEDICARE

## 2019-05-27 VITALS
BODY MASS INDEX: 26.79 KG/M2 | HEIGHT: 64 IN | SYSTOLIC BLOOD PRESSURE: 142 MMHG | TEMPERATURE: 98 F | DIASTOLIC BLOOD PRESSURE: 79 MMHG | OXYGEN SATURATION: 100 % | HEART RATE: 75 BPM | WEIGHT: 156.94 LBS

## 2019-05-27 DIAGNOSIS — R60.0 BILATERAL LEG EDEMA: ICD-10-CM

## 2019-05-27 DIAGNOSIS — R53.81 DEBILITY: ICD-10-CM

## 2019-05-27 DIAGNOSIS — E11.9 TYPE 2 DIABETES MELLITUS WITHOUT COMPLICATION, WITHOUT LONG-TERM CURRENT USE OF INSULIN: ICD-10-CM

## 2019-05-27 DIAGNOSIS — C90.00 MULTIPLE MYELOMA, REMISSION STATUS UNSPECIFIED: ICD-10-CM

## 2019-05-27 DIAGNOSIS — M25.562 LEFT KNEE PAIN, UNSPECIFIED CHRONICITY: ICD-10-CM

## 2019-05-27 DIAGNOSIS — Z94.81 STATUS POST AUTOLOGOUS BONE MARROW TRANSPLANT: Primary | ICD-10-CM

## 2019-05-27 DIAGNOSIS — D75.9 CYTOPENIA: ICD-10-CM

## 2019-05-27 DIAGNOSIS — Z76.82 STEM CELL TRANSPLANT CANDIDATE: ICD-10-CM

## 2019-05-27 DIAGNOSIS — T45.1X5A CHEMOTHERAPY-INDUCED PERIPHERAL NEUROPATHY: ICD-10-CM

## 2019-05-27 DIAGNOSIS — N18.30 CHRONIC KIDNEY DISEASE, STAGE III (MODERATE): ICD-10-CM

## 2019-05-27 DIAGNOSIS — T45.1X5A CINV (CHEMOTHERAPY-INDUCED NAUSEA AND VOMITING): ICD-10-CM

## 2019-05-27 DIAGNOSIS — R11.2 CINV (CHEMOTHERAPY-INDUCED NAUSEA AND VOMITING): ICD-10-CM

## 2019-05-27 DIAGNOSIS — C90.00 MULTIPLE MYELOMA: ICD-10-CM

## 2019-05-27 DIAGNOSIS — I10 ESSENTIAL HYPERTENSION: ICD-10-CM

## 2019-05-27 DIAGNOSIS — G62.0 CHEMOTHERAPY-INDUCED PERIPHERAL NEUROPATHY: ICD-10-CM

## 2019-05-27 DIAGNOSIS — G47.00 INSOMNIA, UNSPECIFIED TYPE: ICD-10-CM

## 2019-05-27 DIAGNOSIS — C90.01 MULTIPLE MYELOMA IN REMISSION: ICD-10-CM

## 2019-05-27 LAB
25(OH)D3+25(OH)D2 SERPL-MCNC: 17 NG/ML (ref 30–96)
ALBUMIN SERPL BCP-MCNC: 2.8 G/DL (ref 3.5–5.2)
ALP SERPL-CCNC: 43 U/L (ref 55–135)
ALT SERPL W/O P-5'-P-CCNC: <5 U/L (ref 10–44)
ANION GAP SERPL CALC-SCNC: 9 MMOL/L (ref 8–16)
AST SERPL-CCNC: 9 U/L (ref 10–40)
BASOPHILS # BLD AUTO: 0.04 K/UL (ref 0–0.2)
BASOPHILS NFR BLD: 0.9 % (ref 0–1.9)
BILIRUB SERPL-MCNC: 0.3 MG/DL (ref 0.1–1)
BUN SERPL-MCNC: 9 MG/DL (ref 8–23)
CALCIUM SERPL-MCNC: 9.3 MG/DL (ref 8.7–10.5)
CHLORIDE SERPL-SCNC: 109 MMOL/L (ref 95–110)
CO2 SERPL-SCNC: 26 MMOL/L (ref 23–29)
CREAT SERPL-MCNC: 0.7 MG/DL (ref 0.5–1.4)
DIFFERENTIAL METHOD: ABNORMAL
EOSINOPHIL # BLD AUTO: 0.2 K/UL (ref 0–0.5)
EOSINOPHIL NFR BLD: 3.2 % (ref 0–8)
ERYTHROCYTE [DISTWIDTH] IN BLOOD BY AUTOMATED COUNT: 18.9 % (ref 11.5–14.5)
EST. GFR  (AFRICAN AMERICAN): >60 ML/MIN/1.73 M^2
EST. GFR  (NON AFRICAN AMERICAN): >60 ML/MIN/1.73 M^2
GLUCOSE SERPL-MCNC: 138 MG/DL (ref 70–110)
HCT VFR BLD AUTO: 30 % (ref 37–48.5)
HGB BLD-MCNC: 9.6 G/DL (ref 12–16)
IGA SERPL-MCNC: 189 MG/DL (ref 40–350)
IGG SERPL-MCNC: 977 MG/DL (ref 650–1600)
IGM SERPL-MCNC: 21 MG/DL (ref 50–300)
IMM GRANULOCYTES # BLD AUTO: 0.02 K/UL (ref 0–0.04)
IMM GRANULOCYTES NFR BLD AUTO: 0.4 % (ref 0–0.5)
LYMPHOCYTES # BLD AUTO: 0.8 K/UL (ref 1–4.8)
LYMPHOCYTES NFR BLD: 17.9 % (ref 18–48)
MCH RBC QN AUTO: 28.1 PG (ref 27–31)
MCHC RBC AUTO-ENTMCNC: 32 G/DL (ref 32–36)
MCV RBC AUTO: 88 FL (ref 82–98)
MONOCYTES # BLD AUTO: 0.5 K/UL (ref 0.3–1)
MONOCYTES NFR BLD: 10.2 % (ref 4–15)
NEUTROPHILS # BLD AUTO: 3.1 K/UL (ref 1.8–7.7)
NEUTROPHILS NFR BLD: 67.4 % (ref 38–73)
NRBC BLD-RTO: 0 /100 WBC
PLATELET # BLD AUTO: 124 K/UL (ref 150–350)
PMV BLD AUTO: 10.2 FL (ref 9.2–12.9)
POTASSIUM SERPL-SCNC: 3.2 MMOL/L (ref 3.5–5.1)
PROT SERPL-MCNC: 5.9 G/DL (ref 6–8.4)
RBC # BLD AUTO: 3.42 M/UL (ref 4–5.4)
SODIUM SERPL-SCNC: 144 MMOL/L (ref 136–145)
WBC # BLD AUTO: 4.63 K/UL (ref 3.9–12.7)

## 2019-05-27 PROCEDURE — 84165 PROTEIN E-PHORESIS SERUM: CPT

## 2019-05-27 PROCEDURE — 84165 PATHOLOGIST INTERPRETATION SPE: ICD-10-PCS | Mod: 26,,, | Performed by: PATHOLOGY

## 2019-05-27 PROCEDURE — 99215 OFFICE O/P EST HI 40 MIN: CPT | Mod: S$PBB,,, | Performed by: NURSE PRACTITIONER

## 2019-05-27 PROCEDURE — 99215 PR OFFICE/OUTPT VISIT, EST, LEVL V, 40-54 MIN: ICD-10-PCS | Mod: S$PBB,,, | Performed by: NURSE PRACTITIONER

## 2019-05-27 PROCEDURE — 99215 OFFICE O/P EST HI 40 MIN: CPT | Mod: PBBFAC | Performed by: NURSE PRACTITIONER

## 2019-05-27 PROCEDURE — 99999 PR PBB SHADOW E&M-EST. PATIENT-LVL V: ICD-10-PCS | Mod: PBBFAC,,, | Performed by: NURSE PRACTITIONER

## 2019-05-27 PROCEDURE — 82784 ASSAY IGA/IGD/IGG/IGM EACH: CPT

## 2019-05-27 PROCEDURE — 83520 IMMUNOASSAY QUANT NOS NONAB: CPT | Mod: 59

## 2019-05-27 PROCEDURE — 99999 PR PBB SHADOW E&M-EST. PATIENT-LVL V: CPT | Mod: PBBFAC,,, | Performed by: NURSE PRACTITIONER

## 2019-05-27 PROCEDURE — 86334 IMMUNOFIX E-PHORESIS SERUM: CPT | Mod: 26,,, | Performed by: PATHOLOGY

## 2019-05-27 PROCEDURE — 85025 COMPLETE CBC W/AUTO DIFF WBC: CPT

## 2019-05-27 PROCEDURE — 82306 VITAMIN D 25 HYDROXY: CPT

## 2019-05-27 PROCEDURE — 86334 PATHOLOGIST INTERPRETATION IFE: ICD-10-PCS | Mod: 26,,, | Performed by: PATHOLOGY

## 2019-05-27 PROCEDURE — 86334 IMMUNOFIX E-PHORESIS SERUM: CPT

## 2019-05-27 PROCEDURE — 36415 COLL VENOUS BLD VENIPUNCTURE: CPT

## 2019-05-27 PROCEDURE — 84165 PROTEIN E-PHORESIS SERUM: CPT | Mod: 26,,, | Performed by: PATHOLOGY

## 2019-05-27 PROCEDURE — 80053 COMPREHEN METABOLIC PANEL: CPT

## 2019-05-27 NOTE — Clinical Note
BM bx on 6/27/19, case request entered and information sheet provided to pt. Please write in binder.

## 2019-05-27 NOTE — Clinical Note
Follow up:-BM BX, PET scan, cbc, cmp, serum free light chains, quantitative immunoglobulins, serum electropheresis, serum immunofixation and appt with Dr. Alva for day +100 eval on 6/27/19

## 2019-05-28 ENCOUNTER — TELEPHONE (OUTPATIENT)
Dept: HEMATOLOGY/ONCOLOGY | Facility: CLINIC | Age: 64
End: 2019-05-28

## 2019-05-28 DIAGNOSIS — E55.9 VITAMIN D DEFICIENCY: Primary | ICD-10-CM

## 2019-05-28 LAB
ALBUMIN SERPL ELPH-MCNC: 3.06 G/DL (ref 3.35–5.55)
ALPHA1 GLOB SERPL ELPH-MCNC: 0.3 G/DL (ref 0.17–0.41)
ALPHA2 GLOB SERPL ELPH-MCNC: 0.73 G/DL (ref 0.43–0.99)
B-GLOBULIN SERPL ELPH-MCNC: 0.67 G/DL (ref 0.5–1.1)
GAMMA GLOB SERPL ELPH-MCNC: 0.84 G/DL (ref 0.67–1.58)
INTERPRETATION SERPL IFE-IMP: NORMAL
KAPPA LC SER QL IA: 1.07 MG/DL (ref 0.33–1.94)
KAPPA LC/LAMBDA SER IA: 0.78 (ref 0.26–1.65)
LAMBDA LC SER QL IA: 1.37 MG/DL (ref 0.57–2.63)
PATHOLOGIST INTERPRETATION IFE: NORMAL
PATHOLOGIST INTERPRETATION SPE: NORMAL
PROT SERPL-MCNC: 5.6 G/DL (ref 6–8.4)

## 2019-05-28 RX ORDER — ERGOCALCIFEROL 1.25 MG/1
50000 CAPSULE ORAL
Qty: 12 CAPSULE | Refills: 3 | Status: SHIPPED | OUTPATIENT
Start: 2019-05-28 | End: 2020-07-24

## 2019-05-28 NOTE — TELEPHONE ENCOUNTER
Called pt daughter and said that Dr. Alva says that labs show that myeloma is in remission. Also to  Vit D capsules from [facility].

## 2019-06-20 ENCOUNTER — TELEPHONE (OUTPATIENT)
Dept: HEMATOLOGY/ONCOLOGY | Facility: CLINIC | Age: 64
End: 2019-06-20

## 2019-06-20 NOTE — TELEPHONE ENCOUNTER
----- Message from Sean Soria sent at 6/20/2019  2:32 PM CDT -----  Contact: Chante (daughter)  Pt is experiencing chest pain, daughter wants to know if she should take Rx pantoprazole 40mg to help with the pain.      Contact:: [phone]

## 2019-06-20 NOTE — TELEPHONE ENCOUNTER
Daughter described that the mother has weird pulling feeling where she was told he has a cracked rib. Said that taking the protonix might not help but will not hurt. Pt said that she thinks it is trapped gas, pt has been having bowel movements. Pt will see if the ultram helps with the sensation. Pt said that her feet are swollen and has stoped taking hyzaar. Said that she see what is going on at pet scan tomorrow.

## 2019-06-21 ENCOUNTER — HOSPITAL ENCOUNTER (OUTPATIENT)
Dept: RADIOLOGY | Facility: HOSPITAL | Age: 64
Discharge: HOME | End: 2019-06-21
Attending: NURSE PRACTITIONER
Payer: MEDICARE

## 2019-06-21 DIAGNOSIS — C90.01 MULTIPLE MYELOMA IN REMISSION: ICD-10-CM

## 2019-06-21 DIAGNOSIS — Z94.81 STATUS POST AUTOLOGOUS BONE MARROW TRANSPLANT: ICD-10-CM

## 2019-06-21 LAB — POCT GLUCOSE: 107 MG/DL (ref 70–110)

## 2019-06-21 PROCEDURE — 78816 PET IMAGE W/CT FULL BODY: CPT | Mod: TC,PS

## 2019-06-21 PROCEDURE — 78816 PET IMAGE W/CT FULL BODY: CPT | Mod: 26,PS,, | Performed by: RADIOLOGY

## 2019-06-21 PROCEDURE — 78816 NM PET CT WHOLE BODY: ICD-10-PCS | Mod: 26,PS,, | Performed by: RADIOLOGY

## 2019-06-23 ENCOUNTER — DOCUMENTATION ONLY (OUTPATIENT)
Dept: HEMATOLOGY/ONCOLOGY | Facility: CLINIC | Age: 64
End: 2019-06-23

## 2019-06-23 NOTE — PROGRESS NOTES
Met with pt. And her daughter (6/21/19) to discuss financial assistance. Pt. Is hoping to move into a 1st floor apt where she will not have to climb steps. Assisted pt. With [facility] patient assistance fund application. Will follow.

## 2019-06-27 ENCOUNTER — ANESTHESIA (OUTPATIENT)
Dept: SURGERY | Facility: HOSPITAL | Age: 64
End: 2019-06-27
Payer: MEDICARE

## 2019-06-27 ENCOUNTER — OFFICE VISIT (OUTPATIENT)
Dept: HEMATOLOGY/ONCOLOGY | Facility: CLINIC | Age: 64
End: 2019-06-27
Payer: MEDICARE

## 2019-06-27 ENCOUNTER — HOSPITAL ENCOUNTER (OUTPATIENT)
Facility: HOSPITAL | Age: 64
Discharge: HOME | End: 2019-06-27
Attending: INTERNAL MEDICINE | Admitting: INTERNAL MEDICINE
Payer: MEDICARE

## 2019-06-27 ENCOUNTER — ANESTHESIA EVENT (OUTPATIENT)
Dept: SURGERY | Facility: HOSPITAL | Age: 64
End: 2019-06-27
Payer: MEDICARE

## 2019-06-27 VITALS
BODY MASS INDEX: 26.63 KG/M2 | SYSTOLIC BLOOD PRESSURE: 144 MMHG | HEIGHT: 64 IN | RESPIRATION RATE: 18 BRPM | HEART RATE: 78 BPM | TEMPERATURE: 99 F | WEIGHT: 156 LBS | OXYGEN SATURATION: 95 % | DIASTOLIC BLOOD PRESSURE: 68 MMHG

## 2019-06-27 VITALS
TEMPERATURE: 99 F | OXYGEN SATURATION: 100 % | HEART RATE: 70 BPM | HEIGHT: 64 IN | WEIGHT: 160.06 LBS | SYSTOLIC BLOOD PRESSURE: 162 MMHG | RESPIRATION RATE: 18 BRPM | BODY MASS INDEX: 27.33 KG/M2 | DIASTOLIC BLOOD PRESSURE: 72 MMHG

## 2019-06-27 DIAGNOSIS — Z94.81 STATUS POST AUTOLOGOUS BONE MARROW TRANSPLANT: ICD-10-CM

## 2019-06-27 DIAGNOSIS — Z94.84 HISTORY OF AUTO STEM CELL TRANSPLANT: ICD-10-CM

## 2019-06-27 DIAGNOSIS — C90.01 MULTIPLE MYELOMA IN REMISSION: Primary | ICD-10-CM

## 2019-06-27 LAB
BODY SITE - BONE MARROW: NORMAL
CLINICAL DIAGNOSIS - BONE MARROW: NORMAL
FLOW CYTOMETRY ANTIBODIES ANALYZED - BONE MARROW: NORMAL
FLOW CYTOMETRY COMMENT - BONE MARROW: NORMAL
FLOW CYTOMETRY INTERPRETATION - BONE MARROW: NORMAL
POCT GLUCOSE: 104 MG/DL (ref 70–110)

## 2019-06-27 PROCEDURE — 82962 GLUCOSE BLOOD TEST: CPT | Performed by: INTERNAL MEDICINE

## 2019-06-27 PROCEDURE — 99999 PR PBB SHADOW E&M-EST. PATIENT-LVL IV: CPT | Mod: PBBFAC,,, | Performed by: INTERNAL MEDICINE

## 2019-06-27 PROCEDURE — 88189 PR  FLOWCYTOMETRY/READ, 16 & > MARKERS: ICD-10-PCS | Mod: ,,, | Performed by: PATHOLOGY

## 2019-06-27 PROCEDURE — 25000003 PHARM REV CODE 250

## 2019-06-27 PROCEDURE — 25000003 PHARM REV CODE 250: Performed by: INTERNAL MEDICINE

## 2019-06-27 PROCEDURE — 88313 SPECIAL STAINS GROUP 2: CPT

## 2019-06-27 PROCEDURE — 88365 TISSUE SPECIMEN TO PATHOLOGY, BONE MARROW ASPIRATION/BIOPSY PROCEDURE: ICD-10-PCS | Mod: 26,,, | Performed by: PATHOLOGY

## 2019-06-27 PROCEDURE — 88184 FLOWCYTOMETRY/ TC 1 MARKER: CPT | Performed by: PATHOLOGY

## 2019-06-27 PROCEDURE — 25000003 PHARM REV CODE 250: Performed by: ANESTHESIOLOGY

## 2019-06-27 PROCEDURE — 99215 PR OFFICE/OUTPT VISIT, EST, LEVL V, 40-54 MIN: ICD-10-PCS | Mod: S$PBB,,, | Performed by: INTERNAL MEDICINE

## 2019-06-27 PROCEDURE — 88185 FLOWCYTOMETRY/TC ADD-ON: CPT | Performed by: PATHOLOGY

## 2019-06-27 PROCEDURE — 88342 IMHCHEM/IMCYTCHM 1ST ANTB: CPT | Mod: 26,59,, | Performed by: PATHOLOGY

## 2019-06-27 PROCEDURE — 63600175 PHARM REV CODE 636 W HCPCS: Performed by: NURSE ANESTHETIST, CERTIFIED REGISTERED

## 2019-06-27 PROCEDURE — 88305 TISSUE EXAM BY PATHOLOGIST: CPT | Mod: 26,,, | Performed by: PATHOLOGY

## 2019-06-27 PROCEDURE — 85097 TISSUE SPECIMEN TO PATHOLOGY, BONE MARROW ASPIRATION/BIOPSY PROCEDURE: ICD-10-PCS | Mod: ,,, | Performed by: PATHOLOGY

## 2019-06-27 PROCEDURE — 99215 OFFICE O/P EST HI 40 MIN: CPT | Mod: S$PBB,,, | Performed by: INTERNAL MEDICINE

## 2019-06-27 PROCEDURE — 36000705 HC OR TIME LEV I EA ADD 15 MIN: Performed by: INTERNAL MEDICINE

## 2019-06-27 PROCEDURE — 88311 TISSUE SPECIMEN TO PATHOLOGY, BONE MARROW ASPIRATION/BIOPSY PROCEDURE: ICD-10-PCS | Mod: 26,,, | Performed by: PATHOLOGY

## 2019-06-27 PROCEDURE — 88311 DECALCIFY TISSUE: CPT | Mod: 26,,, | Performed by: PATHOLOGY

## 2019-06-27 PROCEDURE — 88364 TISSUE SPECIMEN TO PATHOLOGY, BONE MARROW ASPIRATION/BIOPSY PROCEDURE: ICD-10-PCS | Mod: 26,,, | Performed by: PATHOLOGY

## 2019-06-27 PROCEDURE — 99999 PR PBB SHADOW E&M-EST. PATIENT-LVL IV: ICD-10-PCS | Mod: PBBFAC,,, | Performed by: INTERNAL MEDICINE

## 2019-06-27 PROCEDURE — D9220A PRA ANESTHESIA: ICD-10-PCS | Mod: CRNA,,, | Performed by: NURSE ANESTHETIST, CERTIFIED REGISTERED

## 2019-06-27 PROCEDURE — 88342 TISSUE SPECIMEN TO PATHOLOGY, BONE MARROW ASPIRATION/BIOPSY PROCEDURE: ICD-10-PCS | Mod: 26,59,, | Performed by: PATHOLOGY

## 2019-06-27 PROCEDURE — 37000008 HC ANESTHESIA 1ST 15 MINUTES: Performed by: INTERNAL MEDICINE

## 2019-06-27 PROCEDURE — D9220A PRA ANESTHESIA: ICD-10-PCS | Mod: ANES,,, | Performed by: ANESTHESIOLOGY

## 2019-06-27 PROCEDURE — D9220A PRA ANESTHESIA: Mod: ANES,,, | Performed by: ANESTHESIOLOGY

## 2019-06-27 PROCEDURE — 88271 CYTOGENETICS DNA PROBE: CPT

## 2019-06-27 PROCEDURE — 85097 BONE MARROW INTERPRETATION: CPT | Mod: ,,, | Performed by: PATHOLOGY

## 2019-06-27 PROCEDURE — 37000009 HC ANESTHESIA EA ADD 15 MINS: Performed by: INTERNAL MEDICINE

## 2019-06-27 PROCEDURE — 36000704 HC OR TIME LEV I 1ST 15 MIN: Performed by: INTERNAL MEDICINE

## 2019-06-27 PROCEDURE — 88184 FLOWCYTOMETRY/ TC 1 MARKER: CPT

## 2019-06-27 PROCEDURE — 88237 TISSUE CULTURE BONE MARROW: CPT

## 2019-06-27 PROCEDURE — D9220A PRA ANESTHESIA: Mod: CRNA,,, | Performed by: NURSE ANESTHETIST, CERTIFIED REGISTERED

## 2019-06-27 PROCEDURE — 88342 IMHCHEM/IMCYTCHM 1ST ANTB: CPT | Mod: 59 | Performed by: PATHOLOGY

## 2019-06-27 PROCEDURE — 88364 INSITU HYBRIDIZATION (FISH): CPT | Mod: 26,,, | Performed by: PATHOLOGY

## 2019-06-27 PROCEDURE — 88313 TISSUE SPECIMEN TO PATHOLOGY, BONE MARROW ASPIRATION/BIOPSY PROCEDURE: ICD-10-PCS | Mod: 26,,, | Performed by: PATHOLOGY

## 2019-06-27 PROCEDURE — 99214 OFFICE O/P EST MOD 30 MIN: CPT | Mod: PBBFAC,25 | Performed by: INTERNAL MEDICINE

## 2019-06-27 PROCEDURE — 88305 TISSUE SPECIMEN TO PATHOLOGY, BONE MARROW ASPIRATION/BIOPSY PROCEDURE: ICD-10-PCS | Mod: 26,,, | Performed by: PATHOLOGY

## 2019-06-27 PROCEDURE — 88305 TISSUE EXAM BY PATHOLOGIST: CPT | Performed by: PATHOLOGY

## 2019-06-27 PROCEDURE — 88313 SPECIAL STAINS GROUP 2: CPT | Mod: 26,,, | Performed by: PATHOLOGY

## 2019-06-27 PROCEDURE — 88341 IMHCHEM/IMCYTCHM EA ADD ANTB: CPT | Mod: 26,59,, | Performed by: PATHOLOGY

## 2019-06-27 PROCEDURE — 71000016 HC POSTOP RECOV ADDL HR: Performed by: INTERNAL MEDICINE

## 2019-06-27 PROCEDURE — 88365 INSITU HYBRIDIZATION (FISH): CPT | Mod: 26,,, | Performed by: PATHOLOGY

## 2019-06-27 PROCEDURE — 88341 TISSUE SPECIMEN TO PATHOLOGY, BONE MARROW ASPIRATION/BIOPSY PROCEDURE: ICD-10-PCS | Mod: 26,59,, | Performed by: PATHOLOGY

## 2019-06-27 PROCEDURE — 71000015 HC POSTOP RECOV 1ST HR: Performed by: INTERNAL MEDICINE

## 2019-06-27 PROCEDURE — 38222 PR BONE MARROW BIOPSY(IES) W/ASPIRATION(S); DIAGNOSTIC: ICD-10-PCS | Mod: LT,,, | Performed by: NURSE PRACTITIONER

## 2019-06-27 PROCEDURE — 88189 FLOWCYTOMETRY/READ 16 & >: CPT | Mod: ,,, | Performed by: PATHOLOGY

## 2019-06-27 PROCEDURE — 88185 FLOWCYTOMETRY/TC ADD-ON: CPT

## 2019-06-27 PROCEDURE — 71000044 HC DOSC ROUTINE RECOVERY FIRST HOUR: Performed by: INTERNAL MEDICINE

## 2019-06-27 PROCEDURE — 38222 DX BONE MARROW BX & ASPIR: CPT | Mod: LT,,, | Performed by: NURSE PRACTITIONER

## 2019-06-27 RX ORDER — LABETALOL HCL 20 MG/4 ML
5 SYRINGE (ML) INTRAVENOUS ONCE
Status: COMPLETED | OUTPATIENT
Start: 2019-06-27 | End: 2019-06-27

## 2019-06-27 RX ORDER — SODIUM CHLORIDE 0.9 % (FLUSH) 0.9 %
10 SYRINGE (ML) INJECTION
Status: DISCONTINUED | OUTPATIENT
Start: 2019-06-27 | End: 2019-06-27 | Stop reason: HOSPADM

## 2019-06-27 RX ORDER — FUROSEMIDE 40 MG/1
TABLET ORAL
Refills: 3 | COMMUNITY
Start: 2019-06-18

## 2019-06-27 RX ORDER — LIDOCAINE HYDROCHLORIDE 10 MG/ML
1 INJECTION, SOLUTION EPIDURAL; INFILTRATION; INTRACAUDAL; PERINEURAL ONCE
Status: COMPLETED | OUTPATIENT
Start: 2019-06-27 | End: 2019-06-27

## 2019-06-27 RX ORDER — FENTANYL CITRATE 50 UG/ML
25 INJECTION, SOLUTION INTRAMUSCULAR; INTRAVENOUS EVERY 5 MIN PRN
Status: DISCONTINUED | OUTPATIENT
Start: 2019-06-27 | End: 2019-06-27 | Stop reason: HOSPADM

## 2019-06-27 RX ORDER — LABETALOL HCL 20 MG/4 ML
SYRINGE (ML) INTRAVENOUS
Status: COMPLETED
Start: 2019-06-27 | End: 2019-06-27

## 2019-06-27 RX ORDER — LIDOCAINE HCL/PF 100 MG/5ML
SYRINGE (ML) INTRAVENOUS
Status: DISCONTINUED | OUTPATIENT
Start: 2019-06-27 | End: 2019-06-27

## 2019-06-27 RX ORDER — ONDANSETRON 2 MG/ML
4 INJECTION INTRAMUSCULAR; INTRAVENOUS ONCE AS NEEDED
Status: DISCONTINUED | OUTPATIENT
Start: 2019-06-27 | End: 2019-06-27 | Stop reason: HOSPADM

## 2019-06-27 RX ORDER — SODIUM CHLORIDE 9 MG/ML
INJECTION, SOLUTION INTRAVENOUS CONTINUOUS
Status: DISCONTINUED | OUTPATIENT
Start: 2019-06-27 | End: 2019-06-27 | Stop reason: HOSPADM

## 2019-06-27 RX ORDER — LABETALOL HYDROCHLORIDE 5 MG/ML
20 INJECTION, SOLUTION INTRAVENOUS ONCE
Status: DISCONTINUED | OUTPATIENT
Start: 2019-06-27 | End: 2019-06-27

## 2019-06-27 RX ORDER — PROPOFOL 10 MG/ML
INJECTION, EMULSION INTRAVENOUS
Status: DISCONTINUED | OUTPATIENT
Start: 2019-06-27 | End: 2019-06-27

## 2019-06-27 RX ORDER — LIDOCAINE HYDROCHLORIDE 10 MG/ML
INJECTION, SOLUTION EPIDURAL; INFILTRATION; INTRACAUDAL; PERINEURAL
Status: DISCONTINUED | OUTPATIENT
Start: 2019-06-27 | End: 2019-06-27 | Stop reason: HOSPADM

## 2019-06-27 RX ADMIN — PROPOFOL 20 MG: 10 INJECTION, EMULSION INTRAVENOUS at 08:06

## 2019-06-27 RX ADMIN — LIDOCAINE HYDROCHLORIDE 0.1 MG: 10 INJECTION, SOLUTION EPIDURAL; INFILTRATION; INTRACAUDAL; PERINEURAL at 07:06

## 2019-06-27 RX ADMIN — LIDOCAINE HYDROCHLORIDE 50 MG: 20 INJECTION, SOLUTION INTRAVENOUS at 08:06

## 2019-06-27 RX ADMIN — PROPOFOL 50 MG: 10 INJECTION, EMULSION INTRAVENOUS at 08:06

## 2019-06-27 RX ADMIN — Medication 5 MG: at 09:06

## 2019-06-27 RX ADMIN — PROPOFOL 40 MG: 10 INJECTION, EMULSION INTRAVENOUS at 08:06

## 2019-06-27 RX ADMIN — LABETALOL HCL IV SOLN PREFILLED SYRINGE 20 MG/4ML (5 MG/ML) 5 MG: 20/4 SOLUTION PREFILLED SYRINGE at 10:06

## 2019-06-27 RX ADMIN — LABETALOL HCL IV SOLN PREFILLED SYRINGE 20 MG/4ML (5 MG/ML) 5 MG: 20/4 SOLUTION PREFILLED SYRINGE at 09:06

## 2019-06-27 RX ADMIN — SODIUM CHLORIDE: 0.9 INJECTION, SOLUTION INTRAVENOUS at 07:06

## 2019-06-27 NOTE — PROGRESS NOTES
Dr. Macias notified blood pressure trending down (165/77) without IV medication. New orders received. Will continue to monitor.

## 2019-06-27 NOTE — ANESTHESIA POSTPROCEDURE EVALUATION
Anesthesia Post Evaluation    Patient: Lori Rivero    Procedure(s) Performed: Procedure(s) (LRB):  Biopsy-bone marrow (Left)  ASPIRATION, BONE MARROW (Left)    Final Anesthesia Type: general  Patient location during evaluation: [facility]  Patient participation: Yes- Able to Participate  Level of consciousness: awake and alert and oriented  Post-procedure vital signs: reviewed and stable  Pain management: adequate  Airway patency: patent  PONV status at discharge: No PONV  Anesthetic complications: no      Cardiovascular status: blood pressure returned to baseline and hemodynamically stable (pt BP slightly above baseline)  Respiratory status: unassisted, spontaneous ventilation and room air  Hydration status: euvolemic  Follow-up not needed.          Vitals Value Taken Time   /68 6/27/2019 10:21 AM   Temp 37.2 °C (99 °F) 6/27/2019  8:39 AM   Pulse 78 6/27/2019 10:21 AM   Resp 18 6/27/2019 10:21 AM   SpO2 95 % 6/27/2019 10:21 AM         No case tracking events are documented in the log.      Pain/Corrine Score: Corrine Score: 9 (6/27/2019  9:00 AM)

## 2019-06-27 NOTE — Clinical Note
cbc, cmp, serum free light chains, quantitative immunoglobulins, serum electropheresis, serum immunofixation and md appt in 6 months

## 2019-06-27 NOTE — H&P
[facility]  Hematology/Oncology  H&P    Patient Name: Lori Rivero  MRN: [id number]  Admission Date: 6/27/2019  Code Status: Prior   Attending Provider: Saúl Alva MD  Primary Care Physician: Rachael Munoz MD  Principal Problem:<principal problem not specified>    Subjective:     HPI: 63 y.o. female with history of multiple myeloma day +100 s/p shabbir auto sct. She is here for restaging bone marrow bx post transplant.    Oncology Treatment Plan:   BMT MA AUTOLOGOUS BMT MELPHALAN (SINGLE-AGENT) FOR MULTIPLE MYELOMA    Medications:  Continuous Infusions:   sodium chloride 0.9% 10 mL/hr at 06/27/19 0712     Scheduled Meds:  PRN Meds:     Review of patient's allergies indicates:  No Known Allergies     Past Medical History:   Diagnosis Date    Depression     DM2 (diabetes mellitus, type 2)     Glaucoma     HTN (hypertension)     Myeloma     Pancytopenia due to chemotherapy 3/24/2019    Therapy     after mother's death     Past Surgical History:   Procedure Laterality Date    Biopsy-bone marrow Left 2/7/2019    Performed by Sa[facility]l Al[facility] MD at [facility] OR [facility]    Insertion,catheter,tunneled N/A 3/14/2019    Performed by [facility] Diagnostic Provider at [facility] OR [facility]     Family History     None        Tobacco Use    Smoking status: Never Smoker    Smokeless tobacco: Never Used   Substance and Sexual Activity    Alcohol use: No     Frequency: Never    Drug use: No    Sexual activity: Not on file       Review of Systems   General ROS: negative  Psychological ROS: negative  Ophthalmic ROS: negative  ENT ROS: negative  Endocrine ROS: negative  Respiratory ROS: negative  Cardiovascular ROS: BLE swelling still but improvement  Gastrointestinal ROS: negative  Genito-Urinary ROS: negative  Musculoskeletal ROS:  bilateral swelling improving per pt; rib pain and tramadol helped  Neurological/psych ROS: anxiety/nervous  Objective:     Vital Signs (Most Recent):  Temp: 98.1 °F (36.7  °C) (06/27/19 0708)  Pulse: 75 (06/27/19 0708)  Resp: 17 (06/27/19 0708)  BP: 124/63 (06/27/19 0708)  SpO2: 100 % (06/27/19 0708) Vital Signs (24h Range):  Temp:  [98.1 °F (36.7 °C)] 98.1 °F (36.7 °C)  Pulse:  [75] 75  Resp:  [17] 17  SpO2:  [100 %] 100 %  BP: (124)/(63) 124/63     Weight: 70.8 kg (156 lb)  Body mass index is 26.78 kg/m².  Body surface area is 1.79 meters squared.    No intake or output data in the 24 hours ending 06/27/19 0728    Physical Exam  General - well developed, well nourished, no apparent distress  Head & Face - no sinus tenderness  Eyes - normal conjunctivae and lids   ENT - normal external auditory canals; oropharynx clear,  Normal dentition and gums  Neck - normal thyroid  Chest and Lung - normal respiratory effort, clear to auscultation bilaterally   Cardiovascular - RRR with no MGR, normal S1 and S2; BLE trace swelling still, non pitting now  Abdomen -  soft, nontender, no palpable hepatomegaly or splenomegaly  Extremities - unremarkable nails and digits  Heme - no bruising, petechiae, pallor  Skin - no rashes or lesions  Psych - appropriate mood and affect, some anxiety      Assessment/Plan:     Active Diagnoses:    Diagnosis Date Noted POA    Multiple myeloma [C90.00] 02/07/2019 Yes      Problems Resolved During this Admission:     MM  Day +100 s/p shabbir auto sct  Okay to proceed with bm bx and aspiration    Pat Dutta NP  Hematology/Oncology  [facility]

## 2019-06-27 NOTE — TRANSFER OF CARE
"Anesthesia Transfer of Care Note    Patient: Lori Rivero    Procedure(s) Performed: Procedure(s) (LRB):  Biopsy-bone marrow (Left)  ASPIRATION, BONE MARROW (Left)    Patient location: PACU    Anesthesia Type: general    Transport from OR: Transported from OR on 2-3 L/min O2 by NC with adequate spontaneous ventilation    Post pain: adequate analgesia    Post assessment: no apparent anesthetic complications and tolerated procedure well    Post vital signs: stable    Level of consciousness: sedated and responds to stimulation    Nausea/Vomiting: no nausea/vomiting    Complications: none    Transfer of care protocol was followed      Last vitals:   Visit Vitals  /63 (BP Location: Right arm, Patient Position: Lying)   Pulse 75   Temp 36.7 °C (98.1 °F) (Oral)   Resp 17   Ht 5' 4" (1.626 m)   Wt 70.8 kg (156 lb)   SpO2 100%   Breastfeeding? No   BMI 26.78 kg/m²     "

## 2019-06-27 NOTE — PROGRESS NOTES
Patient's blood pressure currently is 144/68 after giving another dose of 5 mg Labetolol. Patient getting dressed and ready for discharge.

## 2019-06-27 NOTE — BRIEF OP NOTE
PROCEDURE NOTE:  Date of Procedure: 06/27/2019  Bone Marrow Biopsy and Aspiration  Indication: MM day +100 s/p shabbir auto  Consent: Informed consent was obtained from patient.  Timeout: Done and documented.  Position: Right lateral  Site: Left posterior illiac crest.  Prep: Betadine.  Needle used: 11 gauge Jamshidi needle.  Anesthetic: 1% lidocaine 5 cc.  Biopsy: The biopsy needle was introduced into the marrow cavity and an aspirate was obtained but no spicules were seen with two attempts. Core biopsy obtained without difficulty and sent for routine histologic examination. A third attempts at aspirate was made and it was obtained with spicules and without complications and sent for flow cytometry, PCPD FISH, MRD testing, and cytogenetics.   Complications: None.  Disposition: The patient was discharged home per anesthesia protocol. Instructed to remove bandaid in 24 hours.  Blood loss: Minimal.     Pat Dutta DNP, NP  Hematology/Oncology

## 2019-06-27 NOTE — PROGRESS NOTES
SECTION OF HEMATOLOGY AND BONE MARROW TRANSPLANT  Return  Patient Visit   07/01/2019  Referred by:  No ref. provider found  Referred for:myeloma, SCT eval     CHIEF COMPLAINT:   No chief complaint on file.      HISTORY OF PRESENT ILLNESS:   63 y.o. female with pmh as below referred dec 2018  for SCT evaluation for MM; with regard to oncologic history; IgG kappa MM; ISS 2; standard risk cytogenetics; with lytic bone lesions at presentation; initiated VRd (21 day cycle) with Dr. Juan at [facility]; completed 5 cycles.  Completed pretransplant staging showing VGPR and completed pre transplant eval with no overt contraindications to transplant.  Presents today for admission for Sh[facility]ir 200 Autologous Sct. Denies fever, chills, nightsweats, bleeding, lymphadenopathy, signs/symptoms of splenomegaly, chest pain, sob, n/v/d/c. Does have chronic neuropathy to bilateral feet.  She also has chronic sciatic pain down both legs with BLE edema (+1 pitting edema today). Pt also has pain and swelling to her left knee. States she fell a week ago and hit her knee. Did not see any provider after fall, no imaging was complete. Taking tylenol and muscle relaxer, pt states neither helping.    Transplant course (3/19/19-4/5/19):  Admitted on 3/18 and received Melphalan on 3/19 and stem cell infusion on 3/20, she tolerated both without difficulty. She had a fall prior to admit, knee x-ray was unrevealing. She complained of severe neuropathy pain on admit and gabapentin was increased from 300 mg TID to 900 mg TID during hospital stay. She experienced the expected side effects of nausea, diarrhea and throat pain which all improved prior to discharge. Engrafted on day +12, 4/1/19. She did not have a fever during stay and was on ppx antimicrobials per protocol. Patient was discharged home with home PT after platelet transfusion and Vascath removal, she will follow-up in clinic on 4/5/19.    Today: pt presents with daughter for routine f/u from shabbir  auto SCT for MM. Today is day +99.   Presents to review day +100 restaging.   Shows CR.  Some fatigue but otherwise doing well.  Comes to clinic with daughter.      PAST MEDICAL HISTORY:   Past Medical History:   Diagnosis Date    Depression     DM2 (diabetes mellitus, type 2)     Glaucoma     HTN (hypertension)     Myeloma     Pancytopenia due to chemotherapy 3/24/2019    Therapy     after mother's death       PAST SURGICAL HISTORY:   Past Surgical History:   Procedure Laterality Date    ASPIRATION, BONE MARROW Left 6/27/2019    Performed by Sa[facility]l Alva MD at [facility] OR 2ND FLR    Biopsy-bone marrow Left 6/27/2019    Performed by Sa[facility]l Alva MD at [facility] OR 2ND FLR    Biopsy-bone marrow Left 2/7/2019    Performed by Saúl Alva MD at [facility] OR [facility] FLR    Insertion,catheter,tunneled N/A 3/14/2019    Performed by [facility] Diagnostic Provider at [facility] OR [facility] FLR       PAST SOCIAL HISTORY:   reports that she has never smoked. She has never used smokeless tobacco. She reports that she does not drink alcohol or use drugs.    FAMILY HISTORY:  History reviewed. No pertinent family history.    CURRENT MEDICATIONS:   Current Outpatient Medications   Medication Sig    acyclovir (ZOVIRAX) 800 MG Tab Take 1 tablet (800 mg total) by mouth 2 (two) times daily.    ergocalciferol (VITAMIN D2) 50,000 unit Cap Take 1 capsule (50,000 Units total) by mouth every 7 days.    furosemide (LASIX) 40 MG tablet TK 1 T PO  QAM PRF FLUID RETENTION    gabapentin (NEURONTIN) 300 MG capsule Take 3 capsules (900 mg total) by mouth 3 (three) times daily.    glipiZIDE (GLUCOTROL) 10 MG tablet Take 1 tablet by mouth daily    JANUMET  mg per tablet Take 1 tablet twice a day with meals    losartan-hydrochlorothiazide 100-12.5 mg (HYZAAR) 100-12.5 mg Tab Take 1 tablet by mouth daily    magic mouthwash diphen/antac/lidoc/nysta Take 10 mLs by mouth 4 (four) times daily as needed (mouth pain).    magnesium oxide  (MAGOX) 400 mg (241.3 mg magnesium) tablet Take 1 tablet (400 mg total) by mouth 2 (two) times daily.    multivit,iron,minerals/lutein (CENTRUM SILVER ULTRA WOMEN'S ORAL) Take 1 tablet by mouth once daily.    ondansetron (ZOFRAN-ODT) 8 MG TbDL Take 8 mg by mouth every 8 (eight) hours as needed (nausea).     pantoprazole (PROTONIX) 40 MG tablet Take 1 tablet (40 mg total) by mouth once daily.    PAZEO 0.7 % Drop Place 1 drop into both eyes every morning.    potassium chloride SA (K-DUR,KLOR-CON) 10 MEQ tablet Take 2 tablets (20 mEq total) by mouth 2 (two) times daily.    prochlorperazine (COMPAZINE) 10 MG tablet Take 10 mg by mouth 4 (four) times daily as needed (nausea/vomiting).     tiZANidine (ZANAFLEX) 4 MG tablet Take 4 mg by mouth 3 (three) times daily as needed (back muscle spasms).     traMADol (ULTRAM) 50 mg tablet Take 1 tablet (50 mg total) by mouth every 8 (eight) hours as needed for Pain.     No current facility-administered medications for this visit.      Review of patient's allergies indicates:  No Known Allergies    REVIEW OF SYSTEMS:   General ROS: negative  Psychological ROS: negative  Ophthalmic ROS: negative  ENT ROS: negative  Allergy and Immunology ROS: negative  Hematological and Lymphatic ROS: negative  Endocrine ROS: negative  Respiratory ROS: negative  Cardiovascular ROS: BLE swelling  Gastrointestinal ROS: negative  Genito-Urinary ROS: negative         PHYSICAL EXAM:   Vitals:    06/27/19 1314   BP: (!) 162/72   Pulse: 70   Resp: 18   Temp: 98.5 °F (36.9 °C)       General - well developed, well nourished, no apparent distress  Head & Face - no sinus tenderness  Eyes - normal conjunctivae and lids   ENT - normal external auditory canals; oropharynx clear,  Normal dentition and gums  Neck - normal thyroid  Chest and Lung - normal respiratory effort, clear to auscultation bilaterally   Cardiovascular - RRR with no MGR, normal S1 and S2; BLE trace swelling, non pitting now  Abdomen -   soft, nontender, no palpable hepatomegaly or splenomegaly  Extremities - unremarkable nails and digits  Heme - no bruising, petechiae, pallor  Skin - no rashes or lesions  Psych - appropriate mood and affect, some anxiety      ECOG Performance Status: (foot note - ECOG PS provided by [employer]cology [employer]) 1 - Symptomatic but completely ambulatory    Karnofsky Performance Score:  70%- Cares for Self: Unable to Carry on Normal Activity or Active Work  DATA:   Lab Results   Component Value Date    WBC 4.84 06/27/2019    HGB 9.5 (L) 06/27/2019    HCT 29.8 (L) 06/27/2019    MCV 91 06/27/2019     (L) 06/27/2019     Gran # (ANC)   Date Value Ref Range Status   06/27/2019 2.9 1.8 - 7.7 K/uL Final     Gran%   Date Value Ref Range Status   06/27/2019 59.3 38.0 - 73.0 % Final     CMP  Sodium   Date Value Ref Range Status   06/27/2019 143 136 - 145 mmol/L Final     Potassium   Date Value Ref Range Status   06/27/2019 3.3 (L) 3.5 - 5.1 mmol/L Final     Chloride   Date Value Ref Range Status   06/27/2019 107 95 - 110 mmol/L Final     CO2   Date Value Ref Range Status   06/27/2019 28 23 - 29 mmol/L Final     Glucose   Date Value Ref Range Status   06/27/2019 185 (H) 70 - 110 mg/dL Final     BUN, Bld   Date Value Ref Range Status   06/27/2019 15 8 - 23 mg/dL Final     Creatinine   Date Value Ref Range Status   06/27/2019 0.7 0.5 - 1.4 mg/dL Final     Calcium   Date Value Ref Range Status   06/27/2019 9.2 8.7 - 10.5 mg/dL Final     Total Protein   Date Value Ref Range Status   06/27/2019 6.2 6.0 - 8.4 g/dL Final     Albumin   Date Value Ref Range Status   06/27/2019 3.1 (L) 3.5 - 5.2 g/dL Final     Total Bilirubin   Date Value Ref Range Status   06/27/2019 0.3 0.1 - 1.0 mg/dL Final     Comment:     For infants and newborns, interpretation of results should be based  on gestational age, weight and in agreement with clinical  observations.  Premature Infant recommended reference ranges:  Up to 24  hours.............<8.0 mg/dL  Up to 48 hours............<12.0 mg/dL  3-5 days..................<15.0 mg/dL  6-29 days.................<15.0 mg/dL       Alkaline Phosphatase   Date Value Ref Range Status   06/27/2019 41 (L) 55 - 135 U/L Final     AST   Date Value Ref Range Status   06/27/2019 10 10 - 40 U/L Final     ALT   Date Value Ref Range Status   06/27/2019 <5 (L) 10 - 44 U/L Final     Anion Gap   Date Value Ref Range Status   06/27/2019 8 8 - 16 mmol/L Final     eGFR if    Date Value Ref Range Status   06/27/2019 >60.0 >60 mL/min/1.73 m^2 Final     eGFR if non    Date Value Ref Range Status   06/27/2019 >60.0 >60 mL/min/1.73 m^2 Final     Comment:     Calculation used to obtain the estimated glomerular filtration  rate (eGFR) is the CKD-EPI equation.        [place] Free Light Chains   Date Value Ref Range Status   06/27/2019 1.15 0.33 - 1.94 mg/dL Final   05/27/2019 1.07 0.33 - 1.94 mg/dL Final   04/22/2019 1.31 0.33 - 1.94 mg/dL Final     Lambda Free Light Chains   Date Value Ref Range Status   06/27/2019 1.32 0.57 - 2.63 mg/dL Final   05/27/2019 1.37 0.57 - 2.63 mg/dL Final   04/22/2019 1.89 0.57 - 2.63 mg/dL Final     Kappa/Lambda FLC Ratio   Date Value Ref Range Status   06/27/2019 0.87 0.26 - 1.65 Final   05/27/2019 0.78 0.26 - 1.65 Final   04/22/2019 0.69 0.26 - 1.65 Final     IgG - Serum   Date Value Ref Range Status   06/27/2019 1010 650 - 1600 mg/dL Final     Comment:     IgG Cord Blood Reference Range: 650-1600 mg/dL.     IgA   Date Value Ref Range Status   06/27/2019 172 40 - 350 mg/dL Final     Comment:     IgA Cord Blood Reference Range: <5 mg/dL.     IgM   Date Value Ref Range Status   06/27/2019 24 (L) 50 - 300 mg/dL Final     Comment:     IgM Cord Blood Reference Range: <25 mg/dL.     ASSESSMENT AND PLAN:   Encounter Diagnoses   Name Primary?    Multiple myeloma in remission Yes    History of auto stem cell transplant        Multiple myeloma s/p shabbir auto stem  cell transplant  -IgG kappa MM; ISS 2; standard risk cytogenetics; with lytic bone lesions at presentation; initiate VRd (21 day cycle) with Dr. Juan at [facility]   -she achieved VGPR prior to transplant  -now day +99 with approximately day +100 restaging PET scan, biochemical studies and bone marrow biopsy confirming a stringent Complete Remission; MRD testing from marrow pending for prognositicatio  -recommend transition to revlimid maintenance under direction of Dr. Juan   -continue acyclovir 800mg BID through day +365; recommend post transplant Zometa q 3 months x 4 doses    initiate post transplant vaccines at day +180 at [facility]; will have bmt coordinators arrange    Cytopenias due to chemotherapy  -transfuse Hb for <7 and platelets <10  -no indication to transfuse today       N/V  -resolved     Neuropathy  -2/2 chemo  -continue increased dose of gabapentin (since 3/31/19) 900 mg TID    FEN/GI  -continue mag oxide 400mg bid  -continue kcl 20meq daily  -wean further per Dr. Reynoso     HTN  -continue losartan/hctz  -was started on norvasc 5 mg daily 3/20/19; discontinued (4/22/19)     Left knee pain  -s/p fall prior to transplant admit  -xray was without fracture, showed edema  -tramadol was given, pain now improved  -okay to receive cortisone injection locally     Debility  -continue to work with PT  DM2  - per PCP        Follow up:  -fu with Dr. Juan to initiate rev maintenance   -cbc, cmp, serum free light chains, quantitative immunoglobulins, serum electropheresis, serum immunofixation and md appt at [facility] in 6 months

## 2019-06-27 NOTE — PLAN OF CARE
Discharge instructions given to patient and daughter. Voiced understanding. VSS. Patient denies pain. Consents in chart.

## 2019-06-27 NOTE — DISCHARGE SUMMARY
[facility]  Hematology/Oncology  Discharge Summary      Patient Name: Lori Rivero  MRN: [id number]  Admission Date: 6/27/2019  Hospital Length of Stay: 0 days  Discharge Date and Time:  06/27/2019 8:47 AM  Attending Physician: Saúl Alva MD   Discharging Provider: Pat Dutta NP  Primary Care Provider: Rachael Munoz MD      Procedure(s) (LRB):  Biopsy-bone marrow (Left)  ASPIRATION, BONE MARROW (Left)     Hospital Course: Patient admitted to pre op today for a bone marrow aspiration and biopsy. Pt was consented for a bone marrow biopsy. Patient was sedated per anesthesia and a bone marrow biopsy and aspiration was performed in the OR (see procedure note). Patient was then transferred to post op and discharged home when appropriate per anesthesia.     Pending Diagnostic Studies:     Procedure Component Value Units Date/Time    Bone Marrow Prep and Stain [[id number]] Collected:  06/27/19 0817    Order Status:  Sent Lab Status:  In process Updated:  06/27/19 0818    Specimen:  Bone Marrow     Chromosome Analysis, Bone Marrow [[id number]] Collected:  06/27/19 0817    Order Status:  Sent Lab Status:  In process Updated:  06/27/19 0818    Specimen:  Bone Marrow     Iron Stain, Bone Marrow [[id number]] Collected:  06/27/19 0817    Order Status:  Sent Lab Status:  In process Updated:  06/27/19 0818    Specimen:  Bone Marrow     Leukemia/Lymphoma Screen - Bone Marrow Left Posterior Iliac Crest [[id number]] Collected:  06/27/19 0817    Order Status:  Sent Lab Status:  In process Updated:  06/27/19 0818    Specimen:  Bone Marrow     Misc Sendout Test, Non-Blood Igll Test ID: MRDMM [[id number]] Collected:  06/27/19 0817    Order Status:  Sent Lab Status:  In process Updated:  06/27/19 0818    Plasma Cell Proliferative Disorder (PCPD), FISH [[id number]] Collected:  06/27/19 0817    Order Status:  Sent Lab Status:  In process Updated:  06/27/19 0818    Specimen:  Bone Marrow     Tissue Specimen  to Pathology, Bone Marrow Aspiration/Biopsy Procedure [[id number]] Collected:  06/27/19 0817    Order Status:  Sent Lab Status:  No result     Specimen:  Bone Marrow Aspirate, Left Iliac Crest         Final Active Diagnoses:    Diagnosis Date Noted POA    Multiple myeloma [C90.00] 02/07/2019 Yes      Problems Resolved During this Admission:      Discharged Condition: stable    Disposition: Home or Self Care    Follow Up:   Future Appointments   Date Time Provider Department Center   6/27/2019 12:30 PM LAB, HEMONC SAME DAY [facility] LAB GEOFF Felipe Thomas   6/27/2019  1:30 PM Saúl Alva MD [facility] BM [facility] [place]         Patient Instructions:      Notify your health care provider if you experience any of the following:  temperature >100.4     Notify your health care provider if you experience any of the following:  severe uncontrolled pain     Notify your health care provider if you experience any of the following:  redness, tenderness, or signs of infection (pain, swelling, redness, odor or green/yellow discharge around incision site)     Remove dressing in 24 hours     Activity as tolerated     Medications:  Reconciled Home Medications:      Medication List      ASK your doctor about these medications    acyclovir 800 MG Tab  Commonly known as:  ZOVIRAX  Take 1 tablet (800 mg total) by mouth 2 (two) times daily.     CENTRUM SILVER ULTRA WOMEN'S ORAL  Take 1 tablet by mouth once daily.     ergocalciferol 50,000 unit Cap  Commonly known as:  VITAMIN D2  Take 1 capsule (50,000 Units total) by mouth every 7 days.     gabapentin 300 MG capsule  Commonly known as:  NEURONTIN  Take 3 capsules (900 mg total) by mouth 3 (three) times daily.     glipiZIDE 10 MG tablet  Commonly known as:  GLUCOTROL  Take 1 tablet by mouth daily     JANUMET  mg per tablet  Generic drug:  SITagliptan-metformin  Take 1 tablet twice a day with meals     losartan-hydrochlorothiazide 100-12.5 mg 100-12.5 mg Tab  Commonly known as:   HYZAAR  Take 1 tablet by mouth daily     magic mouthwash diphen/antac/lidoc/nysta  Take 10 mLs by mouth 4 (four) times daily as needed (mouth pain).     magnesium oxide 400 mg (241.3 mg magnesium) tablet  Commonly known as:  MAGOX  Take 1 tablet (400 mg total) by mouth 2 (two) times daily.     ondansetron 8 MG Tbdl  Commonly known as:  ZOFRAN-ODT  Take 8 mg by mouth every 8 (eight) hours as needed (nausea).     pantoprazole 40 MG tablet  Commonly known as:  PROTONIX  Take 1 tablet (40 mg total) by mouth once daily.     PAZEO 0.7 % Drop  Generic drug:  olopatadine  Place 1 drop into both eyes every morning.     potassium chloride SA 10 MEQ tablet  Commonly known as:  K-DUR,KLOR-CON  Take 2 tablets (20 mEq total) by mouth 2 (two) times daily.     prochlorperazine 10 MG tablet  Commonly known as:  COMPAZINE  Take 10 mg by mouth 4 (four) times daily as needed (nausea/vomiting).     tiZANidine 4 MG tablet  Commonly known as:  ZANAFLEX  Take 4 mg by mouth 3 (three) times daily as needed (back muscle spasms).     traMADol 50 mg tablet  Commonly known as:  ULTRAM  Take 1 tablet (50 mg total) by mouth every 8 (eight) hours as needed for Pain.            Pat Dutta NP  Hematology/Oncology  [facility]

## 2019-06-27 NOTE — DISCHARGE INSTRUCTIONS
Discharge instructions for having a Bone Marrow Aspiration / Biopsy:    Keep Bandage in place for 24 hours.  - Do not shower or take a tube bath during this time (you may sponge bathe).  - Call the nurse or physician for excessive bleeding or pain at biopsy site.  - You may take Tylenol as needed for pain.    You have received medication to sedate you.  - Do not drive a car or operate heavy machinery for the rest of the day.  - You may resume other activities as tolerated.    You can call [phone] for any problems during the hours of 8:00 AM-5:00PM.    For an emergency after 5:00 PM you can call [phone] and have the  page the Hematologist / Oncologist on call.

## 2019-06-27 NOTE — PROGRESS NOTES
Patient's blood pressure 172/74. Dr. Macias notified. New orders for IV labetalol received. Will continue to monitor.

## 2019-06-27 NOTE — ANESTHESIA PREPROCEDURE EVALUATION
06/27/2019  Lori Rivero is a 63 y.o., female   Pre-operative evaluation for Procedure(s) (LRB):  Biopsy-bone marrow (Left)    Lori Rivero is a 63 y.o. female     Patient Active Problem List   Diagnosis    Multiple myeloma    Status post autologous bone marrow transplant    Autologous donor of stem cells    Type 2 diabetes mellitus without complication, without long-term current use of insulin    Essential hypertension    Chemotherapy-induced peripheral neuropathy    Left knee pain    CINV (chemotherapy-induced nausea and vomiting)    Electrolyte abnormality    Cytopenia    Debility    Insomnia       Review of patient's allergies indicates:  No Known Allergies    Current Facility-Administered Medications on File Prior to Visit   Medication Dose Route Frequency Provider Last Rate Last Dose    0.9%  NaCl infusion   Intravenous Continuous Saúl Alva MD 10 mL/hr at 06/27/19 0712       Current Outpatient Medications on File Prior to Visit   Medication Sig Dispense Refill    acyclovir (ZOVIRAX) 800 MG Tab Take 1 tablet (800 mg total) by mouth 2 (two) times daily. 60 tablet 11    ergocalciferol (VITAMIN D2) 50,000 unit Cap Take 1 capsule (50,000 Units total) by mouth every 7 days. 12 capsule 3    gabapentin (NEURONTIN) 300 MG capsule Take 3 capsules (900 mg total) by mouth 3 (three) times daily. 270 capsule 11    glipiZIDE (GLUCOTROL) 10 MG tablet Take 1 tablet by mouth daily  2    JANUMET  mg per tablet Take 1 tablet twice a day with meals  5    losartan-hydrochlorothiazide 100-12.5 mg (HYZAAR) 100-12.5 mg Tab Take 1 tablet by mouth daily  2    magic mouthwash diphen/antac/lidoc/nysta Take 10 mLs by mouth 4 (four) times daily as needed (mouth pain). 240 mL 0    magnesium oxide (MAGOX) 400 mg (241.3 mg magnesium) tablet Take 1 tablet (400 mg total) by mouth 2 (two)  times daily. 60 tablet 0    multivit,iron,minerals/lutein (CENTRUM SILVER ULTRA WOMEN'S ORAL) Take 1 tablet by mouth once daily.      ondansetron (ZOFRAN-ODT) 8 MG TbDL Take 8 mg by mouth every 8 (eight) hours as needed (nausea).   6    pantoprazole (PROTONIX) 40 MG tablet Take 1 tablet (40 mg total) by mouth once daily. 30 tablet 2    PAZEO 0.7 % Drop Place 1 drop into both eyes every morning.  5    potassium chloride SA (K-DUR,KLOR-CON) 10 MEQ tablet Take 2 tablets (20 mEq total) by mouth 2 (two) times daily. 60 tablet 0    prochlorperazine (COMPAZINE) 10 MG tablet Take 10 mg by mouth 4 (four) times daily as needed (nausea/vomiting).   6    tiZANidine (ZANAFLEX) 4 MG tablet Take 4 mg by mouth 3 (three) times daily as needed (back muscle spasms).       traMADol (ULTRAM) 50 mg tablet Take 1 tablet (50 mg total) by mouth every 8 (eight) hours as needed for Pain. 30 tablet 0       Past Surgical History:   Procedure Laterality Date    Biopsy-bone marrow Left 2/7/2019    Performed by Saúl Alva MD at [facility] OR [facility] FLR    Insertion,catheter,tunneled N/A 3/14/2019    [facility]       Social History     Socioeconomic History    Marital status: Legally      Spouse name: Not on file    Number of children: 3    Years of education: Not on file    Highest education level: Not on file   Occupational History    Not on file   Social Needs    Financial resource strain: Not on file    Food insecurity:     Worry: Not on file     Inability: Not on file    Transportation needs:     Medical: Not on file     Non-medical: Not on file   Tobacco Use    Smoking status: Never Smoker    Smokeless tobacco: Never Used   Substance and Sexual Activity    Alcohol use: No     Frequency: Never    Drug use: No    Sexual activity: Not on file   Lifestyle    Physical activity:     Days per week: Not on file     Minutes per session: Not on file    Stress: Not on file    Relationships    Social connections:     Talks on phone: Not on file     Gets together: Not on file     Attends [employer] service: Not on file     Active member of club or organization: Not on file     Attends meetings of clubs or organizations: Not on file     Relationship status: Not on file   Other Topics Concern    Patient feels they ought to cut down on drinking/drug use Not Asked    Patient annoyed by others criticizing their drinking/drug use Not Asked    Patient has felt bad or guilty about drinking/drug use Not Asked    Patient has had a drink/used drugs as an eye opener in the AM Not Asked   Social History Narrative    Lives alone, 3 sons (Cayetano, Aydin, Scotty), 1 daughter (Sylwia), 8 grandchildren, 1 great grandchild; 1x ,  since 1980's; former          Pre-op Assessment    I have reviewed the Patient Summary Reports.      I have reviewed the Medications.     Review of Systems  Anesthesia Hx:  No problems with previous Anesthesia Denies Hx of Anesthetic complications  Denies Family Hx of Anesthesia complications.   Denies Personal Hx of Anesthesia complications.   Social:  No Alcohol Use, Non-Smoker    Hematology/Oncology:  Hematology Normal      Current/Recent Cancer. Oncology Comments: Multiple myeloma    EENT/Dental:EENT/Dental Normal   Cardiovascular:   Exercise tolerance: good Hypertension    Pulmonary:  Pulmonary Normal    Renal/:  Renal/ Normal     Hepatic/GI:  Hepatic/GI Normal    Neurological:  Neurology Normal    Endocrine:   Diabetes, type 2    Psych:   Psychiatric History depression          Physical Exam  General:  Well nourished    Airway/Jaw/Neck:  Airway Findings: Mouth Opening: Normal Tongue: Normal  General Airway Assessment: Adult, Average  Mallampati: II  TM Distance: Normal, at least 6 cm  Jaw/Neck Findings:  Neck ROM: Normal ROM      Dental:  Dental Findings: In tact   Chest/Lungs:  Chest/Lungs Findings: Normal Respiratory Rate, Clear to  auscultation     Heart/Vascular:  Heart Findings: Rate: Normal  Rhythm: Regular Rhythm        Mental Status:  Mental Status Findings:  Alert and Oriented, Cooperative         Anesthesia Plan  Type of Anesthesia, risks & benefits discussed:  Anesthesia Type:  MAC  Patient's Preference:   Intra-op Monitoring Plan: standard ASA monitors  Intra-op Monitoring Plan Comments:   Post Op Pain Control Plan: multimodal analgesia, IV/PO Opioids PRN and per primary service following discharge from PACU  Post Op Pain Control Plan Comments:   Induction:   IV  Beta Blocker:  Patient is not currently on a Beta-Blocker (No further documentation required).       Informed Consent: Patient understands risks and agrees with Anesthesia plan.  Questions answered. Anesthesia consent signed with patient.  ASA Score: 3     Day of Surgery Review of History & Physical:    H&P update referred to the surgeon.         Ready For Surgery From Anesthesia Perspective.

## 2019-06-28 LAB — BONE MARROW WRIGHT STAIN COMMENT: NORMAL

## 2019-07-01 LAB
GENETICIST REVIEW: NORMAL
PLASMA CELL PROLIF RELEASED BY: NORMAL
PLASMA CELL PROLIF RESULT SUMMARY: NORMAL
PLASMA CELL PROLIF RESULT TABLE: NORMAL
REASON FOR REFERRAL, PLASMA CELL PROLIF (PCPD), FISH: NORMAL
REF LAB TEST METHOD: NORMAL
RESULTS, PLASMA CELL PROLIF (PCPD), FISH: NORMAL
SERVICE CMNT-IMP: NORMAL
SERVICE CMNT-IMP: NORMAL
SPECIMEN SOURCE: NORMAL
SPECIMEN, PLASMA CELL PROLIF (PCPD), FISH: NORMAL

## 2019-07-02 ENCOUNTER — TELEPHONE (OUTPATIENT)
Dept: HEMATOLOGY/ONCOLOGY | Facility: CLINIC | Age: 64
End: 2019-07-02

## 2019-07-02 NOTE — TELEPHONE ENCOUNTER
Called pt daughter and notified that pt is in complete remission. Doctor at [facility] aware. Pt daughter appreciative.

## 2019-07-02 NOTE — TELEPHONE ENCOUNTER
----- Message from Saúl Alva MD sent at 7/1/2019  7:37 PM CDT -----  Pt not on my [facility] can you calll pt/daughter to let them know the blood work, pet scan and bone marrow from last week show her myeloma is in complete remission so great news. Her touro doc aware and will assume most of care. Thanks   ----- Message -----  From: Pat Dutta NP  Sent: 6/24/2019   6:18 AM  To: Saúl Alva MD        ----- Message -----  From: Interface, Rad Results In  Sent: 6/21/2019   1:40 PM  To: Pat Dutta NP

## 2019-07-03 LAB
BONE MARROW IRON STAIN COMMENT: NORMAL
MAYO MISCELLANEOUS RESULT (REF): NORMAL

## 2019-12-11 DIAGNOSIS — C90.01 MULTIPLE MYELOMA IN REMISSION: Primary | ICD-10-CM

## 2019-12-31 ENCOUNTER — TELEPHONE (OUTPATIENT)
Dept: TRANSPLANT | Facility: HOSPITAL | Age: 64
End: 2019-12-31

## 2019-12-31 ENCOUNTER — OFFICE VISIT (OUTPATIENT)
Dept: HEMATOLOGY/ONCOLOGY | Facility: CLINIC | Age: 64
End: 2019-12-31
Payer: MEDICARE

## 2019-12-31 ENCOUNTER — LAB VISIT (OUTPATIENT)
Dept: LAB | Facility: HOSPITAL | Age: 64
End: 2019-12-31
Payer: MEDICARE

## 2019-12-31 VITALS
SYSTOLIC BLOOD PRESSURE: 158 MMHG | BODY MASS INDEX: 29.55 KG/M2 | OXYGEN SATURATION: 100 % | DIASTOLIC BLOOD PRESSURE: 65 MMHG | TEMPERATURE: 98 F | RESPIRATION RATE: 16 BRPM | WEIGHT: 173.06 LBS | HEART RATE: 60 BPM | HEIGHT: 64 IN

## 2019-12-31 DIAGNOSIS — C90.00 MULTIPLE MYELOMA, REMISSION STATUS UNSPECIFIED: Primary | ICD-10-CM

## 2019-12-31 DIAGNOSIS — Z94.84 HISTORY OF AUTO STEM CELL TRANSPLANT: ICD-10-CM

## 2019-12-31 DIAGNOSIS — C90.01 MULTIPLE MYELOMA IN REMISSION: Primary | ICD-10-CM

## 2019-12-31 DIAGNOSIS — C90.00 MULTIPLE MYELOMA, REMISSION STATUS UNSPECIFIED: ICD-10-CM

## 2019-12-31 DIAGNOSIS — C90.01 MULTIPLE MYELOMA IN REMISSION: ICD-10-CM

## 2019-12-31 LAB
ALBUMIN SERPL BCP-MCNC: 3.4 G/DL (ref 3.5–5.2)
ALP SERPL-CCNC: 68 U/L (ref 55–135)
ALT SERPL W/O P-5'-P-CCNC: 8 U/L (ref 10–44)
ANION GAP SERPL CALC-SCNC: 4 MMOL/L (ref 8–16)
AST SERPL-CCNC: 11 U/L (ref 10–40)
BASOPHILS # BLD AUTO: 0.04 K/UL (ref 0–0.2)
BASOPHILS NFR BLD: 1.1 % (ref 0–1.9)
BILIRUB SERPL-MCNC: 0.2 MG/DL (ref 0.1–1)
BUN SERPL-MCNC: 13 MG/DL (ref 8–23)
CALCIUM SERPL-MCNC: 9.2 MG/DL (ref 8.7–10.5)
CHLORIDE SERPL-SCNC: 110 MMOL/L (ref 95–110)
CO2 SERPL-SCNC: 29 MMOL/L (ref 23–29)
CREAT SERPL-MCNC: 0.9 MG/DL (ref 0.5–1.4)
DIFFERENTIAL METHOD: ABNORMAL
EOSINOPHIL # BLD AUTO: 0.1 K/UL (ref 0–0.5)
EOSINOPHIL NFR BLD: 3.6 % (ref 0–8)
ERYTHROCYTE [DISTWIDTH] IN BLOOD BY AUTOMATED COUNT: 15.6 % (ref 11.5–14.5)
EST. GFR  (AFRICAN AMERICAN): >60 ML/MIN/1.73 M^2
EST. GFR  (NON AFRICAN AMERICAN): >60 ML/MIN/1.73 M^2
GLUCOSE SERPL-MCNC: 100 MG/DL (ref 70–110)
HCT VFR BLD AUTO: 33.5 % (ref 37–48.5)
HGB BLD-MCNC: 10.5 G/DL (ref 12–16)
IGA SERPL-MCNC: 301 MG/DL (ref 40–350)
IGG SERPL-MCNC: 1332 MG/DL (ref 650–1600)
IGM SERPL-MCNC: 26 MG/DL (ref 50–300)
IMM GRANULOCYTES # BLD AUTO: 0.01 K/UL (ref 0–0.04)
IMM GRANULOCYTES NFR BLD AUTO: 0.3 % (ref 0–0.5)
LYMPHOCYTES # BLD AUTO: 1.4 K/UL (ref 1–4.8)
LYMPHOCYTES NFR BLD: 39.2 % (ref 18–48)
MCH RBC QN AUTO: 28.7 PG (ref 27–31)
MCHC RBC AUTO-ENTMCNC: 31.3 G/DL (ref 32–36)
MCV RBC AUTO: 92 FL (ref 82–98)
MONOCYTES # BLD AUTO: 0.4 K/UL (ref 0.3–1)
MONOCYTES NFR BLD: 12.3 % (ref 4–15)
NEUTROPHILS # BLD AUTO: 1.6 K/UL (ref 1.8–7.7)
NEUTROPHILS NFR BLD: 43.5 % (ref 38–73)
NRBC BLD-RTO: 0 /100 WBC
PLATELET # BLD AUTO: 143 K/UL (ref 150–350)
PMV BLD AUTO: 10.2 FL (ref 9.2–12.9)
POTASSIUM SERPL-SCNC: 4.4 MMOL/L (ref 3.5–5.1)
PROT SERPL-MCNC: 7 G/DL (ref 6–8.4)
RBC # BLD AUTO: 3.66 M/UL (ref 4–5.4)
SODIUM SERPL-SCNC: 143 MMOL/L (ref 136–145)
WBC # BLD AUTO: 3.57 K/UL (ref 3.9–12.7)

## 2019-12-31 PROCEDURE — 1126F AMNT PAIN NOTED NONE PRSNT: CPT | Mod: BMT,,, | Performed by: INTERNAL MEDICINE

## 2019-12-31 PROCEDURE — 86334 IMMUNOFIX E-PHORESIS SERUM: CPT | Mod: 26,BMT,, | Performed by: PATHOLOGY

## 2019-12-31 PROCEDURE — 86334 IMMUNOFIX E-PHORESIS SERUM: CPT

## 2019-12-31 PROCEDURE — 1126F PR PAIN SEVERITY QUANTIFIED, NO PAIN PRESENT: ICD-10-PCS | Mod: BMT,,, | Performed by: INTERNAL MEDICINE

## 2019-12-31 PROCEDURE — 99999 PR PBB SHADOW E&M-EST. PATIENT-LVL IV: ICD-10-PCS | Mod: PBBFAC,BMT,, | Performed by: INTERNAL MEDICINE

## 2019-12-31 PROCEDURE — 99214 PR OFFICE/OUTPT VISIT, EST, LEVL IV, 30-39 MIN: ICD-10-PCS | Mod: S$PBB,BMT,, | Performed by: INTERNAL MEDICINE

## 2019-12-31 PROCEDURE — 1159F PR MEDICATION LIST DOCUMENTED IN MEDICAL RECORD: ICD-10-PCS | Mod: BMT,,, | Performed by: INTERNAL MEDICINE

## 2019-12-31 PROCEDURE — 84165 PROTEIN E-PHORESIS SERUM: CPT

## 2019-12-31 PROCEDURE — 84165 PATHOLOGIST INTERPRETATION SPE: ICD-10-PCS | Mod: 26,BMT,, | Performed by: PATHOLOGY

## 2019-12-31 PROCEDURE — 85025 COMPLETE CBC W/AUTO DIFF WBC: CPT

## 2019-12-31 PROCEDURE — 99214 OFFICE O/P EST MOD 30 MIN: CPT | Mod: PBBFAC | Performed by: INTERNAL MEDICINE

## 2019-12-31 PROCEDURE — 82784 ASSAY IGA/IGD/IGG/IGM EACH: CPT | Mod: 59

## 2019-12-31 PROCEDURE — 1159F MED LIST DOCD IN RCRD: CPT | Mod: BMT,,, | Performed by: INTERNAL MEDICINE

## 2019-12-31 PROCEDURE — 99214 OFFICE O/P EST MOD 30 MIN: CPT | Mod: S$PBB,BMT,, | Performed by: INTERNAL MEDICINE

## 2019-12-31 PROCEDURE — 83520 IMMUNOASSAY QUANT NOS NONAB: CPT | Mod: 59

## 2019-12-31 PROCEDURE — 80053 COMPREHEN METABOLIC PANEL: CPT

## 2019-12-31 PROCEDURE — 99999 PR PBB SHADOW E&M-EST. PATIENT-LVL IV: CPT | Mod: PBBFAC,BMT,, | Performed by: INTERNAL MEDICINE

## 2019-12-31 PROCEDURE — 84165 PROTEIN E-PHORESIS SERUM: CPT | Mod: 26,BMT,, | Performed by: PATHOLOGY

## 2019-12-31 PROCEDURE — 36415 COLL VENOUS BLD VENIPUNCTURE: CPT

## 2019-12-31 PROCEDURE — 86334 PATHOLOGIST INTERPRETATION IFE: ICD-10-PCS | Mod: 26,BMT,, | Performed by: PATHOLOGY

## 2019-12-31 RX ORDER — LENALIDOMIDE 10 MG/1
CAPSULE ORAL
Refills: 0 | COMMUNITY
Start: 2019-12-06

## 2019-12-31 RX ORDER — AMLODIPINE BESYLATE 5 MG/1
5 TABLET ORAL
COMMUNITY
Start: 2019-04-01

## 2019-12-31 RX ORDER — NYSTATIN 100000 [USP'U]/ML
1 SUSPENSION ORAL
COMMUNITY
Start: 2019-04-01

## 2019-12-31 NOTE — PROGRESS NOTES
SECTION OF HEMATOLOGY AND BONE MARROW TRANSPLANT  Return  Patient Visit   01/04/2020  Referred by:  No ref. provider found  Referred for:myeloma, SCT eval     CHIEF COMPLAINT:   No chief complaint on file.      HISTORY OF PRESENT ILLNESS:   64 y.o. female with pmh as below referred dec 2018  for SCT evaluation for MM; with regard to oncologic history; IgG kappa MM; ISS 2; standard risk cytogenetics; with lytic bone lesions at presentation; initiated VRd (21 day cycle) with Dr. Juan at [facility]; completed 5 cycles.  Completed pretransplant staging showing VGPR and completed pre transplant eval with no overt contraindications to transplant.  Presents today for admission for Shabbir 200 Autologous Sct. Denies fever, chills, nightsweats, bleeding, lymphadenopathy, signs/symptoms of splenomegaly, chest pain, sob, n/v/d/c. Does have chronic neuropathy to bilateral feet.  She also has chronic sciatic pain down both legs with BLE edema (+1 pitting edema today). Pt also has pain and swelling to her left knee. States she fell a week ago and hit her knee. Did not see any provider after fall, no imaging was complete. Taking tylenol and muscle relaxer, pt states neither helping.    Transplant course (3/19/19-4/5/19):  Admitted on 3/18 and received Melphalan on 3/19 and stem cell infusion on 3/20, she tolerated both without difficulty. She had a fall prior to admit, knee x-ray was unrevealing. She complained of severe neuropathy pain on admit and gabapentin was increased from 300 mg TID to 900 mg TID during hospital stay. She experienced the expected side effects of nausea, diarrhea and throat pain which all improved prior to discharge. Engrafted on day +12, 4/1/19. She did not have a fever during stay and was on ppx antimicrobials per protocol. Patient was discharged home with home PT after platelet transfusion and Vascath removal, she will follow-up in clinic on 4/5/19.    Today: pt presents with daughter for routine f/u from shabbir  auto SCT for MM. Today is day +286.    Continued  CR.  Some fatigue but otherwise doing well.  On rev maintenanc following closely with Dr. Reynoso.  Comes to clinic with daughter.      PAST MEDICAL HISTORY:   Past Medical History:   Diagnosis Date    Depression     DM2 (diabetes mellitus, type 2)     Glaucoma     HTN (hypertension)     Myeloma     Pancytopenia due to chemotherapy 3/24/2019    Therapy     after mother's death       PAST SURGICAL HISTORY:   Past Surgical History:   Procedure Laterality Date    BONE MARROW ASPIRATION Left 6/27/2019    Procedure: ASPIRATION, BONE MARROW;  Surgeon: Saúl Alva MD;  Location: [facility] OR [address] [place] [address];  Service: Oncology;  Laterality: Left;    BONE MARROW BIOPSY Left 2/7/2019    Procedure: Biopsy-bone marrow;  Surgeon: Saúl Alva MD;  Location: [facility] OR [address] [place] [address];  Service: Oncology;  Laterality: Left;    BONE MARROW BIOPSY Left 6/27/2019    Procedure: Biopsy-bone marrow;  Surgeon: Saúl Alva MD;  Location: [facility] OR [address] [place] [address];  Service: Oncology;  Laterality: Left;       PAST SOCIAL HISTORY:   reports that she has never smoked. She has never used smokeless tobacco. She reports that she does not drink alcohol or use drugs.    FAMILY HISTORY:  History reviewed. No pertinent family history.    CURRENT MEDICATIONS:   Current Outpatient Medications   Medication Sig    acyclovir (ZOVIRAX) 800 MG Tab Take 1 tablet (800 mg total) by mouth 2 (two) times daily.    amLODIPine (NORVASC) 5 MG tablet Take 5 mg by mouth.    ergocalciferol (VITAMIN D2) 50,000 unit Cap Take 1 capsule (50,000 Units total) by mouth every 7 days.    furosemide (LASIX) 40 MG tablet TK 1 T PO  QAM PRF FLUID RETENTION    gabapentin (NEURONTIN) 300 MG capsule Take 3 capsules (900 mg total) by mouth 3 (three) times daily.    glipiZIDE (GLUCOTROL) 10 MG tablet Take 1 tablet by mouth daily    JANUMET  mg per tablet Take 1 tablet twice a day with meals     losartan-hydrochlorothiazide 100-12.5 mg (HYZAAR) 100-12.5 mg Tab Take 1 tablet by mouth daily    magic mouthwash diphen/antac/lidoc/nysta Take 10 mLs by mouth 4 (four) times daily as needed (mouth pain).    magnesium oxide (MAGOX) 400 mg (241.3 mg magnesium) tablet Take 1 tablet (400 mg total) by mouth 2 (two) times daily.    multivit,iron,minerals/lutein (CENTRUM SILVER ULTRA WOMEN'S ORAL) Take 1 tablet by mouth once daily.    multivitamin-Ca-iron-minerals Tab Take by mouth.    nystatin (MYCOSTATIN) 100,000 unit/mL suspension Take 1 mL by mouth.    ondansetron (ZOFRAN-ODT) 8 MG TbDL Take 8 mg by mouth every 8 (eight) hours as needed (nausea).     pantoprazole (PROTONIX) 40 MG tablet Take 1 tablet (40 mg total) by mouth once daily.    PAZEO 0.7 % Drop Place 1 drop into both eyes every morning.    potassium chloride SA (K-DUR,KLOR-CON) 10 MEQ tablet Take 2 tablets (20 mEq total) by mouth 2 (two) times daily.    prochlorperazine (COMPAZINE) 10 MG tablet Take 10 mg by mouth 4 (four) times daily as needed (nausea/vomiting).     REVLIMID 10 mg Cap TK ONE C PO QD FOR 28 DAYS    tiZANidine (ZANAFLEX) 4 MG tablet Take 4 mg by mouth 3 (three) times daily as needed (back muscle spasms).     traMADol (ULTRAM) 50 mg tablet Take 1 tablet (50 mg total) by mouth every 8 (eight) hours as needed for Pain.     No current facility-administered medications for this visit.      Review of patient's allergies indicates:  No Known Allergies    REVIEW OF SYSTEMS:   General ROS: negative  Psychological ROS: negative  Ophthalmic ROS: negative  ENT ROS: negative  Allergy and Immunology ROS: negative  Hematological and Lymphatic ROS: negative  Endocrine ROS: negative  Respiratory ROS: negative  Cardiovascular ROS: BLE swelling  Gastrointestinal ROS: negative  Genito-Urinary ROS: negative         PHYSICAL EXAM:   Vitals:    12/31/19 1442   BP: (!) 158/65   Pulse: 60   Resp: 16   Temp: 98.2 °F (36.8 °C)       General - well  developed, well nourished, no apparent distress  Head & Face - no sinus tenderness  Eyes - normal conjunctivae and lids   ENT - normal external auditory canals; oropharynx clear,  Normal dentition and gums  Neck - normal thyroid  Chest and Lung - normal respiratory effort, clear to auscultation bilaterally   Cardiovascular - RRR with no MGR, normal S1 and S2; BLE trace swelling, non pitting now  Abdomen -  soft, nontender, no palpable hepatomegaly or splenomegaly  Extremities - unremarkable nails and digits  Heme - no bruising, petechiae, pallor  Skin - no rashes or lesions  Psych - appropriate mood and affect, some anxiety      ECOG Performance Status: (foot note - ECOG PS provided by Eastern Cooperative Oncology Group) 1 - Symptomatic but completely ambulatory    Karnofsky Performance Score:  80%- Normal Activity with Effort: Some Symptoms of Disease  DATA:   Lab Results   Component Value Date    WBC 3.57 (L) 12/31/2019    HGB 10.5 (L) 12/31/2019    HCT 33.5 (L) 12/31/2019    MCV 92 12/31/2019     (L) 12/31/2019     Gran # (ANC)   Date Value Ref Range Status   12/31/2019 1.6 (L) 1.8 - 7.7 K/uL Final     Gran%   Date Value Ref Range Status   12/31/2019 43.5 38.0 - 73.0 % Final     CMP  Sodium   Date Value Ref Range Status   12/31/2019 143 136 - 145 mmol/L Final     Potassium   Date Value Ref Range Status   12/31/2019 4.4 3.5 - 5.1 mmol/L Final     Chloride   Date Value Ref Range Status   12/31/2019 110 95 - 110 mmol/L Final     CO2   Date Value Ref Range Status   12/31/2019 29 23 - 29 mmol/L Final     Glucose   Date Value Ref Range Status   12/31/2019 100 70 - 110 mg/dL Final     BUN, Bld   Date Value Ref Range Status   12/31/2019 13 8 - 23 mg/dL Final     Creatinine   Date Value Ref Range Status   12/31/2019 0.9 0.5 - 1.4 mg/dL Final     Calcium   Date Value Ref Range Status   12/31/2019 9.2 8.7 - 10.5 mg/dL Final     Total Protein   Date Value Ref Range Status   12/31/2019 7.0 6.0 - 8.4 g/dL Final      Albumin   Date Value Ref Range Status   12/31/2019 3.4 (L) 3.5 - 5.2 g/dL Final     Total Bilirubin   Date Value Ref Range Status   12/31/2019 0.2 0.1 - 1.0 mg/dL Final     Comment:     For infants and newborns, interpretation of results should be based  on gestational age, weight and in agreement with clinical  observations.  Premature Infant recommended reference ranges:  Up to 24 hours.............<8.0 mg/dL  Up to 48 hours............<12.0 mg/dL  3-5 days..................<15.0 mg/dL  6-29 days.................<15.0 mg/dL       Alkaline Phosphatase   Date Value Ref Range Status   12/31/2019 68 55 - 135 U/L Final     AST   Date Value Ref Range Status   12/31/2019 11 10 - 40 U/L Final     ALT   Date Value Ref Range Status   12/31/2019 8 (L) 10 - 44 U/L Final     Anion Gap   Date Value Ref Range Status   12/31/2019 4 (L) 8 - 16 mmol/L Final     eGFR if    Date Value Ref Range Status   12/31/2019 >60.0 >60 mL/min/1.73 m^2 Final     eGFR if non    Date Value Ref Range Status   12/31/2019 >60.0 >60 mL/min/1.73 m^2 Final     Comment:     Calculation used to obtain the estimated glomerular filtration  rate (eGFR) is the CKD-EPI equation.        [place] Free Light Chains   Date Value Ref Range Status   12/31/2019 2.19 (H) 0.33 - 1.94 mg/dL Final   06/27/2019 1.15 0.33 - 1.94 mg/dL Final   05/27/2019 1.07 0.33 - 1.94 mg/dL Final     Lambda Free Light Chains   Date Value Ref Range Status   12/31/2019 1.89 0.57 - 2.63 mg/dL Final   06/27/2019 1.32 0.57 - 2.63 mg/dL Final   05/27/2019 1.37 0.57 - 2.63 mg/dL Final     Kappa/Lambda FLC Ratio   Date Value Ref Range Status   12/31/2019 1.16 0.26 - 1.65 Final   06/27/2019 0.87 0.26 - 1.65 Final   05/27/2019 0.78 0.26 - 1.65 Final     IgG - Serum   Date Value Ref Range Status   12/31/2019 1332 650 - 1600 mg/dL Final     Comment:     IgG Cord Blood Reference Range: 650-1600 mg/dL.     IgA   Date Value Ref Range Status   12/31/2019 301 40 - 350  mg/dL Final     Comment:     IgA Cord Blood Reference Range: <5 mg/dL.     IgM   Date Value Ref Range Status   12/31/2019 26 (L) 50 - 300 mg/dL Final     Comment:     IgM Cord Blood Reference Range: <25 mg/dL.   Pathologist Interpretation SPE   Pathologist Interpretation SPE   Collected: 12/31/19 1334   Resulting lab: [facility]   Value: REVIEWED   Comment: Electronically reviewed and signed by:   Sun Kaba MD   Signed on 01/02/20 at 13:21   Normal total protein, normal pattern.    *Additional information available - comment     Pathologist Interpretation JOJO   Pathologist Interpretation JOJO   Collected: 12/31/19 1334   Resulting lab: [facility]   Value: REVIEWED   Comment: Electronically reviewed and signed by:   Sun Kaba MD   Signed on 01/02/20 at 13:21   No monoclonal peaks identified.    [facility] Miscellaneous Test   Order: [id number]   Status:  Final result   Visible to patient:  No (Not Released)   Next appt:  04/02/2020 at 07:10 AM in Lab (LAB, HEMONC CANCER BLDG)   Component 6mo ago   [facility] Miscellaneous Result SEE COMMENTS    Comment: Test                              Result  Flag  Unit  RefValue   --------------------------------------------------------------   Multiple Myeloma MRD by Flow, BM   % Minimal Residual Disease      0.0000        %   (MRD)                ASSESSMENT AND PLAN:   Encounter Diagnoses   Name Primary?    Multiple myeloma in remission Yes    History of auto stem cell transplant        Multiple myeloma s/p shabbir auto stem cell transplant  -IgG kappa MM; ISS 2; standard risk cytogenetics; with lytic bone lesions at presentation; sp induction with  VRd (21 day cycle) with Dr. Juan at [facility]   -she achieved VGPR prior to transplant; tolerated shabbir 200 auto transplant (Transplant day - 3/20/19)  -now day + 286 with approximately day +100 restaging PET scan, biochemical studies and bone marrow biopsy confirming a stringent  Complete Remission; MRD testing from [facility] on day +100 also showed MRD negativity predicting long duration of remission  -recommend maintain revlimid maintenance under direction of Dr. Juan until progression or intolerance; 10mg daily with asa  -continue acyclovir 800mg BID through day +365; recommend post transplant Zometa q 3 months x 4 doses; she is receiving this    -initiate post transplant vaccines at day +180 at [facility]; will have bmt coordinators arrange; pt does not think these have been scheduled will confirm with coordinators today     Cytopenias due to revlimid  -no indication for dose adjustments based on cytopenia level  -transfuse Hb for <7 and platelets <10  -no indication to transfuse today         Neuropathy  -2/2 chemo  -continue increased dose of gabapentin (since 3/31/19) 900 mg TID      HTN  -per PMD    DM2  - per PCP        Follow up:    -1 yr post transplant bone marrow under sedation, cbc, cmp, serum free light chains, quantitative immunoglobulins, serum electropheresis, serum immunofixation , and md or np appt in 3 months; all in same day if possible

## 2019-12-31 NOTE — Clinical Note
-bone marrow under sedation, cbc, cmp, serum free light chains, quantitative immunoglobulins, serum electropheresis, serum immunofixation , and md or np appt in 3 months; all in same day if possible

## 2020-01-02 LAB
ALBUMIN SERPL ELPH-MCNC: 3.64 G/DL (ref 3.35–5.55)
ALPHA1 GLOB SERPL ELPH-MCNC: 0.28 G/DL (ref 0.17–0.41)
ALPHA2 GLOB SERPL ELPH-MCNC: 0.75 G/DL (ref 0.43–0.99)
B-GLOBULIN SERPL ELPH-MCNC: 0.86 G/DL (ref 0.5–1.1)
GAMMA GLOB SERPL ELPH-MCNC: 1.26 G/DL (ref 0.67–1.58)
INTERPRETATION SERPL IFE-IMP: NORMAL
KAPPA LC SER QL IA: 2.19 MG/DL (ref 0.33–1.94)
KAPPA LC/LAMBDA SER IA: 1.16 (ref 0.26–1.65)
LAMBDA LC SER QL IA: 1.89 MG/DL (ref 0.57–2.63)
PATHOLOGIST INTERPRETATION IFE: NORMAL
PATHOLOGIST INTERPRETATION SPE: NORMAL
PROT SERPL-MCNC: 6.8 G/DL (ref 6–8.4)

## 2020-01-06 DIAGNOSIS — D84.822 IMMUNOCOMPROMISED STATE ASSOCIATED WITH STEM CELL TRANSPLANT: ICD-10-CM

## 2020-01-06 DIAGNOSIS — C90.00 MULTIPLE MYELOMA: Primary | ICD-10-CM

## 2020-01-06 DIAGNOSIS — Z94.84 IMMUNOCOMPROMISED STATE ASSOCIATED WITH STEM CELL TRANSPLANT: ICD-10-CM

## 2020-01-06 DIAGNOSIS — Z76.82 STEM CELL TRANSPLANT CANDIDATE: ICD-10-CM

## 2020-01-06 DIAGNOSIS — Z94.84 H/O AUTOLOGOUS STEM CELL TRANSPLANT: ICD-10-CM

## 2020-01-07 ENCOUNTER — TELEPHONE (OUTPATIENT)
Dept: HEMATOLOGY/ONCOLOGY | Facility: CLINIC | Age: 65
End: 2020-01-07

## 2020-01-20 ENCOUNTER — CLINICAL SUPPORT (OUTPATIENT)
Dept: INFECTIOUS DISEASES | Facility: CLINIC | Age: 65
End: 2020-01-20
Payer: MEDICARE

## 2020-01-20 PROCEDURE — 90715 TDAP VACCINE 7 YRS/> IM: CPT | Mod: PBBFAC

## 2020-01-20 PROCEDURE — 90472 IMMUNIZATION ADMIN EACH ADD: CPT | Mod: PBBFAC

## 2020-01-20 PROCEDURE — G0010 ADMIN HEPATITIS B VACCINE: HCPCS | Mod: PBBFAC

## 2020-01-20 PROCEDURE — 90471 IMMUNIZATION ADMIN: CPT | Mod: PBBFAC

## 2020-01-20 PROCEDURE — 90713 POLIOVIRUS IPV SC/IM: CPT | Mod: PBBFAC

## 2020-01-20 PROCEDURE — 90702 DT VACCINE UNDER 7 YRS IM: CPT | Mod: PBBFAC

## 2020-01-20 PROCEDURE — 90714 TD VACC NO PRESV 7 YRS+ IM: CPT | Mod: PBBFAC

## 2020-01-20 PROCEDURE — 90700 DTAP VACCINE < 7 YRS IM: CPT | Mod: PBBFAC

## 2020-01-20 NOTE — PROGRESS NOTES
Ms. iRvero received Polio, Prevnar 13, Hepatitis B, Dtap and Hib vaccines   Tolerated well  Left unit in NAD

## 2020-03-16 ENCOUNTER — CLINICAL SUPPORT (OUTPATIENT)
Dept: INFECTIOUS DISEASES | Facility: CLINIC | Age: 65
End: 2020-03-16
Payer: MEDICARE

## 2020-03-16 PROCEDURE — 90472 IMMUNIZATION ADMIN EACH ADD: CPT | Mod: PBBFAC

## 2020-03-16 PROCEDURE — G0010 ADMIN HEPATITIS B VACCINE: HCPCS | Mod: PBBFAC

## 2020-03-16 PROCEDURE — 90471 IMMUNIZATION ADMIN: CPT | Mod: PBBFAC

## 2020-03-16 PROCEDURE — 90700 DTAP VACCINE < 7 YRS IM: CPT | Mod: PBBFAC

## 2020-03-16 PROCEDURE — 90713 POLIOVIRUS IPV SC/IM: CPT | Mod: PBBFAC

## 2020-03-16 PROCEDURE — 90715 TDAP VACCINE 7 YRS/> IM: CPT | Mod: PBBFAC

## 2020-03-16 NOTE — PROGRESS NOTES
received Dtap, HIB,Polio, Hepatitis B, and Prevnar 13 vaccines. Tolerated well. Left unit in NAD.

## 2020-04-01 ENCOUNTER — TELEPHONE (OUTPATIENT)
Dept: HEMATOLOGY/ONCOLOGY | Facility: CLINIC | Age: 65
End: 2020-04-01

## 2020-04-01 DIAGNOSIS — C90.01 MULTIPLE MYELOMA IN REMISSION: Primary | ICD-10-CM

## 2020-04-01 NOTE — TELEPHONE ENCOUNTER
"----- Message from Yadira Keene sent at 4/1/2020 10:20 AM CDT -----  Name of caller: Daughter   Provider name: Nida VUONG MD   Contact Preference:  [phone]  Is this regarding current patient or new patient?: current   What is the nature of the call?  - pt calling in regards to procedure tomorrow, has questions   about the appt. Please call    Additional Notes:   "Thank you for all that you do for our patients'"     "

## 2020-04-01 NOTE — TELEPHONE ENCOUNTER
Informed pt daughter that BMBX under sedation has been cancelled due to COVID-19 crisis. Will be rescheduled again in 3 months. Advised to stop acyclovir per Dr. Alva. Pt verbalized understanding. No questions or concerns voiced at this time.

## 2020-04-01 NOTE — PROGRESS NOTES
Case request place for BMBx in OR on 7/2/20.    Maria Guadalupe Poole, FNP  Hematology/Oncology/Bone Marrow Transplant

## 2020-06-10 ENCOUNTER — TELEPHONE (OUTPATIENT)
Dept: HEMATOLOGY/ONCOLOGY | Facility: CLINIC | Age: 65
End: 2020-06-10

## 2020-08-19 ENCOUNTER — TELEPHONE (OUTPATIENT)
Dept: HEMATOLOGY/ONCOLOGY | Facility: CLINIC | Age: 65
End: 2020-08-19

## 2020-08-19 NOTE — TELEPHONE ENCOUNTER
Pt stating she was reluctant to reschedule BMBX apt that she had previously missed because she was nervous to have the procedure done in the clinic. Discussed procedure in detail. Pt agreeable to coming into clinic for biopsy. Scheduled on 9/2 at 9 AM. Confirmed apt with pt.

## 2020-08-19 NOTE — TELEPHONE ENCOUNTER
----- Message from Sean Soria sent at 8/19/2020  8:11 AM CDT -----  Contact: Patient  Patient Advice/Staff Message     Caller name/title: Pt    Provider: Nida    Reason for call: Pt calling to speak with the RN to get a BMBX scheduled.    Do you feel you need to be seen today:: No     Contact:: [phone]    Additional info::

## 2020-08-28 ENCOUNTER — CLINICAL SUPPORT (OUTPATIENT)
Dept: INFECTIOUS DISEASES | Facility: CLINIC | Age: 65
End: 2020-08-28
Payer: MEDICARE

## 2020-08-28 DIAGNOSIS — Z76.82 STEM CELL TRANSPLANT CANDIDATE: Primary | ICD-10-CM

## 2020-08-28 DIAGNOSIS — Z94.84 H/O AUTOLOGOUS STEM CELL TRANSPLANT: ICD-10-CM

## 2020-08-28 DIAGNOSIS — C90.00 MULTIPLE MYELOMA, REMISSION STATUS UNSPECIFIED: ICD-10-CM

## 2020-08-28 PROCEDURE — 90700 DTAP VACCINE < 7 YRS IM: CPT | Mod: PBBFAC

## 2020-08-28 PROCEDURE — 90472 IMMUNIZATION ADMIN EACH ADD: CPT | Mod: PBBFAC

## 2020-08-28 PROCEDURE — 90471 IMMUNIZATION ADMIN: CPT | Mod: PBBFAC

## 2020-08-28 PROCEDURE — 90713 POLIOVIRUS IPV SC/IM: CPT | Mod: PBBFAC

## 2020-08-28 PROCEDURE — 90715 TDAP VACCINE 7 YRS/> IM: CPT | Mod: PBBFAC

## 2020-08-28 PROCEDURE — G0010 ADMIN HEPATITIS B VACCINE: HCPCS | Mod: PBBFAC

## 2020-08-28 NOTE — PROGRESS NOTES
Ms. Rivero received Dtap, Prevnar 13, Hepatitis B, Hib, and Polio vaccines   Tolerated well  Left unit in NAD

## 2020-08-28 NOTE — PROGRESS NOTES
Placed orders for BMBx in clinic.    Maria Guadalupe Poole, FNP  Hematology/Oncology/Bone Marrow Transplant

## 2020-09-01 ENCOUNTER — TELEPHONE (OUTPATIENT)
Dept: HEMATOLOGY/ONCOLOGY | Facility: CLINIC | Age: 65
End: 2020-09-01

## 2020-09-01 NOTE — TELEPHONE ENCOUNTER
----- Message from Chantal Waterman sent at 9/1/2020  8:00 AM CDT -----  Regarding: Advice  Contact: pt  Reason:Calling to ask if she need someone with her for a bone density appt?    Communication: [phone]

## 2020-09-01 NOTE — TELEPHONE ENCOUNTER
Attempted to call pt x2. No answer. Left voicemail confirming BMBX apt for tomorrow 9/2. Advised that the pt does not need to bring someone with her to the procedure and that no fasting is required prior to apt.

## 2020-09-02 ENCOUNTER — PROCEDURE VISIT (OUTPATIENT)
Dept: HEMATOLOGY/ONCOLOGY | Facility: CLINIC | Age: 65
End: 2020-09-02
Payer: MEDICARE

## 2020-09-02 VITALS
TEMPERATURE: 98 F | RESPIRATION RATE: 16 BRPM | DIASTOLIC BLOOD PRESSURE: 67 MMHG | OXYGEN SATURATION: 99 % | HEART RATE: 66 BPM | SYSTOLIC BLOOD PRESSURE: 154 MMHG

## 2020-09-02 DIAGNOSIS — C90.00 MULTIPLE MYELOMA, REMISSION STATUS UNSPECIFIED: ICD-10-CM

## 2020-09-02 DIAGNOSIS — Z94.84 H/O AUTOLOGOUS STEM CELL TRANSPLANT: ICD-10-CM

## 2020-09-02 PROCEDURE — 85097 PR  BONE MARROW,SMEAR INTERPRETATION: ICD-10-PCS | Mod: BMT,,, | Performed by: PATHOLOGY

## 2020-09-02 PROCEDURE — 88184 FLOWCYTOMETRY/ TC 1 MARKER: CPT | Performed by: PATHOLOGY

## 2020-09-02 PROCEDURE — 88311 PR  DECALCIFY TISSUE: ICD-10-PCS | Mod: 26,BMT,, | Performed by: PATHOLOGY

## 2020-09-02 PROCEDURE — 88305 TISSUE EXAM BY PATHOLOGIST: CPT | Mod: 26,BMT,, | Performed by: PATHOLOGY

## 2020-09-02 PROCEDURE — 88313 PR  SPECIAL STAINS,GROUP II: ICD-10-PCS | Mod: 26,BMT,, | Performed by: PATHOLOGY

## 2020-09-02 PROCEDURE — 38222 DX BONE MARROW BX & ASPIR: CPT | Mod: BMT,S$PBB,LT, | Performed by: NURSE PRACTITIONER

## 2020-09-02 PROCEDURE — 88365 INSITU HYBRIDIZATION (FISH): CPT | Mod: 26,BMT,, | Performed by: PATHOLOGY

## 2020-09-02 PROCEDURE — 85097 BONE MARROW INTERPRETATION: CPT | Mod: BMT,,, | Performed by: PATHOLOGY

## 2020-09-02 PROCEDURE — 88365 INSITU HYBRIDIZATION (FISH): CPT | Performed by: PATHOLOGY

## 2020-09-02 PROCEDURE — 88364 INSITU HYBRIDIZATION (FISH): CPT | Performed by: PATHOLOGY

## 2020-09-02 PROCEDURE — 88189 FLOWCYTOMETRY/READ 16 & >: CPT | Mod: BMT,,, | Performed by: PATHOLOGY

## 2020-09-02 PROCEDURE — 88185 FLOWCYTOMETRY/TC ADD-ON: CPT | Performed by: PATHOLOGY

## 2020-09-02 PROCEDURE — 88364 CHG INSITU HYBRIDIZATION (FISH: ICD-10-PCS | Mod: 26,BMT,, | Performed by: PATHOLOGY

## 2020-09-02 PROCEDURE — 38222 PR BONE MARROW BIOPSY(IES) W/ASPIRATION(S); DIAGNOSTIC: ICD-10-PCS | Mod: BMT,S$PBB,LT, | Performed by: NURSE PRACTITIONER

## 2020-09-02 PROCEDURE — 88342 IMHCHEM/IMCYTCHM 1ST ANTB: CPT | Mod: 59 | Performed by: PATHOLOGY

## 2020-09-02 PROCEDURE — 88365 PR  TISSUE HYBRIDIZATION: ICD-10-PCS | Mod: 26,BMT,, | Performed by: PATHOLOGY

## 2020-09-02 PROCEDURE — 88311 DECALCIFY TISSUE: CPT | Performed by: PATHOLOGY

## 2020-09-02 PROCEDURE — 88313 SPECIAL STAINS GROUP 2: CPT | Performed by: PATHOLOGY

## 2020-09-02 PROCEDURE — 88342 CHG IMMUNOCYTOCHEMISTRY: ICD-10-PCS | Mod: 26,BMT,59, | Performed by: PATHOLOGY

## 2020-09-02 PROCEDURE — 88305 TISSUE EXAM BY PATHOLOGIST: CPT | Performed by: PATHOLOGY

## 2020-09-02 PROCEDURE — 88342 IMHCHEM/IMCYTCHM 1ST ANTB: CPT | Mod: 26,BMT,59, | Performed by: PATHOLOGY

## 2020-09-02 PROCEDURE — 88313 SPECIAL STAINS GROUP 2: CPT | Mod: 26,BMT,, | Performed by: PATHOLOGY

## 2020-09-02 PROCEDURE — 88364 INSITU HYBRIDIZATION (FISH): CPT | Mod: 26,BMT,, | Performed by: PATHOLOGY

## 2020-09-02 PROCEDURE — 88189 PR  FLOWCYTOMETRY/READ, 16 & > MARKERS: ICD-10-PCS | Mod: BMT,,, | Performed by: PATHOLOGY

## 2020-09-02 PROCEDURE — 88311 DECALCIFY TISSUE: CPT | Mod: 26,BMT,, | Performed by: PATHOLOGY

## 2020-09-02 PROCEDURE — 88305 TISSUE EXAM BY PATHOLOGIST: ICD-10-PCS | Mod: 26,BMT,, | Performed by: PATHOLOGY

## 2020-09-02 NOTE — PROCEDURES
Procedures   PROCEDURE NOTE:  Date of Procedure: 09/02/2020  Bone Marrow Biopsy and Aspiration  Indication: 1 year, 5 months post auto SCT for multiple myeloma.  Consent: Informed consent was obtained from patient.  Timeout: Done and documented.  Position: Prone  Site: Left posterior illiac crest.  Prep: Betadine.  Needle used: 11 gauge Jamshidi needle.  Anesthetic: 2% lidocaine 10 cc.  Biopsy: The biopsy needle was introduced into the marrow cavity and an aspirate was obtained without complications and sent for flow cytometry and cytogenetics and PCPD FISH. Core biopsy obtained without difficulty and sent for routine histologic examination.  Complications: None.  Disposition: Patient was left in the room with RN and instructed to lie on back for 20 minutes. Instructed to keep dressing dry and intact for 24 hours.  Blood loss: Minimal.     Maria Guadalupe Poole, MATTHEWP  Hematology/Oncology/Bone Marrow Transplant

## 2020-09-02 NOTE — PROCEDURES
Patient presented for planned post transplant (1y, 5 m) BMBx in clinic without sedation. Tolerated procedure well.    Maria Guadalupe Poole, FNP  Hematology/Oncology/Bone Marrow Transplant

## 2020-09-03 LAB
BODY SITE - BONE MARROW: NORMAL
CLINICAL DIAGNOSIS - BONE MARROW: NORMAL
FLOW CYTOMETRY ANTIBODIES ANALYZED - BONE MARROW: NORMAL
FLOW CYTOMETRY COMMENT - BONE MARROW: NORMAL
FLOW CYTOMETRY INTERPRETATION - BONE MARROW: NORMAL

## 2020-09-14 LAB
COMMENT: NORMAL
FINAL PATHOLOGIC DIAGNOSIS: NORMAL
GENETICIST REVIEW: NORMAL
GROSS: NORMAL
Lab: NORMAL
MICROSCOPIC EXAM: NORMAL
PLASMA CELL PROLIF RELEASED BY: NORMAL
PLASMA CELL PROLIF RESULT SUMMARY: NORMAL
PLASMA CELL PROLIF RESULT TABLE: NORMAL
REASON FOR REFERRAL, PLASMA CELL PROLIF (PCPD), FISH: NORMAL
REF LAB TEST METHOD: NORMAL
RESULTS, PLASMA CELL PROLIF (PCPD), FISH: NORMAL
SERVICE CMNT-IMP: NORMAL
SERVICE CMNT-IMP: NORMAL
SPECIMEN SOURCE: NORMAL
SPECIMEN, PLASMA CELL PROLIF (PCPD), FISH: NORMAL
SUPPLEMENTAL DIAGNOSIS: NORMAL

## 2021-01-12 ENCOUNTER — TELEPHONE (OUTPATIENT)
Dept: HEMATOLOGY/ONCOLOGY | Facility: CLINIC | Age: 66
End: 2021-01-12

## 2021-02-09 ENCOUNTER — TELEPHONE (OUTPATIENT)
Dept: HEMATOLOGY/ONCOLOGY | Facility: CLINIC | Age: 66
End: 2021-02-09

## 2021-02-10 ENCOUNTER — TELEPHONE (OUTPATIENT)
Dept: HEMATOLOGY/ONCOLOGY | Facility: CLINIC | Age: 66
End: 2021-02-10

## 2021-02-11 ENCOUNTER — TELEPHONE (OUTPATIENT)
Dept: HEMATOLOGY/ONCOLOGY | Facility: CLINIC | Age: 66
End: 2021-02-11

## 2021-02-11 DIAGNOSIS — C90.00 MULTIPLE MYELOMA, REMISSION STATUS UNSPECIFIED: ICD-10-CM

## 2021-02-11 DIAGNOSIS — Z94.84 H/O AUTOLOGOUS STEM CELL TRANSPLANT: Primary | ICD-10-CM

## 2021-02-17 ENCOUNTER — LAB VISIT (OUTPATIENT)
Dept: LAB | Facility: HOSPITAL | Age: 66
End: 2021-02-17
Attending: INTERNAL MEDICINE
Payer: MEDICARE

## 2021-02-17 DIAGNOSIS — C90.00 MULTIPLE MYELOMA: ICD-10-CM

## 2021-02-17 DIAGNOSIS — Z76.82 STEM CELL TRANSPLANT CANDIDATE: ICD-10-CM

## 2021-02-17 DIAGNOSIS — D84.822 IMMUNOCOMPROMISED STATE ASSOCIATED WITH STEM CELL TRANSPLANT: ICD-10-CM

## 2021-02-17 DIAGNOSIS — Z94.84 H/O AUTOLOGOUS STEM CELL TRANSPLANT: ICD-10-CM

## 2021-02-17 DIAGNOSIS — C90.00 MULTIPLE MYELOMA, REMISSION STATUS UNSPECIFIED: ICD-10-CM

## 2021-02-17 DIAGNOSIS — Z94.84 IMMUNOCOMPROMISED STATE ASSOCIATED WITH STEM CELL TRANSPLANT: ICD-10-CM

## 2021-02-17 LAB
ALBUMIN SERPL BCP-MCNC: 3.5 G/DL (ref 3.5–5.2)
ALP SERPL-CCNC: 102 U/L (ref 55–135)
ALT SERPL W/O P-5'-P-CCNC: 19 U/L (ref 10–44)
ANION GAP SERPL CALC-SCNC: 9 MMOL/L (ref 8–16)
AST SERPL-CCNC: 17 U/L (ref 10–40)
BASOPHILS # BLD AUTO: 0.05 K/UL (ref 0–0.2)
BASOPHILS NFR BLD: 1.6 % (ref 0–1.9)
BILIRUB SERPL-MCNC: 0.2 MG/DL (ref 0.1–1)
BUN SERPL-MCNC: 18 MG/DL (ref 8–23)
CALCIUM SERPL-MCNC: 8.9 MG/DL (ref 8.7–10.5)
CHLORIDE SERPL-SCNC: 107 MMOL/L (ref 95–110)
CO2 SERPL-SCNC: 25 MMOL/L (ref 23–29)
CREAT SERPL-MCNC: 1.3 MG/DL (ref 0.5–1.4)
DIFFERENTIAL METHOD: ABNORMAL
EOSINOPHIL # BLD AUTO: 0.2 K/UL (ref 0–0.5)
EOSINOPHIL NFR BLD: 6.1 % (ref 0–8)
ERYTHROCYTE [DISTWIDTH] IN BLOOD BY AUTOMATED COUNT: 16.1 % (ref 11.5–14.5)
EST. GFR  (AFRICAN AMERICAN): 49.1 ML/MIN/1.73 M^2
EST. GFR  (NON AFRICAN AMERICAN): 42.6 ML/MIN/1.73 M^2
GLUCOSE SERPL-MCNC: 193 MG/DL (ref 70–110)
HCT VFR BLD AUTO: 32.1 % (ref 37–48.5)
HGB BLD-MCNC: 10.3 G/DL (ref 12–16)
IGA SERPL-MCNC: 390 MG/DL (ref 40–350)
IGG SERPL-MCNC: 1330 MG/DL (ref 650–1600)
IGM SERPL-MCNC: 23 MG/DL (ref 50–300)
IMM GRANULOCYTES # BLD AUTO: 0.01 K/UL (ref 0–0.04)
IMM GRANULOCYTES NFR BLD AUTO: 0.3 % (ref 0–0.5)
LYMPHOCYTES # BLD AUTO: 0.7 K/UL (ref 1–4.8)
LYMPHOCYTES NFR BLD: 22.4 % (ref 18–48)
MCH RBC QN AUTO: 28.5 PG (ref 27–31)
MCHC RBC AUTO-ENTMCNC: 32.1 G/DL (ref 32–36)
MCV RBC AUTO: 89 FL (ref 82–98)
MONOCYTES # BLD AUTO: 0.3 K/UL (ref 0.3–1)
MONOCYTES NFR BLD: 10.5 % (ref 4–15)
NEUTROPHILS # BLD AUTO: 1.9 K/UL (ref 1.8–7.7)
NEUTROPHILS NFR BLD: 59.1 % (ref 38–73)
NRBC BLD-RTO: 0 /100 WBC
PLATELET # BLD AUTO: 112 K/UL (ref 150–350)
PMV BLD AUTO: 10.5 FL (ref 9.2–12.9)
POTASSIUM SERPL-SCNC: 3.7 MMOL/L (ref 3.5–5.1)
PROT SERPL-MCNC: 7.3 G/DL (ref 6–8.4)
RBC # BLD AUTO: 3.61 M/UL (ref 4–5.4)
SODIUM SERPL-SCNC: 141 MMOL/L (ref 136–145)
WBC # BLD AUTO: 3.13 K/UL (ref 3.9–12.7)

## 2021-02-17 PROCEDURE — 83520 IMMUNOASSAY QUANT NOS NONAB: CPT | Mod: 59

## 2021-02-17 PROCEDURE — 36415 COLL VENOUS BLD VENIPUNCTURE: CPT

## 2021-02-17 PROCEDURE — 82784 ASSAY IGA/IGD/IGG/IGM EACH: CPT | Mod: 59

## 2021-02-17 PROCEDURE — 86334 IMMUNOFIX E-PHORESIS SERUM: CPT | Mod: 26,BMT,, | Performed by: PATHOLOGY

## 2021-02-17 PROCEDURE — 86684 HEMOPHILUS INFLUENZA ANTIBDY: CPT

## 2021-02-17 PROCEDURE — 80053 COMPREHEN METABOLIC PANEL: CPT

## 2021-02-17 PROCEDURE — 85025 COMPLETE CBC W/AUTO DIFF WBC: CPT

## 2021-02-17 PROCEDURE — 86774 TETANUS ANTIBODY: CPT

## 2021-02-17 PROCEDURE — 84165 PATHOLOGIST INTERPRETATION SPE: ICD-10-PCS | Mod: 26,BMT,, | Performed by: PATHOLOGY

## 2021-02-17 PROCEDURE — 84165 PROTEIN E-PHORESIS SERUM: CPT | Mod: 26,BMT,, | Performed by: PATHOLOGY

## 2021-02-17 PROCEDURE — 86334 IMMUNOFIX E-PHORESIS SERUM: CPT

## 2021-02-17 PROCEDURE — 84165 PROTEIN E-PHORESIS SERUM: CPT

## 2021-02-17 PROCEDURE — 86334 PATHOLOGIST INTERPRETATION IFE: ICD-10-PCS | Mod: 26,BMT,, | Performed by: PATHOLOGY

## 2021-02-17 PROCEDURE — 86706 HEP B SURFACE ANTIBODY: CPT

## 2021-02-18 LAB
ALBUMIN SERPL ELPH-MCNC: 3.52 G/DL (ref 3.35–5.55)
ALPHA1 GLOB SERPL ELPH-MCNC: 0.3 G/DL (ref 0.17–0.41)
ALPHA2 GLOB SERPL ELPH-MCNC: 0.75 G/DL (ref 0.43–0.99)
B-GLOBULIN SERPL ELPH-MCNC: 0.99 G/DL (ref 0.5–1.1)
GAMMA GLOB SERPL ELPH-MCNC: 1.35 G/DL (ref 0.67–1.58)
HBV SURFACE AB SER-ACNC: NEGATIVE M[IU]/ML
INTERPRETATION SERPL IFE-IMP: NORMAL
KAPPA LC SER QL IA: 3.38 MG/DL (ref 0.33–1.94)
KAPPA LC/LAMBDA SER IA: 1.04 (ref 0.26–1.65)
LAMBDA LC SER QL IA: 3.24 MG/DL (ref 0.57–2.63)
PATHOLOGIST INTERPRETATION IFE: NORMAL
PATHOLOGIST INTERPRETATION SPE: NORMAL
PROT SERPL-MCNC: 6.9 G/DL (ref 6–8.4)

## 2021-02-20 LAB
C DIPHTHERIAE AB SER IA-ACNC: 0.14 IU/ML
C TETANI TOXOID AB SER-ACNC: 5.03 IU/ML
DEPRECATED S PNEUM23 IGG SER-MCNC: 0.8 UG/ML
DEPRECATED S PNEUM4 IGG SER-MCNC: 1.2 UG/ML
HAEM INFLU B IGG SER IA-MCNC: 6.47 MCG/ML
S PN DA SERO 19F IGG SER-MCNC: 1.3 UG/ML
S PNEUM DA 1 IGG SER-MCNC: 1 UG/ML
S PNEUM DA 14 IGG SER-MCNC: 4.7
S PNEUM DA 18C IGG SER-MCNC: 2.3
S PNEUM DA 19A IGG SER-MCNC: 0.6 UG/ML
S PNEUM DA 3 IGG SER-MCNC: <0.3 UG/ML
S PNEUM DA 5 IGG SER-MCNC: 0.7 UG/ML
S PNEUM DA 6A IGG SER-MCNC: 1.2 UG/ML
S PNEUM DA 6B IGG SER-MCNC: 1.9 UG/ML
S PNEUM DA 7F IGG SER-MCNC: 0.3 UG/ML
S PNEUM DA 9V IGG SER-MCNC: 0.9 UG/ML

## 2021-03-02 ENCOUNTER — OFFICE VISIT (OUTPATIENT)
Dept: HEMATOLOGY/ONCOLOGY | Facility: CLINIC | Age: 66
End: 2021-03-02
Payer: MEDICARE

## 2021-03-02 VITALS
OXYGEN SATURATION: 100 % | BODY MASS INDEX: 34.1 KG/M2 | DIASTOLIC BLOOD PRESSURE: 72 MMHG | TEMPERATURE: 99 F | WEIGHT: 199.75 LBS | SYSTOLIC BLOOD PRESSURE: 171 MMHG | HEART RATE: 58 BPM | HEIGHT: 64 IN | RESPIRATION RATE: 18 BRPM

## 2021-03-02 DIAGNOSIS — G62.0 CHEMOTHERAPY-INDUCED PERIPHERAL NEUROPATHY: ICD-10-CM

## 2021-03-02 DIAGNOSIS — D61.810 PANCYTOPENIA DUE TO CHEMOTHERAPY: ICD-10-CM

## 2021-03-02 DIAGNOSIS — I10 ESSENTIAL HYPERTENSION: ICD-10-CM

## 2021-03-02 DIAGNOSIS — Z94.84 H/O AUTOLOGOUS STEM CELL TRANSPLANT: ICD-10-CM

## 2021-03-02 DIAGNOSIS — D84.821 IMMUNOCOMPROMISED STATE DUE TO DRUG THERAPY: ICD-10-CM

## 2021-03-02 DIAGNOSIS — C90.01 MULTIPLE MYELOMA IN REMISSION: Primary | ICD-10-CM

## 2021-03-02 DIAGNOSIS — T45.1X5A CHEMOTHERAPY-INDUCED PERIPHERAL NEUROPATHY: ICD-10-CM

## 2021-03-02 DIAGNOSIS — Z79.899 IMMUNOCOMPROMISED STATE DUE TO DRUG THERAPY: ICD-10-CM

## 2021-03-02 DIAGNOSIS — E11.9 TYPE 2 DIABETES MELLITUS WITHOUT COMPLICATION, WITHOUT LONG-TERM CURRENT USE OF INSULIN: ICD-10-CM

## 2021-03-02 PROCEDURE — 99214 PR OFFICE/OUTPT VISIT, EST, LEVL IV, 30-39 MIN: ICD-10-PCS | Mod: S$PBB,BMT,, | Performed by: NURSE PRACTITIONER

## 2021-03-02 PROCEDURE — 99214 OFFICE O/P EST MOD 30 MIN: CPT | Mod: S$PBB,BMT,, | Performed by: NURSE PRACTITIONER

## 2021-03-02 PROCEDURE — 99215 OFFICE O/P EST HI 40 MIN: CPT | Mod: PBBFAC | Performed by: NURSE PRACTITIONER

## 2021-03-02 PROCEDURE — 99999 PR PBB SHADOW E&M-EST. PATIENT-LVL V: CPT | Mod: PBBFAC,BMT,, | Performed by: NURSE PRACTITIONER

## 2021-03-02 PROCEDURE — 99999 PR PBB SHADOW E&M-EST. PATIENT-LVL V: ICD-10-PCS | Mod: PBBFAC,BMT,, | Performed by: NURSE PRACTITIONER

## 2021-03-02 RX ORDER — ASPIRIN 81 MG/1
81 TABLET ORAL
COMMUNITY
Start: 2020-07-15

## 2021-03-03 ENCOUNTER — TELEPHONE (OUTPATIENT)
Dept: HEMATOLOGY/ONCOLOGY | Facility: CLINIC | Age: 66
End: 2021-03-03

## 2021-03-03 DIAGNOSIS — Z94.84 H/O AUTOLOGOUS STEM CELL TRANSPLANT: Primary | ICD-10-CM

## 2021-03-30 ENCOUNTER — TELEPHONE (OUTPATIENT)
Dept: HEMATOLOGY/ONCOLOGY | Facility: CLINIC | Age: 66
End: 2021-03-30

## 2021-06-16 ENCOUNTER — TELEPHONE (OUTPATIENT)
Dept: HEMATOLOGY/ONCOLOGY | Facility: CLINIC | Age: 66
End: 2021-06-16

## 2021-06-28 ENCOUNTER — TELEPHONE (OUTPATIENT)
Dept: HEMATOLOGY/ONCOLOGY | Facility: CLINIC | Age: 66
End: 2021-06-28

## 2021-07-02 ENCOUNTER — OFFICE VISIT (OUTPATIENT)
Dept: HEMATOLOGY/ONCOLOGY | Facility: CLINIC | Age: 66
End: 2021-07-02
Payer: MEDICARE

## 2021-07-02 ENCOUNTER — LAB VISIT (OUTPATIENT)
Dept: LAB | Facility: HOSPITAL | Age: 66
End: 2021-07-02
Payer: MEDICARE

## 2021-07-02 VITALS
SYSTOLIC BLOOD PRESSURE: 140 MMHG | WEIGHT: 197.56 LBS | HEIGHT: 64 IN | BODY MASS INDEX: 33.73 KG/M2 | RESPIRATION RATE: 16 BRPM | OXYGEN SATURATION: 97 % | TEMPERATURE: 99 F | HEART RATE: 64 BPM | DIASTOLIC BLOOD PRESSURE: 69 MMHG

## 2021-07-02 DIAGNOSIS — G62.0 CHEMOTHERAPY-INDUCED PERIPHERAL NEUROPATHY: ICD-10-CM

## 2021-07-02 DIAGNOSIS — Z79.899 IMMUNOCOMPROMISED STATE DUE TO DRUG THERAPY: ICD-10-CM

## 2021-07-02 DIAGNOSIS — E11.9 TYPE 2 DIABETES MELLITUS WITHOUT COMPLICATION, WITHOUT LONG-TERM CURRENT USE OF INSULIN: ICD-10-CM

## 2021-07-02 DIAGNOSIS — Z94.84 H/O AUTOLOGOUS STEM CELL TRANSPLANT: ICD-10-CM

## 2021-07-02 DIAGNOSIS — D84.821 IMMUNOCOMPROMISED STATE DUE TO DRUG THERAPY: ICD-10-CM

## 2021-07-02 DIAGNOSIS — D61.810 PANCYTOPENIA DUE TO CHEMOTHERAPY: ICD-10-CM

## 2021-07-02 DIAGNOSIS — I10 ESSENTIAL HYPERTENSION: ICD-10-CM

## 2021-07-02 DIAGNOSIS — Z94.84 H/O AUTOLOGOUS STEM CELL TRANSPLANT: Primary | ICD-10-CM

## 2021-07-02 DIAGNOSIS — C90.00 MULTIPLE MYELOMA, REMISSION STATUS UNSPECIFIED: ICD-10-CM

## 2021-07-02 DIAGNOSIS — T45.1X5A CHEMOTHERAPY-INDUCED PERIPHERAL NEUROPATHY: ICD-10-CM

## 2021-07-02 DIAGNOSIS — C90.01 MULTIPLE MYELOMA IN REMISSION: ICD-10-CM

## 2021-07-02 LAB
ALBUMIN SERPL BCP-MCNC: 3.4 G/DL (ref 3.5–5.2)
ALP SERPL-CCNC: 93 U/L (ref 55–135)
ALT SERPL W/O P-5'-P-CCNC: 15 U/L (ref 10–44)
ANION GAP SERPL CALC-SCNC: 11 MMOL/L (ref 8–16)
AST SERPL-CCNC: 14 U/L (ref 10–40)
BASOPHILS # BLD AUTO: 0.03 K/UL (ref 0–0.2)
BASOPHILS NFR BLD: 0.9 % (ref 0–1.9)
BILIRUB SERPL-MCNC: 0.3 MG/DL (ref 0.1–1)
BUN SERPL-MCNC: 13 MG/DL (ref 8–23)
CALCIUM SERPL-MCNC: 9.4 MG/DL (ref 8.7–10.5)
CHLORIDE SERPL-SCNC: 108 MMOL/L (ref 95–110)
CO2 SERPL-SCNC: 23 MMOL/L (ref 23–29)
CREAT SERPL-MCNC: 1.3 MG/DL (ref 0.5–1.4)
DIFFERENTIAL METHOD: ABNORMAL
EOSINOPHIL # BLD AUTO: 0.2 K/UL (ref 0–0.5)
EOSINOPHIL NFR BLD: 6.9 % (ref 0–8)
ERYTHROCYTE [DISTWIDTH] IN BLOOD BY AUTOMATED COUNT: 16 % (ref 11.5–14.5)
EST. GFR  (AFRICAN AMERICAN): 49.1 ML/MIN/1.73 M^2
EST. GFR  (NON AFRICAN AMERICAN): 42.6 ML/MIN/1.73 M^2
GLUCOSE SERPL-MCNC: 211 MG/DL (ref 70–110)
HCT VFR BLD AUTO: 32.7 % (ref 37–48.5)
HGB BLD-MCNC: 10.3 G/DL (ref 12–16)
IGA SERPL-MCNC: 413 MG/DL (ref 40–350)
IGG SERPL-MCNC: 1390 MG/DL (ref 650–1600)
IGM SERPL-MCNC: 27 MG/DL (ref 50–300)
IMM GRANULOCYTES # BLD AUTO: 0.01 K/UL (ref 0–0.04)
IMM GRANULOCYTES NFR BLD AUTO: 0.3 % (ref 0–0.5)
LYMPHOCYTES # BLD AUTO: 1 K/UL (ref 1–4.8)
LYMPHOCYTES NFR BLD: 29.3 % (ref 18–48)
MCH RBC QN AUTO: 27.8 PG (ref 27–31)
MCHC RBC AUTO-ENTMCNC: 31.5 G/DL (ref 32–36)
MCV RBC AUTO: 88 FL (ref 82–98)
MONOCYTES # BLD AUTO: 0.4 K/UL (ref 0.3–1)
MONOCYTES NFR BLD: 12.4 % (ref 4–15)
NEUTROPHILS # BLD AUTO: 1.7 K/UL (ref 1.8–7.7)
NEUTROPHILS NFR BLD: 50.2 % (ref 38–73)
NRBC BLD-RTO: 0 /100 WBC
PLATELET # BLD AUTO: 114 K/UL (ref 150–450)
PMV BLD AUTO: 10.2 FL (ref 9.2–12.9)
POTASSIUM SERPL-SCNC: 3.8 MMOL/L (ref 3.5–5.1)
PROT SERPL-MCNC: 7 G/DL (ref 6–8.4)
RBC # BLD AUTO: 3.71 M/UL (ref 4–5.4)
SODIUM SERPL-SCNC: 142 MMOL/L (ref 136–145)
WBC # BLD AUTO: 3.31 K/UL (ref 3.9–12.7)

## 2021-07-02 PROCEDURE — 99214 OFFICE O/P EST MOD 30 MIN: CPT | Mod: PBBFAC | Performed by: NURSE PRACTITIONER

## 2021-07-02 PROCEDURE — 99214 PR OFFICE/OUTPT VISIT, EST, LEVL IV, 30-39 MIN: ICD-10-PCS | Mod: S$PBB,BMT,, | Performed by: NURSE PRACTITIONER

## 2021-07-02 PROCEDURE — 99214 OFFICE O/P EST MOD 30 MIN: CPT | Mod: S$PBB,BMT,, | Performed by: NURSE PRACTITIONER

## 2021-07-02 PROCEDURE — 99999 PR PBB SHADOW E&M-EST. PATIENT-LVL IV: CPT | Mod: PBBFAC,BMT,, | Performed by: NURSE PRACTITIONER

## 2021-07-02 PROCEDURE — 80053 COMPREHEN METABOLIC PANEL: CPT | Performed by: INTERNAL MEDICINE

## 2021-07-02 PROCEDURE — 84165 PROTEIN E-PHORESIS SERUM: CPT | Mod: 26,BMT,, | Performed by: PATHOLOGY

## 2021-07-02 PROCEDURE — 86334 PATHOLOGIST INTERPRETATION IFE: ICD-10-PCS | Mod: 26,BMT,, | Performed by: PATHOLOGY

## 2021-07-02 PROCEDURE — 84165 PATHOLOGIST INTERPRETATION SPE: ICD-10-PCS | Mod: 26,BMT,, | Performed by: PATHOLOGY

## 2021-07-02 PROCEDURE — 99999 PR PBB SHADOW E&M-EST. PATIENT-LVL IV: ICD-10-PCS | Mod: PBBFAC,BMT,, | Performed by: NURSE PRACTITIONER

## 2021-07-02 PROCEDURE — 84165 PROTEIN E-PHORESIS SERUM: CPT | Performed by: INTERNAL MEDICINE

## 2021-07-02 PROCEDURE — 36415 COLL VENOUS BLD VENIPUNCTURE: CPT | Performed by: INTERNAL MEDICINE

## 2021-07-02 PROCEDURE — 83520 IMMUNOASSAY QUANT NOS NONAB: CPT | Mod: 59 | Performed by: INTERNAL MEDICINE

## 2021-07-02 PROCEDURE — 85025 COMPLETE CBC W/AUTO DIFF WBC: CPT | Performed by: INTERNAL MEDICINE

## 2021-07-02 PROCEDURE — 86334 IMMUNOFIX E-PHORESIS SERUM: CPT | Performed by: INTERNAL MEDICINE

## 2021-07-02 PROCEDURE — 82784 ASSAY IGA/IGD/IGG/IGM EACH: CPT | Performed by: INTERNAL MEDICINE

## 2021-07-02 PROCEDURE — 86334 IMMUNOFIX E-PHORESIS SERUM: CPT | Mod: 26,BMT,, | Performed by: PATHOLOGY

## 2021-07-06 LAB
ALBUMIN SERPL ELPH-MCNC: 3.5 G/DL (ref 3.35–5.55)
ALPHA1 GLOB SERPL ELPH-MCNC: 0.28 G/DL (ref 0.17–0.41)
ALPHA2 GLOB SERPL ELPH-MCNC: 0.7 G/DL (ref 0.43–0.99)
B-GLOBULIN SERPL ELPH-MCNC: 0.94 G/DL (ref 0.5–1.1)
GAMMA GLOB SERPL ELPH-MCNC: 1.39 G/DL (ref 0.67–1.58)
INTERPRETATION SERPL IFE-IMP: NORMAL
KAPPA LC SER QL IA: 3.05 MG/DL (ref 0.33–1.94)
KAPPA LC/LAMBDA SER IA: 0.97 (ref 0.26–1.65)
LAMBDA LC SER QL IA: 3.14 MG/DL (ref 0.57–2.63)
PROT SERPL-MCNC: 6.8 G/DL (ref 6–8.4)

## 2021-07-07 LAB
PATHOLOGIST INTERPRETATION IFE: NORMAL
PATHOLOGIST INTERPRETATION SPE: NORMAL

## 2021-10-05 ENCOUNTER — TELEPHONE (OUTPATIENT)
Dept: HEMATOLOGY/ONCOLOGY | Facility: CLINIC | Age: 66
End: 2021-10-05

## 2021-10-06 ENCOUNTER — TELEPHONE (OUTPATIENT)
Dept: HEMATOLOGY/ONCOLOGY | Facility: CLINIC | Age: 66
End: 2021-10-06

## 2022-01-20 ENCOUNTER — TELEPHONE (OUTPATIENT)
Dept: HEMATOLOGY/ONCOLOGY | Facility: CLINIC | Age: 67
End: 2022-01-20
Payer: MEDICARE

## 2022-02-04 DIAGNOSIS — Z94.84 H/O AUTOLOGOUS STEM CELL TRANSPLANT: Primary | ICD-10-CM

## 2022-02-07 ENCOUNTER — LAB VISIT (OUTPATIENT)
Dept: LAB | Facility: HOSPITAL | Age: 67
End: 2022-02-07
Payer: MEDICARE

## 2022-02-07 DIAGNOSIS — C90.00 MULTIPLE MYELOMA, REMISSION STATUS UNSPECIFIED: ICD-10-CM

## 2022-02-07 DIAGNOSIS — Z94.84 H/O AUTOLOGOUS STEM CELL TRANSPLANT: ICD-10-CM

## 2022-02-07 LAB
ALBUMIN SERPL BCP-MCNC: 3.3 G/DL (ref 3.5–5.2)
ALP SERPL-CCNC: 103 U/L (ref 55–135)
ALT SERPL W/O P-5'-P-CCNC: 16 U/L (ref 10–44)
ANION GAP SERPL CALC-SCNC: 8 MMOL/L (ref 8–16)
AST SERPL-CCNC: 17 U/L (ref 10–40)
BASOPHILS # BLD AUTO: 0.03 K/UL (ref 0–0.2)
BASOPHILS NFR BLD: 0.8 % (ref 0–1.9)
BILIRUB SERPL-MCNC: 0.5 MG/DL (ref 0.1–1)
BUN SERPL-MCNC: 18 MG/DL (ref 8–23)
CALCIUM SERPL-MCNC: 9.4 MG/DL (ref 8.7–10.5)
CHLORIDE SERPL-SCNC: 100 MMOL/L (ref 95–110)
CO2 SERPL-SCNC: 26 MMOL/L (ref 23–29)
CREAT SERPL-MCNC: 1.4 MG/DL (ref 0.5–1.4)
DIFFERENTIAL METHOD: ABNORMAL
EOSINOPHIL # BLD AUTO: 0.1 K/UL (ref 0–0.5)
EOSINOPHIL NFR BLD: 3.1 % (ref 0–8)
ERYTHROCYTE [DISTWIDTH] IN BLOOD BY AUTOMATED COUNT: 15.4 % (ref 11.5–14.5)
EST. GFR  (AFRICAN AMERICAN): 44.5 ML/MIN/1.73 M^2
EST. GFR  (NON AFRICAN AMERICAN): 38.6 ML/MIN/1.73 M^2
GLUCOSE SERPL-MCNC: 306 MG/DL (ref 70–110)
HCT VFR BLD AUTO: 34.8 % (ref 37–48.5)
HGB BLD-MCNC: 11.1 G/DL (ref 12–16)
IGA SERPL-MCNC: 501 MG/DL (ref 40–350)
IGG SERPL-MCNC: 1473 MG/DL (ref 650–1600)
IGM SERPL-MCNC: 26 MG/DL (ref 50–300)
IMM GRANULOCYTES # BLD AUTO: 0.02 K/UL (ref 0–0.04)
IMM GRANULOCYTES NFR BLD AUTO: 0.6 % (ref 0–0.5)
LYMPHOCYTES # BLD AUTO: 0.7 K/UL (ref 1–4.8)
LYMPHOCYTES NFR BLD: 19.3 % (ref 18–48)
MCH RBC QN AUTO: 27.7 PG (ref 27–31)
MCHC RBC AUTO-ENTMCNC: 31.9 G/DL (ref 32–36)
MCV RBC AUTO: 87 FL (ref 82–98)
MONOCYTES # BLD AUTO: 0.4 K/UL (ref 0.3–1)
MONOCYTES NFR BLD: 10.2 % (ref 4–15)
NEUTROPHILS # BLD AUTO: 2.3 K/UL (ref 1.8–7.7)
NEUTROPHILS NFR BLD: 66 % (ref 38–73)
NRBC BLD-RTO: 0 /100 WBC
PLATELET # BLD AUTO: 132 K/UL (ref 150–450)
PMV BLD AUTO: 9.8 FL (ref 9.2–12.9)
POTASSIUM SERPL-SCNC: 4.1 MMOL/L (ref 3.5–5.1)
PROT SERPL-MCNC: 7.5 G/DL (ref 6–8.4)
RBC # BLD AUTO: 4.01 M/UL (ref 4–5.4)
SODIUM SERPL-SCNC: 134 MMOL/L (ref 136–145)
WBC # BLD AUTO: 3.53 K/UL (ref 3.9–12.7)

## 2022-02-07 PROCEDURE — 86334 PATHOLOGIST INTERPRETATION IFE: ICD-10-PCS | Mod: 26,,, | Performed by: PATHOLOGY

## 2022-02-07 PROCEDURE — 84165 PATHOLOGIST INTERPRETATION SPE: ICD-10-PCS | Mod: 26,,, | Performed by: PATHOLOGY

## 2022-02-07 PROCEDURE — 86334 IMMUNOFIX E-PHORESIS SERUM: CPT | Mod: 26,,, | Performed by: PATHOLOGY

## 2022-02-07 PROCEDURE — 84165 PROTEIN E-PHORESIS SERUM: CPT | Mod: 26,,, | Performed by: PATHOLOGY

## 2022-02-07 PROCEDURE — 80053 COMPREHEN METABOLIC PANEL: CPT | Performed by: INTERNAL MEDICINE

## 2022-02-07 PROCEDURE — 82784 ASSAY IGA/IGD/IGG/IGM EACH: CPT | Performed by: INTERNAL MEDICINE

## 2022-02-07 PROCEDURE — 85025 COMPLETE CBC W/AUTO DIFF WBC: CPT | Performed by: INTERNAL MEDICINE

## 2022-02-07 PROCEDURE — 36415 COLL VENOUS BLD VENIPUNCTURE: CPT | Performed by: INTERNAL MEDICINE

## 2022-02-07 PROCEDURE — 83520 IMMUNOASSAY QUANT NOS NONAB: CPT | Mod: 59 | Performed by: INTERNAL MEDICINE

## 2022-02-07 PROCEDURE — 84165 PROTEIN E-PHORESIS SERUM: CPT | Performed by: INTERNAL MEDICINE

## 2022-02-07 PROCEDURE — 86334 IMMUNOFIX E-PHORESIS SERUM: CPT | Performed by: INTERNAL MEDICINE

## 2022-02-08 LAB
ALBUMIN SERPL ELPH-MCNC: 3.54 G/DL (ref 3.35–5.55)
ALPHA1 GLOB SERPL ELPH-MCNC: 0.35 G/DL (ref 0.17–0.41)
ALPHA2 GLOB SERPL ELPH-MCNC: 0.78 G/DL (ref 0.43–0.99)
B-GLOBULIN SERPL ELPH-MCNC: 1.04 G/DL (ref 0.5–1.1)
GAMMA GLOB SERPL ELPH-MCNC: 1.49 G/DL (ref 0.67–1.58)
INTERPRETATION SERPL IFE-IMP: NORMAL
KAPPA LC SER QL IA: 4.3 MG/DL (ref 0.33–1.94)
KAPPA LC/LAMBDA SER IA: 1.09 (ref 0.26–1.65)
LAMBDA LC SER QL IA: 3.93 MG/DL (ref 0.57–2.63)
PATHOLOGIST INTERPRETATION IFE: NORMAL
PATHOLOGIST INTERPRETATION SPE: NORMAL
PROT SERPL-MCNC: 7.2 G/DL (ref 6–8.4)

## 2022-02-09 ENCOUNTER — CLINICAL SUPPORT (OUTPATIENT)
Dept: INFECTIOUS DISEASES | Facility: CLINIC | Age: 67
End: 2022-02-09
Payer: MEDICARE

## 2022-02-09 ENCOUNTER — OFFICE VISIT (OUTPATIENT)
Dept: HEMATOLOGY/ONCOLOGY | Facility: CLINIC | Age: 67
End: 2022-02-09
Payer: MEDICARE

## 2022-02-09 VITALS
RESPIRATION RATE: 17 BRPM | HEART RATE: 62 BPM | OXYGEN SATURATION: 99 % | SYSTOLIC BLOOD PRESSURE: 140 MMHG | HEIGHT: 64 IN | WEIGHT: 194.75 LBS | TEMPERATURE: 99 F | DIASTOLIC BLOOD PRESSURE: 67 MMHG | BODY MASS INDEX: 33.25 KG/M2

## 2022-02-09 DIAGNOSIS — D84.9 IMMUNOSUPPRESSED STATUS: ICD-10-CM

## 2022-02-09 DIAGNOSIS — E11.9 TYPE 2 DIABETES MELLITUS WITHOUT COMPLICATION, WITHOUT LONG-TERM CURRENT USE OF INSULIN: ICD-10-CM

## 2022-02-09 DIAGNOSIS — C90.01 MULTIPLE MYELOMA IN REMISSION: Primary | ICD-10-CM

## 2022-02-09 DIAGNOSIS — I10 ESSENTIAL HYPERTENSION: ICD-10-CM

## 2022-02-09 DIAGNOSIS — Z94.84 H/O AUTOLOGOUS STEM CELL TRANSPLANT: ICD-10-CM

## 2022-02-09 DIAGNOSIS — D61.810 PANCYTOPENIA DUE TO CHEMOTHERAPY: ICD-10-CM

## 2022-02-09 PROCEDURE — 99999 PR PBB SHADOW E&M-EST. PATIENT-LVL IV: CPT | Mod: PBBFAC,,, | Performed by: NURSE PRACTITIONER

## 2022-02-09 PROCEDURE — 99215 PR OFFICE/OUTPT VISIT, EST, LEVL V, 40-54 MIN: ICD-10-PCS | Mod: S$PBB,,, | Performed by: NURSE PRACTITIONER

## 2022-02-09 PROCEDURE — 99214 OFFICE O/P EST MOD 30 MIN: CPT | Mod: PBBFAC | Performed by: NURSE PRACTITIONER

## 2022-02-09 PROCEDURE — 99999 PR PBB SHADOW E&M-EST. PATIENT-LVL IV: ICD-10-PCS | Mod: PBBFAC,,, | Performed by: NURSE PRACTITIONER

## 2022-02-09 PROCEDURE — 99215 OFFICE O/P EST HI 40 MIN: CPT | Mod: S$PBB,,, | Performed by: NURSE PRACTITIONER

## 2022-02-09 RX ORDER — CYCLOBENZAPRINE HCL 5 MG
5 TABLET ORAL 3 TIMES DAILY PRN
COMMUNITY
Start: 2021-10-14

## 2022-02-09 NOTE — PROGRESS NOTES
SECTION OF HEMATOLOGY AND BONE MARROW TRANSPLANT  Return  Patient Visit   02/09/2022  Referred by:  Dr. Saúl Alva  Referred for:myeloma, SCT eval     CHIEF COMPLAINT:   Chief Complaint   Patient presents with    Multiple Myeloma     S/p asct       HISTORY OF PRESENT ILLNESS:   66 y.o. female with pmh as below referred dec 2018  for SCT evaluation for MM; with regard to oncologic history; IgG kappa MM; ISS 2; standard risk cytogenetics; with lytic bone lesions at presentation; initiated VRd (21 day cycle) with Dr. Juan at [facility]; completed 5 cycles.  Completed pretransplant staging showing VGPR and completed pre transplant eval with no overt contraindications to transplant.  Presents today for admission for [facility] Autologous Sct. Denies fever, chills, nightsweats, bleeding, lymphadenopathy, signs/symptoms of splenomegaly, chest pain, sob, n/v/d/c. Does have chronic neuropathy to bilateral feet.  She also has chronic sciatic pain down both legs with BLE edema (+1 pitting edema today). Pt also has pain and swelling to her left knee. States she fell a week ago and hit her knee. Did not see any provider after fall, no imaging was complete. Taking tylenol and muscle relaxer, pt states neither helping.    Transplant course (3/19/19-4/5/19):  Admitted on 3/18 and received Melphalan on 3/19 and stem cell infusion on 3/20, she tolerated both without difficulty. She had a fall prior to admit, knee x-ray was unrevealing. She complained of severe neuropathy pain on admit and gabapentin was increased from 300 mg TID to 900 mg TID during hospital stay. She experienced the expected side effects of nausea, diarrhea and throat pain which all improved prior to discharge. Engrafted on day +12, 4/1/19. She did not have a fever during stay and was on ppx antimicrobials per protocol. Patient was discharged home with home PT after platelet transfusion and Vascath removal, she will follow-up in clinic on 4/5/19.    Today: pt  presents alone for routine f/u from shabbir auto SCT for MM. Today is day 2 year 10 months. Continued  CR. Bilateral lowe extremity spasm at times, otherwise doing well. On rev maintenance.  Continue to work part time. Able to do activities as tolerated. Experiencing muscle spasm often after strenuous activities. PCP evaluated for swallowing trouble/musular spasm. Noticed elevated blood sugar, diabetes managed by PCP. Following closely with Dr. Reynoso.       PAST MEDICAL HISTORY:   Past Medical History:   Diagnosis Date    Depression     DM2 (diabetes mellitus, type 2)     Glaucoma     HTN (hypertension)     Myeloma     Pancytopenia due to chemotherapy 3/24/2019    Therapy     after mother's death       PAST SURGICAL HISTORY:   Past Surgical History:   Procedure Laterality Date    BONE MARROW ASPIRATION Left 6/27/2019    Procedure: ASPIRATION, BONE MARROW;  Surgeon: Saúl Alva MD;  Location: [facility] OR [address] [place] [address];  Service: Oncology;  Laterality: Left;    BONE MARROW BIOPSY Left 2/7/2019    Procedure: Biopsy-bone marrow;  Surgeon: Saúl Alva MD;  Location: [facility] OR [address] [place] [address];  Service: Oncology;  Laterality: Left;    BONE MARROW BIOPSY Left 6/27/2019    Procedure: Biopsy-bone marrow;  Surgeon: Saúl Alva MD;  Location: [facility] OR [address] [place] [address];  Service: Oncology;  Laterality: Left;       PAST SOCIAL HISTORY:   reports that she has never smoked. She has never used smokeless tobacco. She reports that she does not drink alcohol and does not use drugs.    FAMILY HISTORY:  No family history on file.    CURRENT MEDICATIONS:   Current Outpatient Medications   Medication Sig    amLODIPine (NORVASC) 5 MG tablet Take 5 mg by mouth.    aspirin (ECOTRIN) 81 MG EC tablet Take 81 mg by mouth.    ergocalciferol (ERGOCALCIFEROL) 50,000 unit Cap TAKE 1 CAPSULE BY MOUTH EVERY 7 DAYS    furosemide (LASIX) 40 MG tablet TK 1 T PO  QAM PRF FLUID RETENTION    glipiZIDE (GLUCOTROL) 10 MG tablet Take 1 tablet by  mouth daily    JANUMET  mg per tablet Take 1 tablet twice a day with meals    multivit,iron,minerals/lutein (CENTRUM SILVER ULTRA WOMEN'S ORAL) Take 1 tablet by mouth once daily.    multivitamin-Ca-iron-minerals Tab Take by mouth.    nystatin (MYCOSTATIN) 100,000 unit/mL suspension Take 1 mL by mouth.    ondansetron (ZOFRAN-ODT) 8 MG TbDL Take 8 mg by mouth every 8 (eight) hours as needed (nausea).     PAZEO 0.7 % Drop Place 1 drop into both eyes every morning.    prochlorperazine (COMPAZINE) 10 MG tablet Take 10 mg by mouth 4 (four) times daily as needed (nausea/vomiting).     REVLIMID 10 mg Cap TK ONE C PO QD FOR 28 DAYS    tiZANidine (ZANAFLEX) 4 MG tablet Take 4 mg by mouth 3 (three) times daily as needed (back muscle spasms).     traMADol (ULTRAM) 50 mg tablet Take 1 tablet (50 mg total) by mouth every 8 (eight) hours as needed for Pain.    cyclobenzaprine (FLEXERIL) 5 MG tablet Take 5 mg by mouth 3 (three) times daily as needed.    gabapentin (NEURONTIN) 300 MG capsule Take 3 capsules (900 mg total) by mouth 3 (three) times daily.    pantoprazole (PROTONIX) 40 MG tablet Take 1 tablet (40 mg total) by mouth once daily.     No current facility-administered medications for this visit.     Review of patient's allergies indicates:  No Known Allergies    REVIEW OF SYSTEMS:   General ROS: negative  Psychological ROS: negative  Ophthalmic ROS: negative  ENT ROS: negative. Swallowing issues at times  Allergy and Immunology ROS: negative  Hematological and Lymphatic ROS: negative  Endocrine ROS: negative  Respiratory ROS: Exertional dyspnea  Cardiovascular ROS: BLE swelling  Gastrointestinal ROS: negative  Genito-Urinary ROS: negative  Muscular: Generalized muscle spam    PHYSICAL EXAM:   Vitals:    02/09/22 1420   BP: (!) 140/67   Pulse: 62   Resp: 17   Temp: 98.5 °F (36.9 °C)       General - well developed, well nourished, no apparent distress  Head & Face - no sinus tenderness  Eyes - normal  conjunctivae and lids   ENT - normal external auditory canals; oropharynx clear,  Normal dentition and gums  Neck - normal thyroid  Chest and Lung - normal respiratory effort, clear to auscultation bilaterally   Cardiovascular - RRR with no MGR, normal S1 and S2; BLE trace swelling, non pitting now  Abdomen -  soft, nontender, no palpable hepatomegaly or splenomegaly  Extremities - unremarkable nails and digits  Heme - no bruising, petechiae, pallor  Skin - no rashes or lesions  Psych - appropriate mood and affect, some anxiety      ECOG Performance Status: (foot note - ECOG PS provided by [employer]cology [employer]) 1 - Symptomatic but completely ambulatory    Karnofsky Performance Score:  80%- Normal Activity with Effort: Some Symptoms of Disease  DATA:   Lab Results   Component Value Date    WBC 3.53 (L) 02/07/2022    HGB 11.1 (L) 02/07/2022    HCT 34.8 (L) 02/07/2022    MCV 87 02/07/2022     (L) 02/07/2022     Gran # (ANC)   Date Value Ref Range Status   02/07/2022 2.3 1.8 - 7.7 K/uL Final     Gran %   Date Value Ref Range Status   02/07/2022 66.0 38.0 - 73.0 % Final     CMP  Sodium   Date Value Ref Range Status   02/07/2022 134 (L) 136 - 145 mmol/L Final     Potassium   Date Value Ref Range Status   02/07/2022 4.1 3.5 - 5.1 mmol/L Final     Chloride   Date Value Ref Range Status   02/07/2022 100 95 - 110 mmol/L Final     CO2   Date Value Ref Range Status   02/07/2022 26 23 - 29 mmol/L Final     Glucose   Date Value Ref Range Status   02/07/2022 306 (H) 70 - 110 mg/dL Final     BUN   Date Value Ref Range Status   02/07/2022 18 8 - 23 mg/dL Final     Creatinine   Date Value Ref Range Status   02/07/2022 1.4 0.5 - 1.4 mg/dL Final     Calcium   Date Value Ref Range Status   02/07/2022 9.4 8.7 - 10.5 mg/dL Final     Total Protein   Date Value Ref Range Status   02/07/2022 7.5 6.0 - 8.4 g/dL Final     Albumin   Date Value Ref Range Status   02/07/2022 3.3 (L) 3.5 - 5.2 g/dL Final     Total Bilirubin    Date Value Ref Range Status   02/07/2022 0.5 0.1 - 1.0 mg/dL Final     Comment:     For infants and newborns, interpretation of results should be based  on gestational age, weight and in agreement with clinical  observations.    Premature Infant recommended reference ranges:  Up to 24 hours.............<8.0 mg/dL  Up to 48 hours............<12.0 mg/dL  3-5 days..................<15.0 mg/dL  6-29 days.................<15.0 mg/dL       Alkaline Phosphatase   Date Value Ref Range Status   02/07/2022 103 55 - 135 U/L Final     AST   Date Value Ref Range Status   02/07/2022 17 10 - 40 U/L Final     ALT   Date Value Ref Range Status   02/07/2022 16 10 - 44 U/L Final     Anion Gap   Date Value Ref Range Status   02/07/2022 8 8 - 16 mmol/L Final     eGFR if    Date Value Ref Range Status   02/07/2022 44.5 (A) >60 mL/min/1.73 m^2 Final     eGFR if non    Date Value Ref Range Status   02/07/2022 38.6 (A) >60 mL/min/1.73 m^2 Final     Comment:     Calculation used to obtain the estimated glomerular filtration  rate (eGFR) is the CKD-EPI equation.        [place] Free Light Chains   Date Value Ref Range Status   02/07/2022 4.30 (H) 0.33 - 1.94 mg/dL Final   07/02/2021 3.05 (H) 0.33 - 1.94 mg/dL Final   02/17/2021 3.38 (H) 0.33 - 1.94 mg/dL Final     Lambda Free Light Chains   Date Value Ref Range Status   02/07/2022 3.93 (H) 0.57 - 2.63 mg/dL Final   07/02/2021 3.14 (H) 0.57 - 2.63 mg/dL Final   02/17/2021 3.24 (H) 0.57 - 2.63 mg/dL Final     Kappa/Lambda FLC Ratio   Date Value Ref Range Status   02/07/2022 1.09 0.26 - 1.65 Final     Comment:     Undetected antigen excess is a rare event but cannot   be excluded. If these free light chain results do not   agree with other clinical or laboratory findings or   if the sample is from a patient that has previously   demonstrated antigen excess, discuss with the testing   laboratory.   Results should always be interpreted in conjunction   with other  laboratory tests and clinical evidence.     07/02/2021 0.97 0.26 - 1.65 Final     Comment:     Undetected antigen excess is a rare event but cannot   be excluded. If these free light chain results do not   agree with other clinical or laboratory findings or   if the sample is from a patient that has previously   demonstrated antigen excess, discuss with the testing   laboratory.   Results should always be interpreted in conjunction   with other laboratory tests and clinical evidence.     02/17/2021 1.04 0.26 - 1.65 Final     Comment:     Undetected antigen excess is a rare event but cannot   be excluded. If these free light chain results do not   agree with other clinical or laboratory findings or   if the sample is from a patient that has previously   demonstrated antigen excess, discuss with the testing   laboratory.   Results should always be interpreted in conjunction   with other laboratory tests and clinical evidence.       IgG   Date Value Ref Range Status   02/07/2022 1473 650 - 1600 mg/dL Final     Comment:     IgG Cord Blood Reference Range: 650-1600 mg/dL.     IgA   Date Value Ref Range Status   02/07/2022 501 (H) 40 - 350 mg/dL Final     Comment:     IgA Cord Blood Reference Range: <5 mg/dL.     IgM   Date Value Ref Range Status   02/07/2022 26 (L) 50 - 300 mg/dL Final     Comment:     IgM Cord Blood Reference Range: <25 mg/dL.   Pathologist Interpretation SPE   Pathologist Interpretation SPE   Collected: 12/31/19 1334   Resulting lab: [facility]   Value: REVIEWED   Comment: Electronically reviewed and signed by:   Sun Kaba MD   Signed on 01/02/20 at 13:21   Normal total protein, normal pattern.    *Additional information available - comment     Pathologist Interpretation JOJO   Pathologist Interpretation JOJO   Collected: 12/31/19 1334   Resulting lab: [facility]   Value: REVIEWED   Comment: Electronically reviewed and signed by:   Sun CONROY  MD Sesar   Signed on 01/02/20 at 13:21   No monoclonal peaks identified.    [facility] Miscellaneous Test   Order: [id number]   Status:  Final result   Visible to patient:  No (Not Released)   Next appt:  04/02/2020 at 07:10 AM in Lab (LAB, HEMONC CANCER BLDG)   Component 6mo ago   [facility] Miscellaneous Result SEE COMMENTS    Comment: Test                              Result  Flag  Unit  RefValue   --------------------------------------------------------------   Multiple Myeloma MRD by Flow, BM   % Minimal Residual Disease      0.0000        %   (MRD)                ASSESSMENT AND PLAN:   Encounter Diagnoses   Name Primary?    Multiple myeloma in remission Yes    H/O autologous stem cell transplant     Pancytopenia due to chemotherapy     Essential hypertension     Type 2 diabetes mellitus without complication, without long-term current use of insulin        Multiple myeloma s/p shabbir auto stem cell transplant  -IgG kappa MM; ISS 2; standard risk cytogenetics; with lytic bone lesions at presentation; sp induction with  VRd (21 day cycle) with Dr. Juan at [facility]   -she achieved VGPR prior to transplant; tolerated shabbir 200 auto transplant (Transplant day - 3/20/19)  -now 2 year 10 month with approximately day +100 restaging PET scan, biochemical studies and bone marrow biopsy confirming a stringent Complete Remission; MRD testing from [place] on day +100 also showed MRD negativity predicting long duration of remission. 18 month marrow continued on complete remission.  -recommend maintain revlimid maintenance under direction of Dr. Juan until progression or intolerance; 10mg daily with asa  -completed acyclovir 800mg BID through day +365;   -  post transplant Zometa q 3 months x 4 doses completed therapy  - Completed post transplant vaccines, received 2 doses of covid vaccines. Ok to receive covid booster.     Pancytopenias due to revlimid  -no indication for dose adjustments based on cytopenia level  -transfuse Hb for  <7 and platelets <10  -no indication to transfuse today     Neuropathy  -2/2 chemo  -continue increased dose of gabapentin (since 3/31/19) 900 mg TID      HTN  -per PMD    DM2  - per PCP   - Elevated BS  306 mg/dl, non compliant with her DM meds.  Repeat cmp in 2 weeks.   - Endo referral placed   Chronic conditions managed by PCP     Follow up:  - Cbc, cmp in 3 weeks on 03/02/22  - Schedule cbc, cmp, serum free light chains, quantitative immunoglobulins, serum electropheresis, serum immunofixation , and md or np appt in 3 months; all in same day if possible.  - Endo referral - Order placed     Dasia Ortiz NP  Hematology/Oncology/BMT

## 2022-02-09 NOTE — Clinical Note
- Cbc, cmp in 3 weeks on 03/02/22 - Schedule cbc, cmp, serum free light chains, quantitative immunoglobulins, serum electropheresis, serum immunofixation , and md or np appt in 3 months; all in same day if possible.

## 2022-02-09 NOTE — PROGRESS NOTES
Patient showed up confused her and daughter jenna.  They didn't know about why she needed these vaccines.  They realize they missed a vaccine in past which looks like Hep B but they didn't know about the MMR and Pneumovax.  Left with information on vaccines and would like to talk to Dr Alva's office to confirm she needs these and reschedule.

## 2022-02-16 DIAGNOSIS — E11.9 TYPE 2 DIABETES MELLITUS WITHOUT COMPLICATION, WITHOUT LONG-TERM CURRENT USE OF INSULIN: ICD-10-CM

## 2022-02-16 DIAGNOSIS — C90.01 MULTIPLE MYELOMA IN REMISSION: Primary | ICD-10-CM

## 2022-02-18 ENCOUNTER — TELEPHONE (OUTPATIENT)
Dept: HEMATOLOGY/ONCOLOGY | Facility: CLINIC | Age: 67
End: 2022-02-18
Payer: MEDICARE

## 2022-02-18 NOTE — TELEPHONE ENCOUNTER
----- Message from Ria Summers sent at 2/18/2022  9:31 AM CST -----  Regarding: questions  Contact: [phone]  Pt Questions    Questions: Pt calling to speak with the dr about her 2nd round of vaccines. Please contact pt   Call Back number: [phone]

## 2022-02-21 ENCOUNTER — TELEPHONE (OUTPATIENT)
Dept: HEMATOLOGY/ONCOLOGY | Facility: CLINIC | Age: 67
End: 2022-02-21
Payer: MEDICARE

## 2022-02-21 NOTE — TELEPHONE ENCOUNTER
----- Message from Mary Jane Dallas sent at 2/21/2022  3:03 PM CST -----  Regarding: questions  Contact: [phone]  Pt Questions     Questions: Pt calling to speak with the dr about her 2nd round of vaccines. Please contact pt   Call Back number: [phone]

## 2022-02-21 NOTE — TELEPHONE ENCOUNTER
Spoke with daughter and she stated that Ms. Rivero was informed she missed some immunizations. Wants to schedule on the 3rd to make up missed immunizations if able.

## 2022-03-02 ENCOUNTER — LAB VISIT (OUTPATIENT)
Dept: LAB | Facility: HOSPITAL | Age: 67
End: 2022-03-02
Payer: MEDICARE

## 2022-03-02 DIAGNOSIS — C90.00 MULTIPLE MYELOMA, REMISSION STATUS UNSPECIFIED: ICD-10-CM

## 2022-03-02 DIAGNOSIS — Z94.84 H/O AUTOLOGOUS STEM CELL TRANSPLANT: ICD-10-CM

## 2022-03-02 LAB
ALBUMIN SERPL BCP-MCNC: 3.3 G/DL (ref 3.5–5.2)
ALP SERPL-CCNC: 86 U/L (ref 55–135)
ALT SERPL W/O P-5'-P-CCNC: 16 U/L (ref 10–44)
ANION GAP SERPL CALC-SCNC: 9 MMOL/L (ref 8–16)
AST SERPL-CCNC: 16 U/L (ref 10–40)
BASOPHILS # BLD AUTO: 0.03 K/UL (ref 0–0.2)
BASOPHILS NFR BLD: 0.9 % (ref 0–1.9)
BILIRUB SERPL-MCNC: 0.3 MG/DL (ref 0.1–1)
BUN SERPL-MCNC: 12 MG/DL (ref 8–23)
CALCIUM SERPL-MCNC: 9.1 MG/DL (ref 8.7–10.5)
CHLORIDE SERPL-SCNC: 105 MMOL/L (ref 95–110)
CO2 SERPL-SCNC: 26 MMOL/L (ref 23–29)
CREAT SERPL-MCNC: 1 MG/DL (ref 0.5–1.4)
DIFFERENTIAL METHOD: ABNORMAL
EOSINOPHIL # BLD AUTO: 0.2 K/UL (ref 0–0.5)
EOSINOPHIL NFR BLD: 6 % (ref 0–8)
ERYTHROCYTE [DISTWIDTH] IN BLOOD BY AUTOMATED COUNT: 15.8 % (ref 11.5–14.5)
EST. GFR  (AFRICAN AMERICAN): >60 ML/MIN/1.73 M^2
EST. GFR  (NON AFRICAN AMERICAN): 58 ML/MIN/1.73 M^2
GLUCOSE SERPL-MCNC: 95 MG/DL (ref 70–110)
HCT VFR BLD AUTO: 35.5 % (ref 37–48.5)
HGB BLD-MCNC: 11.1 G/DL (ref 12–16)
IMM GRANULOCYTES # BLD AUTO: 0.01 K/UL (ref 0–0.04)
IMM GRANULOCYTES NFR BLD AUTO: 0.3 % (ref 0–0.5)
LYMPHOCYTES # BLD AUTO: 1.2 K/UL (ref 1–4.8)
LYMPHOCYTES NFR BLD: 35.9 % (ref 18–48)
MCH RBC QN AUTO: 28.1 PG (ref 27–31)
MCHC RBC AUTO-ENTMCNC: 31.3 G/DL (ref 32–36)
MCV RBC AUTO: 90 FL (ref 82–98)
MONOCYTES # BLD AUTO: 0.4 K/UL (ref 0.3–1)
MONOCYTES NFR BLD: 11.1 % (ref 4–15)
NEUTROPHILS # BLD AUTO: 1.5 K/UL (ref 1.8–7.7)
NEUTROPHILS NFR BLD: 45.8 % (ref 38–73)
NRBC BLD-RTO: 0 /100 WBC
PLATELET # BLD AUTO: 125 K/UL (ref 150–450)
PMV BLD AUTO: 10 FL (ref 9.2–12.9)
POTASSIUM SERPL-SCNC: 3.6 MMOL/L (ref 3.5–5.1)
PROT SERPL-MCNC: 7.1 G/DL (ref 6–8.4)
RBC # BLD AUTO: 3.95 M/UL (ref 4–5.4)
SODIUM SERPL-SCNC: 140 MMOL/L (ref 136–145)
WBC # BLD AUTO: 3.34 K/UL (ref 3.9–12.7)

## 2022-03-02 PROCEDURE — 36415 COLL VENOUS BLD VENIPUNCTURE: CPT | Performed by: INTERNAL MEDICINE

## 2022-03-02 PROCEDURE — 80053 COMPREHEN METABOLIC PANEL: CPT | Performed by: INTERNAL MEDICINE

## 2022-03-02 PROCEDURE — 85025 COMPLETE CBC W/AUTO DIFF WBC: CPT | Performed by: INTERNAL MEDICINE

## 2022-05-10 ENCOUNTER — OFFICE VISIT (OUTPATIENT)
Dept: HEMATOLOGY/ONCOLOGY | Facility: CLINIC | Age: 67
End: 2022-05-10
Payer: MEDICARE

## 2022-05-10 ENCOUNTER — LAB VISIT (OUTPATIENT)
Dept: LAB | Facility: HOSPITAL | Age: 67
End: 2022-05-10
Attending: INTERNAL MEDICINE
Payer: MEDICARE

## 2022-05-10 VITALS
DIASTOLIC BLOOD PRESSURE: 75 MMHG | RESPIRATION RATE: 16 BRPM | HEIGHT: 64 IN | HEART RATE: 60 BPM | SYSTOLIC BLOOD PRESSURE: 173 MMHG | BODY MASS INDEX: 33.84 KG/M2 | WEIGHT: 198.19 LBS | OXYGEN SATURATION: 99 %

## 2022-05-10 DIAGNOSIS — Z71.85 VACCINE COUNSELING: ICD-10-CM

## 2022-05-10 DIAGNOSIS — C90.01 MULTIPLE MYELOMA IN REMISSION: Primary | ICD-10-CM

## 2022-05-10 DIAGNOSIS — Z94.84 HISTORY OF AUTO STEM CELL TRANSPLANT: ICD-10-CM

## 2022-05-10 DIAGNOSIS — Z94.84 H/O AUTOLOGOUS STEM CELL TRANSPLANT: ICD-10-CM

## 2022-05-10 DIAGNOSIS — C90.00 MULTIPLE MYELOMA, REMISSION STATUS UNSPECIFIED: ICD-10-CM

## 2022-05-10 LAB
ALBUMIN SERPL BCP-MCNC: 3.3 G/DL (ref 3.5–5.2)
ALP SERPL-CCNC: 93 U/L (ref 55–135)
ALT SERPL W/O P-5'-P-CCNC: 12 U/L (ref 10–44)
ANION GAP SERPL CALC-SCNC: 5 MMOL/L (ref 8–16)
AST SERPL-CCNC: 13 U/L (ref 10–40)
BASOPHILS # BLD AUTO: 0.04 K/UL (ref 0–0.2)
BASOPHILS NFR BLD: 1.1 % (ref 0–1.9)
BILIRUB SERPL-MCNC: 0.3 MG/DL (ref 0.1–1)
BUN SERPL-MCNC: 14 MG/DL (ref 8–23)
CALCIUM SERPL-MCNC: 9.2 MG/DL (ref 8.7–10.5)
CHLORIDE SERPL-SCNC: 107 MMOL/L (ref 95–110)
CO2 SERPL-SCNC: 28 MMOL/L (ref 23–29)
CREAT SERPL-MCNC: 1.1 MG/DL (ref 0.5–1.4)
DIFFERENTIAL METHOD: ABNORMAL
EOSINOPHIL # BLD AUTO: 0.1 K/UL (ref 0–0.5)
EOSINOPHIL NFR BLD: 4 % (ref 0–8)
ERYTHROCYTE [DISTWIDTH] IN BLOOD BY AUTOMATED COUNT: 15.9 % (ref 11.5–14.5)
EST. GFR  (AFRICAN AMERICAN): 59.6 ML/MIN/1.73 M^2
EST. GFR  (NON AFRICAN AMERICAN): 51.7 ML/MIN/1.73 M^2
GLUCOSE SERPL-MCNC: 136 MG/DL (ref 70–110)
HBV SURFACE AB SER-ACNC: NEGATIVE M[IU]/ML
HCT VFR BLD AUTO: 34.9 % (ref 37–48.5)
HGB BLD-MCNC: 11.1 G/DL (ref 12–16)
IGA SERPL-MCNC: 447 MG/DL (ref 40–350)
IGG SERPL-MCNC: 1475 MG/DL (ref 650–1600)
IGM SERPL-MCNC: 23 MG/DL (ref 50–300)
IMM GRANULOCYTES # BLD AUTO: 0.01 K/UL (ref 0–0.04)
IMM GRANULOCYTES NFR BLD AUTO: 0.3 % (ref 0–0.5)
LYMPHOCYTES # BLD AUTO: 1.2 K/UL (ref 1–4.8)
LYMPHOCYTES NFR BLD: 34.8 % (ref 18–48)
MCH RBC QN AUTO: 28.5 PG (ref 27–31)
MCHC RBC AUTO-ENTMCNC: 31.8 G/DL (ref 32–36)
MCV RBC AUTO: 90 FL (ref 82–98)
MONOCYTES # BLD AUTO: 0.5 K/UL (ref 0.3–1)
MONOCYTES NFR BLD: 14.4 % (ref 4–15)
NEUTROPHILS # BLD AUTO: 1.6 K/UL (ref 1.8–7.7)
NEUTROPHILS NFR BLD: 45.4 % (ref 38–73)
NRBC BLD-RTO: 0 /100 WBC
PLATELET # BLD AUTO: 133 K/UL (ref 150–450)
PMV BLD AUTO: 9.9 FL (ref 9.2–12.9)
POTASSIUM SERPL-SCNC: 3.9 MMOL/L (ref 3.5–5.1)
PROT SERPL-MCNC: 7.1 G/DL (ref 6–8.4)
RBC # BLD AUTO: 3.9 M/UL (ref 4–5.4)
SODIUM SERPL-SCNC: 140 MMOL/L (ref 136–145)
WBC # BLD AUTO: 3.48 K/UL (ref 3.9–12.7)

## 2022-05-10 PROCEDURE — 86334 IMMUNOFIX E-PHORESIS SERUM: CPT | Mod: 26,,, | Performed by: PATHOLOGY

## 2022-05-10 PROCEDURE — 99213 OFFICE O/P EST LOW 20 MIN: CPT | Mod: PBBFAC | Performed by: INTERNAL MEDICINE

## 2022-05-10 PROCEDURE — 99214 OFFICE O/P EST MOD 30 MIN: CPT | Mod: S$PBB,,, | Performed by: INTERNAL MEDICINE

## 2022-05-10 PROCEDURE — 82784 ASSAY IGA/IGD/IGG/IGM EACH: CPT | Mod: 59 | Performed by: INTERNAL MEDICINE

## 2022-05-10 PROCEDURE — 99999 PR PBB SHADOW E&M-EST. PATIENT-LVL III: CPT | Mod: PBBFAC,,, | Performed by: INTERNAL MEDICINE

## 2022-05-10 PROCEDURE — 99999 PR PBB SHADOW E&M-EST. PATIENT-LVL III: ICD-10-PCS | Mod: PBBFAC,,, | Performed by: INTERNAL MEDICINE

## 2022-05-10 PROCEDURE — 99214 PR OFFICE/OUTPT VISIT, EST, LEVL IV, 30-39 MIN: ICD-10-PCS | Mod: S$PBB,,, | Performed by: INTERNAL MEDICINE

## 2022-05-10 PROCEDURE — 36415 COLL VENOUS BLD VENIPUNCTURE: CPT | Performed by: INTERNAL MEDICINE

## 2022-05-10 PROCEDURE — 83520 IMMUNOASSAY QUANT NOS NONAB: CPT | Mod: 59 | Performed by: INTERNAL MEDICINE

## 2022-05-10 PROCEDURE — 80053 COMPREHEN METABOLIC PANEL: CPT | Performed by: INTERNAL MEDICINE

## 2022-05-10 PROCEDURE — 86334 IMMUNOFIX E-PHORESIS SERUM: CPT | Performed by: INTERNAL MEDICINE

## 2022-05-10 PROCEDURE — 86706 HEP B SURFACE ANTIBODY: CPT | Performed by: INTERNAL MEDICINE

## 2022-05-10 PROCEDURE — 85025 COMPLETE CBC W/AUTO DIFF WBC: CPT | Performed by: INTERNAL MEDICINE

## 2022-05-10 PROCEDURE — 86334 PATHOLOGIST INTERPRETATION IFE: ICD-10-PCS | Mod: 26,,, | Performed by: PATHOLOGY

## 2022-05-10 NOTE — PROGRESS NOTES
Message alize re: missed appt and if needs repeat ab's testing vaccinces  Fu 6 months   Cc yamil labs   Get covid booster

## 2022-05-10 NOTE — PROGRESS NOTES
SECTION OF HEMATOLOGY AND BONE MARROW TRANSPLANT  Return  Patient Visit   05/12/2022  Referred by:  No ref. provider found  Referred for:myeloma, SCT eval     CHIEF COMPLAINT:   No chief complaint on file.      HISTORY OF PRESENT ILLNESS:   66 y.o. female with pmh as below referred dec 2018  for SCT evaluation for MM; with regard to oncologic history; IgG kappa MM; ISS 2; standard risk cytogenetics; with lytic bone lesions at presentation; initiated VRd (21 day cycle) with Dr. Juan at [facility]; completed 5 cycles.  Completed pretransplant staging showing VGPR and completed pre transplant eval with no overt contraindications to transplant.  Presents today for admission for Shabbir 200 Autologous Sct. Denies fever, chills, nightsweats, bleeding, lymphadenopathy, signs/symptoms of splenomegaly, chest pain, sob, n/v/d/c. Does have chronic neuropathy to bilateral feet.  She also has chronic sciatic pain down both legs with BLE edema (+1 pitting edema today). Pt also has pain and swelling to her left knee. States she fell a week ago and hit her knee. Did not see any provider after fall, no imaging was complete. Taking tylenol and muscle relaxer, pt states neither helping.    Transplant course (3/19/19-4/5/19):  Admitted on 3/18 and received Melphalan on 3/19 and stem cell infusion on 3/20, she tolerated both without difficulty. She had a fall prior to admit, knee x-ray was unrevealing. She complained of severe neuropathy pain on admit and gabapentin was increased from 300 mg TID to 900 mg TID during hospital stay. She experienced the expected side effects of nausea, diarrhea and throat pain which all improved prior to discharge. Engrafted on day +12, 4/1/19. She did not have a fever during stay and was on ppx antimicrobials per protocol. Patient was discharged home with home PT after platelet transfusion and Vascath removal, she will follow-up in clinic on 4/5/19.    Today: pt presents alone for routine f/u from shabbir auto SCT  for MM. Today is day 3  year 1 month  .   Continued  CR.  Remains on maintenance revlimid tolerating well.  Diabetes managed by PCP. Following closely with Dr. Reynoso.   Missed appt today  with ID for post transplant vaccine ab monitoring.     PAST MEDICAL HISTORY:   Past Medical History:   Diagnosis Date    Depression     DM2 (diabetes mellitus, type 2)     Glaucoma     HTN (hypertension)     Myeloma     Pancytopenia due to chemotherapy 3/24/2019    Therapy     after mother's death       PAST SURGICAL HISTORY:   Past Surgical History:   Procedure Laterality Date    BONE MARROW ASPIRATION Left 6/27/2019    Procedure: ASPIRATION, BONE MARROW;  Surgeon: Saúl Alva MD;  Location: [address];  Service: Oncology;  Laterality: Left;    BONE MARROW BIOPSY Left 2/7/2019    Procedure: Biopsy-bone marrow;  Surgeon: Saúl Alva MD;  Location: [facility] OR [address] [place] [address];  Service: Oncology;  Laterality: Left;    BONE MARROW BIOPSY Left 6/27/2019    Procedure: Biopsy-bone marrow;  Surgeon: Saúl Alva MD;  Location: [facility] OR [address] [place] [address];  Service: Oncology;  Laterality: Left;       PAST SOCIAL HISTORY:   reports that she has never smoked. She has never used smokeless tobacco. She reports that she does not drink alcohol and does not use drugs.    FAMILY HISTORY:  No family history on file.    CURRENT MEDICATIONS:   Current Outpatient Medications   Medication Sig    amLODIPine (NORVASC) 5 MG tablet Take 5 mg by mouth.    aspirin (ECOTRIN) 81 MG EC tablet Take 81 mg by mouth.    cyclobenzaprine (FLEXERIL) 5 MG tablet Take 5 mg by mouth 3 (three) times daily as needed.    ergocalciferol (ERGOCALCIFEROL) 50,000 unit Cap TAKE 1 CAPSULE BY MOUTH EVERY 7 DAYS    furosemide (LASIX) 40 MG tablet TK 1 T PO  QAM PRF FLUID RETENTION    glipiZIDE (GLUCOTROL) 10 MG tablet Take 1 tablet by mouth daily    JANUMET  mg per tablet Take 1 tablet twice a day with meals    multivit,iron,minerals/lutein  (CENTRUM SILVER ULTRA WOMEN'S ORAL) Take 1 tablet by mouth once daily.    multivitamin-Ca-iron-minerals Tab Take by mouth.    nystatin (MYCOSTATIN) 100,000 unit/mL suspension Take 1 mL by mouth.    ondansetron (ZOFRAN-ODT) 8 MG TbDL Take 8 mg by mouth every 8 (eight) hours as needed (nausea).     PAZEO 0.7 % Drop Place 1 drop into both eyes every morning.    prochlorperazine (COMPAZINE) 10 MG tablet Take 10 mg by mouth 4 (four) times daily as needed (nausea/vomiting).     REVLIMID 10 mg Cap TK ONE C PO QD FOR 28 DAYS    tiZANidine (ZANAFLEX) 4 MG tablet Take 4 mg by mouth 3 (three) times daily as needed (back muscle spasms).     traMADol (ULTRAM) 50 mg tablet Take 1 tablet (50 mg total) by mouth every 8 (eight) hours as needed for Pain.    gabapentin (NEURONTIN) 300 MG capsule Take 3 capsules (900 mg total) by mouth 3 (three) times daily.    pantoprazole (PROTONIX) 40 MG tablet Take 1 tablet (40 mg total) by mouth once daily.     No current facility-administered medications for this visit.     Review of patient's allergies indicates:  No Known Allergies    REVIEW OF SYSTEMS:   General ROS: negative  Psychological ROS: negative  Ophthalmic ROS: negative  ENT ROS: negative. Swallowing issues at times  Allergy and Immunology ROS: negative  Hematological and Lymphatic ROS: negative  Endocrine ROS: negative  Respiratory ROS: Exertional dyspnea  Cardiovascular ROS: BLE swelling  Gastrointestinal ROS: negative  Genito-Urinary ROS: negative  Muscular: Generalized muscle spam    PHYSICAL EXAM:   Vitals:    05/10/22 1310   BP: (!) 173/75   Pulse: 60   Resp: 16       General - well developed, well nourished, no apparent distress  Head & Face - no sinus tenderness  Eyes - normal conjunctivae and lids   ENT - normal external auditory canals; oropharynx clear,  Normal dentition and gums  Neck - normal thyroid  Chest and Lung - normal respiratory effort, clear to auscultation bilaterally   Cardiovascular - RRR with no  MGR, normal S1 and S2   Abdomen -  soft, nontender, no palpable hepatomegaly or splenomegaly  Extremities - unremarkable nails and digits  Heme - no bruising, petechiae, pallor  Skin - no rashes or lesions  Psych - appropriate mood and affect    ECOG Performance Status: (foot note - ECOG PS provided by [employer]cology [employer]) 1 - Symptomatic but completely ambulatory    Karnofsky Performance Score:  80%- Normal Activity with Effort: Some Symptoms of Disease  DATA:   Lab Results   Component Value Date    WBC 3.48 (L) 05/10/2022    HGB 11.1 (L) 05/10/2022    HCT 34.9 (L) 05/10/2022    MCV 90 05/10/2022     (L) 05/10/2022     Gran # (ANC)   Date Value Ref Range Status   05/10/2022 1.6 (L) 1.8 - 7.7 K/uL Final     Gran %   Date Value Ref Range Status   05/10/2022 45.4 38.0 - 73.0 % Final     CMP  Sodium   Date Value Ref Range Status   05/10/2022 140 136 - 145 mmol/L Final     Potassium   Date Value Ref Range Status   05/10/2022 3.9 3.5 - 5.1 mmol/L Final     Chloride   Date Value Ref Range Status   05/10/2022 107 95 - 110 mmol/L Final     CO2   Date Value Ref Range Status   05/10/2022 28 23 - 29 mmol/L Final     Glucose   Date Value Ref Range Status   05/10/2022 136 (H) 70 - 110 mg/dL Final     BUN   Date Value Ref Range Status   05/10/2022 14 8 - 23 mg/dL Final     Creatinine   Date Value Ref Range Status   05/10/2022 1.1 0.5 - 1.4 mg/dL Final     Calcium   Date Value Ref Range Status   05/10/2022 9.2 8.7 - 10.5 mg/dL Final     Total Protein   Date Value Ref Range Status   05/10/2022 7.1 6.0 - 8.4 g/dL Final     Albumin   Date Value Ref Range Status   05/10/2022 3.3 (L) 3.5 - 5.2 g/dL Final     Total Bilirubin   Date Value Ref Range Status   05/10/2022 0.3 0.1 - 1.0 mg/dL Final     Comment:     For infants and newborns, interpretation of results should be based  on gestational age, weight and in agreement with clinical  observations.    Premature Infant recommended reference ranges:  Up to 24  hours.............<8.0 mg/dL  Up to 48 hours............<12.0 mg/dL  3-5 days..................<15.0 mg/dL  6-29 days.................<15.0 mg/dL       Alkaline Phosphatase   Date Value Ref Range Status   05/10/2022 93 55 - 135 U/L Final     AST   Date Value Ref Range Status   05/10/2022 13 10 - 40 U/L Final     ALT   Date Value Ref Range Status   05/10/2022 12 10 - 44 U/L Final     Anion Gap   Date Value Ref Range Status   05/10/2022 5 (L) 8 - 16 mmol/L Final     eGFR if    Date Value Ref Range Status   05/10/2022 59.6 (A) >60 mL/min/1.73 m^2 Final     eGFR if non    Date Value Ref Range Status   05/10/2022 51.7 (A) >60 mL/min/1.73 m^2 Final     Comment:     Calculation used to obtain the estimated glomerular filtration  rate (eGFR) is the CKD-EPI equation.        [place] Free Light Chains   Date Value Ref Range Status   05/10/2022 2.98 (H) 0.33 - 1.94 mg/dL Final   02/07/2022 4.30 (H) 0.33 - 1.94 mg/dL Final   07/02/2021 3.05 (H) 0.33 - 1.94 mg/dL Final     Lambda Free Light Chains   Date Value Ref Range Status   05/10/2022 2.65 (H) 0.57 - 2.63 mg/dL Final   02/07/2022 3.93 (H) 0.57 - 2.63 mg/dL Final   07/02/2021 3.14 (H) 0.57 - 2.63 mg/dL Final     Kappa/Lambda FLC Ratio   Date Value Ref Range Status   05/10/2022 1.12 0.26 - 1.65 Final     Comment:     Undetected antigen excess is a rare event but cannot   be excluded. If these free light chain results do not   agree with other clinical or laboratory findings or   if the sample is from a patient that has previously   demonstrated antigen excess, discuss with the testing   laboratory.   Results should always be interpreted in conjunction   with other laboratory tests and clinical evidence.     02/07/2022 1.09 0.26 - 1.65 Final     Comment:     Undetected antigen excess is a rare event but cannot   be excluded. If these free light chain results do not   agree with other clinical or laboratory findings or   if the sample is from a  patient that has previously   demonstrated antigen excess, discuss with the testing   laboratory.   Results should always be interpreted in conjunction   with other laboratory tests and clinical evidence.     07/02/2021 0.97 0.26 - 1.65 Final     Comment:     Undetected antigen excess is a rare event but cannot   be excluded. If these free light chain results do not   agree with other clinical or laboratory findings or   if the sample is from a patient that has previously   demonstrated antigen excess, discuss with the testing   laboratory.   Results should always be interpreted in conjunction   with other laboratory tests and clinical evidence.       IgG   Date Value Ref Range Status   05/10/2022 1475 650 - 1600 mg/dL Final     Comment:     IgG Cord Blood Reference Range: 650-1600 mg/dL.     IgA   Date Value Ref Range Status   05/10/2022 447 (H) 40 - 350 mg/dL Final     Comment:     IgA Cord Blood Reference Range: <5 mg/dL.     IgM   Date Value Ref Range Status   05/10/2022 23 (L) 50 - 300 mg/dL Final     Comment:     IgM Cord Blood Reference Range: <25 mg/dL.    Pathologist Interpretation JOJO  Pathologist Interpretation JOJO  Collected: 05/10/22 1208   Result status: Final   Resulting lab: [facility]   Value: REVIEWED   Comment:   Electronically reviewed and signed by:   Sun Kaba MD   Signed on 05/11/22 at 14:50   No monoclonal peaks identified.        [place] Miscellaneous Test   Order: [id number]   Status:  Final result   Visible to patient:  No (Not Released)   Next appt:  04/02/2020 at 07:10 AM in Lab (LAB, [facility])   Component 6mo ago   [facility] SEE COMMENTS    Comment: Test                              Result  Flag  Unit  RefValue   --------------------------------------------------------------   Multiple Myeloma MRD by Flow, BM   % Minimal Residual Disease      0.0000        %   (MRD)                ASSESSMENT AND PLAN:   Encounter Diagnoses   Name  Primary?    Multiple myeloma in remission Yes    History of auto stem cell transplant     Vaccine counseling        Multiple myeloma s/p shabbir auto stem cell transplant  -IgG kappa MM; ISS 2; standard risk cytogenetics; with lytic bone lesions at presentation; sp induction with  VRd (21 day cycle) with Dr. Juan at [facility]   -she achieved VGPR prior to transplant; tolerated shabbir 200 auto transplant (Transplant day - 3/20/19)  -now 3  year  1 month with approximately day +100 restaging PET scan, biochemical studies and bone marrow biopsy confirming a stringent Complete Remission; MRD testing from [place] on day +100 also showed MRD negativity predicting long duration of remission. 18 month marrow continued on complete remission.  -recommend maintain revlimid maintenance under direction of Dr. Juan until progression or intolerance; 10mg daily with asa  -completed acyclovir 800mg BID through day +365;   -  post transplant Zometa q 3 months x 4 doses completed therapy  - Completed post transplant vaccines; missed appt with ID for humoral panel and fu; will contact ID clinic to reschedule  -will continue to see pt 6 months; fu with Dr. Juan as directed    Pancytopenias due to revlimid  -no indication for dose adjustments based on cytopenia level  -transfuse Hb for <7 and platelets <10  -no indication to transfuse today     Neuropathy  -2/2 chemo; stable to improved since last appt  -continue increased dose of gabapentin (since 3/31/19) 900 mg TID      HTN  -per PMD    DM2  - per PCP         Follow up:    md appt   in 6  Months ; no labs needed before

## 2022-05-11 LAB
INTERPRETATION SERPL IFE-IMP: NORMAL
KAPPA LC SER QL IA: 2.98 MG/DL (ref 0.33–1.94)
KAPPA LC/LAMBDA SER IA: 1.12 (ref 0.26–1.65)
LAMBDA LC SER QL IA: 2.65 MG/DL (ref 0.57–2.63)
PATHOLOGIST INTERPRETATION IFE: NORMAL

## 2022-10-13 NOTE — PROGRESS NOTES
[facility]  Hematology  Bone Marrow Transplant  Progress Note    Patient Name: Lori Rivero  Admission Date: 3/18/2019  Hospital Length of Stay: 9 days  Code Status: Full Code    Subjective:     Interval History:   Day +7 Daija AutoSCT. VSS. Afebrile. ANC 60. Lovenox held due to thrombocytopenia. NAEON.     Objective:     Vital Signs (Most Recent):  Temp: 98.8 °F (37.1 °C) (03/27/19 1141)  Pulse: 104 (03/27/19 1141)  Resp: 18 (03/27/19 1141)  BP: 107/61 (03/27/19 1141)  SpO2: 98 % (03/27/19 1141) Vital Signs (24h Range):  Temp:  [97.9 °F (36.6 °C)-98.8 °F (37.1 °C)] 98.8 °F (37.1 °C)  Pulse:  [] 104  Resp:  [17-20] 18  SpO2:  [95 %-99 %] 98 %  BP: ()/(51-67) 107/61     Weight: 75.4 kg (166 lb 1.9 oz)  Body mass index is 28.51 kg/m².  Body surface area is 1.85 meters squared.        Intake/Output - Last 3 Shifts       03/25 0700 - 03/26 0659 03/26 0700 - 03/27 0659 03/27 0700 - 03/28 0659    P.O. 1060  240    I.V. (mL/kg) 1668.8 (22.1) 2581.3 (34.3) 688.3 (9.1)    Total Intake(mL/kg) 2728.8 (36.2) 2581.3 (34.3) 928.3 (12.3)    Urine (mL/kg/hr) 1050 (0.6) 2150 (1.2) 300 (0.5)    Other   350    Stool  0     Total Output 1050 2150 650    Net +1678.8 +431.3 +278.3           Urine Occurrence 1 x 3 x     Stool Occurrence 1 x 1 x           Physical Exam   Constitutional: She is oriented to person, place, and time. She appears well-developed and well-nourished.   HENT:   Head: Normocephalic and atraumatic.   Right Ear: External ear normal.   Left Ear: External ear normal.   Mouth/Throat: Oropharynx is clear and moist.   Eyes: Pupils are equal, round, and reactive to light. Conjunctivae and EOM are normal.   Neck: Normal range of motion. Neck supple.   Cardiovascular: Normal rate, regular rhythm, normal heart sounds and intact distal pulses.   Pulmonary/Chest: Effort normal and breath sounds normal.   Abdominal: Soft. Bowel sounds are normal.   Musculoskeletal: She exhibits edema.   Trace  edema to BLE. Edema to left knee 2/2 fall which is improving.   Neurological: She is alert and oriented to person, place, and time.   Skin: Skin is warm and dry.   Psychiatric: She has a normal mood and affect. Her behavior is normal. Judgment and thought content normal.   Nursing note and vitals reviewed.      Significant Labs:   CBC:   Recent Labs   Lab 03/26/19  0336 03/27/19  0325   WBC 0.14* 0.12*   HGB 9.6* 9.5*   HCT 30.0* 28.9*   PLT 63* 33*    and CMP:   Recent Labs   Lab 03/26/19  0336 03/27/19  0325   * 136   K 3.9 3.9    104   CO2 23 21*   * 137*   BUN 11 12   CREATININE 0.6 0.6   CALCIUM 8.4* 8.5*   PROT 5.5* 5.3*   ALBUMIN 3.0* 2.8*   BILITOT 0.5 0.4   ALKPHOS 70 63   AST 18 15   ALT 16 15   ANIONGAP 8 11   EGFRNONAA >60.0 >60.0       Diagnostic Results:  I have reviewed all pertinent imaging results/findings within the past 24 hours.    Assessment/Plan:     * Status post autologous bone marrow transplant  - see Multiple Myeloma  - admitted for Daija Auto SCT  - today is Day +7  - H. Mat's transplant was Wednesday, 3/20/19 at 1330. She received 9 bags and a CD34 dose of 3.61 x 10^6/kg.    CINV (chemotherapy-induced nausea and vomiting)  - continue scheduled zofran and PRN compazine/ativan  - no vomiting in past 24hrs    Pancytopenia due to chemotherapy  Her WBC is 0.12 and ANC is 60  Hb stable at 9.5  Platelets at 33  Transfuse Hb for <7 and platelets <10  Lovenox on hold for thromboctyopenia    Hypomagnesemia  - replace PRN per protocol    Moderate malnutrition  - dietician following      Left knee pain  - s/p fall prior to admit  - x-ray with edema, no fractures  - tramadol prn ordered for pain     Chemotherapy-induced peripheral neuropathy  - continue gabapentin and tramadol     Essential hypertension  - continue home losartan-HCTZ  - BP elevated since admission, Norvasc 5 mg daily started 3/20/19  - BP WNL    Type 2 diabetes mellitus without complication, without long-term  current use of insulin  - will hold oral anti diabetic medications while inpatient  - blood glucose checks ACHS  - increased to MDSSI 3/26  - DM diet      Multiple myeloma  -IgG kappa MM; ISS 2; standard risk cytogenetics; with lytic bone lesions at presentation; initiate VRd (21 day cycle) with Dr. Juan at [facility]; currently mid cycle  5    -she has achieved VGPR  -pre transplant eval with no contraindication for transplant   -never smoker so plan to follow up LL nodule at repeat PET scan at day +100  -Admitted for Daija auto SCT. Today is Day +7  -Neupogen and ppx antimicrobials per protocol.             VTE Risk Mitigation (From admission, onward)        Ordered     heparin (porcine) injection 1,600 Units  As needed (PRN)      03/18/19 1754     IP VTE HIGH RISK PATIENT  Once      03/18/19 1702          Disposition: inpatient    Carolina Lovell NP  Bone Marrow Transplant  [facility]       Simple: Patient demonstrates quick and easy understanding

## 2022-11-14 ENCOUNTER — OFFICE VISIT (OUTPATIENT)
Dept: HEMATOLOGY/ONCOLOGY | Facility: CLINIC | Age: 67
End: 2022-11-14
Payer: MEDICARE

## 2022-11-14 VITALS
OXYGEN SATURATION: 98 % | WEIGHT: 198.94 LBS | HEIGHT: 64 IN | DIASTOLIC BLOOD PRESSURE: 74 MMHG | HEART RATE: 54 BPM | BODY MASS INDEX: 33.96 KG/M2 | SYSTOLIC BLOOD PRESSURE: 173 MMHG | RESPIRATION RATE: 16 BRPM

## 2022-11-14 DIAGNOSIS — D84.821 IMMUNOCOMPROMISED STATE DUE TO DRUG THERAPY: ICD-10-CM

## 2022-11-14 DIAGNOSIS — Z94.84 HISTORY OF AUTO STEM CELL TRANSPLANT: ICD-10-CM

## 2022-11-14 DIAGNOSIS — E11.9 TYPE 2 DIABETES MELLITUS WITHOUT COMPLICATION, WITHOUT LONG-TERM CURRENT USE OF INSULIN: ICD-10-CM

## 2022-11-14 DIAGNOSIS — D84.9 IMMUNOCOMPROMISED STATE: ICD-10-CM

## 2022-11-14 DIAGNOSIS — D70.8 OTHER NEUTROPENIA: ICD-10-CM

## 2022-11-14 DIAGNOSIS — Z79.899 IMMUNOCOMPROMISED STATE DUE TO DRUG THERAPY: ICD-10-CM

## 2022-11-14 DIAGNOSIS — I10 ESSENTIAL HYPERTENSION: ICD-10-CM

## 2022-11-14 DIAGNOSIS — D72.819 LEUKOPENIA, UNSPECIFIED TYPE: ICD-10-CM

## 2022-11-14 DIAGNOSIS — C90.01 MULTIPLE MYELOMA IN REMISSION: Primary | ICD-10-CM

## 2022-11-14 PROCEDURE — 99999 PR PBB SHADOW E&M-EST. PATIENT-LVL IV: CPT | Mod: PBBFAC,,, | Performed by: NURSE PRACTITIONER

## 2022-11-14 PROCEDURE — 99999 PR PBB SHADOW E&M-EST. PATIENT-LVL IV: ICD-10-PCS | Mod: PBBFAC,,, | Performed by: NURSE PRACTITIONER

## 2022-11-14 PROCEDURE — 99214 OFFICE O/P EST MOD 30 MIN: CPT | Mod: PBBFAC | Performed by: NURSE PRACTITIONER

## 2022-11-14 PROCEDURE — 99215 OFFICE O/P EST HI 40 MIN: CPT | Mod: S$PBB,,, | Performed by: NURSE PRACTITIONER

## 2022-11-14 PROCEDURE — 99215 PR OFFICE/OUTPT VISIT, EST, LEVL V, 40-54 MIN: ICD-10-PCS | Mod: S$PBB,,, | Performed by: NURSE PRACTITIONER

## 2022-11-14 NOTE — PROGRESS NOTES
SECTION OF HEMATOLOGY AND BONE MARROW TRANSPLANT  Return  Patient Visit   11/14/2022  Referred by:  No ref. provider found  Referred for:myeloma, SCT eval     CHIEF COMPLAINT:   Chief Complaint   Patient presents with    Multiple Myeloma     Multiple Myeloma - s/p asct         HISTORY OF PRESENT ILLNESS:   67 y.o. female with pmh as below referred dec 2018  for SCT evaluation for MM; with regard to oncologic history; IgG kappa MM; ISS 2; standard risk cytogenetics; with lytic bone lesions at presentation; initiated VRd (21 day cycle) with Dr. Juan at [facility]; completed 5 cycles.  Completed pretransplant staging showing VGPR and completed pre transplant eval with no overt contraindications to transplant.  Presents today for admission for [facility] Autologous Sct. Denies fever, chills, nightsweats, bleeding, lymphadenopathy, signs/symptoms of splenomegaly, chest pain, sob, n/v/d/c. Does have chronic neuropathy to bilateral feet.  She also has chronic sciatic pain down both legs with BLE edema (+1 pitting edema today). Pt also has pain and swelling to her left knee. States she fell a week ago and hit her knee. Did not see any provider after fall, no imaging was complete. Taking tylenol and muscle relaxer, pt states neither helping.    Transplant course (3/19/19-4/5/19):  Admitted on 3/18 and received Melphalan on 3/19 and stem cell infusion on 3/20, she tolerated both without difficulty. She had a fall prior to admit, knee x-ray was unrevealing. She complained of severe neuropathy pain on admit and gabapentin was increased from 300 mg TID to 900 mg TID during hospital stay. She experienced the expected side effects of nausea, diarrhea and throat pain which all improved prior to discharge. Engrafted on day +12, 4/1/19. She did not have a fever during stay and was on ppx antimicrobials per protocol. Patient was discharged home with home PT after platelet transfusion and Vascath removal, she will follow-up in clinic on  4/5/19.    Today: pt presents alone for routine f/u from shabbir auto SCT for MM. Today is day 3  year 7 month. Continued  CR.  Remains on maintenance revlimid tolerating well.  Diabetes managed by PCP. Following closely with Dr. Reynoso. No acute events since last visit.  BP elevated during visit, missed am medications.     PAST MEDICAL HISTORY:   Past Medical History:   Diagnosis Date    Depression     DM2 (diabetes mellitus, type 2)     Glaucoma     HTN (hypertension)     Myeloma     Pancytopenia due to chemotherapy 3/24/2019    Therapy     after mother's death       PAST SURGICAL HISTORY:   Past Surgical History:   Procedure Laterality Date    BONE MARROW ASPIRATION Left 6/27/2019    Procedure: ASPIRATION, BONE MARROW;  Surgeon: Saúl Alva MD;  Location: [facility] OR [address] [place] [address];  Service: Oncology;  Laterality: Left;    BONE MARROW BIOPSY Left 2/7/2019    Procedure: Biopsy-bone marrow;  Surgeon: Saúl Alva MD;  Location: [facility] OR [address] [place] [address];  Service: Oncology;  Laterality: Left;    BONE MARROW BIOPSY Left 6/27/2019    Procedure: Biopsy-bone marrow;  Surgeon: Saúl Alva MD;  Location: [facility] OR [address] [place] [address];  Service: Oncology;  Laterality: Left;       PAST SOCIAL HISTORY:   reports that she has never smoked. She has never used smokeless tobacco. She reports that she does not drink alcohol and does not use drugs.    FAMILY HISTORY:  No family history on file.    CURRENT MEDICATIONS:   Current Outpatient Medications   Medication Sig    amLODIPine (NORVASC) 5 MG tablet Take 5 mg by mouth.    aspirin (ECOTRIN) 81 MG EC tablet Take 81 mg by mouth.    cyclobenzaprine (FLEXERIL) 5 MG tablet Take 5 mg by mouth 3 (three) times daily as needed.    ergocalciferol (ERGOCALCIFEROL) 50,000 unit Cap TAKE 1 CAPSULE BY MOUTH EVERY 7 DAYS    furosemide (LASIX) 40 MG tablet TK 1 T PO  QAM PRF FLUID RETENTION    glipiZIDE (GLUCOTROL) 10 MG tablet Take 1 tablet by mouth daily    JANUMET  mg per tablet Take 1 tablet  twice a day with meals    multivit,iron,minerals/lutein (CENTRUM SILVER ULTRA WOMEN'S ORAL) Take 1 tablet by mouth once daily.    multivitamin-Ca-iron-minerals Tab Take by mouth.    nystatin (MYCOSTATIN) 100,000 unit/mL suspension Take 1 mL by mouth.    ondansetron (ZOFRAN-ODT) 8 MG TbDL Take 8 mg by mouth every 8 (eight) hours as needed (nausea).     PAZEO 0.7 % Drop Place 1 drop into both eyes every morning.    prochlorperazine (COMPAZINE) 10 MG tablet Take 10 mg by mouth 4 (four) times daily as needed (nausea/vomiting).     REVLIMID 10 mg Cap TK ONE C PO QD FOR 28 DAYS    tiZANidine (ZANAFLEX) 4 MG tablet Take 4 mg by mouth 3 (three) times daily as needed (back muscle spasms).     traMADol (ULTRAM) 50 mg tablet Take 1 tablet (50 mg total) by mouth every 8 (eight) hours as needed for Pain.    gabapentin (NEURONTIN) 300 MG capsule Take 3 capsules (900 mg total) by mouth 3 (three) times daily.    pantoprazole (PROTONIX) 40 MG tablet Take 1 tablet (40 mg total) by mouth once daily.     No current facility-administered medications for this visit.     Review of patient's allergies indicates:  No Known Allergies    REVIEW OF SYSTEMS:   General ROS: negative  Psychological ROS: negative  Ophthalmic ROS: negative  ENT ROS: negative. Swallowing issues at times  Allergy and Immunology ROS: negative  Hematological and Lymphatic ROS: negative  Endocrine ROS: negative  Respiratory ROS: Exertional dyspnea  Cardiovascular ROS: BLE swelling  Gastrointestinal ROS: negative  Genito-Urinary ROS: negative  Muscular: Generalized muscle spam    PHYSICAL EXAM:   Vitals:    11/14/22 0903   BP: (!) 173/74   Pulse: (!) 54   Resp: 16       General - well developed, well nourished, no apparent distress  Head & Face - no sinus tenderness  Eyes - normal conjunctivae and lids   ENT - normal external auditory canals; oropharynx clear,  Normal dentition and gums  Neck - normal thyroid  Chest and Lung - normal respiratory effort, clear to  auscultation bilaterally   Cardiovascular - RRR with no MGR, normal S1 and S2. Elevated BP,asymptomatic   Abdomen -  soft, nontender, no palpable hepatomegaly or splenomegaly  Extremities - unremarkable nails and digits  Heme - no bruising, petechiae, pallor  Skin - no rashes or lesions  Psych - appropriate mood and affect    ECOG Performance Status: (foot note - ECOG PS provided by [employer]cology [employer]) 1 - Symptomatic but completely ambulatory    Karnofsky Performance Score:  80%- Normal Activity with Effort: Some Symptoms of Disease  DATA:   Lab Results   Component Value Date    WBC 3.48 (L) 05/10/2022    HGB 11.1 (L) 05/10/2022    HCT 34.9 (L) 05/10/2022    MCV 90 05/10/2022     (L) 05/10/2022     Gran # (ANC)   Date Value Ref Range Status   05/10/2022 1.6 (L) 1.8 - 7.7 K/uL Final     Gran %   Date Value Ref Range Status   05/10/2022 45.4 38.0 - 73.0 % Final     CMP  Sodium   Date Value Ref Range Status   05/10/2022 140 136 - 145 mmol/L Final     Potassium   Date Value Ref Range Status   05/10/2022 3.9 3.5 - 5.1 mmol/L Final     Chloride   Date Value Ref Range Status   05/10/2022 107 95 - 110 mmol/L Final     CO2   Date Value Ref Range Status   05/10/2022 28 23 - 29 mmol/L Final     Glucose   Date Value Ref Range Status   05/10/2022 136 (H) 70 - 110 mg/dL Final     BUN   Date Value Ref Range Status   05/10/2022 14 8 - 23 mg/dL Final     Creatinine   Date Value Ref Range Status   05/10/2022 1.1 0.5 - 1.4 mg/dL Final     Calcium   Date Value Ref Range Status   05/10/2022 9.2 8.7 - 10.5 mg/dL Final     Total Protein   Date Value Ref Range Status   05/10/2022 7.1 6.0 - 8.4 g/dL Final     Albumin   Date Value Ref Range Status   05/10/2022 3.3 (L) 3.5 - 5.2 g/dL Final     Total Bilirubin   Date Value Ref Range Status   05/10/2022 0.3 0.1 - 1.0 mg/dL Final     Comment:     For infants and newborns, interpretation of results should be based  on gestational age, weight and in agreement with  clinical  observations.    Premature Infant recommended reference ranges:  Up to 24 hours.............<8.0 mg/dL  Up to 48 hours............<12.0 mg/dL  3-5 days..................<15.0 mg/dL  6-29 days.................<15.0 mg/dL       Alkaline Phosphatase   Date Value Ref Range Status   05/10/2022 93 55 - 135 U/L Final     AST   Date Value Ref Range Status   05/10/2022 13 10 - 40 U/L Final     ALT   Date Value Ref Range Status   05/10/2022 12 10 - 44 U/L Final     Anion Gap   Date Value Ref Range Status   05/10/2022 5 (L) 8 - 16 mmol/L Final     eGFR if    Date Value Ref Range Status   05/10/2022 59.6 (A) >60 mL/min/1.73 m^2 Final     eGFR if non    Date Value Ref Range Status   05/10/2022 51.7 (A) >60 mL/min/1.73 m^2 Final     Comment:     Calculation used to obtain the estimated glomerular filtration  rate (eGFR) is the CKD-EPI equation.        [place] Free Light Chains   Date Value Ref Range Status   05/10/2022 2.98 (H) 0.33 - 1.94 mg/dL Final   02/07/2022 4.30 (H) 0.33 - 1.94 mg/dL Final   07/02/2021 3.05 (H) 0.33 - 1.94 mg/dL Final     Lambda Free Light Chains   Date Value Ref Range Status   05/10/2022 2.65 (H) 0.57 - 2.63 mg/dL Final   02/07/2022 3.93 (H) 0.57 - 2.63 mg/dL Final   07/02/2021 3.14 (H) 0.57 - 2.63 mg/dL Final     Kappa/Lambda FLC Ratio   Date Value Ref Range Status   05/10/2022 1.12 0.26 - 1.65 Final     Comment:     Undetected antigen excess is a rare event but cannot   be excluded. If these free light chain results do not   agree with other clinical or laboratory findings or   if the sample is from a patient that has previously   demonstrated antigen excess, discuss with the testing   laboratory.   Results should always be interpreted in conjunction   with other laboratory tests and clinical evidence.     02/07/2022 1.09 0.26 - 1.65 Final     Comment:     Undetected antigen excess is a rare event but cannot   be excluded. If these free light chain results do not    agree with other clinical or laboratory findings or   if the sample is from a patient that has previously   demonstrated antigen excess, discuss with the testing   laboratory.   Results should always be interpreted in conjunction   with other laboratory tests and clinical evidence.     07/02/2021 0.97 0.26 - 1.65 Final     Comment:     Undetected antigen excess is a rare event but cannot   be excluded. If these free light chain results do not   agree with other clinical or laboratory findings or   if the sample is from a patient that has previously   demonstrated antigen excess, discuss with the testing   laboratory.   Results should always be interpreted in conjunction   with other laboratory tests and clinical evidence.       IgG   Date Value Ref Range Status   05/10/2022 1475 650 - 1600 mg/dL Final     Comment:     IgG Cord Blood Reference Range: 650-1600 mg/dL.     IgA   Date Value Ref Range Status   05/10/2022 447 (H) 40 - 350 mg/dL Final     Comment:     IgA Cord Blood Reference Range: <5 mg/dL.     IgM   Date Value Ref Range Status   05/10/2022 23 (L) 50 - 300 mg/dL Final     Comment:     IgM Cord Blood Reference Range: <25 mg/dL.    Pathologist Interpretation JOJO  Pathologist Interpretation JOJO  Collected: 05/10/22 1208   Result status: Final   Resulting lab: [facility]   Value: REVIEWED   Comment:   Electronically reviewed and signed by:   Sun Kaba MD   Signed on 05/11/22 at 14:50   No monoclonal peaks identified.        [facility] Miscellaneous Test   Order: [id number]   Status:  Final result   Visible to patient:  No (Not Released)   Next appt:  04/02/2020 at 07:10 AM in Lab (LAB, [facility])   Component 6mo ago   [facility]    Comment: Test                              Result  Flag  Unit  RefValue   --------------------------------------------------------------   Multiple Myeloma MRD by Flow, BM   % Minimal Residual Disease      0.0000         %   (MRD)                ASSESSMENT AND PLAN:   Encounter Diagnoses   Name Primary?    Multiple myeloma in remission Yes    History of auto stem cell transplant     Leukopenia, unspecified type     Immunocompromised state due to drug therapy     Other neutropenia     Essential hypertension     Type 2 diabetes mellitus without complication, without long-term current use of insulin     Immunocompromised state          Multiple myeloma s/p shabbir auto stem cell transplant  -IgG kappa MM; ISS 2; standard risk cytogenetics; with lytic bone lesions at presentation; sp induction with  VRd (21 day cycle) with Dr. Juan at [facility]   -she achieved VGPR prior to transplant; tolerated shabbir 200 auto transplant (Transplant day - 3/20/19)  -now 3  year 7 month with approximately day +100 restaging PET scan, biochemical studies and bone marrow biopsy confirming a stringent Complete Remission; MRD testing from [facility] on day +100 also showed MRD negativity predicting long duration of remission. 18 month marrow continued on complete remission.  -recommend maintain revlimid maintenance under direction of Dr. Juan until progression or intolerance; 10mg daily with asa  -completed acyclovir 800mg BID through day +365;   -  post transplant Zometa q 3 months x 4 doses completed therapy  - Completed post transplant vaccines; missed ID appmnt previously, will contact ID clinic to reschedule  - will continue to see pt 6 months; fu with Dr. Juan as directed    Leukopenias due to revlimid  -no indication for dose adjustments based on cytopenia level  - Plt, hgb - wnl   -no indication to transfuse today     Neuropathy  -2/2 chemo; stable to improved since last appt  -On gabapentin 900 mg TID, now taking gabapentin BID       HTN  -per PMD  - elevated bp during visit, missed am medications   DM2  - per PCP        BMT Chart Routing      Follow up with physician . /ALEENA in 6 months   Follow up with ALEENA    Provider visit type     Infusion scheduling note    Injection scheduling note    Labs   Lab interval:  No labs needed prior visit. Labs with    Imaging   Missed previous ID visit - f/u   Pharmacy appointment    Other referrals         Dasia Ortiz NP  Hematology/Oncology/BMT

## 2023-05-22 ENCOUNTER — TELEPHONE (OUTPATIENT)
Dept: HEMATOLOGY/ONCOLOGY | Facility: CLINIC | Age: 68
End: 2023-05-22
Payer: MEDICARE

## 2023-05-26 ENCOUNTER — LAB VISIT (OUTPATIENT)
Dept: LAB | Facility: HOSPITAL | Age: 68
End: 2023-05-26
Payer: MEDICARE

## 2023-05-26 ENCOUNTER — OFFICE VISIT (OUTPATIENT)
Dept: HEMATOLOGY/ONCOLOGY | Facility: CLINIC | Age: 68
End: 2023-05-26
Payer: MEDICARE

## 2023-05-26 VITALS
TEMPERATURE: 99 F | WEIGHT: 197.06 LBS | RESPIRATION RATE: 16 BRPM | BODY MASS INDEX: 33.64 KG/M2 | OXYGEN SATURATION: 98 % | DIASTOLIC BLOOD PRESSURE: 72 MMHG | HEART RATE: 56 BPM | HEIGHT: 64 IN | SYSTOLIC BLOOD PRESSURE: 156 MMHG

## 2023-05-26 DIAGNOSIS — D84.821 IMMUNOCOMPROMISED STATE DUE TO DRUG THERAPY: ICD-10-CM

## 2023-05-26 DIAGNOSIS — Z79.899 IMMUNOCOMPROMISED STATE DUE TO DRUG THERAPY: ICD-10-CM

## 2023-05-26 DIAGNOSIS — E11.9 TYPE 2 DIABETES MELLITUS WITHOUT COMPLICATION, WITHOUT LONG-TERM CURRENT USE OF INSULIN: ICD-10-CM

## 2023-05-26 DIAGNOSIS — K52.1 CHEMOTHERAPY INDUCED DIARRHEA: ICD-10-CM

## 2023-05-26 DIAGNOSIS — Z94.84 HISTORY OF AUTO STEM CELL TRANSPLANT: ICD-10-CM

## 2023-05-26 DIAGNOSIS — I10 ESSENTIAL HYPERTENSION: ICD-10-CM

## 2023-05-26 DIAGNOSIS — C90.01 MULTIPLE MYELOMA IN REMISSION: Primary | ICD-10-CM

## 2023-05-26 DIAGNOSIS — G62.9 NEUROPATHY: ICD-10-CM

## 2023-05-26 DIAGNOSIS — C90.01 MULTIPLE MYELOMA IN REMISSION: ICD-10-CM

## 2023-05-26 DIAGNOSIS — T45.1X5A CHEMOTHERAPY INDUCED DIARRHEA: ICD-10-CM

## 2023-05-26 LAB
ALBUMIN SERPL BCP-MCNC: 3.4 G/DL (ref 3.5–5.2)
ALP SERPL-CCNC: 78 U/L (ref 55–135)
ALT SERPL W/O P-5'-P-CCNC: 16 U/L (ref 10–44)
ANION GAP SERPL CALC-SCNC: 7 MMOL/L (ref 8–16)
AST SERPL-CCNC: 16 U/L (ref 10–40)
BASOPHILS # BLD AUTO: 0.03 K/UL (ref 0–0.2)
BASOPHILS NFR BLD: 0.7 % (ref 0–1.9)
BILIRUB SERPL-MCNC: 0.2 MG/DL (ref 0.1–1)
BUN SERPL-MCNC: 15 MG/DL (ref 8–23)
CALCIUM SERPL-MCNC: 9.3 MG/DL (ref 8.7–10.5)
CHLORIDE SERPL-SCNC: 108 MMOL/L (ref 95–110)
CO2 SERPL-SCNC: 26 MMOL/L (ref 23–29)
CREAT SERPL-MCNC: 1 MG/DL (ref 0.5–1.4)
DIFFERENTIAL METHOD: ABNORMAL
EOSINOPHIL # BLD AUTO: 0.2 K/UL (ref 0–0.5)
EOSINOPHIL NFR BLD: 3.9 % (ref 0–8)
ERYTHROCYTE [DISTWIDTH] IN BLOOD BY AUTOMATED COUNT: 15.7 % (ref 11.5–14.5)
EST. GFR  (NO RACE VARIABLE): >60 ML/MIN/1.73 M^2
GLUCOSE SERPL-MCNC: 82 MG/DL (ref 70–110)
HCT VFR BLD AUTO: 36.8 % (ref 37–48.5)
HGB BLD-MCNC: 11.3 G/DL (ref 12–16)
IGA SERPL-MCNC: 428 MG/DL (ref 40–350)
IGG SERPL-MCNC: 1598 MG/DL (ref 650–1600)
IGM SERPL-MCNC: 26 MG/DL (ref 50–300)
IMM GRANULOCYTES # BLD AUTO: 0.02 K/UL (ref 0–0.04)
IMM GRANULOCYTES NFR BLD AUTO: 0.5 % (ref 0–0.5)
LYMPHOCYTES # BLD AUTO: 1.4 K/UL (ref 1–4.8)
LYMPHOCYTES NFR BLD: 33.3 % (ref 18–48)
MAGNESIUM SERPL-MCNC: 1.8 MG/DL (ref 1.6–2.6)
MCH RBC QN AUTO: 26.8 PG (ref 27–31)
MCHC RBC AUTO-ENTMCNC: 30.7 G/DL (ref 32–36)
MCV RBC AUTO: 87 FL (ref 82–98)
MONOCYTES # BLD AUTO: 0.5 K/UL (ref 0.3–1)
MONOCYTES NFR BLD: 10.4 % (ref 4–15)
NEUTROPHILS # BLD AUTO: 2.2 K/UL (ref 1.8–7.7)
NEUTROPHILS NFR BLD: 51.2 % (ref 38–73)
NRBC BLD-RTO: 0 /100 WBC
PLATELET # BLD AUTO: 154 K/UL (ref 150–450)
PMV BLD AUTO: 9.9 FL (ref 9.2–12.9)
POTASSIUM SERPL-SCNC: 3.9 MMOL/L (ref 3.5–5.1)
PROT SERPL-MCNC: 7.5 G/DL (ref 6–8.4)
RBC # BLD AUTO: 4.22 M/UL (ref 4–5.4)
SODIUM SERPL-SCNC: 141 MMOL/L (ref 136–145)
WBC # BLD AUTO: 4.32 K/UL (ref 3.9–12.7)

## 2023-05-26 PROCEDURE — 99215 OFFICE O/P EST HI 40 MIN: CPT | Mod: S$PBB,,, | Performed by: NURSE PRACTITIONER

## 2023-05-26 PROCEDURE — 83521 IG LIGHT CHAINS FREE EACH: CPT | Mod: 59 | Performed by: NURSE PRACTITIONER

## 2023-05-26 PROCEDURE — 99215 OFFICE O/P EST HI 40 MIN: CPT | Mod: PBBFAC | Performed by: NURSE PRACTITIONER

## 2023-05-26 PROCEDURE — 80053 COMPREHEN METABOLIC PANEL: CPT | Performed by: NURSE PRACTITIONER

## 2023-05-26 PROCEDURE — 82784 ASSAY IGA/IGD/IGG/IGM EACH: CPT | Mod: 59 | Performed by: NURSE PRACTITIONER

## 2023-05-26 PROCEDURE — 99215 PR OFFICE/OUTPT VISIT, EST, LEVL V, 40-54 MIN: ICD-10-PCS | Mod: S$PBB,,, | Performed by: NURSE PRACTITIONER

## 2023-05-26 PROCEDURE — 86334 PATHOLOGIST INTERPRETATION IFE: ICD-10-PCS | Mod: 26,,, | Performed by: PATHOLOGY

## 2023-05-26 PROCEDURE — 99999 PR PBB SHADOW E&M-EST. PATIENT-LVL V: ICD-10-PCS | Mod: PBBFAC,,, | Performed by: NURSE PRACTITIONER

## 2023-05-26 PROCEDURE — 99999 PR PBB SHADOW E&M-EST. PATIENT-LVL V: CPT | Mod: PBBFAC,,, | Performed by: NURSE PRACTITIONER

## 2023-05-26 PROCEDURE — 84165 PROTEIN E-PHORESIS SERUM: CPT | Performed by: NURSE PRACTITIONER

## 2023-05-26 PROCEDURE — 86334 IMMUNOFIX E-PHORESIS SERUM: CPT | Mod: 26,,, | Performed by: PATHOLOGY

## 2023-05-26 PROCEDURE — 86334 IMMUNOFIX E-PHORESIS SERUM: CPT | Performed by: NURSE PRACTITIONER

## 2023-05-26 PROCEDURE — 36415 COLL VENOUS BLD VENIPUNCTURE: CPT | Performed by: NURSE PRACTITIONER

## 2023-05-26 PROCEDURE — 85025 COMPLETE CBC W/AUTO DIFF WBC: CPT | Performed by: NURSE PRACTITIONER

## 2023-05-26 PROCEDURE — 84165 PROTEIN E-PHORESIS SERUM: CPT | Mod: 26,,, | Performed by: PATHOLOGY

## 2023-05-26 PROCEDURE — 83735 ASSAY OF MAGNESIUM: CPT | Performed by: NURSE PRACTITIONER

## 2023-05-26 PROCEDURE — 84165 PATHOLOGIST INTERPRETATION SPE: ICD-10-PCS | Mod: 26,,, | Performed by: PATHOLOGY

## 2023-05-26 RX ORDER — LOPERAMIDE HYDROCHLORIDE 2 MG/1
2 CAPSULE ORAL 4 TIMES DAILY PRN
Qty: 90 CAPSULE | Refills: 0 | Status: SHIPPED | OUTPATIENT
Start: 2023-05-26 | End: 2023-11-15

## 2023-05-26 NOTE — PROGRESS NOTES
SECTION OF HEMATOLOGY AND BONE MARROW TRANSPLANT  Return  Patient Visit   05/27/2023  Referred by:  No ref. provider found  Referred for:myeloma, SCT eval     CHIEF COMPLAINT:   Chief Complaint   Patient presents with    Multiple myeloma in remission         HISTORY OF PRESENT ILLNESS:   67 y.o. female with pmh as below referred dec 2018  for SCT evaluation for MM; with regard to oncologic history; IgG kappa MM; ISS 2; standard risk cytogenetics; with lytic bone lesions at presentation; initiated VRd (21 day cycle) with Dr. Juan at [facility]; completed 5 cycles.  Completed pretransplant staging showing VGPR and completed pre transplant eval with no overt contraindications to transplant.  Presents today for admission for [facility] Autologous Sct. Denies fever, chills, nightsweats, bleeding, lymphadenopathy, signs/symptoms of splenomegaly, chest pain, sob, n/v/d/c. Does have chronic neuropathy to bilateral feet.  She also has chronic sciatic pain down both legs with BLE edema (+1 pitting edema today). Pt also has pain and swelling to her left knee. States she fell a week ago and hit her knee. Did not see any provider after fall, no imaging was complete. Taking tylenol and muscle relaxer, pt states neither helping.    Transplant course (3/19/19-4/5/19):  Admitted on 3/18 and received Melphalan on 3/19 and stem cell infusion on 3/20, she tolerated both without difficulty. She had a fall prior to admit, knee x-ray was unrevealing. She complained of severe neuropathy pain on admit and gabapentin was increased from 300 mg TID to 900 mg TID during hospital stay. She experienced the expected side effects of nausea, diarrhea and throat pain which all improved prior to discharge. Engrafted on day +12, 4/1/19. She did not have a fever during stay and was on ppx antimicrobials per protocol. Patient was discharged home with home PT after platelet transfusion and Vascath removal, she will follow-up in clinic on 4/5/19.    Today: pt  presents alone for routine f/u from shabbir auto SCT for MM. Today is day 4  year 2 month. Continued  CR.  Remains on maintenance revlimid tolerating well.  Diabetes managed by PCP. Following closely with Dr. Reynoso. No acute events since last visit.  BP elevated during visit, missed am medications.     PAST MEDICAL HISTORY:   Past Medical History:   Diagnosis Date    Depression     DM2 (diabetes mellitus, type 2)     Glaucoma     HTN (hypertension)     Myeloma     Pancytopenia due to chemotherapy 3/24/2019    Therapy     after mother's death       PAST SURGICAL HISTORY:   Past Surgical History:   Procedure Laterality Date    BONE MARROW ASPIRATION Left 6/27/2019    Procedure: ASPIRATION, BONE MARROW;  Surgeon: Saúl Alva MD;  Location: [facility] OR [address] [place] [address];  Service: Oncology;  Laterality: Left;    BONE MARROW BIOPSY Left 2/7/2019    Procedure: Biopsy-bone marrow;  Surgeon: Saúl Alva MD;  Location: [facility] OR [address] [place] [address];  Service: Oncology;  Laterality: Left;    BONE MARROW BIOPSY Left 6/27/2019    Procedure: Biopsy-bone marrow;  Surgeon: Saúl Alva MD;  Location: [facility] OR [address] [place] [address];  Service: Oncology;  Laterality: Left;       PAST SOCIAL HISTORY:   reports that she has never smoked. She has never used smokeless tobacco. She reports that she does not drink alcohol and does not use drugs.    FAMILY HISTORY:  No family history on file.    CURRENT MEDICATIONS:   Current Outpatient Medications   Medication Sig    amLODIPine (NORVASC) 5 MG tablet Take 5 mg by mouth.    aspirin (ECOTRIN) 81 MG EC tablet Take 81 mg by mouth.    cyclobenzaprine (FLEXERIL) 5 MG tablet Take 5 mg by mouth 3 (three) times daily as needed.    ergocalciferol (ERGOCALCIFEROL) 50,000 unit Cap TAKE 1 CAPSULE BY MOUTH EVERY 7 DAYS    furosemide (LASIX) 40 MG tablet TK 1 T PO  QAM PRF FLUID RETENTION    gabapentin (NEURONTIN) 300 MG capsule Take 3 capsules (900 mg total) by mouth 3 (three) times daily.    glipiZIDE (GLUCOTROL) 10 MG  tablet Take 1 tablet by mouth daily    JANUMET  mg per tablet Take 1 tablet twice a day with meals    multivit,iron,minerals/lutein (CENTRUM SILVER ULTRA WOMEN'S ORAL) Take 1 tablet by mouth once daily.    multivitamin-Ca-iron-minerals Tab Take by mouth.    nystatin (MYCOSTATIN) 100,000 unit/mL suspension Take 1 mL by mouth.    ondansetron (ZOFRAN-ODT) 8 MG TbDL Take 8 mg by mouth every 8 (eight) hours as needed (nausea).     PAZEO 0.7 % Drop Place 1 drop into both eyes every morning.    prochlorperazine (COMPAZINE) 10 MG tablet Take 10 mg by mouth 4 (four) times daily as needed (nausea/vomiting).     REVLIMID 10 mg Cap TK ONE C PO QD FOR 28 DAYS    tiZANidine (ZANAFLEX) 4 MG tablet Take 4 mg by mouth 3 (three) times daily as needed (back muscle spasms).     traMADol (ULTRAM) 50 mg tablet Take 1 tablet (50 mg total) by mouth every 8 (eight) hours as needed for Pain.    loperamide (IMODIUM) 2 mg capsule Take 1 capsule (2 mg total) by mouth 4 (four) times daily as needed for Diarrhea.    pantoprazole (PROTONIX) 40 MG tablet Take 1 tablet (40 mg total) by mouth once daily.     No current facility-administered medications for this visit.     Review of patient's allergies indicates:  No Known Allergies    REVIEW OF SYSTEMS:   General ROS: negative  Psychological ROS: negative  Ophthalmic ROS: negative  ENT ROS: Swallowing issues at times  Allergy and Immunology ROS: negative  Hematological and Lymphatic ROS: negative  Endocrine ROS: negative  Respiratory ROS: Exertional dyspnea occasionally  Cardiovascular ROS: BLE swelling  Gastrointestinal ROS: negative  Genito-Urinary ROS: negative  Muscular: Generalized muscle spam    PHYSICAL EXAM:   Vitals:    05/26/23 1406   BP: (!) 156/72   Pulse: (!) 56   Resp: 16   Temp: 98.6 °F (37 °C)       General - well developed, well nourished, no apparent distress  Head & Face - no sinus tenderness  Eyes - normal conjunctivae and lids   ENT - normal external auditory canals;  oropharynx clear,  Normal dentition and gums  Neck - normal thyroid  Chest and Lung - normal respiratory effort, clear to auscultation bilaterally   Cardiovascular - RRR with no MGR, normal S1 and S2. Elevated BP,asymptomatic   Abdomen -  soft, nontender, no palpable hepatomegaly or splenomegaly  Extremities - unremarkable nails and digits  Heme - no bruising, petechiae, pallor  Skin - no rashes or lesions  Psych - appropriate mood and affect    ECOG Performance Status: (foot note - ECOG PS provided by [employer]cology [employer]) 1 - Symptomatic but completely ambulatory    Karnofsky Performance Score:  80%- Normal Activity with Effort: Some Symptoms of Disease  DATA:   Lab Results   Component Value Date    WBC 4.32 05/26/2023    HGB 11.3 (L) 05/26/2023    HCT 36.8 (L) 05/26/2023    MCV 87 05/26/2023     05/26/2023     Gran # (ANC)   Date Value Ref Range Status   05/26/2023 2.2 1.8 - 7.7 K/uL Final     Gran %   Date Value Ref Range Status   05/26/2023 51.2 38.0 - 73.0 % Final     CMP  Sodium   Date Value Ref Range Status   05/26/2023 141 136 - 145 mmol/L Final     Potassium   Date Value Ref Range Status   05/26/2023 3.9 3.5 - 5.1 mmol/L Final     Chloride   Date Value Ref Range Status   05/26/2023 108 95 - 110 mmol/L Final     CO2   Date Value Ref Range Status   05/26/2023 26 23 - 29 mmol/L Final     Glucose   Date Value Ref Range Status   05/26/2023 82 70 - 110 mg/dL Final     BUN   Date Value Ref Range Status   05/26/2023 15 8 - 23 mg/dL Final     Creatinine   Date Value Ref Range Status   05/26/2023 1.0 0.5 - 1.4 mg/dL Final     Calcium   Date Value Ref Range Status   05/26/2023 9.3 8.7 - 10.5 mg/dL Final     Total Protein   Date Value Ref Range Status   05/26/2023 7.5 6.0 - 8.4 g/dL Final     Albumin   Date Value Ref Range Status   05/26/2023 3.4 (L) 3.5 - 5.2 g/dL Final     Total Bilirubin   Date Value Ref Range Status   05/26/2023 0.2 0.1 - 1.0 mg/dL Final     Comment:     For infants and  newborns, interpretation of results should be based  on gestational age, weight and in agreement with clinical  observations.    Premature Infant recommended reference ranges:  Up to 24 hours.............<8.0 mg/dL  Up to 48 hours............<12.0 mg/dL  3-5 days..................<15.0 mg/dL  6-29 days.................<15.0 mg/dL       Alkaline Phosphatase   Date Value Ref Range Status   05/26/2023 78 55 - 135 U/L Final     AST   Date Value Ref Range Status   05/26/2023 16 10 - 40 U/L Final     ALT   Date Value Ref Range Status   05/26/2023 16 10 - 44 U/L Final     Anion Gap   Date Value Ref Range Status   05/26/2023 7 (L) 8 - 16 mmol/L Final     eGFR if    Date Value Ref Range Status   05/10/2022 59.6 (A) >60 mL/min/1.73 m^2 Final     eGFR if non    Date Value Ref Range Status   05/10/2022 51.7 (A) >60 mL/min/1.73 m^2 Final     Comment:     Calculation used to obtain the estimated glomerular filtration  rate (eGFR) is the CKD-EPI equation.        Bena Free Light Chains   Date Value Ref Range Status   05/10/2022 2.98 (H) 0.33 - 1.94 mg/dL Final   02/07/2022 4.30 (H) 0.33 - 1.94 mg/dL Final   07/02/2021 3.05 (H) 0.33 - 1.94 mg/dL Final     Lambda Free Light Chains   Date Value Ref Range Status   05/10/2022 2.65 (H) 0.57 - 2.63 mg/dL Final   02/07/2022 3.93 (H) 0.57 - 2.63 mg/dL Final   07/02/2021 3.14 (H) 0.57 - 2.63 mg/dL Final     Kappa/Lambda FLC Ratio   Date Value Ref Range Status   05/10/2022 1.12 0.26 - 1.65 Final     Comment:     Undetected antigen excess is a rare event but cannot   be excluded. If these free light chain results do not   agree with other clinical or laboratory findings or   if the sample is from a patient that has previously   demonstrated antigen excess, discuss with the testing   laboratory.   Results should always be interpreted in conjunction   with other laboratory tests and clinical evidence.     02/07/2022 1.09 0.26 - 1.65 Final     Comment:      Undetected antigen excess is a rare event but cannot   be excluded. If these free light chain results do not   agree with other clinical or laboratory findings or   if the sample is from a patient that has previously   demonstrated antigen excess, discuss with the testing   laboratory.   Results should always be interpreted in conjunction   with other laboratory tests and clinical evidence.     07/02/2021 0.97 0.26 - 1.65 Final     Comment:     Undetected antigen excess is a rare event but cannot   be excluded. If these free light chain results do not   agree with other clinical or laboratory findings or   if the sample is from a patient that has previously   demonstrated antigen excess, discuss with the testing   laboratory.   Results should always be interpreted in conjunction   with other laboratory tests and clinical evidence.       IgG   Date Value Ref Range Status   05/26/2023 1598 650 - 1600 mg/dL Final     Comment:     IgG Cord Blood Reference Range: 650-1600 mg/dL.     IgA   Date Value Ref Range Status   05/26/2023 428 (H) 40 - 350 mg/dL Final     Comment:     IgA Cord Blood Reference Range: <5 mg/dL.     IgM   Date Value Ref Range Status   05/26/2023 26 (L) 50 - 300 mg/dL Final     Comment:     IgM Cord Blood Reference Range: <25 mg/dL.    Pathologist Interpretation JOJO  Pathologist Interpretation JOJO  Collected: 05/10/22 1208   Result status: Final   Resulting lab: [facility]   Value: REVIEWED   Comment:   Electronically reviewed and signed by:   Sun Kaba MD   Signed on 05/11/22 at 14:50   No monoclonal peaks identified.        [place] Miscellaneous Test   Order: [id number]   Status:  Final result   Visible to patient:  No (Not Released)   Next appt:  04/02/2020 at 07:10 AM in Lab (LAB, HEMONC CANCER BLDG)   Component 6mo ago   [place] Miscellaneous Result SEE COMMENTS    Comment: Test                              Result  Flag  Unit  RefValue    --------------------------------------------------------------   Multiple Myeloma MRD by Flow, BM   % Minimal Residual Disease      0.0000        %   (MRD)                ASSESSMENT AND PLAN:   Encounter Diagnoses   Name Primary?    Multiple myeloma in remission Yes    History of auto stem cell transplant     Chemotherapy induced diarrhea     Immunocompromised state due to drug therapy     Essential hypertension     Type 2 diabetes mellitus without complication, without long-term current use of insulin     Neuropathy            Multiple myeloma s/p shabbir auto stem cell transplant  -IgG kappa MM; ISS 2; standard risk cytogenetics; with lytic bone lesions at presentation; sp induction with  VRd (21 day cycle) with Dr. Juan at [facility]   -she achieved VGPR prior to transplant; tolerated shabbir 200 auto transplant (Transplant day - 3/20/19)  -now 4 year 2 month with approximately day +100 restaging PET scan, biochemical studies and bone marrow biopsy confirming a stringent Complete Remission; MRD testing from [place] on day +100 also showed MRD negativity predicting long duration of remission. 18 month marrow continued on complete remission.  -recommend maintain revlimid maintenance under direction of Dr. Juan until progression or intolerance; 10mg daily with asa  -completed acyclovir 800mg BID through day +365;   -  post transplant Zometa q 3 months x 4 doses completed therapy  - Completed post transplant vaccines;missed MMR, pneumonia vaccines. Reinforced to get these, informed ID clinic to f/u  - will continue to see pt 6 months; fu with Dr. Juan as directed    Anemia due to revlimid  -no indication for dose adjustments based on cytopenia level  - Plt, wbc - wnl   -no indication to transfuse today     Neuropathy  -2/2 chemo; stable to improved since last appt  -On gabapentin 900 mg TID, now taking gabapentin BID   - Referral for integrative medicine    HTN  -per PMD  - elevated bp during visit, missed am  medications   DM2  - per PCP        BMT Chart Routing      Follow up with physician . /ALEENA in 6 month   Follow up with ALEENA    Provider visit type    Infusion scheduling note    Injection scheduling note    Labs   Scheduling:  Preferred lab:  Lab interval:  No labs needed prior visit. Labs with    Imaging   ID f/u - MMR vaccine   Pharmacy appointment    Other referrals   Additional referrals needed  Integrative medicine for neuropathy        Advance Care Planning     Date: 05/27/2023       Patient did not wish to name a surrogate decision maker or provide an Advance Care Plan.    Dasia Ortiz NP  Hematology/Oncology/BMT

## 2023-05-29 LAB
ALBUMIN SERPL ELPH-MCNC: 3.54 G/DL (ref 3.35–5.55)
ALPHA1 GLOB SERPL ELPH-MCNC: 0.3 G/DL (ref 0.17–0.41)
ALPHA2 GLOB SERPL ELPH-MCNC: 0.77 G/DL (ref 0.43–0.99)
B-GLOBULIN SERPL ELPH-MCNC: 1.04 G/DL (ref 0.5–1.1)
GAMMA GLOB SERPL ELPH-MCNC: 1.55 G/DL (ref 0.67–1.58)
INTERPRETATION SERPL IFE-IMP: NORMAL
KAPPA LC SER QL IA: 4.5 MG/DL (ref 0.33–1.94)
KAPPA LC/LAMBDA SER IA: 1.34 (ref 0.26–1.65)
LAMBDA LC SER QL IA: 3.36 MG/DL (ref 0.57–2.63)
PATHOLOGIST INTERPRETATION IFE: NORMAL
PATHOLOGIST INTERPRETATION SPE: NORMAL
PROT SERPL-MCNC: 7.2 G/DL (ref 6–8.4)

## 2024-02-06 ENCOUNTER — OFFICE VISIT (OUTPATIENT)
Dept: HEMATOLOGY/ONCOLOGY | Facility: CLINIC | Age: 69
End: 2024-02-06
Payer: MEDICARE

## 2024-02-06 VITALS
HEART RATE: 59 BPM | TEMPERATURE: 99 F | HEIGHT: 64 IN | RESPIRATION RATE: 18 BRPM | SYSTOLIC BLOOD PRESSURE: 150 MMHG | BODY MASS INDEX: 30.48 KG/M2 | DIASTOLIC BLOOD PRESSURE: 62 MMHG | WEIGHT: 178.56 LBS | OXYGEN SATURATION: 99 %

## 2024-02-06 DIAGNOSIS — C90.01 MULTIPLE MYELOMA IN REMISSION: Primary | ICD-10-CM

## 2024-02-06 DIAGNOSIS — Z94.84 HISTORY OF AUTO STEM CELL TRANSPLANT: ICD-10-CM

## 2024-02-06 PROCEDURE — 99214 OFFICE O/P EST MOD 30 MIN: CPT | Mod: PBBFAC | Performed by: INTERNAL MEDICINE

## 2024-02-06 PROCEDURE — 99214 OFFICE O/P EST MOD 30 MIN: CPT | Mod: S$PBB,,, | Performed by: INTERNAL MEDICINE

## 2024-02-06 PROCEDURE — 99999 PR PBB SHADOW E&M-EST. PATIENT-LVL IV: CPT | Mod: PBBFAC,,, | Performed by: INTERNAL MEDICINE

## 2024-02-06 RX ORDER — LANCETS 33 GAUGE
1 EACH MISCELLANEOUS
COMMUNITY
Start: 2023-06-27

## 2024-02-06 RX ORDER — OLMESARTAN MEDOXOMIL 20 MG/1
1 TABLET ORAL DAILY
COMMUNITY
Start: 2023-08-04 | End: 2024-08-03

## 2024-02-06 RX ORDER — TIRZEPATIDE 5 MG/.5ML
5 INJECTION, SOLUTION SUBCUTANEOUS
COMMUNITY
Start: 2023-11-14

## 2024-02-06 RX ORDER — INSULIN PUMP SYRINGE, 3 ML
1 EACH MISCELLANEOUS
COMMUNITY
Start: 2023-06-27

## 2024-02-06 RX ORDER — ROSUVASTATIN CALCIUM 5 MG/1
1 TABLET, COATED ORAL DAILY
COMMUNITY
Start: 2023-08-04 | End: 2024-08-03

## 2024-02-06 NOTE — PROGRESS NOTES
SECTION OF HEMATOLOGY AND BONE MARROW TRANSPLANT  Return  Patient Visit   02/08/2024  Referred by:  Dr. Saúl Alva  Referred for:myeloma, SCT eval     CHIEF COMPLAINT:   No chief complaint on file.        HISTORY OF PRESENT ILLNESS:   68 y.o. female with pmh as below referred dec 2018  for SCT evaluation for MM; with regard to oncologic history; IgG kappa MM; ISS 2; standard risk cytogenetics; with lytic bone lesions at presentation; initiated VRd (21 day cycle) with Dr. Juan at [facility]; completed 5 cycles.  Completed pretransplant staging showing VGPR and completed pre transplant eval with no overt contraindications to transplant.  Presents today for admission for Shabbir 200 Autologous Sct. Denies fever, chills, nightsweats, bleeding, lymphadenopathy, signs/symptoms of splenomegaly, chest pain, sob, n/v/d/c. Does have chronic neuropathy to bilateral feet.  She also has chronic sciatic pain down both legs with BLE edema (+1 pitting edema today). Pt also has pain and swelling to her left knee. States she fell a week ago and hit her knee. Did not see any provider after fall, no imaging was complete. Taking tylenol and muscle relaxer, pt states neither helping.    Transplant course (3/19/19-4/5/19):  Admitted on 3/18 and received Melphalan on 3/19 and stem cell infusion on 3/20, she tolerated both without difficulty. She had a fall prior to admit, knee x-ray was unrevealing. She complained of severe neuropathy pain on admit and gabapentin was increased from 300 mg TID to 900 mg TID during hospital stay. She experienced the expected side effects of nausea, diarrhea and throat pain which all improved prior to discharge. Engrafted on day +12, 4/1/19. She did not have a fever during stay and was on ppx antimicrobials per protocol. Patient was discharged home with home PT after platelet transfusion and Vascath removal, she will follow-up in clinic on 4/5/19.    Today: pt presents alone for routine f/u from shabbir auto  SCT for MM. Today is day 4  year 1 0 month. Continued  CR.  Remains on maintenance revlimid tolerating well.  Diabetes managed by PCP. Following closely with Dr. Reynoso. No acute events since last visit.       PAST MEDICAL HISTORY:   Past Medical History:   Diagnosis Date    Depression     DM2 (diabetes mellitus, type 2)     Glaucoma     HTN (hypertension)     Myeloma     Pancytopenia due to chemotherapy 3/24/2019    Therapy     after mother's death       PAST SURGICAL HISTORY:   Past Surgical History:   Procedure Laterality Date    BONE MARROW ASPIRATION Left 6/27/2019    Procedure: ASPIRATION, BONE MARROW;  Surgeon: Saúl Alva MD;  Location: [facility] OR [address] [place] [address];  Service: Oncology;  Laterality: Left;    BONE MARROW BIOPSY Left 2/7/2019    Procedure: Biopsy-bone marrow;  Surgeon: Saúl Alva MD;  Location: [facility] OR [address] [place] [address];  Service: Oncology;  Laterality: Left;    BONE MARROW BIOPSY Left 6/27/2019    Procedure: Biopsy-bone marrow;  Surgeon: Saúl Alva MD;  Location: [facility] OR [address] [place] [address];  Service: Oncology;  Laterality: Left;       PAST SOCIAL HISTORY:   reports that she has never smoked. She has never used smokeless tobacco. She reports that she does not drink alcohol and does not use drugs.    FAMILY HISTORY:  No family history on file.    CURRENT MEDICATIONS:   Current Outpatient Medications   Medication Sig    amLODIPine (NORVASC) 5 MG tablet Take 5 mg by mouth.    aspirin (ECOTRIN) 81 MG EC tablet Take 81 mg by mouth.    blood sugar diagnostic (TRUE METRIX GLUCOSE TEST STRIP) Strp USE TEST STRIP TO TEST BLOOD SUGAR THREE TIMES DAILY    blood-glucose meter kit 1 each by Other route.    cyclobenzaprine (FLEXERIL) 5 MG tablet Take 5 mg by mouth 3 (three) times daily as needed.    furosemide (LASIX) 40 MG tablet TK 1 T PO  QAM PRF FLUID RETENTION    glipiZIDE (GLUCOTROL) 10 MG tablet Take 1 tablet by mouth daily    JANUMET  mg per tablet Take 1 tablet twice a day with meals    lancets  33 gauge Misc 1 each by Other route.    loperamide (IMODIUM) 2 mg capsule TAKE 1 CAPSULE(2 MG) BY MOUTH FOUR TIMES DAILY AS NEEDED FOR DIARRHEA    MOUNJARO 5 mg/0.5 mL PnIj Inject 5 mg into the skin.    multivit,iron,minerals/lutein (CENTRUM SILVER ULTRA WOMEN'S ORAL) Take 1 tablet by mouth once daily.    multivitamin-Ca-iron-minerals Tab Take by mouth.    nystatin (MYCOSTATIN) 100,000 unit/mL suspension Take 1 mL by mouth.    olmesartan (BENICAR) 20 MG tablet Take 1 tablet by mouth once daily.    ondansetron (ZOFRAN-ODT) 8 MG TbDL Take 8 mg by mouth every 8 (eight) hours as needed (nausea).     PAZEO 0.7 % Drop Place 1 drop into both eyes every morning.    prochlorperazine (COMPAZINE) 10 MG tablet Take 10 mg by mouth 4 (four) times daily as needed (nausea/vomiting).     REVLIMID 10 mg Cap TK ONE C PO QD FOR 28 DAYS    rosuvastatin (CRESTOR) 5 MG tablet Take 1 tablet by mouth once daily.    tiZANidine (ZANAFLEX) 4 MG tablet Take 4 mg by mouth 3 (three) times daily as needed (back muscle spasms).     traMADol (ULTRAM) 50 mg tablet Take 1 tablet (50 mg total) by mouth every 8 (eight) hours as needed for Pain.    ergocalciferol (ERGOCALCIFEROL) 50,000 unit Cap TAKE 1 CAPSULE BY MOUTH EVERY 7 DAYS    gabapentin (NEURONTIN) 300 MG capsule Take 3 capsules (900 mg total) by mouth 3 (three) times daily.    pantoprazole (PROTONIX) 40 MG tablet Take 1 tablet (40 mg total) by mouth once daily.     No current facility-administered medications for this visit.     Review of patient's allergies indicates:  No Known Allergies    REVIEW OF SYSTEMS:   General ROS: negative  Psychological ROS: negative  Ophthalmic ROS: negative  ENT ROS: Swallowing issues at times  Allergy and Immunology ROS: negative  Hematological and Lymphatic ROS: negative  Endocrine ROS: negative  Respiratory ROS: Exertional dyspnea occasionally  Cardiovascular ROS: BLE swelling  Gastrointestinal ROS: negative  Genito-Urinary ROS: negative  Muscular: Generalized  muscle spam    PHYSICAL EXAM:   Vitals:    02/06/24 1558   BP: (!) 150/62   Pulse: (!) 59   Resp: 18   Temp: 98.8 °F (37.1 °C)       General - well developed, well nourished, no apparent distress  Head & Face - no sinus tenderness  Eyes - normal conjunctivae and lids   ENT - normal external auditory canals; oropharynx clear,  Normal dentition and gums  Neck - normal thyroid  Chest and Lung - normal respiratory effort, clear to auscultation bilaterally   Cardiovascular - RRR with no MGR, normal S1 and S2. Elevated BP,asymptomatic   Abdomen -  soft, nontender, no palpable hepatomegaly or splenomegaly  Extremities - unremarkable nails and digits  Heme - no bruising, petechiae, pallor  Skin - no rashes or lesions  Psych - appropriate mood and affect    ECOG Performance Status: (foot note - ECOG PS provided by [employer]cology [employer]) 1 - Symptomatic but completely ambulatory    Karnofsky Performance Score:  80%- Normal Activity with Effort: Some Symptoms of Disease  DATA:   Lab Results   Component Value Date    WBC 4.32 05/26/2023    HGB 11.3 (L) 05/26/2023    HCT 36.8 (L) 05/26/2023    MCV 87 05/26/2023     05/26/2023     Gran # (ANC)   Date Value Ref Range Status   05/26/2023 2.2 1.8 - 7.7 K/uL Final     Gran %   Date Value Ref Range Status   05/26/2023 51.2 38.0 - 73.0 % Final     CMP  Sodium   Date Value Ref Range Status   05/26/2023 141 136 - 145 mmol/L Final     Potassium   Date Value Ref Range Status   05/26/2023 3.9 3.5 - 5.1 mmol/L Final     Chloride   Date Value Ref Range Status   05/26/2023 108 95 - 110 mmol/L Final     CO2   Date Value Ref Range Status   05/26/2023 26 23 - 29 mmol/L Final     Glucose   Date Value Ref Range Status   05/26/2023 82 70 - 110 mg/dL Final     BUN   Date Value Ref Range Status   05/26/2023 15 8 - 23 mg/dL Final     Creatinine   Date Value Ref Range Status   05/26/2023 1.0 0.5 - 1.4 mg/dL Final     Calcium   Date Value Ref Range Status   05/26/2023 9.3 8.7 - 10.5  mg/dL Final     Total Protein   Date Value Ref Range Status   05/26/2023 7.5 6.0 - 8.4 g/dL Final     Albumin   Date Value Ref Range Status   05/26/2023 3.4 (L) 3.5 - 5.2 g/dL Final     Total Bilirubin   Date Value Ref Range Status   05/26/2023 0.2 0.1 - 1.0 mg/dL Final     Comment:     For infants and newborns, interpretation of results should be based  on gestational age, weight and in agreement with clinical  observations.    Premature Infant recommended reference ranges:  Up to 24 hours.............<8.0 mg/dL  Up to 48 hours............<12.0 mg/dL  3-5 days..................<15.0 mg/dL  6-29 days.................<15.0 mg/dL       Alkaline Phosphatase   Date Value Ref Range Status   05/26/2023 78 55 - 135 U/L Final     AST   Date Value Ref Range Status   05/26/2023 16 10 - 40 U/L Final     ALT   Date Value Ref Range Status   05/26/2023 16 10 - 44 U/L Final     Anion Gap   Date Value Ref Range Status   05/26/2023 7 (L) 8 - 16 mmol/L Final     eGFR if    Date Value Ref Range Status   05/10/2022 59.6 (A) >60 mL/min/1.73 m^2 Final     eGFR    Date Value Ref Range Status   03/20/2023 53 (L) >=90 mL/min Final     Comment:     Calculation based on the Chronic Kidney Disease Epidemiology Collaboration (CKD-EPI) equation refit without adjustment for race.     eGFR if non    Date Value Ref Range Status   05/10/2022 51.7 (A) >60 mL/min/1.73 m^2 Final     Comment:     Calculation used to obtain the estimated glomerular filtration  rate (eGFR) is the CKD-EPI equation.        [place] Free Light Chains   Date Value Ref Range Status   05/26/2023 4.50 (H) 0.33 - 1.94 mg/dL Final   05/10/2022 2.98 (H) 0.33 - 1.94 mg/dL Final   02/07/2022 4.30 (H) 0.33 - 1.94 mg/dL Final     [place] Free Light Chains   Date Value Ref Range Status   05/26/2023 3.36 (H) 0.57 - 2.63 mg/dL Final   05/10/2022 2.65 (H) 0.57 - 2.63 mg/dL Final   02/07/2022 3.93 (H) 0.57 - 2.63 mg/dL Final     Kappa/Lambda FLC  Ratio   Date Value Ref Range Status   05/26/2023 1.34 0.26 - 1.65 Final     Comment:     Undetected antigen excess is a rare event but cannot   be excluded. If these free light chain results do not   agree with other clinical or laboratory findings or   if the sample is from a patient that has previously   demonstrated antigen excess, discuss with the testing   laboratory.   Results should always be interpreted in conjunction   with other laboratory tests and clinical evidence.     05/10/2022 1.12 0.26 - 1.65 Final     Comment:     Undetected antigen excess is a rare event but cannot   be excluded. If these free light chain results do not   agree with other clinical or laboratory findings or   if the sample is from a patient that has previously   demonstrated antigen excess, discuss with the testing   laboratory.   Results should always be interpreted in conjunction   with other laboratory tests and clinical evidence.     02/07/2022 1.09 0.26 - 1.65 Final     Comment:     Undetected antigen excess is a rare event but cannot   be excluded. If these free light chain results do not   agree with other clinical or laboratory findings or   if the sample is from a patient that has previously   demonstrated antigen excess, discuss with the testing   laboratory.   Results should always be interpreted in conjunction   with other laboratory tests and clinical evidence.       IgG   Date Value Ref Range Status   05/26/2023 1598 650 - 1600 mg/dL Final     Comment:     IgG Cord Blood Reference Range: 650-1600 mg/dL.     IgA   Date Value Ref Range Status   05/26/2023 428 (H) 40 - 350 mg/dL Final     Comment:     IgA Cord Blood Reference Range: <5 mg/dL.     IgM   Date Value Ref Range Status   05/26/2023 26 (L) 50 - 300 mg/dL Final     Comment:     IgM Cord Blood Reference Range: <25 mg/dL.    Pathologist Interpretation JOJO  Pathologist Interpretation JOJO  Collected: 05/10/22 9821   Result status: Final   Resulting lab: [facility]   Value: REVIEWED   Comment:   Electronically reviewed and signed by:   Sun Kaba MD   Signed on 05/11/22 at 14:50   No monoclonal peaks identified.        [facility] Miscellaneous Test   Order: [id number]   Status:  Final result   Visible to patient:  No (Not Released)   Next appt:  04/02/2020 at 07:10 AM in Lab (LAB, [facility])   Component 6mo ago   [facility]iscellaneous Result SEE COMMENTS    Comment: Test                              Result  Flag  Unit  RefValue   --------------------------------------------------------------   Multiple Myeloma MRD by Flow, BM   % Minimal Residual Disease      0.0000        %   (MRD)            1/29/24 labs available in care everywhere -cw with continued biochemical remission     ASSESSMENT AND PLAN:   Encounter Diagnoses   Name Primary?    Multiple myeloma in remission Yes    History of auto stem cell transplant            Multiple myeloma s/p shabbir auto stem cell transplant  -IgG kappa MM; ISS 2; standard risk cytogenetics; with lytic bone lesions at presentation; sp induction with  VRd (21 day cycle) with Dr. Juan at [facility]   -she achieved VGPR prior to transplant; tolerated shabbir 200 auto transplant (Transplant day - 3/20/19)  -now 4 year 10  month with approximately day +100 restaging PET scan, biochemical studies and bone marrow biopsy confirming a stringent Complete Remission; MRD testing from [place] on day +100 also showed MRD negativity predicting long duration of remission   -recommend maintain revlimid maintenance under direction of Dr. Juan until progression or intolerance; 10mg daily with asa-  -her 1/29/24 labs cw with continued remission; she is tolerating rev maintenance well  -we discussed still not firm data on cessation of revlimid yet so recommended continue for now; if study data becomes more mature we can discuss restaging marrow and if persistent MRD stopping at later date  -completed acyclovir 800mg BID through day  +365;   -  post transplant Zometa q 3 months x 4 doses completed therapy  - Completed post transplant vaccines;missed MMR, pneumonia vaccines. Reinforced to get these, informed ID clinic to f/u  - will continue to see pt 6 months; fu with Dr. Juan as directed    Anemia due to revlimid  -no indication for dose adjustments based on cytopenia level  - Plt, wbc - wnl   -no indication to transfuse today     Neuropathy  -2/2 chemo; stable to improved since last appt          HTN  -per PMD     DM2  - per PCP      FU: MD appt in 6 months

## 2024-02-07 DIAGNOSIS — E55.9 VITAMIN D DEFICIENCY: ICD-10-CM

## 2024-02-08 RX ORDER — ERGOCALCIFEROL 1.25 MG/1
CAPSULE ORAL
Qty: 26 CAPSULE | Refills: 1 | Status: SHIPPED | OUTPATIENT
Start: 2024-02-08

## 2024-05-06 ENCOUNTER — DOCUMENTATION ONLY (OUTPATIENT)
Dept: HEMATOLOGY/ONCOLOGY | Facility: CLINIC | Age: 69
End: 2024-05-06
Payer: MEDICARE

## 2024-05-06 NOTE — PROGRESS NOTES
Received request to assist with insurance questions.  Reached out to patient; no answer and no voicemail available.  Will re-attempt.  Pt expressed interest in [facility] Medicare Advantage plan; will encourage to confirm PCP is in-network and/or consult liaison Aniya at the [place] [facility] ([phone]) to compare plan options.  Will follow.

## 2024-05-15 ENCOUNTER — DOCUMENTATION ONLY (OUTPATIENT)
Dept: HEMATOLOGY/ONCOLOGY | Facility: CLINIC | Age: 69
End: 2024-05-15
Payer: MEDICARE

## 2024-05-15 NOTE — PROGRESS NOTES
Followed up on request to assist with insurance questions.  Reached out to patient; reached daughter, who detailed their decision to keep insurance the same for medical/oncology needs, but consider an individual dental plan.  Provided contact information for [facility] [employer] for discounted care, and for this writer for any future needs.  Will follow.

## 2024-12-18 ENCOUNTER — TELEPHONE (OUTPATIENT)
Dept: HEMATOLOGY/ONCOLOGY | Facility: CLINIC | Age: 69
End: 2024-12-18
Payer: MEDICARE

## 2024-12-18 NOTE — TELEPHONE ENCOUNTER
----- Message from Sun sent at 12/18/2024 10:11 AM CST -----  Regarding: Appt  Contact: Pt  [phone]        Appt Type:  Est Pt      Date/Time Preference: Next donald      Treating Provider:  Saúl Alva MD     Caller Name: Yoseph      Contact Prefer: [phone]    Comments/Notes: Called top reschedule missed appt from 08/06/24

## 2024-12-31 NOTE — PROGRESS NOTES
SECTION OF HEMATOLOGY AND BONE MARROW TRANSPLANT  Return  Patient Visit   01/06/2025  Referred for: Myeloma, SCT     CHIEF COMPLAINT:   Chief Complaint   Patient presents with    Diarrhea       HISTORY OF PRESENT ILLNESS:   69 y.o. female with pmh as below referred dec 2018  for SCT evaluation for MM; with regard to oncologic history; IgG kappa MM; ISS 2; standard risk cytogenetics; with lytic bone lesions at presentation; initiated VRd (21 day cycle) with Dr. Juan at [facility]; completed 5 cycles.  Completed pretransplant staging showing VGPR and completed pre transplant eval with no overt contraindications to transplant.  Presents today for admission for Daija 200 Autologous Sct. Denies fever, chills, nightsweats, bleeding, lymphadenopathy, signs/symptoms of splenomegaly, chest pain, sob, n/v/d/c. Does have chronic neuropathy to bilateral feet.  She also has chronic sciatic pain down both legs with BLE edema (+1 pitting edema today). Pt also has pain and swelling to her left knee. States she fell a week ago and hit her knee. Did not see any provider after fall, no imaging was complete. Taking tylenol and muscle relaxer, pt states neither helping.    Transplant course (3/19/19-4/5/19):  Admitted on 3/18 and received Melphalan on 3/19 and stem cell infusion on 3/20, she tolerated both without difficulty. She had a fall prior to admit, knee x-ray was unrevealing. She complained of severe neuropathy pain on admit and gabapentin was increased from 300 mg TID to 900 mg TID during hospital stay. She experienced the expected side effects of nausea, diarrhea and throat pain which all improved prior to discharge. Engrafted on day +12, 4/1/19. She did not have a fever during stay and was on ppx antimicrobials per protocol. Patient was discharged home with home PT after platelet transfusion and Vascath removal, she will follow-up in clinic on 4/5/19.    Interval History 01/06/2025:  Patient here for follow up, on maintenance  revlimid, now 5 years, 9 months post-transplant. She is also on mounjaro for diabetes management and reports having chronic diarrhea. Denies fever, chills, drenching night sweats, unexplained weight loss, early satiety, chest pain, shortness of breath, new/worsening bone pain, adenopathy, N/V.         PAST MEDICAL HISTORY:   Past Medical History:   Diagnosis Date    Depression     DM2 (diabetes mellitus, type 2)     Glaucoma     HTN (hypertension)     Myeloma     Pancytopenia due to chemotherapy 3/24/2019    Therapy     after mother's death       PAST SURGICAL HISTORY:   Past Surgical History:   Procedure Laterality Date    BONE MARROW ASPIRATION Left 6/27/2019    Procedure: ASPIRATION, BONE MARROW;  Surgeon: Saúl Alva MD;  Location: [facility] OR [address] [place] [address];  Service: Oncology;  Laterality: Left;    BONE MARROW BIOPSY Left 2/7/2019    Procedure: Biopsy-bone marrow;  Surgeon: Súal Alva MD;  Location: [facility] OR [address] [place] [address];  Service: Oncology;  Laterality: Left;    BONE MARROW BIOPSY Left 6/27/2019    Procedure: Biopsy-bone marrow;  Surgeon: Saúl Alva MD;  Location: [facility] OR [address] [place] [address];  Service: Oncology;  Laterality: Left;       PAST SOCIAL HISTORY:   reports that she has never smoked. She has never used smokeless tobacco. She reports that she does not drink alcohol and does not use drugs.    FAMILY HISTORY:  No family history on file.    CURRENT MEDICATIONS:   Current Outpatient Medications   Medication Sig    amLODIPine (NORVASC) 5 MG tablet Take 5 mg by mouth.    aspirin (ECOTRIN) 81 MG EC tablet Take 81 mg by mouth.    blood sugar diagnostic (TRUE METRIX GLUCOSE TEST STRIP) Strp USE TEST STRIP TO TEST BLOOD SUGAR THREE TIMES DAILY    blood-glucose meter kit 1 each by Other route.    cyclobenzaprine (FLEXERIL) 5 MG tablet Take 5 mg by mouth 3 (three) times daily as needed.    furosemide (LASIX) 40 MG tablet TK 1 T PO  QAM PRF FLUID RETENTION    glipiZIDE (GLUCOTROL) 10 MG tablet Take 1 tablet by  mouth daily    JANUMET  mg per tablet Take 1 tablet twice a day with meals    lancets 33 gauge Misc 1 each by Other route.    loperamide (IMODIUM) 2 mg capsule TAKE 1 CAPSULE(2 MG) BY MOUTH FOUR TIMES DAILY AS NEEDED FOR DIARRHEA    MOUNJARO 5 mg/0.5 mL PnIj Inject 5 mg into the skin.    multivit,iron,minerals/lutein (CENTRUM SILVER ULTRA WOMEN'S ORAL) Take 1 tablet by mouth once daily.    multivitamin-Ca-iron-minerals Tab Take by mouth.    nystatin (MYCOSTATIN) 100,000 unit/mL suspension Take 1 mL by mouth.    ondansetron (ZOFRAN-ODT) 8 MG TbDL Take 8 mg by mouth every 8 (eight) hours as needed (nausea).     PAZEO 0.7 % Drop Place 1 drop into both eyes every morning.    prochlorperazine (COMPAZINE) 10 MG tablet Take 10 mg by mouth 4 (four) times daily as needed (nausea/vomiting).     REVLIMID 10 mg Cap TK ONE C PO QD FOR 28 DAYS    tiZANidine (ZANAFLEX) 4 MG tablet Take 4 mg by mouth 3 (three) times daily as needed (back muscle spasms).     traMADol (ULTRAM) 50 mg tablet Take 1 tablet (50 mg total) by mouth every 8 (eight) hours as needed for Pain.    ergocalciferol (ERGOCALCIFEROL) 50,000 unit Cap Take 1 capsule (50,000 Units total) by mouth every 7 days.    gabapentin (NEURONTIN) 300 MG capsule Take 3 capsules (900 mg total) by mouth 3 (three) times daily.    olmesartan (BENICAR) 20 MG tablet Take 1 tablet by mouth once daily.    pantoprazole (PROTONIX) 40 MG tablet Take 1 tablet (40 mg total) by mouth once daily.    rosuvastatin (CRESTOR) 5 MG tablet Take 1 tablet by mouth once daily.     No current facility-administered medications for this visit.     Review of patient's allergies indicates:  No Known Allergies    REVIEW OF SYSTEMS:     Review of Systems   Constitutional:  Negative for chills, diaphoresis, fever and weight loss.   HENT:  Negative for congestion, sinus pain and sore throat.    Eyes:  Negative for blurred vision and pain.   Respiratory:  Negative for shortness of breath and wheezing.     Cardiovascular:  Negative for chest pain and palpitations.   Gastrointestinal:  Positive for diarrhea. Negative for blood in stool, nausea and vomiting.   Genitourinary:  Negative for dysuria and hematuria.   Musculoskeletal:  Negative for falls.   Skin:  Negative for rash.   Neurological:  Negative for dizziness, sensory change, weakness and headaches.   Psychiatric/Behavioral:  The patient is not nervous/anxious and does not have insomnia.         PHYSICAL EXAM:   Vitals:    01/06/25 1520   BP: 135/65   Pulse: 61   Resp: 18   Temp: 98.2 °F (36.8 °C)       Physical Exam  Vitals reviewed.   Constitutional:       General: She is not in acute distress.     Appearance: Normal appearance.   HENT:      Head: Normocephalic and atraumatic.      Nose: Nose normal. No congestion.      Mouth/Throat:      Mouth: Mucous membranes are moist.      Pharynx: Oropharynx is clear.   Eyes:      Pupils: Pupils are equal, round, and reactive to light.   Cardiovascular:      Rate and Rhythm: Normal rate and regular rhythm.      Heart sounds: No murmur heard.  Pulmonary:      Effort: Pulmonary effort is normal.      Breath sounds: Normal breath sounds. No wheezing.   Abdominal:      General: Bowel sounds are normal.      Palpations: Abdomen is soft.      Tenderness: There is no abdominal tenderness.   Musculoskeletal:         General: Normal range of motion.      Cervical back: Normal range of motion and neck supple.      Right lower leg: No edema.      Left lower leg: No edema.   Skin:     General: Skin is warm and dry.      Capillary Refill: Capillary refill takes less than 2 seconds.      Findings: No bruising or lesion.   Neurological:      Mental Status: She is alert and oriented to person, place, and time.      Motor: No weakness.   Psychiatric:         Mood and Affect: Mood normal.         Behavior: Behavior normal.         Thought Content: Thought content normal.          ECOG Performance Status: (foot note - ECOG PS provided by  Eastern Cooperative Oncology Group) 1 - Symptomatic but completely ambulatory    Karnofsky Performance Score:  80%- Normal Activity with Effort: Some Symptoms of Disease  DATA:   Lab Results   Component Value Date    WBC 3.67 (L) 01/06/2025    HGB 11.6 (L) 01/06/2025    HCT 36.4 (L) 01/06/2025    MCV 88 01/06/2025     (L) 01/06/2025     Gran # (ANC)   Date Value Ref Range Status   01/06/2025 1.9 1.8 - 7.7 K/uL Final     Gran %   Date Value Ref Range Status   01/06/2025 51.2 38.0 - 73.0 % Final     CMP  Sodium   Date Value Ref Range Status   01/06/2025 140 136 - 145 mmol/L Final     Potassium   Date Value Ref Range Status   01/06/2025 4.1 3.5 - 5.1 mmol/L Final     Chloride   Date Value Ref Range Status   01/06/2025 107 95 - 110 mmol/L Final     CO2   Date Value Ref Range Status   01/06/2025 27 23 - 29 mmol/L Final     Glucose   Date Value Ref Range Status   01/06/2025 108 70 - 110 mg/dL Final     BUN   Date Value Ref Range Status   01/06/2025 13 8 - 23 mg/dL Final     Creatinine   Date Value Ref Range Status   01/06/2025 1.0 0.5 - 1.4 mg/dL Final     Calcium   Date Value Ref Range Status   01/06/2025 8.9 8.7 - 10.5 mg/dL Final     Total Protein   Date Value Ref Range Status   01/06/2025 7.1 6.0 - 8.4 g/dL Final     Albumin   Date Value Ref Range Status   01/06/2025 3.3 (L) 3.5 - 5.2 g/dL Final     Total Bilirubin   Date Value Ref Range Status   01/06/2025 0.2 0.1 - 1.0 mg/dL Final     Comment:     For infants and newborns, interpretation of results should be based  on gestational age, weight and in agreement with clinical  observations.    Premature Infant recommended reference ranges:  Up to 24 hours.............<8.0 mg/dL  Up to 48 hours............<12.0 mg/dL  3-5 days..................<15.0 mg/dL  6-29 days.................<15.0 mg/dL       Alkaline Phosphatase   Date Value Ref Range Status   01/06/2025 68 40 - 150 U/L Final     AST   Date Value Ref Range Status   01/06/2025 14 10 - 40 U/L Final     ALT    Date Value Ref Range Status   01/06/2025 12 10 - 44 U/L Final     Anion Gap   Date Value Ref Range Status   01/06/2025 6 (L) 8 - 16 mmol/L Final     eGFR if    Date Value Ref Range Status   05/10/2022 59.6 (A) >60 mL/min/1.73 m^2 Final     eGFR    Date Value Ref Range Status   03/20/2023 53 (L) >=90 mL/min Final     Comment:     Calculation based on the Chronic Kidney Disease Epidemiology Collaboration (CKD-EPI) equation refit without adjustment for race.     eGFR if non    Date Value Ref Range Status   05/10/2022 51.7 (A) >60 mL/min/1.73 m^2 Final     Comment:     Calculation used to obtain the estimated glomerular filtration  rate (eGFR) is the CKD-EPI equation.        [place] Free Light Chains   Date Value Ref Range Status   05/26/2023 4.50 (H) 0.33 - 1.94 mg/dL Final   05/10/2022 2.98 (H) 0.33 - 1.94 mg/dL Final   02/07/2022 4.30 (H) 0.33 - 1.94 mg/dL Final     [place] Free Light Chains   Date Value Ref Range Status   05/26/2023 3.36 (H) 0.57 - 2.63 mg/dL Final   05/10/2022 2.65 (H) 0.57 - 2.63 mg/dL Final   02/07/2022 3.93 (H) 0.57 - 2.63 mg/dL Final     Kappa/Lambda FLC Ratio   Date Value Ref Range Status   05/26/2023 1.34 0.26 - 1.65 Final     Comment:     Undetected antigen excess is a rare event but cannot   be excluded. If these free light chain results do not   agree with other clinical or laboratory findings or   if the sample is from a patient that has previously   demonstrated antigen excess, discuss with the testing   laboratory.   Results should always be interpreted in conjunction   with other laboratory tests and clinical evidence.     05/10/2022 1.12 0.26 - 1.65 Final     Comment:     Undetected antigen excess is a rare event but cannot   be excluded. If these free light chain results do not   agree with other clinical or laboratory findings or   if the sample is from a patient that has previously   demonstrated antigen excess, discuss with the  testing   laboratory.   Results should always be interpreted in conjunction   with other laboratory tests and clinical evidence.     02/07/2022 1.09 0.26 - 1.65 Final     Comment:     Undetected antigen excess is a rare event but cannot   be excluded. If these free light chain results do not   agree with other clinical or laboratory findings or   if the sample is from a patient that has previously   demonstrated antigen excess, discuss with the testing   laboratory.   Results should always be interpreted in conjunction   with other laboratory tests and clinical evidence.       IgG   Date Value Ref Range Status   01/06/2025 1336 650 - 1600 mg/dL Final     Comment:     IgG Cord Blood Reference Range: 650-1600 mg/dL.     IgA   Date Value Ref Range Status   01/06/2025 350 40 - 350 mg/dL Final     Comment:     IgA Cord Blood Reference Range: <5 mg/dL.     IgM   Date Value Ref Range Status   01/06/2025 31 (L) 50 - 300 mg/dL Final     Comment:     IgM Cord Blood Reference Range: <25 mg/dL.    Pathologist Interpretation JOJO  Pathologist Interpretation JOJO  Collected: 05/10/22 1208   Result status: Final   Resulting lab: [facility]   Value: REVIEWED   Comment:   Electronically reviewed and signed by:   Sun Kaba MD   Signed on 05/11/22 at 14:50   No monoclonal peaks identified.        [facility] Miscellaneous Test   Order: [id number]   Status:  Final result   Visible to patient:  No (Not Released)   Next appt:  04/02/2020 at 07:10 AM in Lab (LAB, [facility])   Component 6mo ago   [facility] Miscellaneous Result SEE COMMENTS    Comment: Test                              Result  Flag  Unit  RefValue   --------------------------------------------------------------   Multiple Myeloma MRD by Flow, BM   % Minimal Residual Disease      0.0000        %   (MRD)            1/29/24 labs available in care everywhere -cw with continued biochemical remission     ASSESSMENT AND PLAN:   Encounter Diagnoses    Name Primary?    Multiple myeloma in remission Yes    History of auto stem cell transplant     Other thrombophilia     Vitamin D deficiency     Anemia in neoplastic disease     Thrombocytopenia     Chemotherapy induced diarrhea          Multiple myeloma s/p shabbir auto stem cell transplant  -IgG kappa MM; ISS 2; standard risk cytogenetics; with lytic bone lesions at presentation; sp induction with  VRd (21 day cycle) with Dr. Juan at [facility]   -she achieved VGPR prior to transplant; tolerated shabbir 200 auto transplant (Transplant day - 3/20/19)  - with approximately day +100 restaging PET scan, biochemical studies and bone marrow biopsy confirming a stringent Complete Remission; MRD testing from [place] on day +100 also showed MRD negativity predicting long duration of remission   - Today is day +2119 5 years, 9 months post-autoSCT  - Recommend maintain revlimid maintenance under direction of Dr. Juan until progression or intolerance; 10mg daily with asa-  - Biochemical studies from 12/12/2024 with continued remission; she is tolerating Rev maintenance well, no overt CRAB  - We discussed still not firm data on cessation of revlimid yet so recommended continue for now; if study data becomes more mature we can discuss restaging marrow and if persistent MRD stopping at later date  - post transplant Zometa q 3 months x 4 doses completed therapy  - Completed post transplant vaccines; missed MMR, pneumonia vaccines. Reinforced to get these, informed ID clinic to f/u  - Continue to see pt 6 months; fu with Dr. Juan as directed    Anemia due to revlimid  - no indication for dose adjustments based on cytopenia level  - Plt, wbc - wnl   - no indication to transfuse today    Chemo-Induced Diarrhea  - Secondary to revlmid and mounjaro  - Imodium PRN  - Offered cholestyramine prescription for bulking; patient declined     Neuropathy  - 2/2 chemo; stable to improved since last appt     HTN  - per PMD     DM2  - per PCP, on  mounjaro    Vitamin D  - Previously deficient and on replacement  - Will repeat vitamin D level, none since 2019  - Continue ergocalciferol 50,000 units weekly      BMT Chart Routing      Follow up with physician    Follow up with ALEENA 6 months. Rachelle: ELIANE follow up   Provider visit type    Infusion scheduling note    Injection scheduling note    Labs CBC, CMP, immunofixation, immunoglobulins, SPEP and other   Scheduling:  Preferred lab:  Lab interval:  Above + calcitriol (vitamin d)   Imaging    Pharmacy appointment    Other referrals                    Total time of this visit was 33 minutes, including time spent face to face with patient and/or via video/audio, and also in preparing for today's visit for MDM and documentation. (Medical Decision Making, including consideration of possible diagnoses, management options, complex medical record review, review of diagnostic tests and information, consideration and discussion of significant complications based on comorbidities, and discussion with providers involved with the care of the patient). Greater than 50% was spent face to face with the patient counseling and coordinating care.     Rachelle Nguyen, FNP-C  Hematologic Malignancies, Stem Cell Transplantation, and Cellular Therapy  [facility]

## 2025-01-06 ENCOUNTER — LAB VISIT (OUTPATIENT)
Dept: LAB | Facility: HOSPITAL | Age: 70
End: 2025-01-06
Attending: INTERNAL MEDICINE
Payer: MEDICARE

## 2025-01-06 ENCOUNTER — OFFICE VISIT (OUTPATIENT)
Dept: HEMATOLOGY/ONCOLOGY | Facility: CLINIC | Age: 70
End: 2025-01-06
Payer: MEDICARE

## 2025-01-06 VITALS
RESPIRATION RATE: 18 BRPM | DIASTOLIC BLOOD PRESSURE: 65 MMHG | BODY MASS INDEX: 32.64 KG/M2 | SYSTOLIC BLOOD PRESSURE: 135 MMHG | OXYGEN SATURATION: 98 % | WEIGHT: 177.38 LBS | TEMPERATURE: 98 F | HEIGHT: 62 IN | HEART RATE: 61 BPM

## 2025-01-06 DIAGNOSIS — C90.01 MULTIPLE MYELOMA IN REMISSION: Primary | ICD-10-CM

## 2025-01-06 DIAGNOSIS — E55.9 VITAMIN D DEFICIENCY: ICD-10-CM

## 2025-01-06 DIAGNOSIS — Z94.84 HISTORY OF AUTO STEM CELL TRANSPLANT: ICD-10-CM

## 2025-01-06 DIAGNOSIS — C90.01 MULTIPLE MYELOMA IN REMISSION: ICD-10-CM

## 2025-01-06 DIAGNOSIS — D63.0 ANEMIA IN NEOPLASTIC DISEASE: ICD-10-CM

## 2025-01-06 DIAGNOSIS — K52.1 CHEMOTHERAPY INDUCED DIARRHEA: ICD-10-CM

## 2025-01-06 DIAGNOSIS — D68.69 OTHER THROMBOPHILIA: ICD-10-CM

## 2025-01-06 DIAGNOSIS — T45.1X5A CHEMOTHERAPY INDUCED DIARRHEA: ICD-10-CM

## 2025-01-06 DIAGNOSIS — D69.6 THROMBOCYTOPENIA: ICD-10-CM

## 2025-01-06 LAB
ALBUMIN SERPL BCP-MCNC: 3.3 G/DL (ref 3.5–5.2)
ALP SERPL-CCNC: 68 U/L (ref 40–150)
ALT SERPL W/O P-5'-P-CCNC: 12 U/L (ref 10–44)
ANION GAP SERPL CALC-SCNC: 6 MMOL/L (ref 8–16)
AST SERPL-CCNC: 14 U/L (ref 10–40)
BASOPHILS # BLD AUTO: 0.05 K/UL (ref 0–0.2)
BASOPHILS NFR BLD: 1.4 % (ref 0–1.9)
BILIRUB SERPL-MCNC: 0.2 MG/DL (ref 0.1–1)
BUN SERPL-MCNC: 13 MG/DL (ref 8–23)
CALCIUM SERPL-MCNC: 8.9 MG/DL (ref 8.7–10.5)
CHLORIDE SERPL-SCNC: 107 MMOL/L (ref 95–110)
CO2 SERPL-SCNC: 27 MMOL/L (ref 23–29)
CREAT SERPL-MCNC: 1 MG/DL (ref 0.5–1.4)
DIFFERENTIAL METHOD BLD: ABNORMAL
EOSINOPHIL # BLD AUTO: 0.2 K/UL (ref 0–0.5)
EOSINOPHIL NFR BLD: 5.7 % (ref 0–8)
ERYTHROCYTE [DISTWIDTH] IN BLOOD BY AUTOMATED COUNT: 15.9 % (ref 11.5–14.5)
EST. GFR  (NO RACE VARIABLE): >60 ML/MIN/1.73 M^2
GLUCOSE SERPL-MCNC: 108 MG/DL (ref 70–110)
HCT VFR BLD AUTO: 36.4 % (ref 37–48.5)
HGB BLD-MCNC: 11.6 G/DL (ref 12–16)
IGA SERPL-MCNC: 350 MG/DL (ref 40–350)
IGG SERPL-MCNC: 1336 MG/DL (ref 650–1600)
IGM SERPL-MCNC: 31 MG/DL (ref 50–300)
IMM GRANULOCYTES # BLD AUTO: 0.01 K/UL (ref 0–0.04)
IMM GRANULOCYTES NFR BLD AUTO: 0.3 % (ref 0–0.5)
LYMPHOCYTES # BLD AUTO: 1.1 K/UL (ref 1–4.8)
LYMPHOCYTES NFR BLD: 30.8 % (ref 18–48)
MCH RBC QN AUTO: 27.9 PG (ref 27–31)
MCHC RBC AUTO-ENTMCNC: 31.9 G/DL (ref 32–36)
MCV RBC AUTO: 88 FL (ref 82–98)
MONOCYTES # BLD AUTO: 0.4 K/UL (ref 0.3–1)
MONOCYTES NFR BLD: 10.6 % (ref 4–15)
NEUTROPHILS # BLD AUTO: 1.9 K/UL (ref 1.8–7.7)
NEUTROPHILS NFR BLD: 51.2 % (ref 38–73)
NRBC BLD-RTO: 0 /100 WBC
PLATELET # BLD AUTO: 130 K/UL (ref 150–450)
PMV BLD AUTO: 10.6 FL (ref 9.2–12.9)
POTASSIUM SERPL-SCNC: 4.1 MMOL/L (ref 3.5–5.1)
PROT SERPL-MCNC: 7.1 G/DL (ref 6–8.4)
RBC # BLD AUTO: 4.16 M/UL (ref 4–5.4)
SODIUM SERPL-SCNC: 140 MMOL/L (ref 136–145)
WBC # BLD AUTO: 3.67 K/UL (ref 3.9–12.7)

## 2025-01-06 PROCEDURE — 83521 IG LIGHT CHAINS FREE EACH: CPT | Mod: 59 | Performed by: INTERNAL MEDICINE

## 2025-01-06 PROCEDURE — 82784 ASSAY IGA/IGD/IGG/IGM EACH: CPT | Mod: 59 | Performed by: INTERNAL MEDICINE

## 2025-01-06 PROCEDURE — 99999 PR PBB SHADOW E&M-EST. PATIENT-LVL V: CPT | Mod: PBBFAC,,,

## 2025-01-06 PROCEDURE — 80053 COMPREHEN METABOLIC PANEL: CPT | Performed by: INTERNAL MEDICINE

## 2025-01-06 PROCEDURE — 99214 OFFICE O/P EST MOD 30 MIN: CPT | Mod: S$PBB,,,

## 2025-01-06 PROCEDURE — 84165 PROTEIN E-PHORESIS SERUM: CPT | Mod: 26,,, | Performed by: PATHOLOGY

## 2025-01-06 PROCEDURE — 36415 COLL VENOUS BLD VENIPUNCTURE: CPT | Performed by: INTERNAL MEDICINE

## 2025-01-06 PROCEDURE — 84165 PROTEIN E-PHORESIS SERUM: CPT | Performed by: INTERNAL MEDICINE

## 2025-01-06 PROCEDURE — 85025 COMPLETE CBC W/AUTO DIFF WBC: CPT | Performed by: INTERNAL MEDICINE

## 2025-01-06 PROCEDURE — 99215 OFFICE O/P EST HI 40 MIN: CPT | Mod: PBBFAC

## 2025-01-06 PROCEDURE — 86334 IMMUNOFIX E-PHORESIS SERUM: CPT | Mod: 26,,, | Performed by: PATHOLOGY

## 2025-01-06 PROCEDURE — 86334 IMMUNOFIX E-PHORESIS SERUM: CPT | Performed by: INTERNAL MEDICINE

## 2025-01-06 RX ORDER — ERGOCALCIFEROL 1.25 MG/1
50000 CAPSULE ORAL
Qty: 26 CAPSULE | Refills: 1 | Status: SHIPPED | OUTPATIENT
Start: 2025-01-06

## 2025-01-07 LAB
ALBUMIN SERPL ELPH-MCNC: 3.67 G/DL (ref 3.35–5.55)
ALPHA1 GLOB SERPL ELPH-MCNC: 0.28 G/DL (ref 0.17–0.41)
ALPHA2 GLOB SERPL ELPH-MCNC: 0.67 G/DL (ref 0.43–0.99)
B-GLOBULIN SERPL ELPH-MCNC: 0.92 G/DL (ref 0.5–1.1)
GAMMA GLOB SERPL ELPH-MCNC: 1.26 G/DL (ref 0.67–1.58)
INTERPRETATION SERPL IFE-IMP: NORMAL
KAPPA LC SER QL IA: 2.71 MG/DL (ref 0.33–1.94)
KAPPA LC/LAMBDA SER IA: 1.12 (ref 0.26–1.65)
LAMBDA LC SER QL IA: 2.42 MG/DL (ref 0.57–2.63)
PROT SERPL-MCNC: 6.8 G/DL (ref 6–8.4)

## 2025-01-08 LAB
PATHOLOGIST INTERPRETATION IFE: NORMAL
PATHOLOGIST INTERPRETATION SPE: NORMAL

## (undated) DEVICE — BANDAGE ADHESIVE

## (undated) DEVICE — TRAY BONE MARROW BEK3411

## (undated) DEVICE — DRESSING LEUKOPLAST FLEX 1X3IN

## (undated) DEVICE — NDL SPINAL 20GX3.5 HUB

## (undated) DEVICE — SYR SLIP TIP 10ML SHIELD

## (undated) DEVICE — SEE MEDLINE ITEM 157128